# Patient Record
Sex: FEMALE | Race: WHITE | Employment: OTHER | URBAN - METROPOLITAN AREA
[De-identification: names, ages, dates, MRNs, and addresses within clinical notes are randomized per-mention and may not be internally consistent; named-entity substitution may affect disease eponyms.]

---

## 2017-01-03 RX ORDER — GABAPENTIN 100 MG/1
CAPSULE ORAL
Qty: 180 CAPSULE | Refills: 1 | Status: SHIPPED | OUTPATIENT
Start: 2017-01-03 | End: 2017-10-24

## 2017-01-03 NOTE — TELEPHONE ENCOUNTER
LOV: 11/1/16  Future office visit: 2/2/17   Last labs: 10/21/16 Cmp Lipid A1c   Last RX: 12/4/15 #180 #1 Refill  Per protocol: Route to Provider

## 2017-01-09 RX ORDER — GABAPENTIN 100 MG/1
CAPSULE ORAL
Qty: 180 CAPSULE | Refills: 1 | Status: SHIPPED | OUTPATIENT
Start: 2017-01-09 | End: 2017-02-02

## 2017-01-26 ENCOUNTER — LAB ENCOUNTER (OUTPATIENT)
Dept: LAB | Facility: HOSPITAL | Age: 82
End: 2017-01-26
Attending: INTERNAL MEDICINE
Payer: MEDICARE

## 2017-01-26 DIAGNOSIS — E11.40 TYPE 2 DIABETES MELLITUS WITH DIABETIC NEUROPATHY, WITHOUT LONG-TERM CURRENT USE OF INSULIN (HCC): ICD-10-CM

## 2017-01-26 DIAGNOSIS — I10 ESSENTIAL HYPERTENSION WITH GOAL BLOOD PRESSURE LESS THAN 130/85: ICD-10-CM

## 2017-01-26 DIAGNOSIS — E78.5 DYSLIPIDEMIA: ICD-10-CM

## 2017-01-26 LAB
ALBUMIN SERPL-MCNC: 3.5 G/DL (ref 3.5–4.8)
ALP LIVER SERPL-CCNC: 51 U/L (ref 55–142)
ALT SERPL-CCNC: 18 U/L (ref 14–54)
AST SERPL-CCNC: 12 U/L (ref 15–41)
BILIRUB SERPL-MCNC: 0.6 MG/DL (ref 0.1–2)
BUN BLD-MCNC: 8 MG/DL (ref 8–20)
CALCIUM BLD-MCNC: 8.9 MG/DL (ref 8.3–10.3)
CHLORIDE: 106 MMOL/L (ref 101–111)
CHOLEST SMN-MCNC: 148 MG/DL (ref ?–200)
CO2: 29 MMOL/L (ref 22–32)
CREAT BLD-MCNC: 0.82 MG/DL (ref 0.55–1.02)
EST. AVERAGE GLUCOSE BLD GHB EST-MCNC: 163 MG/DL (ref 68–126)
GLUCOSE BLD-MCNC: 144 MG/DL (ref 70–99)
HBA1C MFR BLD HPLC: 7.3 % (ref ?–5.7)
HDLC SERPL-MCNC: 47 MG/DL (ref 45–?)
HDLC SERPL: 3.15 {RATIO} (ref ?–4.44)
LDLC SERPL CALC-MCNC: 49 MG/DL (ref ?–130)
M PROTEIN MFR SERPL ELPH: 7.1 G/DL (ref 6.1–8.3)
NONHDLC SERPL-MCNC: 101 MG/DL (ref ?–130)
POTASSIUM SERPL-SCNC: 3.6 MMOL/L (ref 3.6–5.1)
SODIUM SERPL-SCNC: 142 MMOL/L (ref 136–144)
TRIGLYCERIDES: 260 MG/DL (ref ?–150)
VLDL: 52 MG/DL (ref 5–40)

## 2017-01-26 PROCEDURE — 80061 LIPID PANEL: CPT

## 2017-01-26 PROCEDURE — 83036 HEMOGLOBIN GLYCOSYLATED A1C: CPT

## 2017-01-26 PROCEDURE — 36415 COLL VENOUS BLD VENIPUNCTURE: CPT

## 2017-01-26 PROCEDURE — 80053 COMPREHEN METABOLIC PANEL: CPT

## 2017-01-30 RX ORDER — ATORVASTATIN CALCIUM 10 MG/1
TABLET, FILM COATED ORAL
Qty: 90 TABLET | Refills: 1 | Status: SHIPPED | OUTPATIENT
Start: 2017-01-30 | End: 2017-05-02

## 2017-02-02 ENCOUNTER — OFFICE VISIT (OUTPATIENT)
Dept: INTERNAL MEDICINE CLINIC | Facility: CLINIC | Age: 82
End: 2017-02-02

## 2017-02-02 VITALS
SYSTOLIC BLOOD PRESSURE: 110 MMHG | WEIGHT: 172.19 LBS | DIASTOLIC BLOOD PRESSURE: 66 MMHG | TEMPERATURE: 98 F | HEART RATE: 76 BPM | BODY MASS INDEX: 32 KG/M2 | RESPIRATION RATE: 16 BRPM

## 2017-02-02 DIAGNOSIS — I10 ESSENTIAL HYPERTENSION: ICD-10-CM

## 2017-02-02 DIAGNOSIS — R42 DIZZINESS: ICD-10-CM

## 2017-02-02 DIAGNOSIS — J30.89 ALLERGIC RHINITIS DUE TO OTHER ALLERGIC TRIGGER, UNSPECIFIED RHINITIS SEASONALITY: ICD-10-CM

## 2017-02-02 DIAGNOSIS — E11.42 DIABETIC POLYNEUROPATHY ASSOCIATED WITH TYPE 2 DIABETES MELLITUS (HCC): Primary | ICD-10-CM

## 2017-02-02 DIAGNOSIS — A63.0 GENITAL WARTS: ICD-10-CM

## 2017-02-02 LAB
BASOPHILS # BLD AUTO: 0.06 X10(3) UL (ref 0–0.1)
BASOPHILS NFR BLD AUTO: 0.8 %
EOSINOPHIL # BLD AUTO: 0.2 X10(3) UL (ref 0–0.3)
EOSINOPHIL NFR BLD AUTO: 2.8 %
ERYTHROCYTE [DISTWIDTH] IN BLOOD BY AUTOMATED COUNT: 13 % (ref 11.5–16)
HCT VFR BLD AUTO: 40 % (ref 34–50)
HGB BLD-MCNC: 13 G/DL (ref 12–16)
IMMATURE GRANULOCYTE COUNT: 0.02 X10(3) UL (ref 0–1)
IMMATURE GRANULOCYTE RATIO %: 0.3 %
LYMPHOCYTES # BLD AUTO: 1.62 X10(3) UL (ref 0.9–4)
LYMPHOCYTES NFR BLD AUTO: 22.9 %
MCH RBC QN AUTO: 29.3 PG (ref 27–33.2)
MCHC RBC AUTO-ENTMCNC: 32.5 G/DL (ref 31–37)
MCV RBC AUTO: 90.1 FL (ref 81–100)
MONOCYTES # BLD AUTO: 0.45 X10(3) UL (ref 0.1–0.6)
MONOCYTES NFR BLD AUTO: 6.4 %
NEUTROPHIL ABS PRELIM: 4.71 X10 (3) UL (ref 1.3–6.7)
NEUTROPHILS # BLD AUTO: 4.71 X10(3) UL (ref 1.3–6.7)
NEUTROPHILS NFR BLD AUTO: 66.8 %
PLATELET # BLD AUTO: 243 10(3)UL (ref 150–450)
RBC # BLD AUTO: 4.44 X10(6)UL (ref 3.8–5.1)
RED CELL DISTRIBUTION WIDTH-SD: 42.6 FL (ref 35.1–46.3)
WBC # BLD AUTO: 7.1 X10(3) UL (ref 4–13)

## 2017-02-02 PROCEDURE — 84439 ASSAY OF FREE THYROXINE: CPT | Performed by: INTERNAL MEDICINE

## 2017-02-02 PROCEDURE — 84443 ASSAY THYROID STIM HORMONE: CPT | Performed by: INTERNAL MEDICINE

## 2017-02-02 PROCEDURE — 99214 OFFICE O/P EST MOD 30 MIN: CPT | Performed by: INTERNAL MEDICINE

## 2017-02-02 PROCEDURE — 85025 COMPLETE CBC W/AUTO DIFF WBC: CPT | Performed by: INTERNAL MEDICINE

## 2017-02-02 RX ORDER — MONTELUKAST SODIUM 10 MG/1
10 TABLET ORAL DAILY
Qty: 30 TABLET | Refills: 3 | Status: SHIPPED | OUTPATIENT
Start: 2017-02-02 | End: 2017-04-20

## 2017-02-03 LAB
FREE T4: 1.2 NG/DL (ref 0.9–1.8)
TSI SER-ACNC: 1.22 MIU/ML (ref 0.35–5.5)

## 2017-02-17 ENCOUNTER — OFFICE VISIT (OUTPATIENT)
Dept: INTERNAL MEDICINE CLINIC | Facility: CLINIC | Age: 82
End: 2017-02-17

## 2017-02-17 VITALS
SYSTOLIC BLOOD PRESSURE: 90 MMHG | WEIGHT: 169 LBS | RESPIRATION RATE: 16 BRPM | TEMPERATURE: 98 F | BODY MASS INDEX: 31 KG/M2 | HEART RATE: 68 BPM | DIASTOLIC BLOOD PRESSURE: 68 MMHG

## 2017-02-17 DIAGNOSIS — G25.81 RESTLESS LEG SYNDROME: ICD-10-CM

## 2017-02-17 DIAGNOSIS — R40.0 DAYTIME SLEEPINESS: Primary | ICD-10-CM

## 2017-02-17 DIAGNOSIS — K21.9 GASTROESOPHAGEAL REFLUX DISEASE, ESOPHAGITIS PRESENCE NOT SPECIFIED: ICD-10-CM

## 2017-02-17 DIAGNOSIS — I10 ESSENTIAL HYPERTENSION: ICD-10-CM

## 2017-02-17 DIAGNOSIS — R03.1 LOW BLOOD PRESSURE READING: ICD-10-CM

## 2017-02-17 DIAGNOSIS — R06.83 SNORING: ICD-10-CM

## 2017-02-17 PROCEDURE — 99214 OFFICE O/P EST MOD 30 MIN: CPT | Performed by: INTERNAL MEDICINE

## 2017-02-17 RX ORDER — PRAMIPEXOLE DIHYDROCHLORIDE 0.25 MG/1
TABLET ORAL
Qty: 90 TABLET | Refills: 1 | Status: SHIPPED | OUTPATIENT
Start: 2017-02-17 | End: 2017-08-22

## 2017-02-17 NOTE — PROGRESS NOTES
Estefany Pereira is a 80year old female. Patient presents with: Follow - Up: 2 week follow up  Diabetes  Fatigue      HPI:     Patient here for f/u-  Headache, nausea resolved in 1-2 weeks, labs wnl. She too feels it was a virus.  Dizziness better but st GABAPENTIN 100 MG Oral Cap TAKE 1 CAPSULE (100 MG TOTAL) BY MOUTH 2 (TWO) TIMES DAILY. Disp: 180 capsule Rfl: 1   METFORMIN  MG Oral Tab TAKE 2 TABLETS (1,000 MG TOTAL) BY MOUTH 2 (TWO) TIMES DAILY.  Disp: 180 tablet Rfl: 1   TraMADol HCl 50 MG Ora Arrhythmia    • Neuropathy ([facility] [address]    • Leaky heart valve      slight per pt- had echo 12/2015   • Disorder of thyroid      has Thyroid nodules   • Cancer [facility])      DCIS right breast   • Hearing impairment      bilateral hearing aid   • Problems with swallo rate without respiratory distress, lungs clear to auscultation  CARDIO: RRR nl S1 S2  GI: normal bowel sounds, soft, NT/ND  EXTREMITIES: no cyanosis, clubbing or edema  NEURO: Alert and oriented, no focal deficits    ASSESSMENT AND PLAN:   Daytime sleepine

## 2017-03-03 ENCOUNTER — LAB SERVICES (OUTPATIENT)
Dept: OTHER | Age: 82
End: 2017-03-03

## 2017-03-03 ENCOUNTER — CHARTING TRANS (OUTPATIENT)
Dept: URGENT CARE | Age: 82
End: 2017-03-03

## 2017-03-03 LAB
BILIRUBIN URINE: NEGATIVE
BLOOD URINE: NEGATIVE
CLARITY: ABNORMAL
COLOR: YELLOW
GLUCOSE QUALITATIVE U: NEGATIVE
KETONES, URINE: NEGATIVE
LEUKOCYTE ESTERASE URINE: ABNORMAL
NITRITE URINE: POSITIVE
PH URINE: 5 (ref 5–7)
SPECIFIC GRAVITY URINE: 1.02 (ref 1–1.03)
URINE PROTEIN, QUAL (DIPSTICK): NEGATIVE
UROBILINOGEN URINE: 0.2

## 2017-03-03 ASSESSMENT — PAIN SCALES - GENERAL: PAINLEVEL_OUTOF10: 0

## 2017-03-06 LAB — FINAL REPORT: ABNORMAL

## 2017-04-04 ENCOUNTER — HOSPITAL ENCOUNTER (OUTPATIENT)
Dept: MAMMOGRAPHY | Facility: HOSPITAL | Age: 82
Discharge: HOME | End: 2017-04-04
Attending: SURGERY
Payer: MEDICARE

## 2017-04-04 DIAGNOSIS — D05.11 DUCTAL CARCINOMA IN SITU (DCIS) OF RIGHT BREAST: ICD-10-CM

## 2017-04-04 PROCEDURE — 77062 BREAST TOMOSYNTHESIS BI: CPT

## 2017-04-04 PROCEDURE — 77066 DX MAMMO INCL CAD BI: CPT

## 2017-04-05 ENCOUNTER — TELEPHONE (OUTPATIENT)
Dept: SURGERY | Facility: CLINIC | Age: 82
End: 2017-04-05

## 2017-04-05 NOTE — TELEPHONE ENCOUNTER
Name & date of birth verified. Pt notified that Dr. Higgins Me sees no concerns on her imaging. Dr. Higgins Me feels comfortable monitoring with annual clinical exam and to plan for further imaging only if her exam changes.  Her next exam would be due with me in [facility]

## 2017-04-10 RX ORDER — LOSARTAN POTASSIUM 100 MG/1
TABLET ORAL
Qty: 90 TABLET | Refills: 3 | Status: SHIPPED | OUTPATIENT
Start: 2017-04-10 | End: 2017-05-02

## 2017-04-20 ENCOUNTER — OFFICE VISIT (OUTPATIENT)
Dept: OBGYN CLINIC | Facility: CLINIC | Age: 82
End: 2017-04-20

## 2017-04-20 VITALS
SYSTOLIC BLOOD PRESSURE: 122 MMHG | HEIGHT: 61.5 IN | BODY MASS INDEX: 31.31 KG/M2 | HEART RATE: 81 BPM | WEIGHT: 168 LBS | DIASTOLIC BLOOD PRESSURE: 68 MMHG

## 2017-04-20 DIAGNOSIS — N90.7 SEBACEOUS CYST OF LABIA: Primary | ICD-10-CM

## 2017-04-20 PROCEDURE — 99203 OFFICE O/P NEW LOW 30 MIN: CPT | Performed by: OBSTETRICS & GYNECOLOGY

## 2017-04-20 RX ORDER — CIPROFLOXACIN 250 MG/1
TABLET, FILM COATED ORAL
Refills: 0 | COMMUNITY
Start: 2017-03-03 | End: 2017-04-20 | Stop reason: ALTCHOICE

## 2017-04-20 NOTE — PATIENT INSTRUCTIONS
Understanding Epidermoid Cyst    An epidermoid cyst is a small abnormal growth in the top layers of the skin. It is filled with keratin, the same proteins that make up your hair and nails. These cysts grow slowly. They can grow anywhere on the body.  But © 8558-6414 [address], [address]. All rights reserved. This information is not intended as a substitute for professional medical care. Always follow your healthcare professional's instructions.

## 2017-04-20 NOTE — PROGRESS NOTES
Zenobia Suero is a 80year old female. HPI:   Patient presents with:  Vaginal Problem: genital warts      States \"for years\" she has had warts of vulva and vagina, sometimes prurituc. Also notes skin tags in inguinal folds.  She also notes that for Right 7/1/16    Comment wire localized partial mastectomy    NIYAH NEEDLE LOCALIZATION W/ SPECIMEN 1 SITE RIGHT      Comment 1980    NIYAH NEEDLE LOCALIZATION W/ SPECIMEN 1 SITE RIGHT Right 7/2016    Comment DCIS      Family History   Problem Relation Age of O Take 1 tablet by mouth 2 (two) times daily. Disp:  Rfl:    Loperamide HCl (IMODIUM) 2 MG Oral Cap Take 2 mg by mouth daily as needed for Diarrhea. Disp:  Rfl:    Ferrous Sulfate 324 (65 FE) MG Oral Tab EC Take by mouth 2 (two) times daily.    Disp:  Rfl:

## 2017-05-02 ENCOUNTER — OFFICE VISIT (OUTPATIENT)
Dept: INTERNAL MEDICINE CLINIC | Facility: CLINIC | Age: 82
End: 2017-05-02

## 2017-05-02 VITALS
HEART RATE: 64 BPM | WEIGHT: 174.63 LBS | RESPIRATION RATE: 16 BRPM | TEMPERATURE: 98 F | BODY MASS INDEX: 32.55 KG/M2 | OXYGEN SATURATION: 97 % | DIASTOLIC BLOOD PRESSURE: 62 MMHG | HEIGHT: 61.5 IN | SYSTOLIC BLOOD PRESSURE: 110 MMHG

## 2017-05-02 DIAGNOSIS — G25.81 RESTLESS LEG SYNDROME: ICD-10-CM

## 2017-05-02 DIAGNOSIS — E11.42 DIABETIC POLYNEUROPATHY ASSOCIATED WITH TYPE 2 DIABETES MELLITUS (HCC): ICD-10-CM

## 2017-05-02 DIAGNOSIS — E78.2 MIXED DYSLIPIDEMIA: ICD-10-CM

## 2017-05-02 DIAGNOSIS — D05.11 DUCTAL CARCINOMA IN SITU (DCIS) OF RIGHT BREAST: ICD-10-CM

## 2017-05-02 DIAGNOSIS — Z12.11 COLON CANCER SCREENING: ICD-10-CM

## 2017-05-02 DIAGNOSIS — R42 DIZZINESS: ICD-10-CM

## 2017-05-02 DIAGNOSIS — H35.3290: ICD-10-CM

## 2017-05-02 DIAGNOSIS — Z00.00 WELLNESS EXAMINATION: Primary | ICD-10-CM

## 2017-05-02 DIAGNOSIS — I10 ESSENTIAL HYPERTENSION: ICD-10-CM

## 2017-05-02 DIAGNOSIS — K21.9 GASTROESOPHAGEAL REFLUX DISEASE, ESOPHAGITIS PRESENCE NOT SPECIFIED: ICD-10-CM

## 2017-05-02 PROCEDURE — 93000 ELECTROCARDIOGRAM COMPLETE: CPT | Performed by: INTERNAL MEDICINE

## 2017-05-02 PROCEDURE — G0439 PPPS, SUBSEQ VISIT: HCPCS | Performed by: INTERNAL MEDICINE

## 2017-05-02 RX ORDER — LOSARTAN POTASSIUM 100 MG/1
50 TABLET ORAL DAILY
Qty: 10 TABLET | Refills: 0 | COMMUNITY
Start: 2017-05-02 | End: 2018-03-06

## 2017-05-02 NOTE — PROGRESS NOTES
Zenobia Suero is a 80year old female who presents for a Medicare Annual Wellness visit.     RLS - controlled with medication  DM2 - latest HGBA1C 7.3, compliant with metformin, +neuropathy in legs with pain and occasional numbness,  neurontin helping he GABAPENTIN 100 MG Oral Cap TAKE 1 CAPSULE (100 MG TOTAL) BY MOUTH 2 (TWO) TIMES DAILY. (Patient taking differently: TAKE 1 CAPSULE (100 MG TOTAL) BY MOUTH DAILY. ) Disp: 180 capsule Rfl: 1   METFORMIN  MG Oral Tab TAKE 2 TABLETS (1,000 MG TOTAL) BY 17 07/17/2016       Lab Results  Component Value Date   ALT 18 01/26/2017   ALT 16 10/21/2016   ALT 22 07/17/2016       Lab Results  Component Value Date   TSH 1.220 02/02/2017   TSH 1.740 12/17/2015       Lab Results  Component Value Date   BUN 8 01/26/20 tripping hazards?: 0-No    Are you on multiple medications?: 1-Yes    Does pain affect your day to day activities?: 1-Yes (arthritis)     Have you had any memory issues?: 0-No    Fall/Risk Scorin    Scoring Interpretation: 4+ At Risk     Depression Scr Annually y    Bone Density Screening      Dexascan Every two years Last Dexa Scan:   XR DEXA BONE DENSITOMETRY (CPT=77080) 04/27/2016    No flowsheet data found.     Pap and Pelvic      Pap: Every 3 yrs age 21-65 or Pap+HPV every 5 yrs age 33-67, age 72 and found.     Dilated Eye exam  Annually Data entered on: 3/24/2016   Last Dilated Eye Exam 3/24/2016     No flowsheet data found. COPD      Spirometry Testing Annually No results found for this or any previous visit. No flowsheet data found.         ALLERG 3 (three) times daily as needed.  Disp: 30 tablet Rfl: 0      MEDICAL INFORMATION:   Past Medical History   Diagnosis Date   • [facility]    • Esophageal reflux    • Hyperlipidemia      not taking lipitor due to side effects   • Restless leg syndrome Grandfather    • Diabetes Paternal Grandmother    • Breast Cancer Paternal Aunt    • Breast Cancer Self 80     DCIS      SOCIAL HISTORY:     Smoking Status: Former Smoker                   Packs/Day: 1.00  Years: 28        Quit date: 08/27/1988    [place] Cynthia Carey is a 80year old female who presents for a Medicare Assessment. PLAN SUMMARY:   Diagnoses and all orders for this visit:    Wellness examination- pt believes she had pneumovax at [facility] last year so declined today.  She will check her r

## 2017-06-29 ENCOUNTER — APPOINTMENT (OUTPATIENT)
Dept: LAB | Facility: HOSPITAL | Age: 82
End: 2017-06-29
Attending: INTERNAL MEDICINE
Payer: MEDICARE

## 2017-06-29 DIAGNOSIS — I10 ESSENTIAL HYPERTENSION: ICD-10-CM

## 2017-06-29 DIAGNOSIS — E78.2 MIXED DYSLIPIDEMIA: ICD-10-CM

## 2017-06-29 DIAGNOSIS — E11.42 DIABETIC POLYNEUROPATHY ASSOCIATED WITH TYPE 2 DIABETES MELLITUS (HCC): ICD-10-CM

## 2017-06-29 LAB
ALBUMIN SERPL-MCNC: 3.5 G/DL (ref 3.5–4.8)
ALP LIVER SERPL-CCNC: 53 U/L (ref 55–142)
ALT SERPL-CCNC: 37 U/L (ref 14–54)
AST SERPL-CCNC: 26 U/L (ref 15–41)
BILIRUB SERPL-MCNC: 0.6 MG/DL (ref 0.1–2)
BUN BLD-MCNC: 9 MG/DL (ref 8–20)
CALCIUM BLD-MCNC: 9.2 MG/DL (ref 8.3–10.3)
CHLORIDE: 107 MMOL/L (ref 101–111)
CO2: 24 MMOL/L (ref 22–32)
CREAT BLD-MCNC: 0.89 MG/DL (ref 0.55–1.02)
CREAT UR-SCNC: 152 MG/DL
EST. AVERAGE GLUCOSE BLD GHB EST-MCNC: 177 MG/DL (ref 68–126)
GLUCOSE BLD-MCNC: 145 MG/DL (ref 70–99)
HBA1C MFR BLD HPLC: 7.8 % (ref ?–5.7)
M PROTEIN MFR SERPL ELPH: 7.2 G/DL (ref 6.1–8.3)
MICROALBUMIN UR-MCNC: 0.81 MG/DL
MICROALBUMIN/CREAT 24H UR-RTO: 5.3 UG/MG (ref ?–30)
POTASSIUM SERPL-SCNC: 3.8 MMOL/L (ref 3.6–5.1)
SODIUM SERPL-SCNC: 140 MMOL/L (ref 136–144)

## 2017-06-29 PROCEDURE — 36415 COLL VENOUS BLD VENIPUNCTURE: CPT

## 2017-06-29 PROCEDURE — 82570 ASSAY OF URINE CREATININE: CPT

## 2017-06-29 PROCEDURE — 83036 HEMOGLOBIN GLYCOSYLATED A1C: CPT

## 2017-06-29 PROCEDURE — 80053 COMPREHEN METABOLIC PANEL: CPT

## 2017-06-29 PROCEDURE — 82043 UR ALBUMIN QUANTITATIVE: CPT

## 2017-07-29 DIAGNOSIS — N39.0 URINARY TRACT INFECTION WITHOUT HEMATURIA, SITE UNSPECIFIED: ICD-10-CM

## 2017-07-29 DIAGNOSIS — B37.2 CANDIDIASIS, CUTANEOUS: ICD-10-CM

## 2017-07-29 DIAGNOSIS — R30.0 DYSURIA: ICD-10-CM

## 2017-07-31 RX ORDER — OMEPRAZOLE 40 MG/1
40 CAPSULE, DELAYED RELEASE ORAL 2 TIMES DAILY
Qty: 180 CAPSULE | Refills: 1 | Status: SHIPPED | OUTPATIENT
Start: 2017-07-31 | End: 2018-01-23

## 2017-08-14 DIAGNOSIS — K64.9 HEMORRHOIDS, UNSPECIFIED HEMORRHOID TYPE: ICD-10-CM

## 2017-08-22 DIAGNOSIS — G25.81 RESTLESS LEG SYNDROME: ICD-10-CM

## 2017-08-22 RX ORDER — PRAMIPEXOLE DIHYDROCHLORIDE 0.25 MG/1
TABLET ORAL
Qty: 90 TABLET | Refills: 0 | Status: SHIPPED | OUTPATIENT
Start: 2017-08-22 | End: 2017-11-13

## 2017-08-22 NOTE — TELEPHONE ENCOUNTER
Medication(s) to Refill:   Pending Prescriptions Disp Refills    PRAMIPEXOLE DIHYDROCHLORIDE 0.25 MG Oral Tab [Pharmacy Med Name: Pramipexole Dihydrochloride Oral Tablet 0.25 MG] 90 tablet 0     Sig: TAKE 1 TABLET (0.25 MG TOTAL) BY MOUTH NIGHTLY.

## 2017-10-24 ENCOUNTER — OFFICE VISIT (OUTPATIENT)
Dept: SURGERY | Facility: CLINIC | Age: 82
End: 2017-10-24

## 2017-10-24 VITALS
TEMPERATURE: 98 F | BODY MASS INDEX: 32 KG/M2 | SYSTOLIC BLOOD PRESSURE: 134 MMHG | WEIGHT: 172.38 LBS | RESPIRATION RATE: 20 BRPM | DIASTOLIC BLOOD PRESSURE: 72 MMHG | HEART RATE: 73 BPM

## 2017-10-24 DIAGNOSIS — D05.11 DUCTAL CARCINOMA IN SITU (DCIS) OF RIGHT BREAST: Primary | ICD-10-CM

## 2017-10-24 PROCEDURE — 99214 OFFICE O/P EST MOD 30 MIN: CPT | Performed by: SURGERY

## 2017-10-25 NOTE — PROGRESS NOTES
Breast Surgery Follow-Up Visit    Diagnosis: DCIS, right breast; ER positive, MO negative status post Lumpectomy on July 1, 2016. Stage: TisNxMx (Stage 0).     Disease Status:  Surgical treatment complete, Endocrine therapy deferred and Radiation deferre Macular degeneration    • Macular degeneration    • Neuropathy    • Osteoarthritis     all joints   • Pneumonia, organism unspecified(486)     04/2015   • PONV (postoperative nausea and vomiting)    • Problems with swallowing     due hiatal hernia; gaggs a 10 tablet Rfl: 0   atorvastatin 10 MG Oral Tab TAKE 1 TABLET (10 MG TOTAL) BY MOUTH NIGHTLY. Disp: 90 tablet Rfl: 1   CALCIUM-VITAMIN D OR Take 1,200 mg by mouth daily.    Disp:  Rfl:    Multiple Vitamins-Minerals (PRESERVISION AREDS) Oral Tab Take 1 tablet glaucoma, yellowing of the eyes, change in vision or color blindness. The patient denies hearing loss, ringing in the ears, ear drainage, earaches, nasal congestion, nose bleeds, snoring, pain in mouth/throat, hoarseness, change in voice, facial trauma. hands/feet. There is no history of abusive relationship, bipolar disorder, sleep disturbance, anxiety, depression or feeling of despair. Endocrine:     There is no history of poor/slow wound healing, weight loss/gain, fertility or hormone problems, cold physical therapy may be warranted in the future if she identifies any limitations or restrictions. She was encouraged to contact the office with any questions or concerns prior to her next scheduled appointment.      This encounter lasted a total of 25 mahad

## 2017-11-13 DIAGNOSIS — G25.81 RESTLESS LEG SYNDROME: ICD-10-CM

## 2017-11-13 RX ORDER — PRAMIPEXOLE DIHYDROCHLORIDE 0.25 MG/1
TABLET ORAL
Qty: 90 TABLET | Refills: 0 | Status: SHIPPED | OUTPATIENT
Start: 2017-11-13 | End: 2018-02-08

## 2017-11-20 DIAGNOSIS — E78.5 DYSLIPIDEMIA: ICD-10-CM

## 2017-11-20 RX ORDER — OMEGA-3-ACID ETHYL ESTERS 1 G/1
1 CAPSULE, LIQUID FILLED ORAL 2 TIMES DAILY
Qty: 60 CAPSULE | Refills: 3 | Status: SHIPPED | OUTPATIENT
Start: 2017-11-20 | End: 2017-12-20

## 2017-11-21 ENCOUNTER — OFFICE VISIT (OUTPATIENT)
Dept: FAMILY MEDICINE CLINIC | Facility: CLINIC | Age: 82
End: 2017-11-21

## 2017-11-21 VITALS
BODY MASS INDEX: 32.06 KG/M2 | HEIGHT: 61.5 IN | WEIGHT: 172 LBS | OXYGEN SATURATION: 99 % | RESPIRATION RATE: 16 BRPM | HEART RATE: 82 BPM | SYSTOLIC BLOOD PRESSURE: 136 MMHG | TEMPERATURE: 98 F | DIASTOLIC BLOOD PRESSURE: 84 MMHG

## 2017-11-21 DIAGNOSIS — N30.01 ACUTE CYSTITIS WITH HEMATURIA: Primary | ICD-10-CM

## 2017-11-21 PROCEDURE — 99213 OFFICE O/P EST LOW 20 MIN: CPT | Performed by: NURSE PRACTITIONER

## 2017-11-21 PROCEDURE — 87186 SC STD MICRODIL/AGAR DIL: CPT | Performed by: NURSE PRACTITIONER

## 2017-11-21 PROCEDURE — 87086 URINE CULTURE/COLONY COUNT: CPT | Performed by: NURSE PRACTITIONER

## 2017-11-21 PROCEDURE — 87088 URINE BACTERIA CULTURE: CPT | Performed by: NURSE PRACTITIONER

## 2017-11-21 PROCEDURE — 81003 URINALYSIS AUTO W/O SCOPE: CPT | Performed by: NURSE PRACTITIONER

## 2017-11-21 RX ORDER — NITROFURANTOIN 25; 75 MG/1; MG/1
CAPSULE ORAL
Qty: 14 CAPSULE | Refills: 0 | Status: SHIPPED | OUTPATIENT
Start: 2017-11-21 | End: 2017-12-01 | Stop reason: ALTCHOICE

## 2017-11-21 NOTE — PROGRESS NOTES
CHIEF COMPLAINT:   Patient presents with:  Urinary Symptoms (urologic): burning during urination, frequency to use restroom, difficulty urinating x3 days       HPI:   Cynthia Carey is a 80year old female who presents with symptoms of UTI.  Complaining atorvastatin 10 MG Oral Tab TAKE 1 TABLET (10 MG TOTAL) BY MOUTH NIGHTLY. Disp: 90 tablet Rfl: 1   Meclizine HCl 25 MG Oral Tab Take 1 tablet (25 mg total) by mouth 3 (three) times daily as needed.  Disp: 30 tablet Rfl: 0      Past Medical History:   Thersia Pro LUNGS: clear to ausculation bilaterally, no wheezing or rhonchi  :  No CVAT     SEE URINE DIPSTICK RESULTS    ASSESSMENT AND PLAN:   Justin Enciso is a 80year old female presents with UTI symptoms.     ASSESSMENT:  Uti symptoms  (primary encounter dennis · The ureters carry urine from the kidneys to the bladder. · The bladder holds urine until you are ready to let it out. · The urethra carries urine from the bladder out of the body.  It is shorter in women, so bacteria can move through it more easily. The

## 2017-11-24 ENCOUNTER — TELEPHONE (OUTPATIENT)
Dept: FAMILY MEDICINE CLINIC | Facility: CLINIC | Age: 82
End: 2017-11-24

## 2017-11-24 RX ORDER — CEPHALEXIN 500 MG/1
500 CAPSULE ORAL 2 TIMES DAILY
Qty: 6 CAPSULE | Refills: 0 | Status: SHIPPED | OUTPATIENT
Start: 2017-11-24 | End: 2017-12-01 | Stop reason: ALTCHOICE

## 2017-11-24 NOTE — TELEPHONE ENCOUNTER
Spoke with patient. Advised that the Urine Culture confirmed the UTI and that it should be responding to the Notrofurantoin which was prescribed at the time of the visit.    Patient states that she is feeling much better, but that she developed itching yes

## 2017-12-01 ENCOUNTER — OFFICE VISIT (OUTPATIENT)
Dept: INTERNAL MEDICINE CLINIC | Facility: CLINIC | Age: 82
End: 2017-12-01

## 2017-12-01 ENCOUNTER — LAB ENCOUNTER (OUTPATIENT)
Dept: LAB | Age: 82
End: 2017-12-01
Attending: INTERNAL MEDICINE
Payer: MEDICARE

## 2017-12-01 VITALS
HEART RATE: 65 BPM | OXYGEN SATURATION: 99 % | DIASTOLIC BLOOD PRESSURE: 60 MMHG | RESPIRATION RATE: 16 BRPM | BODY MASS INDEX: 32.69 KG/M2 | WEIGHT: 173.13 LBS | HEIGHT: 61 IN | TEMPERATURE: 98 F | SYSTOLIC BLOOD PRESSURE: 130 MMHG

## 2017-12-01 DIAGNOSIS — K52.1 DRUG-INDUCED DIARRHEA: ICD-10-CM

## 2017-12-01 DIAGNOSIS — E11.42 DIABETIC POLYNEUROPATHY ASSOCIATED WITH TYPE 2 DIABETES MELLITUS (HCC): ICD-10-CM

## 2017-12-01 DIAGNOSIS — E11.42 DIABETIC POLYNEUROPATHY ASSOCIATED WITH TYPE 2 DIABETES MELLITUS (HCC): Primary | ICD-10-CM

## 2017-12-01 DIAGNOSIS — Z91.09 ENVIRONMENTAL ALLERGIES: ICD-10-CM

## 2017-12-01 DIAGNOSIS — N39.0 RECURRENT UTI: ICD-10-CM

## 2017-12-01 DIAGNOSIS — M19.042 PRIMARY OSTEOARTHRITIS OF BOTH HANDS: ICD-10-CM

## 2017-12-01 DIAGNOSIS — M19.041 PRIMARY OSTEOARTHRITIS OF BOTH HANDS: ICD-10-CM

## 2017-12-01 PROCEDURE — 81003 URINALYSIS AUTO W/O SCOPE: CPT | Performed by: INTERNAL MEDICINE

## 2017-12-01 PROCEDURE — 83036 HEMOGLOBIN GLYCOSYLATED A1C: CPT

## 2017-12-01 PROCEDURE — 87086 URINE CULTURE/COLONY COUNT: CPT | Performed by: INTERNAL MEDICINE

## 2017-12-01 PROCEDURE — 99214 OFFICE O/P EST MOD 30 MIN: CPT | Performed by: INTERNAL MEDICINE

## 2017-12-01 PROCEDURE — 80053 COMPREHEN METABOLIC PANEL: CPT

## 2017-12-01 NOTE — PROGRESS NOTES
Gorge Bonilla    is a 80year old female. Patient presents with:  Diabetic Flu  Finger Pain  Allergies      HPI:     Patient here for f/u  DM2 - compliant with metformin but getting too much diarrhea with it, up to 4 times a day.  On 1000 bid currently, Oral Capsule Delayed Release TAKE 1 CAPSULE (40 MG TOTAL) BY MOUTH 2 (TWO) TIMES DAILY. Disp: 180 capsule Rfl: 1   losartan 100 MG Oral Tab Take 0.5 tablets (50 mg total) by mouth daily.  Disp: 10 tablet Rfl: 0   atorvastatin 10 MG Oral Tab TAKE 1 TABLET (1 Vertigo       Social History:  Smoking status: Former Smoker                                                              Packs/day: 1.00      Years: 35.00        Quit date: 8/27/1988  Smokeless tobacco: Never Used                      Alcohol use:  No metformin  Diabetic polyneuropathy associated with type 2 diabetes mellitus (hcc)- check hgba1c today, goal for her is less than 8.  Reduce metformin as above and add januvia 50mg daily  Environmental allergies- start otc claritin or zyrtec  Primary osteoar

## 2018-01-16 PROBLEM — M19.042 ARTHRITIS OF BOTH HANDS: Status: ACTIVE | Noted: 2018-01-16

## 2018-01-16 PROBLEM — G56.03 BILATERAL CARPAL TUNNEL SYNDROME: Status: ACTIVE | Noted: 2018-01-16

## 2018-01-16 PROBLEM — M19.041 ARTHRITIS OF BOTH HANDS: Status: ACTIVE | Noted: 2018-01-16

## 2018-01-23 DIAGNOSIS — N39.0 URINARY TRACT INFECTION WITHOUT HEMATURIA, SITE UNSPECIFIED: ICD-10-CM

## 2018-01-23 DIAGNOSIS — R30.0 DYSURIA: ICD-10-CM

## 2018-01-23 DIAGNOSIS — B37.2 CANDIDIASIS, CUTANEOUS: ICD-10-CM

## 2018-01-23 RX ORDER — OMEPRAZOLE 40 MG/1
CAPSULE, DELAYED RELEASE ORAL
Qty: 180 CAPSULE | Refills: 0 | Status: SHIPPED | OUTPATIENT
Start: 2018-01-23 | End: 2018-07-19

## 2018-01-23 NOTE — TELEPHONE ENCOUNTER
Medication(s) to Refill:   Pending Prescriptions Disp Refills    OMEPRAZOLE 40 MG Oral Capsule Delayed Release [Pharmacy Med Name: Omeprazole Oral Capsule Delayed Release 40 MG] 180 capsule 0     Sig: TAKE ONE CAPSULE BY MOUTH TWICE DAILY            Last T

## 2018-02-08 DIAGNOSIS — G25.81 RESTLESS LEG SYNDROME: ICD-10-CM

## 2018-02-08 RX ORDER — PRAMIPEXOLE DIHYDROCHLORIDE 0.25 MG/1
TABLET ORAL
Qty: 90 TABLET | Refills: 0 | Status: SHIPPED | OUTPATIENT
Start: 2018-02-08 | End: 2018-05-04

## 2018-02-08 NOTE — TELEPHONE ENCOUNTER
LOV: 12/1/17  Future office visit: No upcoming visit   Last labs: 12/1/17 A1C Cmp   Last RX: 11/13/17 #90 No Refills  Per protocol: Route to provider

## 2018-02-20 ENCOUNTER — LAB ENCOUNTER (OUTPATIENT)
Dept: LAB | Facility: HOSPITAL | Age: 83
End: 2018-02-20
Attending: INTERNAL MEDICINE
Payer: MEDICARE

## 2018-02-20 DIAGNOSIS — Z12.11 SCREENING FOR COLON CANCER: Primary | ICD-10-CM

## 2018-02-20 PROCEDURE — 82272 OCCULT BLD FECES 1-3 TESTS: CPT

## 2018-02-20 PROCEDURE — 82270 OCCULT BLOOD FECES: CPT

## 2018-03-02 ENCOUNTER — TELEPHONE (OUTPATIENT)
Dept: INTERNAL MEDICINE CLINIC | Facility: CLINIC | Age: 83
End: 2018-03-02

## 2018-03-02 NOTE — TELEPHONE ENCOUNTER
Pt scheduling [id number] at time of call. Is asking if she is due for her A1C and thyroid check? and does she a routine X-Ray of her chest? Pls let Pt know. Pt did send in 3 day stool test, have we received the results?

## 2018-03-02 NOTE — TELEPHONE ENCOUNTER
FOV 5/4/18    Last A1c 12/1/17  TSH+T4 2/2/17  CT chest 5/31/16  No FIT results    Ok to await to [facility] or would you like labs/imaging prior to OV? Please advise. Thank you.

## 2018-03-02 NOTE — TELEPHONE ENCOUNTER
Called pt to inform, per TB, will see pt first before imaging/tests are ordered. Pt verbalized understanding and agreed with POC.

## 2018-03-06 DIAGNOSIS — I10 ESSENTIAL HYPERTENSION: ICD-10-CM

## 2018-03-06 DIAGNOSIS — R42 DIZZINESS: ICD-10-CM

## 2018-03-06 RX ORDER — LOSARTAN POTASSIUM 100 MG/1
TABLET ORAL
Qty: 90 TABLET | Refills: 2 | Status: SHIPPED | OUTPATIENT
Start: 2018-03-06 | End: 2018-08-24

## 2018-04-18 ENCOUNTER — PATIENT OUTREACH (OUTPATIENT)
Dept: CASE MANAGEMENT | Age: 83
End: 2018-04-18

## 2018-04-23 ENCOUNTER — PATIENT OUTREACH (OUTPATIENT)
Dept: CASE MANAGEMENT | Age: 83
End: 2018-04-23

## 2018-04-23 NOTE — PROGRESS NOTES
Contacted patient to introduce my self as her new chronic care manager.  Patient asked to get a call back on Wednesday at 1pm    Time Spent This Encounter Total: 5 min medical record review, telephone communication, care plan updates where needed, and educa

## 2018-04-24 ENCOUNTER — OFFICE VISIT (OUTPATIENT)
Dept: SURGERY | Facility: CLINIC | Age: 83
End: 2018-04-24

## 2018-04-24 VITALS
BODY MASS INDEX: 33.04 KG/M2 | HEART RATE: 93 BPM | HEIGHT: 61 IN | OXYGEN SATURATION: 97 % | RESPIRATION RATE: 20 BRPM | DIASTOLIC BLOOD PRESSURE: 77 MMHG | SYSTOLIC BLOOD PRESSURE: 138 MMHG | WEIGHT: 175 LBS

## 2018-04-24 DIAGNOSIS — D05.11 DUCTAL CARCINOMA IN SITU (DCIS) OF RIGHT BREAST: Primary | ICD-10-CM

## 2018-04-24 PROCEDURE — 99214 OFFICE O/P EST MOD 30 MIN: CPT | Performed by: SURGERY

## 2018-04-24 NOTE — PROGRESS NOTES
Attempted to contact patient multiple times today without success    Time Spent This Encounter Total: 2 min medical record review, telephone communication, care plan updates where needed, and education.  Provided acknowledgment and validation to patient's c

## 2018-04-27 NOTE — PROGRESS NOTES
No response letter mailed. Time Spent This Encounter Total: 2 min medical record review, telephone communication, care plan updates where needed, and education. Provided acknowledgment and validation to patient's concerns.      Monthly Minute Total inclu

## 2018-05-01 ENCOUNTER — PATIENT OUTREACH (OUTPATIENT)
Dept: CASE MANAGEMENT | Age: 83
End: 2018-05-01

## 2018-05-01 DIAGNOSIS — E78.2 MIXED DYSLIPIDEMIA: ICD-10-CM

## 2018-05-01 DIAGNOSIS — I10 ESSENTIAL HYPERTENSION: ICD-10-CM

## 2018-05-01 DIAGNOSIS — Z91.81 AT RISK FOR FALLING: ICD-10-CM

## 2018-05-01 DIAGNOSIS — E11.42 DIABETIC POLYNEUROPATHY ASSOCIATED WITH TYPE 2 DIABETES MELLITUS (HCC): ICD-10-CM

## 2018-05-01 DIAGNOSIS — M19.042 ARTHRITIS OF BOTH HANDS: ICD-10-CM

## 2018-05-01 DIAGNOSIS — G56.03 BILATERAL CARPAL TUNNEL SYNDROME: ICD-10-CM

## 2018-05-01 DIAGNOSIS — M19.041 ARTHRITIS OF BOTH HANDS: ICD-10-CM

## 2018-05-01 DIAGNOSIS — G25.81 RESTLESS LEG SYNDROME: ICD-10-CM

## 2018-05-01 PROCEDURE — 99490 CHRNC CARE MGMT STAFF 1ST 20: CPT

## 2018-05-01 NOTE — PROGRESS NOTES
Spoke to [place] at [facility] about CCM, HIPAA verified, current care plan and performed CCM Initial . Reviewed pt Patient Active Problem List:     Diabetic polyneuropathy associated with type 2 diabetes mellitus ([facility] [address]     Gastroesophageal reflux disease Sleep apnea- uses breathing strips to help her sleep. Has order for sleep study but is concerned about not being able to fat a sleeps. She may only sleep on her side. Restless leg syndrome- Keeps her up at night it takes her two hours to fall a sleep. What would you say is your biggest concerns about your health? Hydration and schedule appt for hand therapy. She will start by drinking 2 cups of water a day. She currently only drinks Coffee, 1 can of soda and 1 cup of lemonade.  She will write a not [employer]

## 2018-05-04 DIAGNOSIS — G25.81 RESTLESS LEG SYNDROME: ICD-10-CM

## 2018-05-04 RX ORDER — PRAMIPEXOLE DIHYDROCHLORIDE 0.25 MG/1
TABLET ORAL
Qty: 90 TABLET | Refills: 0 | Status: SHIPPED | OUTPATIENT
Start: 2018-05-04 | End: 2018-07-31

## 2018-05-04 NOTE — TELEPHONE ENCOUNTER
LOV: 12/1/17 TB  Future office visit: 5/23/18   Last labs: 12/1/17 Cmp A1C   Last RX: Pramipexole 0.25mg 2/8/18   Per protocol: Route to provider

## 2018-05-23 ENCOUNTER — OFFICE VISIT (OUTPATIENT)
Dept: INTERNAL MEDICINE CLINIC | Facility: CLINIC | Age: 83
End: 2018-05-23

## 2018-05-23 VITALS
WEIGHT: 172 LBS | RESPIRATION RATE: 16 BRPM | HEART RATE: 78 BPM | SYSTOLIC BLOOD PRESSURE: 110 MMHG | TEMPERATURE: 98 F | BODY MASS INDEX: 32.47 KG/M2 | OXYGEN SATURATION: 97 % | DIASTOLIC BLOOD PRESSURE: 60 MMHG | HEIGHT: 61 IN

## 2018-05-23 DIAGNOSIS — R53.83 OTHER FATIGUE: ICD-10-CM

## 2018-05-23 DIAGNOSIS — Z85.3 HISTORY OF BREAST CANCER: ICD-10-CM

## 2018-05-23 DIAGNOSIS — E78.00 PURE HYPERCHOLESTEROLEMIA: ICD-10-CM

## 2018-05-23 DIAGNOSIS — Z00.00 WELLNESS EXAMINATION: Primary | ICD-10-CM

## 2018-05-23 DIAGNOSIS — I10 ESSENTIAL HYPERTENSION: ICD-10-CM

## 2018-05-23 DIAGNOSIS — L29.9 ITCHING: ICD-10-CM

## 2018-05-23 DIAGNOSIS — M75.80 ROTATOR CUFF TENDINITIS, UNSPECIFIED LATERALITY: ICD-10-CM

## 2018-05-23 DIAGNOSIS — Z91.09 ENVIRONMENTAL ALLERGIES: ICD-10-CM

## 2018-05-23 DIAGNOSIS — E11.42 DIABETIC POLYNEUROPATHY ASSOCIATED WITH TYPE 2 DIABETES MELLITUS (HCC): ICD-10-CM

## 2018-05-23 DIAGNOSIS — R06.83 SNORING: ICD-10-CM

## 2018-05-23 PROCEDURE — G0439 PPPS, SUBSEQ VISIT: HCPCS | Performed by: INTERNAL MEDICINE

## 2018-05-23 PROCEDURE — 99213 OFFICE O/P EST LOW 20 MIN: CPT | Performed by: INTERNAL MEDICINE

## 2018-05-23 NOTE — PROGRESS NOTES
REASON FOR VISIT:    Akanksha Beckman is a 80year old female who presents for a Medicare Annual Wellness visit.     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     Atypical lobular hyperplasia of right breast     Vertigo     At risk for falling     Peripheral vertigo     Arthritis of both hands     Bilateral carpal tunnel syndrome      Current Outpatient Prescriptions:  PRAMIP mg/dL 0.83 0.89 0.82 0.96 0.81 0.71 0.78   Some recent data might be hidden     AST and ALT Latest Ref Rng & Units 12/1/2017 6/29/2017 1/26/2017 10/21/2016 7/17/2016 6/20/2016 3/21/2016   AST 15 - 41 U/L 13(L) 26 12(L) 10(L) 17 15 14(L)   ALT 14 - 54 U/L 2 all  PHQ-2 SCORE: 0        Advance Directives     Do you have a healthcare power of ?: Yes  Do you have a living will?: No     Cognitive Assessment     What day of the week is this?: Correct  What month is it?: Correct  What year is it?: Correct  R Date Value   01/26/2017 49       Dilated Eye exam  Annually Data entered on: 3/24/2016   Last Dilated Eye Exam 3/24/2016              ALLERGIES:     Animal Dander [Dand*        Comment:fur  English Plantain          Juniper                   Other DCIS- 06/07/16  1980: OTHER SURGICAL HISTORY      Comment: gallstones  1980: OTHER SURGICAL HISTORY      Comment: L and R breast cyst removal   Family History   Problem Relation Age of Onset   • Breast Cancer Paternal Aunt    • Heart Disorder Mother    • H throat are clear                Hearing Assessed via: Finger Rub decreased b/l- needs hearing aids  EYES: PERRLA, EOMI, conjunctiva are clear    NECK: supple, no adenopathy, no bruits  CHEST: no chest tenderness  BREAST:declined, seeing Dr. Loc Cisneros: appears to not be taking metformin regularly. utd on eye exam Dr. Luca Garcia; Future  -     HEMOGLOBIN A1C; Future  -     COMP METABOLIC PANEL (14); Future  -     MICROALB/CREAT RATIO, RANDOM URINE;  Future  -     TSH W REFLEX TO FREE T4; Fut

## 2018-05-25 ENCOUNTER — HOSPITAL ENCOUNTER (OUTPATIENT)
Dept: OCCUPATIONAL MEDICINE | Facility: HOSPITAL | Age: 83
Setting detail: THERAPIES SERIES
Discharge: HOME | End: 2018-05-25
Attending: ORTHOPAEDIC SURGERY
Payer: MEDICARE

## 2018-05-25 PROCEDURE — 97165 OT EVAL LOW COMPLEX 30 MIN: CPT

## 2018-05-25 NOTE — PROGRESS NOTES
OCCUPATIONAL THERAPY UPPER EXTREMITY EVALUATION   Referring Physician: Katina Burks  Diagnosis: Arthritis bilateral hands (J34.706) (J66.650)     Date of Service: 5/25/2018     PATIENT SUMMARY   Salomon Chilel is a 80year old y/o female who presents ORTHOTICS:   MD issued Pt  What she describes as a wrist orthosis ,  One for each hand/wrist . Not worn to this evaluation. Therapist wants to see them upon next tx session.     SCAR:   none    SENSORY:   Pt reports  Finger and hand  \"tingling\" mainl Code:  [id number]    Patient/Family/Caregiver was advised of these findings, precautions, and treatment options and has agreed to actively participate in planning and for this course of care.     Thank you for your referral. Please co-sign or sign and retur

## 2018-05-31 ENCOUNTER — TELEPHONE (OUTPATIENT)
Dept: OCCUPATIONAL MEDICINE | Facility: HOSPITAL | Age: 83
End: 2018-05-31

## 2018-06-04 ENCOUNTER — HOSPITAL ENCOUNTER (OUTPATIENT)
Dept: OCCUPATIONAL MEDICINE | Facility: HOSPITAL | Age: 83
Setting detail: THERAPIES SERIES
Discharge: HOME | End: 2018-06-04
Attending: ORTHOPAEDIC SURGERY
Payer: MEDICARE

## 2018-06-04 ENCOUNTER — PATIENT OUTREACH (OUTPATIENT)
Dept: CASE MANAGEMENT | Age: 83
End: 2018-06-04

## 2018-06-04 DIAGNOSIS — G25.81 RESTLESS LEG SYNDROME: ICD-10-CM

## 2018-06-04 DIAGNOSIS — I10 ESSENTIAL HYPERTENSION: ICD-10-CM

## 2018-06-04 DIAGNOSIS — E78.00 PURE HYPERCHOLESTEROLEMIA: ICD-10-CM

## 2018-06-04 DIAGNOSIS — E78.2 MIXED DYSLIPIDEMIA: ICD-10-CM

## 2018-06-04 DIAGNOSIS — M19.042 ARTHRITIS OF BOTH HANDS: ICD-10-CM

## 2018-06-04 DIAGNOSIS — M19.041 ARTHRITIS OF BOTH HANDS: ICD-10-CM

## 2018-06-04 DIAGNOSIS — Z91.81 AT RISK FOR FALLING: ICD-10-CM

## 2018-06-04 PROCEDURE — 97140 MANUAL THERAPY 1/> REGIONS: CPT

## 2018-06-04 PROCEDURE — 97110 THERAPEUTIC EXERCISES: CPT

## 2018-06-04 NOTE — PROGRESS NOTES
Spoke to Joselyn Elizabeth for CCM call.     Medical History  Patient Active Problem List:     Diabetic polyneuropathy associated with type 2 diabetes mellitus ([facility] [address]     Gastroesophageal reflux disease     Essential hypertension     Restless leg syndr hand therapy. She will start by drinking 2 cups of water a day. She currently only drinks Coffee, 1 can of soda and 1 cup of lemonade. She will write a not saying drink water on her table next to her phone and also keep the cup full on her coffee table.  Paris Pina

## 2018-06-04 NOTE — PROGRESS NOTES
Dx: Arthritis bilateral hands (J74.995) (M19.042)         Authorized # of Visits:  2/12        Next MD visit: none scheduled  Fall Risk: standard         Precautions: n/a           Subjective:   Reports:  She spoke to a hospital  Rep this morning on the ph Beige twist stik         yel putty  and squeeze to tolerance jhon         Reviewed correct application of MD Issued wrist orthoses.                            Skilled Services: manual therapy    Charges: 1x MT, 2x TE      Total Timed Treatment: 45 mi

## 2018-06-06 ENCOUNTER — HOSPITAL ENCOUNTER (OUTPATIENT)
Dept: OCCUPATIONAL MEDICINE | Facility: HOSPITAL | Age: 83
Setting detail: THERAPIES SERIES
Discharge: HOME | End: 2018-06-06
Attending: ORTHOPAEDIC SURGERY
Payer: MEDICARE

## 2018-06-06 PROCEDURE — 97140 MANUAL THERAPY 1/> REGIONS: CPT

## 2018-06-06 PROCEDURE — 97110 THERAPEUTIC EXERCISES: CPT

## 2018-06-06 NOTE — PROGRESS NOTES
Dx: Arthritis bilateral hands (G53.435) (M19.042)         Authorized # of Visits:  3/12        Next MD visit: none scheduled  Fall Risk: standard         Precautions: n/a           Subjective:   Reports:  * Her L thumb is aching more than the rest of her p questions        Beige twist stik Composite flx/ext in dry rice bilat hands        yel putty  and squeeze to tolerance bilat AROM to all fingers and bilat wrists        Reviewed correct application of MD Issued wrist orthoses.

## 2018-06-11 ENCOUNTER — TELEPHONE (OUTPATIENT)
Dept: INTERNAL MEDICINE CLINIC | Facility: CLINIC | Age: 83
End: 2018-06-11

## 2018-06-11 ENCOUNTER — HOSPITAL ENCOUNTER (OUTPATIENT)
Dept: OCCUPATIONAL MEDICINE | Facility: HOSPITAL | Age: 83
Setting detail: THERAPIES SERIES
Discharge: HOME | End: 2018-06-11
Attending: ORTHOPAEDIC SURGERY
Payer: MEDICARE

## 2018-06-11 PROCEDURE — 97140 MANUAL THERAPY 1/> REGIONS: CPT

## 2018-06-11 PROCEDURE — 97110 THERAPEUTIC EXERCISES: CPT

## 2018-06-11 NOTE — PROGRESS NOTES
Dx: Arthritis bilateral hands (U99.065) (M19.042)         Authorized # of Visits:  3/12        Next MD visit: none scheduled  Fall Risk: standard         Precautions: n/a           Subjective:   Reports:  * Her L thumb continues to ache more than the rest attention to L RF and L thumb       Gentle jnt mobs to carpals and finger  jnts Gentle jnt mobs to carpals and finger  jnts Gentle jnt mobs to carpals and finger  jnts       reveiwed HEP and issued Pt instructions for hands and wrists.  Reviewed HEP  Pt had

## 2018-06-11 NOTE — TELEPHONE ENCOUNTER
Spoke with patient, informed her TB wrote early lease termination letter, per patient's request faxed to Attn: [facility] in [place] and copy mailed out to patient's address on file. Faxed 3 times, failed fax.    Called patient to

## 2018-06-11 NOTE — TELEPHONE ENCOUNTER
Re-faxed letter to number provided [phone], confirmation received. Patient informed and verbalized understanding.

## 2018-06-11 NOTE — TELEPHONE ENCOUNTER
Pt called back to say that the fax number to [facility] [place] at [phone] [facility] to confirm fax number Marilu Vallejo

## 2018-06-11 NOTE — TELEPHONE ENCOUNTER
Patient states she would like to speak with a nurse or Dr SAUNDERS as soon as possible. She states she has a cane and her son feels it is best she doesn't live alone any longer. Her son wants her to move in with him.   In order to do so, she needs a letter from

## 2018-06-13 ENCOUNTER — HOSPITAL ENCOUNTER (OUTPATIENT)
Dept: OCCUPATIONAL MEDICINE | Facility: HOSPITAL | Age: 83
Setting detail: THERAPIES SERIES
Discharge: HOME | End: 2018-06-13
Attending: ORTHOPAEDIC SURGERY
Payer: MEDICARE

## 2018-06-13 PROCEDURE — 97140 MANUAL THERAPY 1/> REGIONS: CPT

## 2018-06-13 PROCEDURE — 97110 THERAPEUTIC EXERCISES: CPT

## 2018-06-13 NOTE — PROGRESS NOTES
Dx: Arthritis bilateral hands (I87.311) (M19.042)         Authorized # of Visits:  5/12        Next MD visit: none scheduled  Fall Risk: standard         Precautions: n/a           Subjective:   Reports:  * her  Bilateral RTC hurt and had to get out of bed mobs to carpals and finger  jnts Gentle jnt mobs to carpals and finger  jnts Hand gripping in rice bilat for finger flx/ext      reveiwed HEP and issued Pt instructions for hands and wrists.  Reviewed HEP  Pt had questions Clear twist stik for bilat wrist A

## 2018-06-17 ENCOUNTER — TELEPHONE (OUTPATIENT)
Dept: INTERNAL MEDICINE CLINIC | Facility: CLINIC | Age: 83
End: 2018-06-17

## 2018-06-18 ENCOUNTER — HOSPITAL ENCOUNTER (OUTPATIENT)
Dept: OCCUPATIONAL MEDICINE | Facility: HOSPITAL | Age: 83
Setting detail: THERAPIES SERIES
Discharge: HOME | End: 2018-06-18
Attending: ORTHOPAEDIC SURGERY
Payer: MEDICARE

## 2018-06-18 PROCEDURE — 97110 THERAPEUTIC EXERCISES: CPT

## 2018-06-18 PROCEDURE — 97140 MANUAL THERAPY 1/> REGIONS: CPT

## 2018-06-18 NOTE — PROGRESS NOTES
Dx: Arthritis bilateral hands (L33.669) (M19.042)         Authorized # of Visits:  6/12        Next MD visit: none scheduled  Fall Risk: standard         Precautions: n/a           Subjective:   Reports:  She has had vertigo for the past 2 days but ok.   * and fingers. Gentle jnt mobs to carpals and finger  jnts Gentle jnt mobs to carpals and finger  jnts Gentle jnt mobs to carpals and finger  jnts Hand gripping in rice bilat for finger flx/ext Fine manip bilat hands in rice finding marbles.      reveiwed

## 2018-06-18 NOTE — TELEPHONE ENCOUNTER
UEU:2/32/4855   Last refill:2/22/2018 , 180 tabs, 0 refills   Last labs:12/01/2017 hgb a1c      per protocol route to provider

## 2018-06-20 ENCOUNTER — HOSPITAL ENCOUNTER (OUTPATIENT)
Dept: OCCUPATIONAL MEDICINE | Facility: HOSPITAL | Age: 83
Setting detail: THERAPIES SERIES
Discharge: HOME | End: 2018-06-20
Attending: ORTHOPAEDIC SURGERY
Payer: MEDICARE

## 2018-06-20 PROCEDURE — 97110 THERAPEUTIC EXERCISES: CPT

## 2018-06-20 PROCEDURE — 97140 MANUAL THERAPY 1/> REGIONS: CPT

## 2018-06-20 NOTE — PROGRESS NOTES
Dx: Arthritis bilateral hands (W73.700) (M19.042)         Authorized # of Visits:  7/12        Next MD visit: none scheduled  Fall Risk: standard         Precautions: n/a       Discharge Summary    Pt has attended 7, cancelled 0, and no shown 0 visits in O flx/ext  3-/5      Strength (lbs) Right Average Left Average   : 38#  (+10) 25#  (+10)   2 pt Pinch: 6#  (+1) 7#   (+3)   3 pt Pinch: 7#  (+1) 8#  (+3)   Lateral Pinch: 12#  (+2) 10#       Goals:    1- Decrease resting and with use pain in bilateral ha PROM all L finger jnts special attention to L RF and L thumb Beige twist stik bilat 3 directions Gentle  jnt mobs to bilat carpals and fingers. Gentle  jnt mobs to bilat carpals and fingers.     Gentle jnt mobs to carpals and finger  jnts Gentle jnt mobs to

## 2018-06-30 PROCEDURE — 99490 CHRNC CARE MGMT STAFF 1ST 20: CPT

## 2018-07-03 ENCOUNTER — PATIENT OUTREACH (OUTPATIENT)
Dept: CASE MANAGEMENT | Age: 83
End: 2018-07-03

## 2018-07-03 DIAGNOSIS — M19.042 ARTHRITIS OF BOTH HANDS: ICD-10-CM

## 2018-07-03 DIAGNOSIS — I10 ESSENTIAL HYPERTENSION: ICD-10-CM

## 2018-07-03 DIAGNOSIS — G56.03 BILATERAL CARPAL TUNNEL SYNDROME: ICD-10-CM

## 2018-07-03 DIAGNOSIS — M19.041 ARTHRITIS OF BOTH HANDS: ICD-10-CM

## 2018-07-03 DIAGNOSIS — E11.42 DIABETIC POLYNEUROPATHY ASSOCIATED WITH TYPE 2 DIABETES MELLITUS (HCC): ICD-10-CM

## 2018-07-03 DIAGNOSIS — E78.2 MIXED DYSLIPIDEMIA: ICD-10-CM

## 2018-07-03 DIAGNOSIS — Z91.81 AT RISK FOR FALLING: ICD-10-CM

## 2018-07-03 NOTE — PROGRESS NOTES
Spoke to Joselyn Elizabeth for CCM call.     Medical History  Patient Active Problem List:     Diabetic polyneuropathy associated with type 2 diabetes mellitus ([facility]     Gastroesophageal reflux disease     Essential hypertension     Restless leg syndr outreach: Hydration (2 cups of water a day)                        • Patient reported progress toward goal: has been drinking two cups of water a day and started PT for her hand    • Patient Reported New Barriers And Concerns: Moving                   - Pl

## 2018-07-09 DIAGNOSIS — E11.42 DIABETIC POLYNEUROPATHY ASSOCIATED WITH TYPE 2 DIABETES MELLITUS (HCC): ICD-10-CM

## 2018-07-09 NOTE — TELEPHONE ENCOUNTER
Re:Diabetic testing supplies (one touch Ultra tests strips) do not meet medicare compliance  Needs dx code and to state if pt is insulin/not insulin user. Also they are missing a script for the Lancets.  Same thing will need dx code and if pt is insulin

## 2018-07-10 NOTE — PROGRESS NOTES
Breast Surgery Follow-Up Visit    Diagnosis: DCIS, right breast; ER positive, SC negative status post Lumpectomy on July 1, 2016. Stage: TisNxMx (Stage 0).     Disease Status:  Surgical treatment complete, Endocrine therapy deferred and Radiation deferre Macular degeneration    • Macular degeneration    • Neuropathy    • Osteoarthritis     all joints   • Pneumonia, organism unspecified(486)     04/2015   • PONV (postoperative nausea and vomiting)    • Problems with swallowing     due hiatal hernia; gaggs a CALCIUM-VITAMIN D OR Take 1,200 mg by mouth daily. Disp:  Rfl:    Multiple Vitamins-Minerals (PRESERVISION AREDS) Oral Tab Take 1 tablet by mouth 2 (two) times daily.  Disp:  Rfl:    Loperamide HCl (IMODIUM) 2 MG Oral Cap Take 2 mg by mouth daily as nee pleurisy/chest pain, pneumonia, asthma, wheezing, +difficulty in breathing with exertion, emphysema, chronic bronchitis, shortness of breath or abnormal sound when breathing. Cardiovascular:   There is no history of chest pain, chest pressure/discomfort history of hives, hay fever, angioedema or anaphylaxis.     /77 (BP Location: Left arm, Patient Position: Sitting, Cuff Size: large)   Pulse 93   Resp 20   Ht 1.549 m (5' 1\")   Wt 79.4 kg (175 lb)   SpO2 97%   BMI 33.07 kg/m²     Physical Examination more than 50% of which was dedicated to the discussion of management options.

## 2018-07-12 RX ORDER — LANCETS 33 GAUGE
1 EACH MISCELLANEOUS DAILY
Qty: 100 EACH | Refills: 3 | Status: SHIPPED | OUTPATIENT
Start: 2018-07-12 | End: 2019-07-12

## 2018-07-17 ENCOUNTER — LAB ENCOUNTER (OUTPATIENT)
Dept: LAB | Facility: HOSPITAL | Age: 83
End: 2018-07-17
Attending: INTERNAL MEDICINE
Payer: MEDICARE

## 2018-07-17 DIAGNOSIS — E11.42 DIABETIC POLYNEUROPATHY ASSOCIATED WITH TYPE 2 DIABETES MELLITUS (HCC): ICD-10-CM

## 2018-07-17 DIAGNOSIS — R53.83 OTHER FATIGUE: ICD-10-CM

## 2018-07-17 LAB
ALBUMIN SERPL-MCNC: 3.4 G/DL (ref 3.5–4.8)
ALP LIVER SERPL-CCNC: 53 U/L (ref 55–142)
ALT SERPL-CCNC: 26 U/L (ref 14–54)
AST SERPL-CCNC: 19 U/L (ref 15–41)
BASOPHILS # BLD AUTO: 0.06 X10(3) UL (ref 0–0.1)
BASOPHILS NFR BLD AUTO: 1.2 %
BILIRUB SERPL-MCNC: 0.8 MG/DL (ref 0.1–2)
BUN BLD-MCNC: 10 MG/DL (ref 8–20)
CALCIUM BLD-MCNC: 8.9 MG/DL (ref 8.3–10.3)
CHLORIDE: 108 MMOL/L (ref 101–111)
CHOLEST SMN-MCNC: 168 MG/DL (ref ?–200)
CO2: 24 MMOL/L (ref 22–32)
CREAT BLD-MCNC: 0.95 MG/DL (ref 0.55–1.02)
CREAT UR-SCNC: 264 MG/DL
EOSINOPHIL # BLD AUTO: 0.2 X10(3) UL (ref 0–0.3)
EOSINOPHIL NFR BLD AUTO: 4 %
ERYTHROCYTE [DISTWIDTH] IN BLOOD BY AUTOMATED COUNT: 12.9 % (ref 11.5–16)
EST. AVERAGE GLUCOSE BLD GHB EST-MCNC: 163 MG/DL (ref 68–126)
GLUCOSE BLD-MCNC: 157 MG/DL (ref 70–99)
HBA1C MFR BLD HPLC: 7.3 % (ref ?–5.7)
HCT VFR BLD AUTO: 37 % (ref 34–50)
HDLC SERPL-MCNC: 36 MG/DL (ref 45–?)
HDLC SERPL: 4.67 {RATIO} (ref ?–4.44)
HGB BLD-MCNC: 12.2 G/DL (ref 12–16)
IMMATURE GRANULOCYTE COUNT: 0.01 X10(3) UL (ref 0–1)
IMMATURE GRANULOCYTE RATIO %: 0.2 %
LDLC SERPL CALC-MCNC: 94 MG/DL (ref ?–130)
LYMPHOCYTES # BLD AUTO: 1.3 X10(3) UL (ref 0.9–4)
LYMPHOCYTES NFR BLD AUTO: 26.1 %
M PROTEIN MFR SERPL ELPH: 7.2 G/DL (ref 6.1–8.3)
MCH RBC QN AUTO: 29 PG (ref 27–33.2)
MCHC RBC AUTO-ENTMCNC: 33 G/DL (ref 31–37)
MCV RBC AUTO: 88.1 FL (ref 81–100)
MICROALBUMIN UR-MCNC: 2 MG/DL
MICROALBUMIN/CREAT 24H UR-RTO: 7.6 UG/MG (ref ?–30)
MONOCYTES # BLD AUTO: 0.41 X10(3) UL (ref 0.1–1)
MONOCYTES NFR BLD AUTO: 8.2 %
NEUTROPHIL ABS PRELIM: 3.01 X10 (3) UL (ref 1.3–6.7)
NEUTROPHILS # BLD AUTO: 3.01 X10(3) UL (ref 1.3–6.7)
NEUTROPHILS NFR BLD AUTO: 60.3 %
NONHDLC SERPL-MCNC: 132 MG/DL (ref ?–130)
PLATELET # BLD AUTO: 222 10(3)UL (ref 150–450)
POTASSIUM SERPL-SCNC: 3.6 MMOL/L (ref 3.6–5.1)
RBC # BLD AUTO: 4.2 X10(6)UL (ref 3.8–5.1)
RED CELL DISTRIBUTION WIDTH-SD: 41.3 FL (ref 35.1–46.3)
SODIUM SERPL-SCNC: 141 MMOL/L (ref 136–144)
TRIGL SERPL-MCNC: 192 MG/DL (ref ?–150)
TSI SER-ACNC: 0.73 MIU/ML (ref 0.35–5.5)
VLDLC SERPL CALC-MCNC: 38 MG/DL (ref 5–40)
WBC # BLD AUTO: 5 X10(3) UL (ref 4–13)

## 2018-07-17 PROCEDURE — 80061 LIPID PANEL: CPT

## 2018-07-17 PROCEDURE — 85025 COMPLETE CBC W/AUTO DIFF WBC: CPT

## 2018-07-17 PROCEDURE — 80053 COMPREHEN METABOLIC PANEL: CPT

## 2018-07-17 PROCEDURE — 84443 ASSAY THYROID STIM HORMONE: CPT

## 2018-07-17 PROCEDURE — 83036 HEMOGLOBIN GLYCOSYLATED A1C: CPT

## 2018-07-17 PROCEDURE — 82570 ASSAY OF URINE CREATININE: CPT

## 2018-07-17 PROCEDURE — 36415 COLL VENOUS BLD VENIPUNCTURE: CPT

## 2018-07-17 PROCEDURE — 82043 UR ALBUMIN QUANTITATIVE: CPT

## 2018-07-19 DIAGNOSIS — N39.0 URINARY TRACT INFECTION WITHOUT HEMATURIA, SITE UNSPECIFIED: ICD-10-CM

## 2018-07-19 DIAGNOSIS — B37.2 CANDIDIASIS, CUTANEOUS: ICD-10-CM

## 2018-07-19 DIAGNOSIS — R30.0 DYSURIA: ICD-10-CM

## 2018-07-19 RX ORDER — OMEPRAZOLE 40 MG/1
CAPSULE, DELAYED RELEASE ORAL
Qty: 180 CAPSULE | Refills: 0 | Status: SHIPPED | OUTPATIENT
Start: 2018-07-19 | End: 2018-08-24

## 2018-07-20 DIAGNOSIS — K64.9 HEMORRHOIDS, UNSPECIFIED HEMORRHOID TYPE: ICD-10-CM

## 2018-07-20 NOTE — TELEPHONE ENCOUNTER
SJQ:[birth year]78 TB  FOV:8/24/18  LAST RX: 8/14/17 proctozone-hc 2.5 % rectal cream use twice a day as needed for itching.  30 g 0 refills   LAST LABS: 7/17/18 microalb,tsh,cbc,cmp,a1c,lipid  PER PROTOCOL: to provider

## 2018-07-31 DIAGNOSIS — G25.81 RESTLESS LEG SYNDROME: ICD-10-CM

## 2018-07-31 PROCEDURE — 99490 CHRNC CARE MGMT STAFF 1ST 20: CPT

## 2018-07-31 RX ORDER — PRAMIPEXOLE DIHYDROCHLORIDE 0.25 MG/1
TABLET ORAL
Qty: 90 TABLET | Refills: 0 | Status: SHIPPED | OUTPATIENT
Start: 2018-07-31 | End: 2018-08-24

## 2018-07-31 NOTE — TELEPHONE ENCOUNTER
LOV: 5/23/18 w/ TB for MWV  FOV: 8/24/18  Last labs: 7/17/18 TSH,CBC,CMP,A1C,LIPID,Microalbumin  Last Refill: 5/4/18 qt:90     Per protocol sent to provider

## 2018-08-06 ENCOUNTER — PATIENT OUTREACH (OUTPATIENT)
Dept: CASE MANAGEMENT | Age: 83
End: 2018-08-06

## 2018-08-06 DIAGNOSIS — I10 ESSENTIAL HYPERTENSION: ICD-10-CM

## 2018-08-06 DIAGNOSIS — E11.42 DIABETIC POLYNEUROPATHY ASSOCIATED WITH TYPE 2 DIABETES MELLITUS (HCC): ICD-10-CM

## 2018-08-06 DIAGNOSIS — Z91.81 AT RISK FOR FALLING: ICD-10-CM

## 2018-08-06 DIAGNOSIS — E78.2 MIXED DYSLIPIDEMIA: ICD-10-CM

## 2018-08-06 DIAGNOSIS — E78.00 PURE HYPERCHOLESTEROLEMIA: ICD-10-CM

## 2018-08-06 NOTE — PROGRESS NOTES
Attempted to contact patient (No answer). Time Spent This Encounter Total: 2 min medical record review, telephone communication, care plan updates where needed, and education. Provided acknowledgment and validation to patient's concerns.      Monthly Mi

## 2018-08-13 NOTE — PROGRESS NOTES
Attempted to contact patient no answer. Time Spent This Encounter Total:2 min medical record review, telephone communication, care plan updates where needed, and education. Provided acknowledgment and validation to patient's concerns.      Monthly Minute

## 2018-08-15 NOTE — PROGRESS NOTES
Spoke to Joselyn Werner for CCM call.     Medical History  Patient Active Problem List:     Diabetic polyneuropathy associated with type 2 diabetes mellitus ([facility] [address]     Gastroesophageal reflux disease     Essential hypertension     Restless leg syndr pain. States is been more achy since she moved. Patient has been unpacking and using arm more than usual.    Plans on following up with a Allergy doctor as patient feels she has chronic runny nose and post nasal drip.  Patient has tried several OTC medicati

## 2018-08-24 ENCOUNTER — OFFICE VISIT (OUTPATIENT)
Dept: INTERNAL MEDICINE CLINIC | Facility: CLINIC | Age: 83
End: 2018-08-24
Payer: MEDICARE

## 2018-08-24 VITALS
DIASTOLIC BLOOD PRESSURE: 82 MMHG | SYSTOLIC BLOOD PRESSURE: 136 MMHG | OXYGEN SATURATION: 96 % | HEART RATE: 68 BPM | HEIGHT: 62 IN | WEIGHT: 166.81 LBS | BODY MASS INDEX: 30.69 KG/M2 | RESPIRATION RATE: 18 BRPM | TEMPERATURE: 98 F

## 2018-08-24 DIAGNOSIS — E11.42 DIABETIC POLYNEUROPATHY ASSOCIATED WITH TYPE 2 DIABETES MELLITUS (HCC): ICD-10-CM

## 2018-08-24 DIAGNOSIS — I10 ESSENTIAL HYPERTENSION: ICD-10-CM

## 2018-08-24 DIAGNOSIS — N90.7 SEBACEOUS CYST OF LABIA: ICD-10-CM

## 2018-08-24 DIAGNOSIS — G89.29 CHRONIC RIGHT SHOULDER PAIN: Primary | ICD-10-CM

## 2018-08-24 DIAGNOSIS — K21.9 GASTROESOPHAGEAL REFLUX DISEASE, ESOPHAGITIS PRESENCE NOT SPECIFIED: ICD-10-CM

## 2018-08-24 DIAGNOSIS — M25.511 CHRONIC RIGHT SHOULDER PAIN: Primary | ICD-10-CM

## 2018-08-24 PROCEDURE — 99214 OFFICE O/P EST MOD 30 MIN: CPT | Performed by: INTERNAL MEDICINE

## 2018-08-24 RX ORDER — LOSARTAN POTASSIUM 100 MG/1
TABLET ORAL
Qty: 90 TABLET | Refills: 2 | Status: SHIPPED | OUTPATIENT
Start: 2018-08-24 | End: 2019-07-01

## 2018-08-24 RX ORDER — PRAMIPEXOLE DIHYDROCHLORIDE 0.25 MG/1
TABLET ORAL
Qty: 90 TABLET | Refills: 1 | Status: SHIPPED | OUTPATIENT
Start: 2018-08-24 | End: 2018-10-19 | Stop reason: DRUGHIGH

## 2018-08-24 RX ORDER — METFORMIN HYDROCHLORIDE 500 MG/1
1000 TABLET, EXTENDED RELEASE ORAL
Qty: 180 TABLET | Refills: 1 | Status: SHIPPED | OUTPATIENT
Start: 2018-08-24 | End: 2018-10-19 | Stop reason: ALTCHOICE

## 2018-08-24 RX ORDER — OMEPRAZOLE 40 MG/1
CAPSULE, DELAYED RELEASE ORAL
Qty: 180 CAPSULE | Refills: 1 | Status: SHIPPED | OUTPATIENT
Start: 2018-08-24 | End: 2018-10-19

## 2018-08-24 NOTE — PROGRESS NOTES
Alejandro Jimenez is a 80year old female. Patient presents with: Follow - Up: cn room 4: follow up on R shoulder   Diabetes: diarrhea with metformin  HTN      HPI:     Patient here for f/u-  Right shoulder pain getting worse, h/o partial RC tear.  Did not d Rectal Cream USE TWO TIMES A DAY AS NEEDED FOR ITCHING Disp: 28.35 g Rfl: 0   Glucose Blood (ONETOUCH ULTRA BLUE) In Vitro Strip TEST BLOOD SUGAR ONE TIME DAILY Disp: 100 strip Rfl: 3   ONETOUCH DELICA LANCETS 47D Does not apply Misc 1 lancet by Finger sti Years: 35.00        Quit date: 8/27/1988  Smokeless tobacco: Never Used                      Alcohol use:  No              Family History   Problem Relation Age of Onset   • Breast Cancer Paternal Aunt    • Heart Disorder Mother    • Hypertension Mother Dedra Boy back for re evaluation, referral given  Diabetic polyneuropathy associated with type 2 diabetes mellitus (hcc)- HGBA1c at goal for her age, eye exam requested from [facility].  Change to metformin ER 1000 mg daily due to diarrhea on regular

## 2018-08-27 ENCOUNTER — TELEPHONE (OUTPATIENT)
Dept: INTERNAL MEDICINE CLINIC | Facility: CLINIC | Age: 83
End: 2018-08-27

## 2018-08-27 NOTE — TELEPHONE ENCOUNTER
Fax received from [facility] for Diabetic eye exam done on 3/30/17 by Georgie Arcos MD patient was also seen in December but march was the eye exam no diabetic Retinopathy  found.  Reviewed by TB and abstracted

## 2018-08-31 PROCEDURE — 99490 CHRNC CARE MGMT STAFF 1ST 20: CPT

## 2018-09-20 ENCOUNTER — PATIENT OUTREACH (OUTPATIENT)
Dept: CASE MANAGEMENT | Age: 83
End: 2018-09-20

## 2018-09-20 NOTE — PROGRESS NOTES
Spoke to patient states she just woke up. Requested a call later in the day. Time Spent This Encounter Total: 3 min medical record review, telephone communication, care plan updates where needed, and education.  Provided acknowledgment and validation to

## 2018-10-01 ENCOUNTER — PATIENT OUTREACH (OUTPATIENT)
Dept: CASE MANAGEMENT | Age: 83
End: 2018-10-01

## 2018-10-01 DIAGNOSIS — E78.00 PURE HYPERCHOLESTEROLEMIA: ICD-10-CM

## 2018-10-01 DIAGNOSIS — E11.42 DIABETIC POLYNEUROPATHY ASSOCIATED WITH TYPE 2 DIABETES MELLITUS (HCC): ICD-10-CM

## 2018-10-01 DIAGNOSIS — Z91.81 AT RISK FOR FALLING: ICD-10-CM

## 2018-10-01 DIAGNOSIS — I10 ESSENTIAL HYPERTENSION: ICD-10-CM

## 2018-10-01 DIAGNOSIS — E78.2 MIXED DYSLIPIDEMIA: ICD-10-CM

## 2018-10-01 NOTE — PROGRESS NOTES
Contacting patient to follow up on CCM for the month. LMCB    Time Spent This Encounter Total:  2 min medical record review, telephone communication, care plan updates where needed, and education.  Provided acknowledgment and validation to patient's concern

## 2018-10-02 NOTE — PROGRESS NOTES
Spoke to Joselyn Elizabeth for CCM call.     Medical History  Patient Active Problem List:     Diabetic polyneuropathy associated with type 2 diabetes mellitus ([facility] [address]     Gastroesophageal reflux disease     Essential hypertension     Restless leg syndr for several environmental allergies and she tested positive for dog,dust mites and three different types of trees she doesn't recall types of trees.     Patient followed up with opthalmology and has eye injection in both eyes due to fluid in the back of the

## 2018-10-10 ENCOUNTER — OFFICE VISIT (OUTPATIENT)
Dept: FAMILY MEDICINE CLINIC | Facility: CLINIC | Age: 83
End: 2018-10-10
Payer: MEDICARE

## 2018-10-10 ENCOUNTER — APPOINTMENT (OUTPATIENT)
Dept: GENERAL RADIOLOGY | Facility: HOSPITAL | Age: 83
End: 2018-10-10
Attending: EMERGENCY MEDICINE
Payer: MEDICARE

## 2018-10-10 ENCOUNTER — APPOINTMENT (OUTPATIENT)
Dept: CT IMAGING | Facility: HOSPITAL | Age: 83
End: 2018-10-10
Attending: EMERGENCY MEDICINE
Payer: MEDICARE

## 2018-10-10 ENCOUNTER — HOSPITAL ENCOUNTER (OUTPATIENT)
Facility: HOSPITAL | Age: 83
Setting detail: OBSERVATION
Discharge: HOME | End: 2018-10-12
Attending: EMERGENCY MEDICINE | Admitting: HOSPITALIST
Payer: MEDICARE

## 2018-10-10 VITALS
DIASTOLIC BLOOD PRESSURE: 54 MMHG | RESPIRATION RATE: 16 BRPM | HEART RATE: 89 BPM | WEIGHT: 164.19 LBS | BODY MASS INDEX: 30 KG/M2 | TEMPERATURE: 100 F | SYSTOLIC BLOOD PRESSURE: 124 MMHG | OXYGEN SATURATION: 94 %

## 2018-10-10 DIAGNOSIS — R53.83 OTHER FATIGUE: ICD-10-CM

## 2018-10-10 DIAGNOSIS — E11.42 DIABETIC POLYNEUROPATHY ASSOCIATED WITH TYPE 2 DIABETES MELLITUS (HCC): Primary | ICD-10-CM

## 2018-10-10 DIAGNOSIS — R53.1 WEAKNESS GENERALIZED: Primary | ICD-10-CM

## 2018-10-10 DIAGNOSIS — R50.9 FEVER, UNSPECIFIED FEVER CAUSE: ICD-10-CM

## 2018-10-10 PROCEDURE — 70450 CT HEAD/BRAIN W/O DYE: CPT | Performed by: EMERGENCY MEDICINE

## 2018-10-10 PROCEDURE — 82962 GLUCOSE BLOOD TEST: CPT | Performed by: NURSE PRACTITIONER

## 2018-10-10 PROCEDURE — 71045 X-RAY EXAM CHEST 1 VIEW: CPT | Performed by: EMERGENCY MEDICINE

## 2018-10-10 PROCEDURE — 99220 INITIAL OBSERVATION CARE,LEVL III: CPT | Performed by: HOSPITALIST

## 2018-10-10 RX ORDER — ASPIRIN 81 MG/1
81 TABLET ORAL DAILY
Status: DISCONTINUED | OUTPATIENT
Start: 2018-10-11 | End: 2018-10-12

## 2018-10-10 RX ORDER — SODIUM CHLORIDE 9 MG/ML
INJECTION, SOLUTION INTRAVENOUS ONCE
Status: COMPLETED | OUTPATIENT
Start: 2018-10-10 | End: 2018-10-12

## 2018-10-10 RX ORDER — DEXTROSE MONOHYDRATE 25 G/50ML
50 INJECTION, SOLUTION INTRAVENOUS
Status: DISCONTINUED | OUTPATIENT
Start: 2018-10-10 | End: 2018-10-12

## 2018-10-10 RX ORDER — ACETAMINOPHEN 500 MG
1000 TABLET ORAL ONCE
Status: COMPLETED | OUTPATIENT
Start: 2018-10-10 | End: 2018-10-10

## 2018-10-10 RX ORDER — PREDNISONE 20 MG/1
20 TABLET ORAL
Status: DISCONTINUED | OUTPATIENT
Start: 2018-10-10 | End: 2018-10-12

## 2018-10-10 RX ORDER — ENOXAPARIN SODIUM 100 MG/ML
30 INJECTION SUBCUTANEOUS DAILY
Status: DISCONTINUED | OUTPATIENT
Start: 2018-10-11 | End: 2018-10-12

## 2018-10-10 RX ORDER — ACETAMINOPHEN 325 MG/1
650 TABLET ORAL EVERY 6 HOURS PRN
Status: DISCONTINUED | OUTPATIENT
Start: 2018-10-10 | End: 2018-10-12

## 2018-10-10 RX ORDER — AMOXICILLIN AND CLAVULANATE POTASSIUM 875; 125 MG/1; MG/1
1 TABLET, FILM COATED ORAL EVERY 12 HOURS SCHEDULED
Status: DISCONTINUED | OUTPATIENT
Start: 2018-10-10 | End: 2018-10-12

## 2018-10-10 RX ORDER — LOSARTAN POTASSIUM 100 MG/1
100 TABLET ORAL DAILY
Status: DISCONTINUED | OUTPATIENT
Start: 2018-10-11 | End: 2018-10-12

## 2018-10-10 RX ORDER — AZELASTINE 1 MG/ML
2 SPRAY, METERED NASAL 2 TIMES DAILY
Status: DISCONTINUED | OUTPATIENT
Start: 2018-10-10 | End: 2018-10-12

## 2018-10-10 RX ORDER — PRAMIPEXOLE DIHYDROCHLORIDE 0.25 MG/1
0.25 TABLET ORAL NIGHTLY
Status: DISCONTINUED | OUTPATIENT
Start: 2018-10-10 | End: 2018-10-12

## 2018-10-10 RX ORDER — PANTOPRAZOLE SODIUM 40 MG/1
40 TABLET, DELAYED RELEASE ORAL
Status: DISCONTINUED | OUTPATIENT
Start: 2018-10-10 | End: 2018-10-11

## 2018-10-10 RX ORDER — SODIUM CHLORIDE 9 MG/ML
INJECTION, SOLUTION INTRAVENOUS CONTINUOUS
Status: DISCONTINUED | OUTPATIENT
Start: 2018-10-10 | End: 2018-10-12

## 2018-10-10 NOTE — PROGRESS NOTES
Pt presents with DIL for Nausea, dizziness, fever of up to 103 for 2 days. Pt states she just hasn't felt well the past few days and last night she spiked a fever. Pt asked if we could check her BS and it was 162.  O2 was 94%, but pt has hx of COPD and is u

## 2018-10-11 RX ORDER — KETOROLAC TROMETHAMINE 30 MG/ML
15 INJECTION, SOLUTION INTRAMUSCULAR; INTRAVENOUS EVERY 6 HOURS PRN
Status: DISCONTINUED | OUTPATIENT
Start: 2018-10-11 | End: 2018-10-12

## 2018-10-11 RX ORDER — KETOROLAC TROMETHAMINE 30 MG/ML
30 INJECTION, SOLUTION INTRAMUSCULAR; INTRAVENOUS EVERY 6 HOURS PRN
Status: DISCONTINUED | OUTPATIENT
Start: 2018-10-11 | End: 2018-10-11 | Stop reason: DRUGHIGH

## 2018-10-11 RX ORDER — PANTOPRAZOLE SODIUM 40 MG/1
40 TABLET, DELAYED RELEASE ORAL NIGHTLY
Status: DISCONTINUED | OUTPATIENT
Start: 2018-10-11 | End: 2018-10-12

## 2018-10-11 NOTE — CM/SW NOTE
10/11/18 1300   CM/SW Screening   Referral Source    Information Source Chart review;Nursing rounds   Patient's Mental Status Alert;Oriented   Patient's [address]   Patient lives with Alone   Patient Status Prior to Admission

## 2018-10-11 NOTE — H&P
ANIBAL HOSPITALIST  History and Physical     Rina Man Patient Status:  Emergency    1930 MRN [id number]   Location [facility] Attending Denisha Varner MD   Hosp Day # 0 PCP Chyna Maier MD     Chief Complaint: • ANGIOGRAM      no intervention   • APPENDECTOMY  1935   • BREAST BIOPSY NEEDLE LOCALIZATION Right 7/1/2016    Performed by Ivan Hilario MD at [facility] MAIN OR   • NIYAH NEEDLE LOCALIZATION W/ SPECIMEN 1 SITE LEFT      1957   • NIYAH NEEDLE LOCALIZATION W/ ITCHING Disp: 28.35 g Rfl: 0   Glucose Blood (ONETOUCH ULTRA BLUE) In Vitro Strip TEST BLOOD SUGAR ONE TIME DAILY Disp: 100 strip Rfl: 3   ONETOUCH DELICA LANCETS 66J Does not apply Misc 1 lancet by Finger stick route daily.  Disp: 100 each Rfl: 3   CALCIUM 1.05 mg/dL (H)). No results for input(s): PTP, INR in the last 168 hours. No results for input(s): TROP, CK in the last 168 hours. Imaging: Imaging data reviewed in Epic. ASSESSMENT / PLAN:     1.  Fever suspect d/t acute sinusitis and possibl

## 2018-10-11 NOTE — PLAN OF CARE
Diabetes/Glucose Control    • Glucose maintained within prescribed range Progressing        Impaired Functional Mobility    • Achieve highest/safest level of mobility/gait Progressing        Impaired Functional Mobility    • Achieve highest/safest level of

## 2018-10-11 NOTE — PHYSICAL THERAPY NOTE
PHYSICAL THERAPY QUICK EVALUATION - INPATIENT    Room Number: 705/144-B  Evaluation Date: 10/11/2018  Presenting Problem: generalized weakness  Physician Order: PT Eval and Treat    Pt was admitted from home on 10/10/2018 with generalized weakness.  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 ..   -   Moving to and from a bed to a chair (including a wheelchair)?: None   -   Need to walk in hospital room?: None   -   Climbing 3-5 steps with a railing?: A Little       AM-PAC Score:  Raw Score: 23   PT Approx Degree of Impairment Sc achieve the following goals . ..     Patient was able to transfer Safely and independently   Patient able to ambulate on level surfaces Safely and independently

## 2018-10-11 NOTE — PROGRESS NOTES
[facility] Pharmacy Note:  Renal Dose Adjustment for Enoxaparin (LOVENOX)    Serafin Agustin has been prescribed Enoxaparin (LOVENOX) 40 mg subcutaneously every 24 hours. Estimated Creatinine Clearance: 26.6 mL/min (A) (based on SCr of 1.05 mg/dL (H)).     Her c

## 2018-10-11 NOTE — PROGRESS NOTES
[facility] Pharmacy Note:  Age Based Dose Adjustment    Paulina Man has been prescribed ketorolac (TORADOL) 15-30 mg IV every 6 hours panel. Patient is >71 years old therefore the dose has been adjusted to 15 mg IV every 6 hours.       Thank you,  Arpit Gomez

## 2018-10-11 NOTE — PROGRESS NOTES
ANIBAL HOSPITALIST  Progress Note     Alvaro Man Patient Status:  Observation    1930 MRN [id number]   [facility] Attending Susie Arias MD   Hosp Day # 0 PCP America Enciso MD     Chief Complaint: fever    S: Patient feel Daily   • Azelastine HCl  2 spray Nasal BID   • Amoxicillin-Pot Clavulanate  1 tablet Oral 2 times per day   • predniSONE  20 mg Oral Daily with breakfast   • enoxaparin  30 mg Subcutaneous Daily   • Insulin Aspart Pen  2-10 Units Subcutaneous TID CC and H

## 2018-10-11 NOTE — ED PROVIDER NOTES
Patient Seen in: [facility] 5nw-a    History   Patient presents with:  Fatigue (constitutional, neurologic)    Stated Complaint: fatigue and fever today    HPI    Patient is an 80-year-old female comes emergency room for evaluation of fatigue.   Juanita intervention   • APPENDECTOMY  1935   • BREAST BIOPSY NEEDLE LOCALIZATION Right 7/1/2016    Performed by Marquita Ontiveros MD at [facility] MAIN OR   • NIYAH NEEDLE LOCALIZATION W/ SPECIMEN 1 SITE LEFT      1957   • NIYAH NEEDLE LOCALIZATION W/ SPECIMEN 1 SITE RIGHT wheezing, no rales, no rhonchi. CARDIOVASCULAR: Regular rate and rhythm. Normal S1S2. No S3S4 or murmur. ABDOMEN: Bowel sounds are present. Soft. nondistended, no pulsatile masses. nontender    MUSCULOSKELETAL: No calf tenderness.   Dorsalis and Posteri HEMOGLOBIN A1C   URINE CULTURE, ROUTINE   BLOOD CULTURE   BLOOD CULTURE   RESPIRATORY PANEL FLU EXPANDED   Sodium 135. Glucose 154. AST 10. EKG    Rate, intervals and axes as noted on EKG Report.   Rate: 81  Rhythm: Sinus Rhythm  Reading: Left anterior hiatal hernia. Small bilateral pleural effusions. Mild atelectasis/scarring in the lower lungs. Normal heart size and pulmonary vascularity. No pneumothorax. No lobar consolidation. CONCLUSION:  Medium-sized hiatal hernia.   Small bilateral pleural patient. Patient has no other questions, complaints or concerns. Patient will be admitted to the hospital for further workup.   Admission disposition: 10/10/2018  9:38 PM                 Disposition and Plan     Clinical Impression:  Weakness generalized  (

## 2018-10-12 VITALS
WEIGHT: 166 LBS | DIASTOLIC BLOOD PRESSURE: 62 MMHG | OXYGEN SATURATION: 95 % | SYSTOLIC BLOOD PRESSURE: 131 MMHG | TEMPERATURE: 99 F | HEART RATE: 66 BPM | HEIGHT: 60 IN | BODY MASS INDEX: 32.59 KG/M2 | RESPIRATION RATE: 18 BRPM

## 2018-10-12 PROCEDURE — 99217 OBSERVATION CARE DISCHARGE: CPT | Performed by: HOSPITALIST

## 2018-10-12 RX ORDER — AMOXICILLIN AND CLAVULANATE POTASSIUM 875; 125 MG/1; MG/1
1 TABLET, FILM COATED ORAL EVERY 12 HOURS SCHEDULED
Qty: 8 TABLET | Refills: 0 | Status: SHIPPED | OUTPATIENT
Start: 2018-10-12 | End: 2018-10-16

## 2018-10-12 RX ORDER — MELATONIN
Qty: 30 TABLET | Refills: 0 | Status: SHIPPED | OUTPATIENT
Start: 2018-10-12 | End: 2020-12-23

## 2018-10-12 NOTE — PROGRESS NOTES
NURSING DISCHARGE NOTE    Discharged Home via Wheelchair. Accompanied by Family member  Belongings Taken by patient/family     Patient was provided discharge information prior to discharge.   She demonstrated an understanding of the discharge informati

## 2018-10-12 NOTE — PROGRESS NOTES
ANIBAL HOSPITALIST  Progress Note     Rina Man Patient Status:  Observation    1930 MRN [id number]   [facility] 5NW-A Attending Abran Candelario MD   Hosp Day # 0 PCP Chyna Maier MD     Chief Complaint:  Fever    S: Patient fee tablet Oral 2 times per day   • predniSONE  20 mg Oral Daily with breakfast   • enoxaparin  30 mg Subcutaneous Daily   • Insulin Aspart Pen  2-10 Units Subcutaneous TID CC and HS       ASSESSMENT / PLAN:     1. Fever due to Acute sinusitis  1.  Continue Aug

## 2018-10-12 NOTE — OCCUPATIONAL THERAPY NOTE
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

## 2018-10-13 NOTE — DISCHARGE SUMMARY
ANIBAL HOSPITALIST  DISCHARGE SUMMARY     Braydon Man Patient Status:  Observation    1930 MRN [id number]   [facility] 5NW-A Attending No att. providers found   Hosp Day # 0 PCP Leah Denney MD     Date of Admission: 10/10/2018  D week.  Patient discharged in good condition    Procedures during hospitalization:   • None     Incidental or significant findings and recommendations (brief descriptions):  • Per Brief Synopsis of Hospital Course    Lab/Test results pending at Discharge: Take 2 tablets (1,000 mg total) by mouth daily with breakfast.   Quantity:  180 tablet  Refills:  1     Omeprazole 40 MG Cpdr      TAKE 1 CAPSULE BY MOUTH TWICE DAILY   Quantity:  180 capsule  Refills:  1     ONETOUCH DELICA LANCETS 64P [facility]      1 lancet

## 2018-10-15 ENCOUNTER — PATIENT OUTREACH (OUTPATIENT)
Dept: CASE MANAGEMENT | Age: 83
End: 2018-10-15

## 2018-10-15 ENCOUNTER — TELEPHONE (OUTPATIENT)
Dept: INTERNAL MEDICINE CLINIC | Facility: CLINIC | Age: 83
End: 2018-10-15

## 2018-10-15 DIAGNOSIS — Z02.9 ENCOUNTERS FOR ADMINISTRATIVE PURPOSE: ICD-10-CM

## 2018-10-15 PROCEDURE — 1111F DSCHRG MED/CURRENT MED MERGE: CPT

## 2018-10-15 NOTE — PROGRESS NOTES
Initial Post Discharge Follow Up   Discharge Date: 10/12/18  Contact Date: 10/15/2018    Consent Verification:  Assessment Completed With: Patient  HIPAA Verified?   Yes    Discharge Dx:   Sinusitis, weakness    General:   • How have you been since your Oral Tab TAKE 1 TABLET (100 MG TOTAL) BY MOUTH DAILY.  (Patient taking differently: Take 100 mg by mouth daily.  ) Disp: 90 tablet Rfl: 2   Omeprazole 40 MG Oral Capsule Delayed Release TAKE 1 CAPSULE BY MOUTH TWICE DAILY Disp: 180 capsule Rfl: 1   MetFORMI addressed before your next visit with your PCP?  (DME, meds, disease concerns, Etc): No     Follow up appointments:       Your appointments     Date & Time Appointment Department [facility])    Oct 19, 2018  2:15 PM T Hospital Follow Up with Bay Rodriguez,

## 2018-10-15 NOTE — TELEPHONE ENCOUNTER
Spoke with pt today for TCM. Pt d/c from hospital on 10/12/18 dx sinusitis, weakness. Pt states since her discharged she has developed a non-productive cough that sounds \"croup like\" with congestion.   Pt states she does not have any other symptoms such

## 2018-10-15 NOTE — TELEPHONE ENCOUNTER
Called pt, answered and got disconnected. Attempted to call back twice- no answer. LMTCB. Regarding sx, offer sooner appt, as there are openings available.

## 2018-10-19 ENCOUNTER — OFFICE VISIT (OUTPATIENT)
Dept: INTERNAL MEDICINE CLINIC | Facility: CLINIC | Age: 83
End: 2018-10-19
Payer: MEDICARE

## 2018-10-19 VITALS
TEMPERATURE: 98 F | RESPIRATION RATE: 16 BRPM | OXYGEN SATURATION: 98 % | DIASTOLIC BLOOD PRESSURE: 62 MMHG | WEIGHT: 167 LBS | HEART RATE: 68 BPM | BODY MASS INDEX: 31.13 KG/M2 | SYSTOLIC BLOOD PRESSURE: 128 MMHG | HEIGHT: 61.6 IN

## 2018-10-19 DIAGNOSIS — J01.90 ACUTE BACTERIAL SINUSITIS: Primary | ICD-10-CM

## 2018-10-19 DIAGNOSIS — B96.89 ACUTE BACTERIAL SINUSITIS: Primary | ICD-10-CM

## 2018-10-19 DIAGNOSIS — K21.9 GASTROESOPHAGEAL REFLUX DISEASE, ESOPHAGITIS PRESENCE NOT SPECIFIED: ICD-10-CM

## 2018-10-19 DIAGNOSIS — E11.42 DIABETIC POLYNEUROPATHY ASSOCIATED WITH TYPE 2 DIABETES MELLITUS (HCC): ICD-10-CM

## 2018-10-19 DIAGNOSIS — G25.81 RESTLESS LEG SYNDROME: ICD-10-CM

## 2018-10-19 PROCEDURE — 99214 OFFICE O/P EST MOD 30 MIN: CPT | Performed by: INTERNAL MEDICINE

## 2018-10-19 PROCEDURE — 1111F DSCHRG MED/CURRENT MED MERGE: CPT | Performed by: INTERNAL MEDICINE

## 2018-10-19 RX ORDER — OMEPRAZOLE 40 MG/1
CAPSULE, DELAYED RELEASE ORAL
Qty: 180 CAPSULE | Refills: 1 | Status: SHIPPED | OUTPATIENT
Start: 2018-10-19 | End: 2019-07-25

## 2018-10-19 RX ORDER — PRAMIPEXOLE DIHYDROCHLORIDE 0.5 MG/1
0.5 TABLET ORAL NIGHTLY
Qty: 90 TABLET | Refills: 3 | Status: SHIPPED | OUTPATIENT
Start: 2018-10-19 | End: 2019-09-29

## 2018-10-19 RX ORDER — BENZONATATE 200 MG/1
200 CAPSULE ORAL 3 TIMES DAILY PRN
Qty: 30 CAPSULE | Refills: 0 | Status: SHIPPED | OUTPATIENT
Start: 2018-10-19 | End: 2018-11-09

## 2018-10-19 NOTE — PROGRESS NOTES
Willian Martinez is a 80year old female. Patient presents with:  Hospital F/U: cn  room 4: hospital follow up on 10/10/18   Diabetes  Sleep Problem: RLS      HPI:     Patient here for [facility]  Admitted 10/10/-10/12/18 for weakness, high fevers and fatigue.  CXR total) by mouth 3 (three) times daily as needed for cough.  Disp: 30 capsule Rfl: 0   MetFORMIN HCl 500 MG Oral Tab TAKE TWO TABLETS BY MOUTH TWICE DAILY Disp: 360 tablet Rfl: 1   melatonin 3 MG Oral Tab 1 tab nightly ORN for sleep Disp: 30 tablet Rfl: 0 degeneration    • Macular degeneration    • Neuropathy    • Osteoarthritis     all joints   • Pneumonia, organism unspecified(486)     04/2015   • PONV (postoperative nausea and vomiting)    • Problems with swallowing     due hiatal hernia; gaggs at times S2  GI: normal bowel sounds, soft, NT/ND  EXTREMITIES: no cyanosis, clubbing or edema, normal strength and tone  NEURO: Alert and oriented    ASSESSMENT AND PLAN:      Acute bacterial sinusitis - pt s/p augmentin. Continue fluids, rest, nasal saline.  Can

## 2018-10-22 ENCOUNTER — OFFICE VISIT (OUTPATIENT)
Dept: FAMILY MEDICINE CLINIC | Facility: CLINIC | Age: 83
End: 2018-10-22
Payer: MEDICARE

## 2018-10-22 VITALS
RESPIRATION RATE: 20 BRPM | HEART RATE: 80 BPM | OXYGEN SATURATION: 99 % | DIASTOLIC BLOOD PRESSURE: 60 MMHG | SYSTOLIC BLOOD PRESSURE: 160 MMHG | TEMPERATURE: 97 F

## 2018-10-22 DIAGNOSIS — R39.9 UTI SYMPTOMS: Primary | ICD-10-CM

## 2018-10-22 PROCEDURE — 87088 URINE BACTERIA CULTURE: CPT | Performed by: PHYSICIAN ASSISTANT

## 2018-10-22 PROCEDURE — 87186 SC STD MICRODIL/AGAR DIL: CPT | Performed by: PHYSICIAN ASSISTANT

## 2018-10-22 PROCEDURE — 99213 OFFICE O/P EST LOW 20 MIN: CPT | Performed by: PHYSICIAN ASSISTANT

## 2018-10-22 PROCEDURE — 87086 URINE CULTURE/COLONY COUNT: CPT | Performed by: PHYSICIAN ASSISTANT

## 2018-10-22 PROCEDURE — 81003 URINALYSIS AUTO W/O SCOPE: CPT | Performed by: PHYSICIAN ASSISTANT

## 2018-10-22 RX ORDER — CEPHALEXIN 500 MG/1
500 CAPSULE ORAL 2 TIMES DAILY
Qty: 14 CAPSULE | Refills: 0 | Status: SHIPPED | OUTPATIENT
Start: 2018-10-22 | End: 2018-11-02 | Stop reason: ALTCHOICE

## 2018-10-22 NOTE — PROGRESS NOTES
CHIEF COMPLAINT:   Patient presents with:  UTI      HPI:   Jenae Sierra is a 80year old female who presents with symptoms of UTI. She comes to the walk in clinic with her son. Complaining of urinary frequency, urgency, dysuria for last 2 days.  Sympto Azelastine HCl 0.1 % Nasal Solution 2 sprays by Nasal route 2 (two) times daily. Disp: 1 Bottle Rfl: 11   losartan 100 MG Oral Tab TAKE 1 TABLET (100 MG TOTAL) BY MOUTH DAILY.  (Patient taking differently: Take 100 mg by mouth daily.  ) Disp: 90 tablet Rfl: due hiatal hernia; gaggs at times   • Restless leg syndrome    • Restless leg syndrome    • Shortness of breath     ON EXERTION   • Torn rotator cuff     both shoulders per pt   • Vertigo       Social History:  Social History    Tobacco Use      Smoking s ASSESSMENT AND PLAN:   Aniya Mehta is a 80year old female presents with UTI symptoms. ASSESSMENT:  Uti symptoms  (primary encounter diagnosis) UA consistent with UTI. Cough.  S/p recent hospital discharge Completed course of augmentin for s · Not al UTIs and cases of cystitis are bladder infections. · Bladder infections are the most common type of cystitis. Symptoms  The infection causes inflammation in the urethra and bladder, which causes many of the symptoms.  The most common symptoms of · You can use acetaminophen or ibuprofen for pain, fever, or discomfort, unless another medicine was prescribed. You can also alternate them, or use both together.  They work differently and are a diferent class of medicines, so taking them together is not If X-rays were done, you will be told if the results will affect your treatment.   Call 911  Call emergency services if any of the following occur:  · Trouble breathing  · Difficulty arousing, confusion  · Fainting or loss of consciousness  · Rapid heart ra

## 2018-10-31 PROCEDURE — 99490 CHRNC CARE MGMT STAFF 1ST 20: CPT

## 2018-11-02 ENCOUNTER — OFFICE VISIT (OUTPATIENT)
Dept: INTERNAL MEDICINE CLINIC | Facility: CLINIC | Age: 83
End: 2018-11-02
Payer: MEDICARE

## 2018-11-02 VITALS
TEMPERATURE: 98 F | HEART RATE: 63 BPM | SYSTOLIC BLOOD PRESSURE: 130 MMHG | DIASTOLIC BLOOD PRESSURE: 64 MMHG | WEIGHT: 162 LBS | HEIGHT: 61.6 IN | OXYGEN SATURATION: 98 % | BODY MASS INDEX: 30.19 KG/M2

## 2018-11-02 DIAGNOSIS — G25.81 RESTLESS LEG SYNDROME: ICD-10-CM

## 2018-11-02 DIAGNOSIS — J06.9 URI, ACUTE: Primary | ICD-10-CM

## 2018-11-02 PROCEDURE — 99213 OFFICE O/P EST LOW 20 MIN: CPT | Performed by: INTERNAL MEDICINE

## 2018-11-02 RX ORDER — ONDANSETRON 4 MG/1
4 TABLET, FILM COATED ORAL EVERY 8 HOURS PRN
Qty: 30 TABLET | Refills: 0 | Status: SHIPPED | OUTPATIENT
Start: 2018-11-02 | End: 2019-06-24

## 2018-11-02 RX ORDER — AZITHROMYCIN 250 MG/1
TABLET, FILM COATED ORAL
Qty: 6 TABLET | Refills: 0 | Status: SHIPPED | OUTPATIENT
Start: 2018-11-02 | End: 2018-11-09 | Stop reason: ALTCHOICE

## 2018-11-02 NOTE — PROGRESS NOTES
Carmen Cho is a 80year old female. Patient presents with: Follow - Up: LG. Room 4. Cough for 3 weeks       HPI:     Patient here for f/u-  C/o cough and congestion for 3 weeks. No fevers or chills.  Gets nausea from so much sputum, would like medicat times daily as needed for cough.  Disp: 30 capsule Rfl: 0   MetFORMIN HCl 500 MG Oral Tab TAKE TWO TABLETS BY MOUTH TWICE DAILY Disp: 360 tablet Rfl: 1   melatonin 3 MG Oral Tab 1 tab nightly ORN for sleep Disp: 30 tablet Rfl: 0   Azelastine HCl 0.1 % Nasal • PONV (postoperative nausea and vomiting)    • Problems with swallowing     due hiatal hernia; gaggs at times   • Restless leg syndrome    • Restless leg syndrome    • Shortness of breath     ON EXERTION   • Torn rotator cuff     both shoulders per pt Z-MIKE) 250 MG Oral Tab 6 tablet 0     Si tabs po the first day and then one daily until finished   • Ondansetron HCl (ZOFRAN) 4 mg tablet 30 tablet 0     Sig: Take 1 tablet (4 mg total) by mouth every 8 (eight) hours as needed for Nausea.        Imaging

## 2018-11-05 ENCOUNTER — OFFICE VISIT (OUTPATIENT)
Dept: FAMILY MEDICINE CLINIC | Facility: CLINIC | Age: 83
End: 2018-11-05
Payer: MEDICARE

## 2018-11-05 VITALS
SYSTOLIC BLOOD PRESSURE: 124 MMHG | HEART RATE: 79 BPM | OXYGEN SATURATION: 97 % | DIASTOLIC BLOOD PRESSURE: 64 MMHG | TEMPERATURE: 98 F

## 2018-11-05 DIAGNOSIS — R39.9 UTI SYMPTOMS: Primary | ICD-10-CM

## 2018-11-05 PROCEDURE — 87086 URINE CULTURE/COLONY COUNT: CPT | Performed by: NURSE PRACTITIONER

## 2018-11-05 PROCEDURE — 81003 URINALYSIS AUTO W/O SCOPE: CPT | Performed by: NURSE PRACTITIONER

## 2018-11-05 PROCEDURE — 99213 OFFICE O/P EST LOW 20 MIN: CPT | Performed by: NURSE PRACTITIONER

## 2018-11-05 RX ORDER — SULFAMETHOXAZOLE AND TRIMETHOPRIM 800; 160 MG/1; MG/1
1 TABLET ORAL 2 TIMES DAILY
Qty: 14 TABLET | Refills: 0 | Status: SHIPPED | OUTPATIENT
Start: 2018-11-05 | End: 2018-11-09

## 2018-11-05 NOTE — PROGRESS NOTES
CHIEF COMPLAINT:   Patient presents with:  UTI: x1 day, burning, frequency      HPI:   Eva Carballo is a 80year old female who presents with symptoms of UTI. Complaining of urinary frequency, urgency, dysuria for last 1 days.    Reports had UTI 3 weeks MetFORMIN HCl 500 MG Oral Tab TAKE TWO TABLETS BY MOUTH TWICE DAILY Disp: 360 tablet Rfl: 1   melatonin 3 MG Oral Tab 1 tab nightly ORN for sleep Disp: 30 tablet Rfl: 0   Azelastine HCl 0.1 % Nasal Solution 2 sprays by Nasal route 2 (two) times daily.  Disp • Problems with swallowing     due hiatal hernia; gaggs at times   • Restless leg syndrome    • Restless leg syndrome    • Shortness of breath     ON EXERTION   • Torn rotator cuff     both shoulders per pt   • Vertigo       Social History:  Social History Jorgito Mckinney is a 80year old female presents with UTI symptoms. ASSESSMENT:  Uti symptoms  (primary encounter diagnosis)    PLAN: Meds as listed below. Comfort measures as described in Patient Instructions.  Advised patient if cough and/or UTI sympto · The bladder holds urine until you are ready to let it out. · The urethra carries urine from the bladder out of the body.  It is shorter in women, so bacteria can move through it more easily. The urethra is longer in men, so a UTI is less likely to reach

## 2018-11-09 ENCOUNTER — OFFICE VISIT (OUTPATIENT)
Dept: INTERNAL MEDICINE CLINIC | Facility: CLINIC | Age: 83
End: 2018-11-09
Payer: MEDICARE

## 2018-11-09 VITALS
HEART RATE: 72 BPM | RESPIRATION RATE: 16 BRPM | WEIGHT: 156 LBS | OXYGEN SATURATION: 95 % | SYSTOLIC BLOOD PRESSURE: 126 MMHG | TEMPERATURE: 98 F | HEIGHT: 61 IN | DIASTOLIC BLOOD PRESSURE: 70 MMHG | BODY MASS INDEX: 29.45 KG/M2

## 2018-11-09 DIAGNOSIS — R63.4 WEIGHT LOSS: ICD-10-CM

## 2018-11-09 DIAGNOSIS — R11.0 NAUSEA: Primary | ICD-10-CM

## 2018-11-09 DIAGNOSIS — R10.10 UPPER ABDOMINAL PAIN: ICD-10-CM

## 2018-11-09 PROCEDURE — 99213 OFFICE O/P EST LOW 20 MIN: CPT | Performed by: INTERNAL MEDICINE

## 2018-11-09 NOTE — PROGRESS NOTES
Jarrod Shaver is a 80year old female. Patient presents with:  Nausea: Pt is having nausea and having difficulty eating. Pt lost about 10 lbs in the past 5 weeks. Pt went to UC for UTI symptoms on 11/5/18 and culture was negative.  LB-rm 9      HPI:     P (ONETOUCH ULTRA BLUE) In Vitro Strip TEST BLOOD SUGAR ONE TIME DAILY Disp: 100 strip Rfl: 3   MetFORMIN HCl 500 MG Oral Tab TAKE TWO TABLETS BY MOUTH TWICE DAILY Disp: 360 tablet Rfl: 1   melatonin 3 MG Oral Tab 1 tab nightly ORN for sleep Disp: 30 tablet organism unspecified(486)     04/2015   • PONV (postoperative nausea and vomiting)    • Problems with swallowing     due hiatal hernia; gaggs at times   • Restless leg syndrome    • Restless leg syndrome    • Shortness of breath     ON EXERTION   • Torn ro discussed differential with pt including gastritis, PUD,and  malignancy. Check labs and abdomen ultrasound.  F/u GI (she has seen [facility])  H/o Barretts esophagus- continue daily omeprazole, discussed she likely needs EGD with above symptoms    O

## 2018-11-12 ENCOUNTER — TELEPHONE (OUTPATIENT)
Dept: INTERNAL MEDICINE CLINIC | Facility: CLINIC | Age: 83
End: 2018-11-12

## 2018-11-12 NOTE — TELEPHONE ENCOUNTER
Pt states that TB referred her to Alona Platt MD for nausea and weight loss.  Pt states that she is unable to get into that doctor until February and would like to know if TB would want to refer her to someone else,     Please advise

## 2018-11-12 NOTE — TELEPHONE ENCOUNTER
Patient will call Dr Abner Garcia office and check if she is able to schedule sooner with any of the other providers. If unable to get in sooner than February with any of the other doctors will call our office.    CHELO

## 2018-11-13 ENCOUNTER — TELEPHONE (OUTPATIENT)
Dept: INTERNAL MEDICINE CLINIC | Facility: CLINIC | Age: 83
End: 2018-11-13

## 2018-11-13 ENCOUNTER — PATIENT OUTREACH (OUTPATIENT)
Dept: CASE MANAGEMENT | Age: 83
End: 2018-11-13

## 2018-11-13 DIAGNOSIS — E78.2 MIXED DYSLIPIDEMIA: ICD-10-CM

## 2018-11-13 DIAGNOSIS — E86.0 DEHYDRATION: ICD-10-CM

## 2018-11-13 DIAGNOSIS — R53.1 WEAKNESS GENERALIZED: ICD-10-CM

## 2018-11-13 DIAGNOSIS — R53.83 OTHER FATIGUE: ICD-10-CM

## 2018-11-13 DIAGNOSIS — I10 ESSENTIAL HYPERTENSION: ICD-10-CM

## 2018-11-13 DIAGNOSIS — Z91.81 AT RISK FOR FALLING: ICD-10-CM

## 2018-11-13 DIAGNOSIS — J06.9 URI, ACUTE: ICD-10-CM

## 2018-11-13 NOTE — TELEPHONE ENCOUNTER
She can try otc antihistamine like claritin or zyrtec for possible allergies. If not improving by end of week, she should come in for OV.

## 2018-11-13 NOTE — TELEPHONE ENCOUNTER
Patient states she has a very wet, hacky cough and is not sure what to do. The cough is keeping her up a night and at times she gets very short of breath. Patient completed abx therapy states shes had cough not for 5 weeks. Please contact patient .  Thanks

## 2018-11-13 NOTE — TELEPHONE ENCOUNTER
Spoke with patient stating has had productive cough--mucus is clear, runny nose for 5-6 weeks now, TB prescribed abx Azithromycin which she completed about a week ago, did have some relief, not taking any thing OTC, No fevers, No chills, No nausea, No vomi

## 2018-11-13 NOTE — PROGRESS NOTES
Spoke to Joselyn Elizabeth for CCM call.     Medical History  Patient Active Problem List:     Diabetic polyneuropathy associated with type 2 diabetes mellitus ([facility] [address]     Gastroesophageal reflux disease     Essential hypertension     Restless leg syndr her up at night. Patient does not follow up with PCP until 11/30/2018 (message routed to PCP office regarding cough)    Future appts;   Audiology 11/16/2018  CAROL HUDSON 11/28/2018  [facility] 11/29/2018  CAROL HORN 02/2019    Previous goal met:Progressing (Co

## 2018-11-13 NOTE — TELEPHONE ENCOUNTER
Spoke with patient advised she can try otc antihistamine like claritin or zyrtec for possible allergies, if not improving by end of week, she should come in for OV. Patient verbalized understanding and agreeable to POC.

## 2018-11-15 ENCOUNTER — LAB ENCOUNTER (OUTPATIENT)
Dept: LAB | Facility: HOSPITAL | Age: 83
End: 2018-11-15
Attending: INTERNAL MEDICINE
Payer: MEDICARE

## 2018-11-15 ENCOUNTER — HOSPITAL ENCOUNTER (OUTPATIENT)
Dept: ULTRASOUND IMAGING | Facility: HOSPITAL | Age: 83
Discharge: HOME | End: 2018-11-15
Attending: INTERNAL MEDICINE
Payer: MEDICARE

## 2018-11-15 DIAGNOSIS — R11.0 NAUSEA: ICD-10-CM

## 2018-11-15 DIAGNOSIS — R10.10 UPPER ABDOMINAL PAIN: ICD-10-CM

## 2018-11-15 DIAGNOSIS — R63.4 WEIGHT LOSS: ICD-10-CM

## 2018-11-15 DIAGNOSIS — E11.42 DIABETIC POLYNEUROPATHY ASSOCIATED WITH TYPE 2 DIABETES MELLITUS (HCC): ICD-10-CM

## 2018-11-15 PROCEDURE — 84443 ASSAY THYROID STIM HORMONE: CPT

## 2018-11-15 PROCEDURE — 85025 COMPLETE CBC W/AUTO DIFF WBC: CPT

## 2018-11-15 PROCEDURE — 76700 US EXAM ABDOM COMPLETE: CPT | Performed by: INTERNAL MEDICINE

## 2018-11-15 PROCEDURE — 83036 HEMOGLOBIN GLYCOSYLATED A1C: CPT

## 2018-11-15 PROCEDURE — 84439 ASSAY OF FREE THYROXINE: CPT

## 2018-11-15 PROCEDURE — 36415 COLL VENOUS BLD VENIPUNCTURE: CPT

## 2018-11-15 PROCEDURE — 80053 COMPREHEN METABOLIC PANEL: CPT

## 2018-11-28 VITALS
TEMPERATURE: 97.7 F | HEART RATE: 68 BPM | SYSTOLIC BLOOD PRESSURE: 127 MMHG | DIASTOLIC BLOOD PRESSURE: 65 MMHG | RESPIRATION RATE: 18 BRPM | OXYGEN SATURATION: 96 %

## 2018-11-28 PROBLEM — R63.4 WEIGHT LOSS: Status: ACTIVE | Noted: 2018-11-28

## 2018-11-28 PROBLEM — R11.0 NAUSEA: Status: ACTIVE | Noted: 2018-11-28

## 2018-11-28 PROBLEM — R05.9 COUGH: Status: ACTIVE | Noted: 2018-11-28

## 2018-11-28 PROBLEM — K22.70 BARRETT'S ESOPHAGUS WITHOUT DYSPLASIA: Status: ACTIVE | Noted: 2018-11-28

## 2018-11-28 PROBLEM — R10.33 PERIUMBILICAL ABDOMINAL PAIN: Status: ACTIVE | Noted: 2018-11-28

## 2018-11-30 ENCOUNTER — HOSPITAL ENCOUNTER (OUTPATIENT)
Dept: CT IMAGING | Facility: HOSPITAL | Age: 83
Discharge: HOME | End: 2018-11-30
Attending: NURSE PRACTITIONER
Payer: MEDICARE

## 2018-11-30 DIAGNOSIS — R05.9 COUGH: ICD-10-CM

## 2018-11-30 DIAGNOSIS — R11.0 NAUSEA: ICD-10-CM

## 2018-11-30 DIAGNOSIS — R10.33 PERIUMBILICAL ABDOMINAL PAIN: ICD-10-CM

## 2018-11-30 DIAGNOSIS — R63.4 WEIGHT LOSS: ICD-10-CM

## 2018-11-30 PROCEDURE — 71260 CT THORAX DX C+: CPT | Performed by: NURSE PRACTITIONER

## 2018-11-30 PROCEDURE — 82565 ASSAY OF CREATININE: CPT

## 2018-11-30 PROCEDURE — 99490 CHRNC CARE MGMT STAFF 1ST 20: CPT

## 2018-11-30 PROCEDURE — 74177 CT ABD & PELVIS W/CONTRAST: CPT | Performed by: NURSE PRACTITIONER

## 2018-12-05 RX ORDER — LORATADINE 10 MG/1
1 CAPSULE, LIQUID FILLED ORAL DAILY
Status: ON HOLD | COMMUNITY
End: 2019-08-17 | Stop reason: ALTCHOICE

## 2018-12-07 ENCOUNTER — PATIENT OUTREACH (OUTPATIENT)
Dept: CASE MANAGEMENT | Age: 83
End: 2018-12-07

## 2018-12-07 DIAGNOSIS — R53.1 WEAKNESS GENERALIZED: ICD-10-CM

## 2018-12-07 DIAGNOSIS — R11.0 NAUSEA: ICD-10-CM

## 2018-12-07 DIAGNOSIS — I10 ESSENTIAL HYPERTENSION: ICD-10-CM

## 2018-12-07 DIAGNOSIS — K22.70 BARRETT'S ESOPHAGUS WITHOUT DYSPLASIA: ICD-10-CM

## 2018-12-07 DIAGNOSIS — Z91.81 AT RISK FOR FALLING: ICD-10-CM

## 2018-12-07 DIAGNOSIS — R05.9 COUGH: ICD-10-CM

## 2018-12-07 DIAGNOSIS — E78.00 PURE HYPERCHOLESTEROLEMIA: ICD-10-CM

## 2018-12-07 DIAGNOSIS — E78.2 MIXED DYSLIPIDEMIA: ICD-10-CM

## 2018-12-07 DIAGNOSIS — R63.4 WEIGHT LOSS: ICD-10-CM

## 2018-12-07 RX ORDER — OMEGA-3-ACID ETHYL ESTERS 1 G/1
1 CAPSULE, LIQUID FILLED ORAL DAILY
COMMUNITY
Start: 2018-11-14 | End: 2019-06-24

## 2018-12-07 NOTE — PROGRESS NOTES
Spoke to Joselyn Elizabeth for CCM call.     Medical History  Patient Active Problem List:     Diabetic polyneuropathy associated with type 2 diabetes mellitus ([facility] [address]     Gastroesophageal reflux disease     Essential hypertension     Restless leg syndr returned. Cold symptoms from November have resolved. Patient is staying hydrated by drinking 3-4 cups of water daily. Patients son is remodeling patients home to accommodate her chronic illness like macular degeneration, weakness and unsteady gait.

## 2018-12-11 ENCOUNTER — ANESTHESIA EVENT (OUTPATIENT)
Dept: ENDOSCOPY | Facility: HOSPITAL | Age: 83
End: 2018-12-11

## 2018-12-12 ENCOUNTER — HOSPITAL ENCOUNTER (OUTPATIENT)
Facility: HOSPITAL | Age: 83
Setting detail: HOSPITAL OUTPATIENT SURGERY
Discharge: HOME | End: 2018-12-12
Attending: INTERNAL MEDICINE | Admitting: INTERNAL MEDICINE
Payer: MEDICARE

## 2018-12-12 ENCOUNTER — ANESTHESIA (OUTPATIENT)
Dept: ENDOSCOPY | Facility: HOSPITAL | Age: 83
End: 2018-12-12

## 2018-12-12 VITALS
DIASTOLIC BLOOD PRESSURE: 62 MMHG | WEIGHT: 155 LBS | OXYGEN SATURATION: 97 % | HEIGHT: 61.5 IN | TEMPERATURE: 98 F | BODY MASS INDEX: 28.89 KG/M2 | RESPIRATION RATE: 18 BRPM | SYSTOLIC BLOOD PRESSURE: 142 MMHG | HEART RATE: 65 BPM

## 2018-12-12 DIAGNOSIS — R93.89 ABNORMAL CT SCAN: ICD-10-CM

## 2018-12-12 DIAGNOSIS — R63.4 WEIGHT LOSS: ICD-10-CM

## 2018-12-12 DIAGNOSIS — K31.89 GASTRIC WALL THICKENING: ICD-10-CM

## 2018-12-12 PROCEDURE — 88305 TISSUE EXAM BY PATHOLOGIST: CPT | Performed by: INTERNAL MEDICINE

## 2018-12-12 PROCEDURE — 0DB58ZZ EXCISION OF ESOPHAGUS, VIA NATURAL OR ARTIFICIAL OPENING ENDOSCOPIC: ICD-10-PCS | Performed by: INTERNAL MEDICINE

## 2018-12-12 PROCEDURE — 0DB68ZZ EXCISION OF STOMACH, VIA NATURAL OR ARTIFICIAL OPENING ENDOSCOPIC: ICD-10-PCS | Performed by: INTERNAL MEDICINE

## 2018-12-12 PROCEDURE — 82962 GLUCOSE BLOOD TEST: CPT

## 2018-12-12 RX ORDER — SODIUM CHLORIDE, SODIUM LACTATE, POTASSIUM CHLORIDE, CALCIUM CHLORIDE 600; 310; 30; 20 MG/100ML; MG/100ML; MG/100ML; MG/100ML
INJECTION, SOLUTION INTRAVENOUS CONTINUOUS
Status: DISCONTINUED | OUTPATIENT
Start: 2018-12-12 | End: 2018-12-12

## 2018-12-12 RX ORDER — DEXTROSE MONOHYDRATE 25 G/50ML
50 INJECTION, SOLUTION INTRAVENOUS
Status: DISCONTINUED | OUTPATIENT
Start: 2018-12-12 | End: 2018-12-12

## 2018-12-12 NOTE — ANESTHESIA PREPROCEDURE EVALUATION
PRE-OP EVALUATION    Patient Name: Meredith Man    Pre-op Diagnosis: Gastric wall thickening [K31.89]  Weight loss [R63.4]  Abnormal CT scan [R93.89]    Procedure(s):  ESOPHAGOGASTRODUODENOSCOPY AND ENDOSCOPIC ULTRASOUND      Surgeon(s) and Role:     * D TABLET (100 MG TOTAL) BY MOUTH DAILY.  (Patient taking differently: Take 100 mg by mouth daily.  ) Disp: 90 tablet Rfl: 2   PROCTOSOL HC 2.5 % Rectal Cream USE TWO TIMES A DAY AS NEEDED FOR ITCHING Disp: 28.35 g Rfl: 0   CALCIUM-VITAMIN D OR Take 1,200 mg b Tobacco Use      Smoking status: Former Smoker        Packs/day: 1.00        Years: 35.00        Pack years: 28        Quit date: 1988        Years since quittin.3      Smokeless tobacco: Never Used    Alcohol use: No      Drug use: No     Availa

## 2018-12-12 NOTE — H&P
History & Physical Examination    Patient Name: Fabricio Villalta  MRN: [id number]  CSN: [id number]  YOB: 1930    Diagnosis: history of rebolledo's esophagus, weight loss, nausea    Present Illness: 81 y/o F history as above presents for EGD/EUS. 2 (two) times daily. Disp:  Rfl:  Taking   Loperamide HCl (IMODIUM) 2 MG Oral Cap Take 2 mg by mouth daily as needed for Diarrhea. Disp:  Rfl:  Past Month at Unknown time   Ferrous Sulfate 324 (65 FE) MG Oral Tab EC Take by mouth 2 (two) times daily.    Dis murmur    • Hyperlipidemia     not taking lipitor due to side effects   • Incontinence     uses pads   • Indigestion    • Itch of skin    • Leaking of urine    • Leaky heart valve     slight per pt- had echo 12/2015   • Loss of appetite    • Macular degene Quit date: 1988        Years since quittin.3      Smokeless tobacco: Never Used    Alcohol use: No      SYSTEM Check if Review is Normal Check if Physical Exam is Normal If not normal, please explain:   HEENT [ x] [x ]    NECK & BACK [ x] [

## 2018-12-12 NOTE — OPERATIVE REPORT
Jimmy Man Patient Status:  Hospital Outpatient Surgery    1930 MRN [id number]   [facility] ([place], [place]) ENDOSCOPY Attending Evans Lau DO   Hosp Day # 0 PCP Marce Zamora MD       PREOPERATIVE DIAGNOSIS/INDICATION: Nausea, We Normal stomach s/p random gastric biopsies. 4. As symptoms resolving, no EUS indicated. RECOMMENDATIONS:    1. Follow up pathology results. 2. Advise small frequent meals. 3. Continue PPI daily.        China Coronado  Gastroenterology/Advanced End[facility]

## 2018-12-12 NOTE — ANESTHESIA POSTPROCEDURE EVALUATION
JOHN L. FARRAR [facility], STVS BRETT Man Patient Status:  Hospital Outpatient Surgery   Age/Gender 80year old female MRN [id number]   Location [address] [facility]. Attending Zbigniew Croft, [address] [facility] Day # 0 PCP Omid Farrell MD       Anesthesia Post-op N

## 2018-12-18 ENCOUNTER — TELEPHONE (OUTPATIENT)
Dept: INTERNAL MEDICINE CLINIC | Facility: CLINIC | Age: 83
End: 2018-12-18

## 2018-12-18 NOTE — TELEPHONE ENCOUNTER
Received letter from patient stated she needs a letter from TB to have a mailbox at her front door. Letter needs to state pt's health problems and that she needs a mailbox at her front door. Letter placed on TB's bin. Please advise.

## 2018-12-19 ENCOUNTER — TELEPHONE (OUTPATIENT)
Dept: INTERNAL MEDICINE CLINIC | Facility: CLINIC | Age: 83
End: 2018-12-19

## 2018-12-20 NOTE — TELEPHONE ENCOUNTER
S/w pt to notify her letter has been written by TB. Pt asked for me to mail her letter along w/ TB's letter to the post office and a copy to her address. Letters have been mailed.

## 2018-12-21 NOTE — TELEPHONE ENCOUNTER
LOV: 5/23/18 TB  FOV: 8/24/18  LAST RX: 1/23/18 omeprazole 40mg 1 cap twice a day 180 caps 0 refills   LAST LABS:7/17/17 microalb,tsh,cbc,cmp,a1c,lipid  PER PROTOCOL: to provider
28-Dec-2018 12:00

## 2018-12-31 PROCEDURE — 99490 CHRNC CARE MGMT STAFF 1ST 20: CPT

## 2019-01-08 ENCOUNTER — PATIENT OUTREACH (OUTPATIENT)
Dept: CASE MANAGEMENT | Age: 84
End: 2019-01-08

## 2019-01-08 DIAGNOSIS — E78.2 MIXED DYSLIPIDEMIA: ICD-10-CM

## 2019-01-08 DIAGNOSIS — Z91.81 AT RISK FOR FALLING: ICD-10-CM

## 2019-01-08 DIAGNOSIS — H35.3230 BILATERAL EXUDATIVE AGE-RELATED MACULAR DEGENERATION, UNSPECIFIED STAGE (HCC): ICD-10-CM

## 2019-01-08 DIAGNOSIS — I10 ESSENTIAL HYPERTENSION: ICD-10-CM

## 2019-01-09 ENCOUNTER — OFFICE VISIT (OUTPATIENT)
Dept: INTERNAL MEDICINE CLINIC | Facility: CLINIC | Age: 84
End: 2019-01-09
Payer: MEDICARE

## 2019-01-09 VITALS
WEIGHT: 156 LBS | HEART RATE: 60 BPM | HEIGHT: 61.5 IN | BODY MASS INDEX: 29.08 KG/M2 | DIASTOLIC BLOOD PRESSURE: 66 MMHG | SYSTOLIC BLOOD PRESSURE: 128 MMHG | TEMPERATURE: 99 F | RESPIRATION RATE: 16 BRPM

## 2019-01-09 DIAGNOSIS — E04.1 THYROID NODULE: ICD-10-CM

## 2019-01-09 DIAGNOSIS — R63.4 WEIGHT LOSS: ICD-10-CM

## 2019-01-09 DIAGNOSIS — I10 ESSENTIAL HYPERTENSION: ICD-10-CM

## 2019-01-09 DIAGNOSIS — R11.0 NAUSEA: ICD-10-CM

## 2019-01-09 DIAGNOSIS — E11.42 DIABETIC POLYNEUROPATHY ASSOCIATED WITH TYPE 2 DIABETES MELLITUS (HCC): Primary | ICD-10-CM

## 2019-01-09 PROCEDURE — 99214 OFFICE O/P EST MOD 30 MIN: CPT | Performed by: INTERNAL MEDICINE

## 2019-01-09 NOTE — PROGRESS NOTES
Heber Skinner is a 80year old female. Patient presents with:  Diabetes: follow up. LB-rm 4      HPI:     Patient here for f/u-  DM2- FBG 130s, +tingling in hands and feet, no sores.  Compliant with metformin  RLS-  pramipexole 0.5mg dose working well, no Disp: 180 capsule Rfl: 1   Glucose Blood (ONETOUCH ULTRA BLUE) In Vitro Strip TEST BLOOD SUGAR ONE TIME DAILY Disp: 100 strip Rfl: 3   MetFORMIN HCl 500 MG Oral Tab TAKE TWO TABLETS BY MOUTH TWICE DAILY Disp: 360 tablet Rfl: 1   melatonin 3 MG Oral Tab 1 t Essential hypertension    • Eye disease    • Fatigue    • Frequent UTI    • Full dentures    • Hearing impairment     bilateral hearing aids   • Hearing loss    • Heart palpitations    • Hemorrhoids    • High blood pressure    • History of cardiac murmur headaches  HEME: No adenopathy      EXAM:   /66 (BP Location: Right arm, Patient Position: Sitting, Cuff Size: adult)   Pulse 60   Temp 98.5 °F (36.9 °C) (Oral)   Resp 16   Ht 61.5\"   Wt 156 lb   Breastfeeding?  No   BMI 29.00 kg/m²   GENERAL: well d

## 2019-01-23 NOTE — PROGRESS NOTES
Spoke to Joselyn Elizabeth for CCM call.     Medical History  Patient Active Problem List:     Diabetic polyneuropathy associated with type 2 diabetes mellitus ([facility] [address]     Gastroesophageal reflux disease     Essential hypertension     Restless leg syndr and was told things take time. Patient states she's had laser treatment on both eye in the past and this has never happended.      Previous goal met:Progressing     Self Management Goals/Action Plan:    • Goal from previous outreach: Staying hydrated, indep

## 2019-01-31 PROCEDURE — 99490 CHRNC CARE MGMT STAFF 1ST 20: CPT

## 2019-02-04 ENCOUNTER — PATIENT OUTREACH (OUTPATIENT)
Dept: CASE MANAGEMENT | Age: 84
End: 2019-02-04

## 2019-02-04 DIAGNOSIS — R42 VERTIGO: ICD-10-CM

## 2019-02-04 DIAGNOSIS — I10 ESSENTIAL HYPERTENSION: ICD-10-CM

## 2019-02-04 DIAGNOSIS — H35.3230 BILATERAL EXUDATIVE AGE-RELATED MACULAR DEGENERATION, UNSPECIFIED STAGE (HCC): ICD-10-CM

## 2019-02-04 DIAGNOSIS — Z91.81 AT RISK FOR FALLING: ICD-10-CM

## 2019-02-04 DIAGNOSIS — E78.2 MIXED DYSLIPIDEMIA: ICD-10-CM

## 2019-02-04 DIAGNOSIS — H81.393 PERIPHERAL VERTIGO OF BOTH EARS: ICD-10-CM

## 2019-02-06 NOTE — PROGRESS NOTES
Spoke to Joselyn Elizabeth for CCM call.     Medical History  Patient Active Problem List:     Diabetic polyneuropathy associated with type 2 diabetes mellitus ([facility] [address]     Gastroesophageal reflux disease     Essential hypertension     Restless leg syndr trying to keep it off.        Previous goal met:Progressing    Self Management Goals/Action Plan:    · Goal from previous outreach: Staying hydrated, independant and out of the hopsital                        · Patient reported progress toward goal: continu

## 2019-02-08 ENCOUNTER — TELEPHONE (OUTPATIENT)
Dept: ENDOCRINOLOGY CLINIC | Facility: CLINIC | Age: 84
End: 2019-02-08

## 2019-02-12 ENCOUNTER — HOSPITAL ENCOUNTER (OUTPATIENT)
Dept: ULTRASOUND IMAGING | Facility: HOSPITAL | Age: 84
Discharge: HOME | End: 2019-02-12
Attending: INTERNAL MEDICINE
Payer: MEDICARE

## 2019-02-12 DIAGNOSIS — E04.1 THYROID NODULE: ICD-10-CM

## 2019-02-12 PROCEDURE — 76536 US EXAM OF HEAD AND NECK: CPT | Performed by: INTERNAL MEDICINE

## 2019-02-13 ENCOUNTER — DIABETIC EDUCATION (OUTPATIENT)
Dept: ENDOCRINOLOGY CLINIC | Facility: CLINIC | Age: 84
End: 2019-02-13
Payer: MEDICARE

## 2019-02-13 VITALS — WEIGHT: 156 LBS | BODY MASS INDEX: 29.08 KG/M2 | HEIGHT: 61.5 IN

## 2019-02-13 DIAGNOSIS — K64.9 HEMORRHOIDS, UNSPECIFIED HEMORRHOID TYPE: ICD-10-CM

## 2019-02-13 DIAGNOSIS — E11.42 DIABETIC POLYNEUROPATHY ASSOCIATED WITH TYPE 2 DIABETES MELLITUS (HCC): Primary | ICD-10-CM

## 2019-02-13 PROCEDURE — 97802 MEDICAL NUTRITION INDIV IN: CPT | Performed by: DIETITIAN, REGISTERED

## 2019-02-13 NOTE — TELEPHONE ENCOUNTER
Patient was in to see Maki in diabetic clinic, requesting refill on PROCTOSOL HC 2.5 % Rectal Cream, pt. States has h/o chronic hemorrhoids, off and on loose stools, itching, burning, no pain, no bleeding, no constipation.  No other symptoms or issues at t

## 2019-02-13 NOTE — PROGRESS NOTES
Jonathan Man  [id number] was seen for Diabetic Medical Nutrition Therapy:    Date: 2/13/2019  Referring Provider: Christa Hendrickson  Start time: 10:00 End time: 11:00    Assessment: Ht 61.5\"   Wt 156 lb   BMI 29.00 kg/m²   HgbA1C (%)   Date Value   11/15/2018

## 2019-02-28 PROCEDURE — 99490 CHRNC CARE MGMT STAFF 1ST 20: CPT

## 2019-03-05 ENCOUNTER — PATIENT OUTREACH (OUTPATIENT)
Dept: CASE MANAGEMENT | Age: 84
End: 2019-03-05

## 2019-03-05 DIAGNOSIS — E78.2 MIXED DYSLIPIDEMIA: ICD-10-CM

## 2019-03-05 DIAGNOSIS — I10 ESSENTIAL HYPERTENSION: ICD-10-CM

## 2019-03-05 DIAGNOSIS — Z91.81 AT RISK FOR FALLING: ICD-10-CM

## 2019-03-05 DIAGNOSIS — E78.00 PURE HYPERCHOLESTEROLEMIA: ICD-10-CM

## 2019-03-05 DIAGNOSIS — H35.3230 BILATERAL EXUDATIVE AGE-RELATED MACULAR DEGENERATION, UNSPECIFIED STAGE (HCC): ICD-10-CM

## 2019-03-06 DIAGNOSIS — K64.9 HEMORRHOIDS, UNSPECIFIED HEMORRHOID TYPE: ICD-10-CM

## 2019-03-06 NOTE — PROGRESS NOTES
Spoke to Joselyn Elizabeth for CCM call.     Medical History  Patient Active Problem List:     Diabetic polyneuropathy associated with type 2 diabetes mellitus ([facility] [place] [address]     Gastroesophageal reflux disease     Essential hypertension     Restless leg syndr avoids processed foods, sweets and too much sodium.     Previous goal met:Progressing    Self Management Goals/Action Plan:    · Goal from previous outreach: Staying hydrated, independant and out of the hopsital  •                          • Patient reporte

## 2019-03-13 ENCOUNTER — TELEPHONE (OUTPATIENT)
Dept: INTERNAL MEDICINE CLINIC | Facility: CLINIC | Age: 84
End: 2019-03-13

## 2019-03-13 NOTE — TELEPHONE ENCOUNTER
Called pt to inform, per TB, recommenced OTC Orajel may help. If not, to F/U with Dentist for further recommendations. Pt verbalized understanding and agreed with POC.

## 2019-03-13 NOTE — TELEPHONE ENCOUNTER
Pt called and stated that she has a sore under her dental plate and is asking if we can send in a prescription to help.  She has tried OTC anbesol and it numbs the area but doesn't heal.     Please advise

## 2019-03-13 NOTE — TELEPHONE ENCOUNTER
Called pt stating x2 weeks has been experiencing bottom gum irritation, soreness, and pain due to dentures. Not a cold sore. Dentures are worn down and have been rubbing against bottom gums- in process of getting new dentures soon.  Previous dentures did no

## 2019-03-26 ENCOUNTER — APPOINTMENT (OUTPATIENT)
Dept: LAB | Facility: HOSPITAL | Age: 84
End: 2019-03-26
Attending: INTERNAL MEDICINE
Payer: MEDICARE

## 2019-03-26 DIAGNOSIS — E11.42 DIABETIC POLYNEUROPATHY ASSOCIATED WITH TYPE 2 DIABETES MELLITUS (HCC): ICD-10-CM

## 2019-03-26 LAB
ALBUMIN SERPL-MCNC: 3.4 G/DL (ref 3.4–5)
ALBUMIN/GLOB SERPL: 0.9 {RATIO} (ref 1–2)
ALP LIVER SERPL-CCNC: 60 U/L (ref 55–142)
ALT SERPL-CCNC: 14 U/L (ref 13–56)
ANION GAP SERPL CALC-SCNC: 11 MMOL/L (ref 0–18)
AST SERPL-CCNC: 15 U/L (ref 15–37)
BILIRUB SERPL-MCNC: 0.7 MG/DL (ref 0.1–2)
BUN BLD-MCNC: 11 MG/DL (ref 7–18)
BUN/CREAT SERPL: 13.3 (ref 10–20)
CALCIUM BLD-MCNC: 8.4 MG/DL (ref 8.5–10.1)
CHLORIDE SERPL-SCNC: 109 MMOL/L (ref 98–107)
CHOLEST SMN-MCNC: 190 MG/DL (ref ?–200)
CO2 SERPL-SCNC: 24 MMOL/L (ref 21–32)
CREAT BLD-MCNC: 0.83 MG/DL (ref 0.55–1.02)
CREAT UR-SCNC: 194 MG/DL
EST. AVERAGE GLUCOSE BLD GHB EST-MCNC: 157 MG/DL (ref 68–126)
GLOBULIN PLAS-MCNC: 3.7 G/DL (ref 2.8–4.4)
GLUCOSE BLD-MCNC: 135 MG/DL (ref 70–99)
HBA1C MFR BLD HPLC: 7.1 % (ref ?–5.7)
HDLC SERPL-MCNC: 45 MG/DL (ref 40–59)
LDLC SERPL CALC-MCNC: 117 MG/DL (ref ?–100)
M PROTEIN MFR SERPL ELPH: 7.1 G/DL (ref 6.4–8.2)
MICROALBUMIN UR-MCNC: 1.39 MG/DL
MICROALBUMIN/CREAT 24H UR-RTO: 7.2 UG/MG (ref ?–30)
NONHDLC SERPL-MCNC: 145 MG/DL (ref ?–130)
OSMOLALITY SERPL CALC.SUM OF ELEC: 299 MOSM/KG (ref 275–295)
POTASSIUM SERPL-SCNC: 3.9 MMOL/L (ref 3.5–5.1)
SODIUM SERPL-SCNC: 144 MMOL/L (ref 136–145)
TRIGL SERPL-MCNC: 141 MG/DL (ref 30–149)
VLDLC SERPL CALC-MCNC: 28 MG/DL (ref 0–30)

## 2019-03-26 PROCEDURE — 83036 HEMOGLOBIN GLYCOSYLATED A1C: CPT

## 2019-03-26 PROCEDURE — 80061 LIPID PANEL: CPT

## 2019-03-26 PROCEDURE — 82043 UR ALBUMIN QUANTITATIVE: CPT

## 2019-03-26 PROCEDURE — 36415 COLL VENOUS BLD VENIPUNCTURE: CPT

## 2019-03-26 PROCEDURE — 80053 COMPREHEN METABOLIC PANEL: CPT

## 2019-03-26 PROCEDURE — 82570 ASSAY OF URINE CREATININE: CPT

## 2019-03-29 ENCOUNTER — OFFICE VISIT (OUTPATIENT)
Dept: INTERNAL MEDICINE CLINIC | Facility: CLINIC | Age: 84
End: 2019-03-29
Payer: MEDICARE

## 2019-03-29 VITALS
TEMPERATURE: 98 F | RESPIRATION RATE: 20 BRPM | WEIGHT: 186 LBS | HEIGHT: 61.5 IN | SYSTOLIC BLOOD PRESSURE: 118 MMHG | DIASTOLIC BLOOD PRESSURE: 60 MMHG | HEART RATE: 68 BPM | BODY MASS INDEX: 34.67 KG/M2

## 2019-03-29 DIAGNOSIS — I10 ESSENTIAL HYPERTENSION: ICD-10-CM

## 2019-03-29 DIAGNOSIS — I38 VALVULAR INSUFFICIENCY: ICD-10-CM

## 2019-03-29 DIAGNOSIS — I51.89 DIASTOLIC DYSFUNCTION: ICD-10-CM

## 2019-03-29 DIAGNOSIS — Z91.09 ENVIRONMENTAL ALLERGIES: ICD-10-CM

## 2019-03-29 DIAGNOSIS — E78.00 HIGH CHOLESTEROL: ICD-10-CM

## 2019-03-29 DIAGNOSIS — E11.42 DIABETIC POLYNEUROPATHY ASSOCIATED WITH TYPE 2 DIABETES MELLITUS (HCC): Primary | ICD-10-CM

## 2019-03-29 PROCEDURE — 99214 OFFICE O/P EST MOD 30 MIN: CPT | Performed by: INTERNAL MEDICINE

## 2019-03-29 RX ORDER — AZELASTINE 1 MG/ML
2 SPRAY, METERED NASAL 2 TIMES DAILY
Qty: 1 BOTTLE | Refills: 11 | Status: SHIPPED | OUTPATIENT
Start: 2019-03-29 | End: 2019-08-13

## 2019-03-29 RX ORDER — PRAVASTATIN SODIUM 20 MG
20 TABLET ORAL NIGHTLY
Qty: 90 TABLET | Refills: 3 | Status: SHIPPED | OUTPATIENT
Start: 2019-03-29 | End: 2019-08-13

## 2019-03-29 NOTE — PROGRESS NOTES
Lenore Ramirez is a 80year old female. Patient presents with:   Follow - Up: room-4 cf. diabetic, high cholesterol, allergies      HPI:     Patient here for f/u-  High cholesterol -  and discussed goal less than 100 with DM2, pt does not want lipit A DAY AS NEEDED FOR ITCHING Disp: 28.35 g Rfl: 0   Loratadine (CLARITIN) 10 MG Oral Cap Take 1 tablet by mouth daily. Disp:  Rfl:    Pramipexole Dihydrochloride 0.5 MG Oral Tab Take 1 tablet (0.5 mg total) by mouth nightly.  Disp: 90 tablet Rfl: 3   Omepraz NO OXYGEN- no pulmonologist   • Deviated septum     left side   • Diabetes ([facility] [address]    • Diabetes mellitus ([facility] [address]    • Diarrhea, unspecified    • Disorder of thyroid     has Thyroid nodules   • Diverticulosis of large intestine    • Dizziness    • Esophagea Allergies  No Known Allergies      REVIEW OF SYSTEMS:   GENERAL HEALTH:  no fevers or chills  SKIN: denies any unusual skin lesions or rashes  RESPIRATORY: no cough  CARDIOVASCULAR: denies chest pain  GI: denies abdominal pain  : no dysuria  NEURO: d Sig: Take 1 tablet (20 mg total) by mouth nightly. • Azelastine HCl 0.1 % Nasal Solution 1 Bottle 11     Si sprays by Nasal route 2 (two) times daily. Imaging & Consults:  CARDIO - INTERNAL    Return in about 3 months (around 2019).   There

## 2019-03-31 PROCEDURE — 99490 CHRNC CARE MGMT STAFF 1ST 20: CPT

## 2019-04-08 ENCOUNTER — PATIENT OUTREACH (OUTPATIENT)
Dept: CASE MANAGEMENT | Age: 84
End: 2019-04-08

## 2019-04-08 DIAGNOSIS — Z91.81 AT RISK FOR FALLING: ICD-10-CM

## 2019-04-08 DIAGNOSIS — E78.2 MIXED DYSLIPIDEMIA: ICD-10-CM

## 2019-04-08 DIAGNOSIS — I10 ESSENTIAL HYPERTENSION: ICD-10-CM

## 2019-04-08 DIAGNOSIS — E78.00 PURE HYPERCHOLESTEROLEMIA: ICD-10-CM

## 2019-04-08 DIAGNOSIS — G25.81 RESTLESS LEG SYNDROME: ICD-10-CM

## 2019-04-08 DIAGNOSIS — H35.3230 BILATERAL EXUDATIVE AGE-RELATED MACULAR DEGENERATION, UNSPECIFIED STAGE (HCC): ICD-10-CM

## 2019-04-09 NOTE — PROGRESS NOTES
Spoke to Josleyn Elizabeth for CCM call.     Medical History  Patient Active Problem List:     Diabetic polyneuropathy associated with type 2 diabetes mellitus ([facility] [address]     Gastroesophageal reflux disease     Essential hypertension     Restless leg syndr price. Encouraged patient to contact medicare to find out about coverage. States she has not been sleeping well at night, finds herself thinking all night and ends up sleeping during the day until noon.  Declined follow up appointment with PCP at this ti

## 2019-04-16 ENCOUNTER — OFFICE VISIT (OUTPATIENT)
Dept: SURGERY | Facility: CLINIC | Age: 84
End: 2019-04-16
Payer: MEDICARE

## 2019-04-16 VITALS
OXYGEN SATURATION: 97 % | BODY MASS INDEX: 34.48 KG/M2 | DIASTOLIC BLOOD PRESSURE: 67 MMHG | HEART RATE: 66 BPM | SYSTOLIC BLOOD PRESSURE: 157 MMHG | HEIGHT: 61.5 IN | WEIGHT: 185 LBS

## 2019-04-16 DIAGNOSIS — D05.11 DUCTAL CARCINOMA IN SITU (DCIS) OF RIGHT BREAST: Primary | ICD-10-CM

## 2019-04-16 PROCEDURE — 99214 OFFICE O/P EST MOD 30 MIN: CPT | Performed by: SURGERY

## 2019-04-16 NOTE — PROGRESS NOTES
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     Disease Status:  Surgical treatment complete, Endocrine therapy deferred and Radiation deferre Frequent UTI    • Full dentures    • Hearing impairment     bilateral hearing aids   • Hearing loss    • Heart palpitations    • Hemorrhoids    • High blood pressure    • History of cardiac murmur    • Hyperlipidemia     not taking lipitor due to side effe menarche at age 15  Age of Menopause: 46  Type: Natural   She has history of hormone replacement therapy for 2 years, last in the 1980's. She denies any history of oral contraceptive use. She denies infertility treatment to achieve pregnancy.     Jon Granda Disp:  Rfl:    Loperamide HCl (IMODIUM) 2 MG Oral Cap Take 2 mg by mouth daily as needed for Diarrhea. Disp:  Rfl:    Ferrous Sulfate 324 (65 FE) MG Oral Tab EC Take by mouth 2 (two) times daily.    Disp:  Rfl:    Aspirin (ASPIR-81 OR) Take 81 mg by mouth d or chest pain while walking.     Breasts:  See history of present illness    Gastrointestinal:     There is no history of difficulty or pain with swallowing, +reflux symptoms, vomiting, dark or bloody stools, constipation, yellowing of the skin, indigestion midline trachea and no thyromegaly. Range of motion is good at both shoulders. Breasts are symmetrical. The tumorectomy site is in theright  breast and shows no evidence of local recurrence. Both nipples are everted with no discharge.  There is not dominant

## 2019-04-30 PROCEDURE — 99490 CHRNC CARE MGMT STAFF 1ST 20: CPT

## 2019-05-02 ENCOUNTER — TELEPHONE (OUTPATIENT)
Dept: INTERNAL MEDICINE CLINIC | Facility: CLINIC | Age: 84
End: 2019-05-02

## 2019-05-02 ENCOUNTER — PATIENT OUTREACH (OUTPATIENT)
Dept: CASE MANAGEMENT | Age: 84
End: 2019-05-02

## 2019-05-02 DIAGNOSIS — Z91.81 AT RISK FOR FALLING: ICD-10-CM

## 2019-05-02 DIAGNOSIS — I10 ESSENTIAL HYPERTENSION: ICD-10-CM

## 2019-05-02 DIAGNOSIS — E11.42 DIABETIC POLYNEUROPATHY ASSOCIATED WITH TYPE 2 DIABETES MELLITUS (HCC): ICD-10-CM

## 2019-05-02 DIAGNOSIS — R53.83 OTHER FATIGUE: ICD-10-CM

## 2019-05-02 DIAGNOSIS — E78.2 MIXED DYSLIPIDEMIA: ICD-10-CM

## 2019-05-02 DIAGNOSIS — R53.1 WEAKNESS GENERALIZED: ICD-10-CM

## 2019-05-02 DIAGNOSIS — E78.00 PURE HYPERCHOLESTEROLEMIA: ICD-10-CM

## 2019-05-02 DIAGNOSIS — G25.81 RESTLESS LEG SYNDROME: ICD-10-CM

## 2019-05-02 NOTE — PROGRESS NOTES
Spoke to Joselyn Elizabeth for CCM call.     Medical History  Patient Active Problem List:     Diabetic polyneuropathy associated with type 2 diabetes mellitus ([facility] [address]     Gastroesophageal reflux disease     Essential hypertension     Restless leg syndr ranging in the 140's. States she tries to avoid too many carb's. Patient expressed concern with transportation. States he sons seems to be very busy and she does not want to disturb him .   Previous goal met:Progressing     Self Management Goals/Action

## 2019-05-03 NOTE — TELEPHONE ENCOUNTER
Called pt to inform, per TB, not sure of audiologist. Recommended to see ENT- Dr. Rizwana Hernandez [address] , [address]  [phone]  Pt verbalized understanding and agreed with POC.

## 2019-05-30 NOTE — PROGRESS NOTES
Patient left message on voicemail last night requesting information for an orthotics or diabetic shoes.  Returned patients call [facility] I was going to suggest [facility] for orthotics [phone]

## 2019-05-31 PROCEDURE — 99490 CHRNC CARE MGMT STAFF 1ST 20: CPT

## 2019-06-03 ENCOUNTER — OFFICE VISIT (OUTPATIENT)
Dept: INTERNAL MEDICINE CLINIC | Facility: CLINIC | Age: 84
End: 2019-06-03
Payer: MEDICARE

## 2019-06-03 ENCOUNTER — PATIENT OUTREACH (OUTPATIENT)
Dept: CASE MANAGEMENT | Age: 84
End: 2019-06-03

## 2019-06-03 VITALS
DIASTOLIC BLOOD PRESSURE: 62 MMHG | WEIGHT: 166 LBS | OXYGEN SATURATION: 97 % | SYSTOLIC BLOOD PRESSURE: 138 MMHG | BODY MASS INDEX: 30.94 KG/M2 | TEMPERATURE: 98 F | HEART RATE: 71 BPM | HEIGHT: 61.5 IN

## 2019-06-03 DIAGNOSIS — R05.9 COUGH: ICD-10-CM

## 2019-06-03 DIAGNOSIS — J06.9 URI, ACUTE: Primary | ICD-10-CM

## 2019-06-03 DIAGNOSIS — I10 ESSENTIAL HYPERTENSION: ICD-10-CM

## 2019-06-03 DIAGNOSIS — R53.1 WEAKNESS GENERALIZED: ICD-10-CM

## 2019-06-03 DIAGNOSIS — E78.2 MIXED DYSLIPIDEMIA: ICD-10-CM

## 2019-06-03 PROCEDURE — 99213 OFFICE O/P EST LOW 20 MIN: CPT | Performed by: INTERNAL MEDICINE

## 2019-06-03 RX ORDER — AZITHROMYCIN 250 MG/1
TABLET, FILM COATED ORAL
Qty: 6 TABLET | Refills: 0 | Status: SHIPPED | OUTPATIENT
Start: 2019-06-03 | End: 2019-06-12 | Stop reason: ALTCHOICE

## 2019-06-03 NOTE — PROGRESS NOTES
Eva Carballo is a 80year old female. Patient presents with:  Cold: LG. Room 3. Nasal and chest congestion for 4 days       HPI:     Patient c/o cough, congestion, low grade fever for 5 days. No sick contacts. No SOB/CP.  She talked to her pharmacist and Disp: 30 tablet Rfl: 0   Pramipexole Dihydrochloride 0.5 MG Oral Tab Take 1 tablet (0.5 mg total) by mouth nightly.  Disp: 90 tablet Rfl: 3   Omeprazole 40 MG Oral Capsule Delayed Release TAKE 1 CAPSULE BY MOUTH TWICE DAILY Disp: 180 capsule Rfl: 1   Glucos Fatigue    • Frequent UTI    • Full dentures    • Hearing impairment     bilateral hearing aids   • Hearing loss    • Heart palpitations    • Hemorrhoids    • High blood pressure    • History of cardiac murmur    • Hyperlipidemia     not taking lipitor due GENERAL: well developed, well nourished,in no apparent distress  SKIN: no rashes,no suspicious lesions  HEENT: atraumatic, normocephalic, nares yellow discharge, OP +erythema, no exudates, TMs +erythema and cerumen (partial)  NECK: supple,no adenopathy

## 2019-06-03 NOTE — PROGRESS NOTES
Melissa Mahan to Joselyn Elizabeth verified for CCM call.     Medical History  Patient Active Problem List:     Diabetic polyneuropathy associated with type 2 diabetes mellitus ([facility] [address]     Gastroesophageal reflux disease     Essential hypertension     Restless leg synd to purchase new hearing aids. Patient inquired about diabetic shoes. States she would like to purchase some or orthotics. Advised patient I left her a message last week with information. Patient states she has not checked her message as she forgets.  Pr

## 2019-06-04 ENCOUNTER — TELEPHONE (OUTPATIENT)
Dept: INTERNAL MEDICINE CLINIC | Facility: CLINIC | Age: 84
End: 2019-06-04

## 2019-06-04 NOTE — TELEPHONE ENCOUNTER
Paperwork received from [facility] in regards to pt's diabetic foot care. Placed in TB's bin for completion.

## 2019-06-05 NOTE — TELEPHONE ENCOUNTER
Diagnosis listed in form do not fit patient, has to have certain deformities of foot etc to qualify it seems.   I will discuss with her further in next OV

## 2019-06-05 NOTE — TELEPHONE ENCOUNTER
S/w pt to notify her of information stated below. Pt verbalized understanding. Forms placed in upcoming appt folder.

## 2019-06-11 ENCOUNTER — TELEPHONE (OUTPATIENT)
Dept: INTERNAL MEDICINE CLINIC | Facility: CLINIC | Age: 84
End: 2019-06-11

## 2019-06-11 NOTE — TELEPHONE ENCOUNTER
Pt not feeling well, has a cough, feeling weak, Just finished antibiotics for cough. 6-3-19.  Would like to see TB

## 2019-06-11 NOTE — TELEPHONE ENCOUNTER
Called pt back to triage. Pt currently c/o \"full nose\", productive cough, headache, sinus pressure, weakness. Currently denies fever/chills. Pt seen 6/3 and prescribed Z-madalyn (which she completed and feels little relief from symptoms).   Pt worried abou

## 2019-06-12 ENCOUNTER — OFFICE VISIT (OUTPATIENT)
Dept: INTERNAL MEDICINE CLINIC | Facility: CLINIC | Age: 84
End: 2019-06-12
Payer: MEDICARE

## 2019-06-12 ENCOUNTER — HOSPITAL ENCOUNTER (OUTPATIENT)
Dept: GENERAL RADIOLOGY | Facility: HOSPITAL | Age: 84
Discharge: HOME | End: 2019-06-12
Attending: INTERNAL MEDICINE
Payer: MEDICARE

## 2019-06-12 VITALS
WEIGHT: 166 LBS | HEART RATE: 76 BPM | RESPIRATION RATE: 16 BRPM | BODY MASS INDEX: 30.94 KG/M2 | TEMPERATURE: 98 F | DIASTOLIC BLOOD PRESSURE: 62 MMHG | HEIGHT: 61.5 IN | SYSTOLIC BLOOD PRESSURE: 130 MMHG

## 2019-06-12 DIAGNOSIS — J34.89 SINUS PRESSURE: ICD-10-CM

## 2019-06-12 DIAGNOSIS — R05.3 PERSISTENT COUGH: ICD-10-CM

## 2019-06-12 DIAGNOSIS — R05.3 PERSISTENT COUGH: Primary | ICD-10-CM

## 2019-06-12 PROCEDURE — 99213 OFFICE O/P EST LOW 20 MIN: CPT | Performed by: INTERNAL MEDICINE

## 2019-06-12 PROCEDURE — 71046 X-RAY EXAM CHEST 2 VIEWS: CPT | Performed by: INTERNAL MEDICINE

## 2019-06-12 RX ORDER — BENZONATATE 200 MG/1
200 CAPSULE ORAL 3 TIMES DAILY PRN
Qty: 30 CAPSULE | Refills: 0 | Status: SHIPPED | OUTPATIENT
Start: 2019-06-12 | End: 2019-08-13

## 2019-06-12 RX ORDER — AMOXICILLIN AND CLAVULANATE POTASSIUM 875; 125 MG/1; MG/1
1 TABLET, FILM COATED ORAL 2 TIMES DAILY
Qty: 20 TABLET | Refills: 0 | Status: SHIPPED | OUTPATIENT
Start: 2019-06-12 | End: 2019-06-22

## 2019-06-12 NOTE — PROGRESS NOTES
Alena Arcos is a 80year old female.   Patient presents with:  Nasal Congestion: SN Rm 4; nasal congestion, cough, completed Rx but continues to feel bad      HPI:     Patient c/o nasal congestion, productive cough, sinus pressure, malaise and lethargy s Loratadine (CLARITIN) 10 MG Oral Cap Take 1 tablet by mouth daily. Disp:  Rfl:    Ondansetron HCl (ZOFRAN) 4 mg tablet Take 1 tablet (4 mg total) by mouth every 8 (eight) hours as needed for Nausea. Disp: 30 tablet Rfl: 0   Pramipexole Dihydrochloride 0. Diarrhea, unspecified    • Disorder of thyroid     has Thyroid nodules   • Diverticulosis of large intestine    • Dizziness    • Esophageal reflux    • Essential hypertension    • Eye disease    • Fatigue    • Frequent UTI    • Full dentures    • Hearing i unusual skin lesions or rashes  RESPIRATORY: + cough  CARDIOVASCULAR: denies chest pain  GI: denies abdominal pain  : no dysuria  NEURO: denies headaches  HEME: No adenopathy      EXAM:   /62 (BP Location: Right arm, Patient Position: Sitting, Cuff

## 2019-06-19 ENCOUNTER — APPOINTMENT (OUTPATIENT)
Dept: GENERAL RADIOLOGY | Facility: HOSPITAL | Age: 84
End: 2019-06-19
Attending: PHYSICIAN ASSISTANT
Payer: MEDICARE

## 2019-06-19 ENCOUNTER — HOSPITAL ENCOUNTER (EMERGENCY)
Facility: HOSPITAL | Age: 84
Discharge: HOME | End: 2019-06-19
Attending: EMERGENCY MEDICINE
Payer: MEDICARE

## 2019-06-19 VITALS
BODY MASS INDEX: 30.75 KG/M2 | WEIGHT: 165 LBS | SYSTOLIC BLOOD PRESSURE: 129 MMHG | HEART RATE: 74 BPM | HEIGHT: 61.5 IN | RESPIRATION RATE: 18 BRPM | OXYGEN SATURATION: 96 % | DIASTOLIC BLOOD PRESSURE: 75 MMHG | TEMPERATURE: 98 F

## 2019-06-19 DIAGNOSIS — J44.1 COPD WITH ACUTE EXACERBATION (HCC): Primary | ICD-10-CM

## 2019-06-19 PROCEDURE — 36415 COLL VENOUS BLD VENIPUNCTURE: CPT

## 2019-06-19 PROCEDURE — 85025 COMPLETE CBC W/AUTO DIFF WBC: CPT | Performed by: PHYSICIAN ASSISTANT

## 2019-06-19 PROCEDURE — 94640 AIRWAY INHALATION TREATMENT: CPT

## 2019-06-19 PROCEDURE — 81001 URINALYSIS AUTO W/SCOPE: CPT | Performed by: PHYSICIAN ASSISTANT

## 2019-06-19 PROCEDURE — 99284 EMERGENCY DEPT VISIT MOD MDM: CPT

## 2019-06-19 PROCEDURE — 71046 X-RAY EXAM CHEST 2 VIEWS: CPT | Performed by: PHYSICIAN ASSISTANT

## 2019-06-19 PROCEDURE — 80053 COMPREHEN METABOLIC PANEL: CPT | Performed by: PHYSICIAN ASSISTANT

## 2019-06-19 RX ORDER — ALBUTEROL SULFATE 90 UG/1
2 AEROSOL, METERED RESPIRATORY (INHALATION) EVERY 4 HOURS PRN
Qty: 1 INHALER | Refills: 0 | Status: SHIPPED | OUTPATIENT
Start: 2019-06-19 | End: 2019-07-19

## 2019-06-19 RX ORDER — IPRATROPIUM BROMIDE AND ALBUTEROL SULFATE 2.5; .5 MG/3ML; MG/3ML
3 SOLUTION RESPIRATORY (INHALATION) ONCE
Status: COMPLETED | OUTPATIENT
Start: 2019-06-19 | End: 2019-06-19

## 2019-06-19 RX ORDER — PREDNISONE 20 MG/1
40 TABLET ORAL DAILY
Qty: 10 TABLET | Refills: 0 | Status: SHIPPED | OUTPATIENT
Start: 2019-06-19 | End: 2019-06-24

## 2019-06-20 NOTE — ED PROVIDER NOTES
Patient Seen in: [facility] Emergency Department    History   Patient presents with:  Dyspnea MELO SOB (respiratory)    Stated Complaint: SOB, URI-ambulatory    HPI    Joon Perla is a pleasant 22-year-old female with a past medical history of COPD, diab • Macular degeneration    • Macular degeneration    • Nausea    • Neuropathy    • Osteoarthritis     all joints   • Osteoporosis    • Pain in joints    • Pneumonia, organism unspecified(486)     04/2015   • PONV (postoperative nausea and vomiting)    • Temp 98 °F (36.7 °C)   Temp src Temporal   SpO2 96 %   O2 Device None (Room air)       Current:/74   Pulse 86   Temp 98 °F (36.7 °C) (Temporal)   Resp 20   Ht 156.2 cm (5' 1.5\")   Wt 74.8 kg   SpO2 96%   BMI 30.67 kg/m²         Physical Exam    Ge Final result                 Please view results for these tests on the individual orders.    RAINBOW DRAW BLUE   RAINBOW DRAW LAVENDER   RAINBOW DRAW LIGHT GREEN   RAINBOW DRAW GOLD   CBC W/ DIFFERENTIAL          Xr Chest Pa + Lat Chest (cpt=71046) is completely resolved and she is able to have a full conversation without any evidence of dyspnea. She states she feels well. She will follow-up closely with her PCP.   She verbalizes that if she has any worsening symptoms should be reevaluated in the ER

## 2019-06-24 ENCOUNTER — OFFICE VISIT (OUTPATIENT)
Dept: INTERNAL MEDICINE CLINIC | Facility: CLINIC | Age: 84
End: 2019-06-24
Payer: MEDICARE

## 2019-06-24 VITALS
HEART RATE: 72 BPM | HEIGHT: 61.5 IN | RESPIRATION RATE: 17 BRPM | BODY MASS INDEX: 30.97 KG/M2 | OXYGEN SATURATION: 96 % | TEMPERATURE: 98 F | DIASTOLIC BLOOD PRESSURE: 72 MMHG | WEIGHT: 166.19 LBS | SYSTOLIC BLOOD PRESSURE: 132 MMHG

## 2019-06-24 DIAGNOSIS — Z78.0 POST-MENOPAUSAL: ICD-10-CM

## 2019-06-24 DIAGNOSIS — I10 ESSENTIAL HYPERTENSION: ICD-10-CM

## 2019-06-24 DIAGNOSIS — Z00.00 ENCOUNTER FOR ANNUAL HEALTH EXAMINATION: Primary | ICD-10-CM

## 2019-06-24 DIAGNOSIS — E11.42 DIABETIC POLYNEUROPATHY ASSOCIATED WITH TYPE 2 DIABETES MELLITUS (HCC): ICD-10-CM

## 2019-06-24 PROCEDURE — G0439 PPPS, SUBSEQ VISIT: HCPCS | Performed by: INTERNAL MEDICINE

## 2019-06-24 NOTE — PATIENT INSTRUCTIONS
Paulina Man's SCREENING SCHEDULE   Tests on this list are recommended by your physician but may not be covered, or covered at this frequency, by your insurer. Please check with your insurance carrier before scheduling to verify coverage.    PREVENTATIV Covered every 10 years- more often if abnormal There are no preventive care reminders to display for this patient.  Update Health Maintenance if applicable    Flex Sigmoidoscopy Screen  Covered every 5 years No results found for this or any previous visit Influenza  Covered Annually No orders found for this or any previous visit. Please get every year    Pneumococcal 13 (Prevnar)  Covered Once after 65 No orders found for this or any previous visit.  Please get once after your 65th birthday    Pneumococcal 2 Directives.

## 2019-06-24 NOTE — PROGRESS NOTES
HPI:   Aniya Mehta is a 80year old female who presents for a Medicare Subsequent Annual Wellness visit (Pt already had Initial Annual Wellness). Patient doing better with her cough;  no fevers, chills and energy better.  She is s/p zpak and on the Vision problems based on screening of functional status. Vision Problems? : Yes   She has Walking problems based on screening of functional status.    Difficulty walking?: Yes             Depression Screening (PHQ-2/PHQ-9): Over the LAST [address] Fever, unspecified fever cause     Acute frontal sinusitis     Dehydration     Hyponatremia     URI, acute     Weight loss     Cough     Periumbilical abdominal pain     Nausea     Mckeon's esophagus without dysplasia    Wt Readings from Last 3 Encounters differently: Take 3 mg by mouth nightly as needed.  )   losartan 100 MG Oral Tab TAKE 1 TABLET (100 MG TOTAL) BY MOUTH DAILY.  (Patient taking differently: Take 100 mg by mouth daily.  )   ONETOUCH DELICA LANCETS 35S Does not apply Misc 1 lancet by Finger s site left; angiogram; needle biopsy right; mastectomy partial (Right, 7/1/16); lyric needle localization w/ specimen 1 site right; lyric needle localization w/ specimen 1 site right (Right, 7/2016); and cholecystectomy.     Her family history includes Breast Ca edema   Pulses: 2+ and symmetric   Skin: Skin color, texture, turgor normal, no rashes or lesions   Lymph nodes: Cervical, supraclavicular, and axillary nodes normal   Neurologic: Alert and oriented       Vaccination History     Immunization History   Admi patient maintain a good energy level?: Daily Walks  How would you describe your daily physical activity?: Moderate  How would you describe your current health state?: Fair  How do you maintain positive mental well-being?: Puzzles      This section provided high risk No results found for: CHLAMYDIA No flowsheet data found. Screening Mammogram      Mammogram Annually to 76, then as discussed There are no preventive care reminders to display for this patient.  Update [employer] if applicable     Immun 03/26/2019 7.2        LDL  Annually LDL Cholesterol (mg/dL)   Date Value   03/26/2019 117 (H)    No flowsheet data found. Dilated Eye exam  Annually Data entered on: 3/30/2017   Last Dilated Eye Exam 3/30/2017     No flowsheet data found.

## 2019-06-28 ENCOUNTER — TELEPHONE (OUTPATIENT)
Dept: INTERNAL MEDICINE CLINIC | Facility: CLINIC | Age: 84
End: 2019-06-28

## 2019-06-28 NOTE — TELEPHONE ENCOUNTER
DM eye exam in [facility] abstracted D. O.S 1/22/19 abstracted and already reviewed by TB

## 2019-06-30 PROCEDURE — 99490 CHRNC CARE MGMT STAFF 1ST 20: CPT

## 2019-07-01 ENCOUNTER — PATIENT OUTREACH (OUTPATIENT)
Dept: CASE MANAGEMENT | Age: 84
End: 2019-07-01

## 2019-07-01 DIAGNOSIS — I10 ESSENTIAL HYPERTENSION: ICD-10-CM

## 2019-07-01 RX ORDER — LOSARTAN POTASSIUM 100 MG/1
TABLET ORAL
Qty: 90 TABLET | Refills: 1 | Status: SHIPPED | OUTPATIENT
Start: 2019-07-01 | End: 2020-03-16

## 2019-07-01 NOTE — TELEPHONE ENCOUNTER
Last Ov: 6/24/19, TB, physical  Upcoming appt: 8/6/19  Last labs: A1c, lipid, microalb/creat, CMP 3/26/19  Last Rx: losartan 100mg, #90, 2R 8/24/18    Per Protocol, passed. Rx refill sent to pharmacy.

## 2019-07-01 NOTE — PROGRESS NOTES
Spoke to patient states she is still in bed, just feels exhausted. Patient requested a call back tomorrow after noon. Time Spent This Encounter Total: 5 min medical record review, telephone communication, care plan updates where needed, and education.  P

## 2019-07-02 NOTE — PROGRESS NOTES
Spoke to Joselyn Elizabeth for CCM call.     Medical History  Patient Active Problem List:     Diabetic polyneuropathy associated with type 2 diabetes mellitus ([facility] [address]     Gastroesophageal reflux disease     Essential hypertension     Restless leg syndr of all the medications side effects hoping to get off some medications but states she now realizes she needs to take them all.     Cotinues trying to cut back on her sugar intake,drink plenty of water and states she walks daily three blocks to the grocery s today: 35  Physical assessment, complete health history, and need for CCM established by America Enciso MD.

## 2019-07-21 ENCOUNTER — OFFICE VISIT (OUTPATIENT)
Dept: FAMILY MEDICINE CLINIC | Facility: CLINIC | Age: 84
End: 2019-07-21
Payer: MEDICARE

## 2019-07-21 VITALS
DIASTOLIC BLOOD PRESSURE: 58 MMHG | SYSTOLIC BLOOD PRESSURE: 110 MMHG | RESPIRATION RATE: 20 BRPM | OXYGEN SATURATION: 99 % | TEMPERATURE: 98 F | HEART RATE: 79 BPM

## 2019-07-21 DIAGNOSIS — R39.9 UTI SYMPTOMS: Primary | ICD-10-CM

## 2019-07-21 LAB
MULTISTIX LOT#: ABNORMAL NUMERIC
PH, URINE: 5.5 (ref 4.5–8)
SPECIFIC GRAVITY: 1.02 (ref 1–1.03)
UROBILINOGEN,SEMI-QN: 0.2 MG/DL (ref 0–1.9)

## 2019-07-21 PROCEDURE — 87186 SC STD MICRODIL/AGAR DIL: CPT | Performed by: NURSE PRACTITIONER

## 2019-07-21 PROCEDURE — 87088 URINE BACTERIA CULTURE: CPT | Performed by: NURSE PRACTITIONER

## 2019-07-21 PROCEDURE — 87086 URINE CULTURE/COLONY COUNT: CPT | Performed by: NURSE PRACTITIONER

## 2019-07-21 PROCEDURE — 81003 URINALYSIS AUTO W/O SCOPE: CPT | Performed by: NURSE PRACTITIONER

## 2019-07-21 PROCEDURE — 99213 OFFICE O/P EST LOW 20 MIN: CPT | Performed by: NURSE PRACTITIONER

## 2019-07-21 RX ORDER — CEPHALEXIN 500 MG/1
500 CAPSULE ORAL 2 TIMES DAILY
Qty: 14 CAPSULE | Refills: 0 | Status: SHIPPED | OUTPATIENT
Start: 2019-07-21 | End: 2019-07-28

## 2019-07-21 NOTE — PROGRESS NOTES
CHIEF COMPLAINT:   Patient presents with:  UTI: dysuria, frequency x2 days      HPI:   Felisa Lindsey is a 80year old female who presents with symptoms of UTI. Complaining of urinary frequency, urgency, dysuria for last 2 days.  Symptoms have been  consi differently: Take 3 mg by mouth nightly as needed.  ) Disp: 30 tablet Rfl: 0   CALCIUM-VITAMIN D OR Take 1,200 mg by mouth daily. Disp:  Rfl:    Multiple Vitamins-Minerals (PRESERVISION AREDS) Oral Tab Take 1 tablet by mouth 2 (two) times daily.  Disp:  R • Restless leg syndrome    • Shortness of breath     ON EXERTION   • Torn rotator cuff     both shoulders per pt   • Uncomfortable fullness after meals    • Vertigo    • Visual impairment     glasses   • Wears glasses    • Weight loss       Social Histor presents with UTI symptoms. ASSESSMENT:  Uti symptoms  (primary encounter diagnosis)    PLAN: Meds as listed below. Comfort measures as described in Patient Instructions.  Patient/Parent has given us consent to send out a culture and understand that the

## 2019-07-25 DIAGNOSIS — K21.9 GASTROESOPHAGEAL REFLUX DISEASE, ESOPHAGITIS PRESENCE NOT SPECIFIED: ICD-10-CM

## 2019-07-25 RX ORDER — OMEPRAZOLE 40 MG/1
CAPSULE, DELAYED RELEASE ORAL
Qty: 180 CAPSULE | Refills: 1 | Status: SHIPPED | OUTPATIENT
Start: 2019-07-25 | End: 2020-04-27

## 2019-07-25 NOTE — TELEPHONE ENCOUNTER
LOV: 6/24/19 w/ TB for MWV  FOV: 8/6/19  Last labs: 3/26/19 CMP,LIPID,A1C,Microalbumin  Last Refill: 10/19/18 qt:180 1 refill    Per protocol routed to provider

## 2019-08-02 ENCOUNTER — PATIENT OUTREACH (OUTPATIENT)
Dept: CASE MANAGEMENT | Age: 84
End: 2019-08-02

## 2019-08-02 DIAGNOSIS — M19.042 ARTHRITIS OF BOTH HANDS: ICD-10-CM

## 2019-08-02 DIAGNOSIS — E11.42 DIABETIC POLYNEUROPATHY ASSOCIATED WITH TYPE 2 DIABETES MELLITUS (HCC): ICD-10-CM

## 2019-08-02 DIAGNOSIS — I10 ESSENTIAL HYPERTENSION: ICD-10-CM

## 2019-08-02 DIAGNOSIS — E87.1 HYPONATREMIA: ICD-10-CM

## 2019-08-02 DIAGNOSIS — G25.81 RESTLESS LEG SYNDROME: ICD-10-CM

## 2019-08-02 DIAGNOSIS — E78.2 MIXED DYSLIPIDEMIA: ICD-10-CM

## 2019-08-02 DIAGNOSIS — M19.041 ARTHRITIS OF BOTH HANDS: ICD-10-CM

## 2019-08-02 NOTE — PROGRESS NOTES
8/2/2019  Spoke to Blanca Florentino for CCM.       Updates to patient care team/ comments: None  Patient reported changes in medications: Not using Azelastine  Med Adherence  Comment: Taking as directed     Health Maintenance:  Up to date  Influenza Vaccine(1) due o • Update to previous barriers: Pt is going to [employer] 8/3 to check into hearing aids    • Patient Reported New Barriers And Concerns: Has several doctor appointments she needs to make                    - Plan for overcoming all barriers: Call the transpo

## 2019-08-08 ENCOUNTER — TELEPHONE (OUTPATIENT)
Dept: INTERNAL MEDICINE CLINIC | Facility: CLINIC | Age: 84
End: 2019-08-08

## 2019-08-08 NOTE — TELEPHONE ENCOUNTER
Patient states she has had low back pain for the last couple of days, hx of arthritis, scoliosis, states the low back pain is across the low back w/o sciatica, constant pain whether sitting or walking, better when lying down, no hip pain, rates about 7, ta

## 2019-08-08 NOTE — TELEPHONE ENCOUNTER
Pt is calling, says she has back pain. would like to see someone for it.  Would like to see a \"back doctor\"    Pls call to discuss

## 2019-08-08 NOTE — TELEPHONE ENCOUNTER
Patient referred to Dr Daryl Bustillo, info given. Pt verbalizes understanding and will call back if she cannot get in to see one of the MD's quickly.

## 2019-08-08 NOTE — TELEPHONE ENCOUNTER
OK, but the concern is they would not be able to get her in soon enough. If she would like to try to see [facility] (Dr. Camron juan) she can, or we can see her here for evaluation.

## 2019-08-09 ENCOUNTER — TELEPHONE (OUTPATIENT)
Dept: OBGYN CLINIC | Facility: CLINIC | Age: 84
End: 2019-08-09

## 2019-08-09 NOTE — TELEPHONE ENCOUNTER
PT has not been seen since 2017 and PT thinks she possibly has a  vaginal or bladder infection. PT has taken her bladder infection medicine so she does not think it is a bladder infection. She has lumps in her vaginal area as well.     Should she be

## 2019-08-09 NOTE — TELEPHONE ENCOUNTER
80year old patient complaining of recurring UTI symptoms and vaginal irritation.  States that she has had vaginal bumps since she saw Dr. Manpreet Mascorro last; nothing has gotten worse, but she wants to have them looked at since it has been awhile and she has va

## 2019-08-13 ENCOUNTER — APPOINTMENT (OUTPATIENT)
Dept: LAB | Facility: HOSPITAL | Age: 84
End: 2019-08-13
Attending: INTERNAL MEDICINE
Payer: MEDICARE

## 2019-08-13 ENCOUNTER — OFFICE VISIT (OUTPATIENT)
Dept: OBGYN CLINIC | Facility: CLINIC | Age: 84
End: 2019-08-13
Payer: MEDICARE

## 2019-08-13 VITALS — BODY MASS INDEX: 31 KG/M2 | SYSTOLIC BLOOD PRESSURE: 130 MMHG | WEIGHT: 166.63 LBS | DIASTOLIC BLOOD PRESSURE: 62 MMHG

## 2019-08-13 DIAGNOSIS — E78.00 HIGH CHOLESTEROL: ICD-10-CM

## 2019-08-13 DIAGNOSIS — L81.9 ABNORMAL PIGMENTATION: ICD-10-CM

## 2019-08-13 DIAGNOSIS — L29.2 VULVAR ITCHING: Primary | ICD-10-CM

## 2019-08-13 DIAGNOSIS — N90.89 VULVAR SKIN TAG: ICD-10-CM

## 2019-08-13 DIAGNOSIS — N30.00 ACUTE CYSTITIS WITHOUT HEMATURIA: ICD-10-CM

## 2019-08-13 LAB
ALBUMIN SERPL-MCNC: 3.4 G/DL (ref 3.4–5)
ALBUMIN/GLOB SERPL: 0.9 {RATIO} (ref 1–2)
ALP LIVER SERPL-CCNC: 60 U/L (ref 55–142)
ALT SERPL-CCNC: 17 U/L (ref 13–56)
ANION GAP SERPL CALC-SCNC: 6 MMOL/L (ref 0–18)
AST SERPL-CCNC: 12 U/L (ref 15–37)
BILIRUB SERPL-MCNC: 0.5 MG/DL (ref 0.1–2)
BUN BLD-MCNC: 13 MG/DL (ref 7–18)
BUN/CREAT SERPL: 14.3 (ref 10–20)
CALCIUM BLD-MCNC: 9 MG/DL (ref 8.5–10.1)
CHLORIDE SERPL-SCNC: 109 MMOL/L (ref 98–112)
CHOLEST SMN-MCNC: 179 MG/DL (ref ?–200)
CO2 SERPL-SCNC: 27 MMOL/L (ref 21–32)
CREAT BLD-MCNC: 0.91 MG/DL (ref 0.55–1.02)
GLOBULIN PLAS-MCNC: 3.7 G/DL (ref 2.8–4.4)
GLUCOSE BLD-MCNC: 129 MG/DL (ref 70–99)
HDLC SERPL-MCNC: 40 MG/DL (ref 40–59)
LDLC SERPL CALC-MCNC: 97 MG/DL (ref ?–100)
M PROTEIN MFR SERPL ELPH: 7.1 G/DL (ref 6.4–8.2)
NONHDLC SERPL-MCNC: 139 MG/DL (ref ?–130)
OSMOLALITY SERPL CALC.SUM OF ELEC: 296 MOSM/KG (ref 275–295)
POTASSIUM SERPL-SCNC: 3.7 MMOL/L (ref 3.5–5.1)
SODIUM SERPL-SCNC: 142 MMOL/L (ref 136–145)
TRIGL SERPL-MCNC: 208 MG/DL (ref 30–149)
VLDLC SERPL CALC-MCNC: 42 MG/DL (ref 0–30)

## 2019-08-13 PROCEDURE — 80053 COMPREHEN METABOLIC PANEL: CPT

## 2019-08-13 PROCEDURE — 80061 LIPID PANEL: CPT

## 2019-08-13 PROCEDURE — 99213 OFFICE O/P EST LOW 20 MIN: CPT | Performed by: NURSE PRACTITIONER

## 2019-08-13 PROCEDURE — 36415 COLL VENOUS BLD VENIPUNCTURE: CPT

## 2019-08-13 PROCEDURE — 87086 URINE CULTURE/COLONY COUNT: CPT | Performed by: NURSE PRACTITIONER

## 2019-08-13 NOTE — PROGRESS NOTES
Darrel Rivera is a 80year old female. HPI:   Patient presents with: Follow - Up: Pt Labia: right and left with small pin point sebaceous cysts pt states they are very itchy.      Pt saw Dr. Luis Gonzales in 2017 for vulvar sebacceous cysts and some pigme AREDS) Oral Tab Take 1 tablet by mouth 2 (two) times daily. Disp:  Rfl:    Loperamide HCl (IMODIUM) 2 MG Oral Cap Take 2 mg by mouth daily as needed for Diarrhea. Disp:  Rfl:    Ferrous Sulfate 324 (65 FE) MG Oral Tab EC Take by mouth 2 (two) times daily. • Vertigo    • Visual impairment     glasses   • Wears glasses    • Weight loss      Past Surgical History:   Procedure Laterality Date   • ANGIOGRAM      no intervention   • APPENDECTOMY  1935   • BREAST BIOPSY NEEDLE LOCALIZATION Right 7/1/2016    Perf 8/13/2019  Kan Jaeger APRN

## 2019-08-16 ENCOUNTER — OFFICE VISIT (OUTPATIENT)
Dept: FAMILY MEDICINE CLINIC | Facility: CLINIC | Age: 84
End: 2019-08-16
Payer: MEDICARE

## 2019-08-16 VITALS
HEART RATE: 78 BPM | OXYGEN SATURATION: 98 % | TEMPERATURE: 98 F | DIASTOLIC BLOOD PRESSURE: 74 MMHG | SYSTOLIC BLOOD PRESSURE: 142 MMHG

## 2019-08-16 DIAGNOSIS — R39.9 UTI SYMPTOMS: Primary | ICD-10-CM

## 2019-08-16 LAB
MULTISTIX LOT#: ABNORMAL NUMERIC
PH, URINE: 6 (ref 4.5–8)
PROTEIN (URINE DIPSTICK): 30 MG/DL
SPECIFIC GRAVITY: 1.02 (ref 1–1.03)
URINE-COLOR: YELLOW
UROBILINOGEN,SEMI-QN: 0.2 MG/DL (ref 0–1.9)

## 2019-08-16 PROCEDURE — 87088 URINE BACTERIA CULTURE: CPT | Performed by: PHYSICIAN ASSISTANT

## 2019-08-16 PROCEDURE — 87186 SC STD MICRODIL/AGAR DIL: CPT | Performed by: PHYSICIAN ASSISTANT

## 2019-08-16 PROCEDURE — 99213 OFFICE O/P EST LOW 20 MIN: CPT | Performed by: PHYSICIAN ASSISTANT

## 2019-08-16 PROCEDURE — 87086 URINE CULTURE/COLONY COUNT: CPT | Performed by: PHYSICIAN ASSISTANT

## 2019-08-16 PROCEDURE — 81003 URINALYSIS AUTO W/O SCOPE: CPT | Performed by: PHYSICIAN ASSISTANT

## 2019-08-16 RX ORDER — NITROFURANTOIN 25; 75 MG/1; MG/1
100 CAPSULE ORAL 2 TIMES DAILY
Qty: 14 CAPSULE | Refills: 0 | Status: ON HOLD | OUTPATIENT
Start: 2019-08-16 | End: 2019-08-17 | Stop reason: ALTCHOICE

## 2019-08-16 RX ORDER — PHENAZOPYRIDINE HYDROCHLORIDE 200 MG/1
200 TABLET, FILM COATED ORAL 3 TIMES DAILY PRN
Qty: 6 TABLET | Refills: 0 | Status: ON HOLD | OUTPATIENT
Start: 2019-08-16 | End: 2019-08-21

## 2019-08-16 NOTE — PROGRESS NOTES
CHIEF COMPLAINT:   Patient presents with:  UTI      HPI:   Wade Scanlon is a 80year old female who presents with symptoms of UTI. Complaining of urinary frequency, urgency, dysuria since July. She was seen here in the [facility] and had an e coli uti.   Sh melatonin 3 MG Oral Tab 1 tab nightly ORN for sleep (Patient taking differently: Take 3 mg by mouth nightly as needed.  ) Disp: 30 tablet Rfl: 0   CALCIUM-VITAMIN D OR Take 1,200 mg by mouth daily.    Disp:  Rfl:    Multiple Vitamins-Minerals (PRESERVISION • Pain in joints    • Pneumonia, organism unspecified(486)     04/2015   • PONV (postoperative nausea and vomiting)    • Problems with swallowing     due hiatal hernia; gaggs at times   • Restless leg syndrome    • Restless leg syndrome    • Shortness of b Nitrite Urine neg Negative    Leukocyte Esterase Urine large Negative    APPEARANCE turbid Clear    Color Urine yellow Yellow    Multistix Lot# [id number] Numeric    Multistix Expiration Date 10/31/19 Date         ASSESSMENT AND PLAN:   Asael nunez a The phrases \"bladder infection\", \"UTI,\" and \"cystitis,\" are often used to describe the same thing, but they are not always the same. Cystitis is an inflammation of the bladder. The  most common cause of cystitis is an infection.   In summary:  · Infec Bladder infections are diagnosed by a urine test. They are treated with antibiotics and usually clear up quickly without complications. Treatment helps prevent a more serious kidney infection.   Medicines  Medicines can help in the treatment of a bladder in · If you use birth control pills and have frequent bladder infections, discuss it with your doctor. Follow-up care  Return to this facility or see your doctor if all symptoms are not gone after 3 days of treatment.  This is especially important if you have

## 2019-08-17 ENCOUNTER — APPOINTMENT (OUTPATIENT)
Dept: GENERAL RADIOLOGY | Facility: HOSPITAL | Age: 84
End: 2019-08-17
Attending: EMERGENCY MEDICINE
Payer: MEDICARE

## 2019-08-17 ENCOUNTER — HOSPITAL ENCOUNTER (OUTPATIENT)
Facility: HOSPITAL | Age: 84
Setting detail: OBSERVATION
Discharge: HOME | End: 2019-08-21
Attending: EMERGENCY MEDICINE | Admitting: HOSPITALIST
Payer: MEDICARE

## 2019-08-17 DIAGNOSIS — N30.00 ACUTE CYSTITIS WITHOUT HEMATURIA: ICD-10-CM

## 2019-08-17 DIAGNOSIS — R53.1 WEAKNESS GENERALIZED: Primary | ICD-10-CM

## 2019-08-17 DIAGNOSIS — R50.9 FEVER, UNSPECIFIED FEVER CAUSE: ICD-10-CM

## 2019-08-17 PROBLEM — R73.9 HYPERGLYCEMIA: Status: ACTIVE | Noted: 2019-08-17

## 2019-08-17 LAB
ALBUMIN SERPL-MCNC: 3.6 G/DL (ref 3.4–5)
ALBUMIN/GLOB SERPL: 0.9 {RATIO} (ref 1–2)
ALP LIVER SERPL-CCNC: 65 U/L (ref 55–142)
ALT SERPL-CCNC: 18 U/L (ref 13–56)
ANION GAP SERPL CALC-SCNC: 5 MMOL/L (ref 0–18)
AST SERPL-CCNC: 12 U/L (ref 15–37)
BASOPHILS # BLD AUTO: 0.05 X10(3) UL (ref 0–0.2)
BASOPHILS NFR BLD AUTO: 0.5 %
BILIRUB SERPL-MCNC: 0.6 MG/DL (ref 0.1–2)
BUN BLD-MCNC: 13 MG/DL (ref 7–18)
BUN/CREAT SERPL: 14.3 (ref 10–20)
CALCIUM BLD-MCNC: 9 MG/DL (ref 8.5–10.1)
CHLORIDE SERPL-SCNC: 107 MMOL/L (ref 98–112)
CLARITY UR REFRACT.AUTO: CLEAR
CO2 SERPL-SCNC: 28 MMOL/L (ref 21–32)
CREAT BLD-MCNC: 0.91 MG/DL (ref 0.55–1.02)
DEPRECATED RDW RBC AUTO: 42.7 FL (ref 35.1–46.3)
EOSINOPHIL # BLD AUTO: 0.29 X10(3) UL (ref 0–0.7)
EOSINOPHIL NFR BLD AUTO: 2.6 %
ERYTHROCYTE [DISTWIDTH] IN BLOOD BY AUTOMATED COUNT: 13.2 % (ref 11–15)
GLOBULIN PLAS-MCNC: 3.8 G/DL (ref 2.8–4.4)
GLUCOSE BLD-MCNC: 160 MG/DL (ref 70–99)
GLUCOSE BLD-MCNC: 200 MG/DL (ref 70–99)
HCT VFR BLD AUTO: 36.6 % (ref 35–48)
HGB BLD-MCNC: 12.4 G/DL (ref 12–16)
IMM GRANULOCYTES # BLD AUTO: 0.05 X10(3) UL (ref 0–1)
IMM GRANULOCYTES NFR BLD: 0.5 %
LACTATE SERPL-SCNC: 1.2 MMOL/L (ref 0.4–2)
LYMPHOCYTES # BLD AUTO: 0.51 X10(3) UL (ref 1–4)
LYMPHOCYTES NFR BLD AUTO: 4.6 %
M PROTEIN MFR SERPL ELPH: 7.4 G/DL (ref 6.4–8.2)
MCH RBC QN AUTO: 29.7 PG (ref 26–34)
MCHC RBC AUTO-ENTMCNC: 33.9 G/DL (ref 31–37)
MCV RBC AUTO: 87.6 FL (ref 80–100)
MONOCYTES # BLD AUTO: 0.51 X10(3) UL (ref 0.1–1)
MONOCYTES NFR BLD AUTO: 4.6 %
NEUTROPHILS # BLD AUTO: 9.62 X10 (3) UL (ref 1.5–7.7)
NEUTROPHILS # BLD AUTO: 9.62 X10(3) UL (ref 1.5–7.7)
NEUTROPHILS NFR BLD AUTO: 87.2 %
OSMOLALITY SERPL CALC.SUM OF ELEC: 294 MOSM/KG (ref 275–295)
PLATELET # BLD AUTO: 198 10(3)UL (ref 150–450)
POTASSIUM SERPL-SCNC: 3.7 MMOL/L (ref 3.5–5.1)
RBC # BLD AUTO: 4.18 X10(6)UL (ref 3.8–5.3)
SODIUM SERPL-SCNC: 140 MMOL/L (ref 136–145)
SP GR UR STRIP.AUTO: <=1.005 (ref 1–1.03)
WBC # BLD AUTO: 11 X10(3) UL (ref 4–11)

## 2019-08-17 PROCEDURE — 99220 INITIAL OBSERVATION CARE,LEVL III: CPT | Performed by: HOSPITALIST

## 2019-08-17 PROCEDURE — 71046 X-RAY EXAM CHEST 2 VIEWS: CPT | Performed by: EMERGENCY MEDICINE

## 2019-08-17 RX ORDER — SODIUM CHLORIDE 9 MG/ML
INJECTION, SOLUTION INTRAVENOUS CONTINUOUS
Status: DISCONTINUED | OUTPATIENT
Start: 2019-08-17 | End: 2019-08-18

## 2019-08-17 RX ORDER — NITROFURANTOIN 25; 75 MG/1; MG/1
100 CAPSULE ORAL 2 TIMES DAILY
Status: ON HOLD | COMMUNITY
End: 2019-08-21

## 2019-08-17 RX ORDER — ENOXAPARIN SODIUM 100 MG/ML
40 INJECTION SUBCUTANEOUS DAILY
Status: DISCONTINUED | OUTPATIENT
Start: 2019-08-17 | End: 2019-08-18

## 2019-08-17 RX ORDER — SODIUM CHLORIDE 9 MG/ML
INJECTION, SOLUTION INTRAVENOUS ONCE
Status: COMPLETED | OUTPATIENT
Start: 2019-08-17 | End: 2019-08-18

## 2019-08-17 RX ORDER — ACETAMINOPHEN 325 MG/1
650 TABLET ORAL EVERY 6 HOURS PRN
Status: DISCONTINUED | OUTPATIENT
Start: 2019-08-17 | End: 2019-08-21

## 2019-08-17 RX ORDER — ASPIRIN 81 MG/1
81 TABLET ORAL DAILY
Status: DISCONTINUED | OUTPATIENT
Start: 2019-08-17 | End: 2019-08-18

## 2019-08-17 RX ORDER — ACETAMINOPHEN 500 MG
1000 TABLET ORAL ONCE
Status: COMPLETED | OUTPATIENT
Start: 2019-08-17 | End: 2019-08-17

## 2019-08-17 RX ORDER — SODIUM CHLORIDE 9 MG/ML
125 INJECTION, SOLUTION INTRAVENOUS CONTINUOUS
Status: DISCONTINUED | OUTPATIENT
Start: 2019-08-17 | End: 2019-08-18

## 2019-08-17 RX ORDER — MAGNESIUM OXIDE 400 MG (241.3 MG MAGNESIUM) TABLET
3 TABLET NIGHTLY PRN
Status: DISCONTINUED | OUTPATIENT
Start: 2019-08-17 | End: 2019-08-21

## 2019-08-17 RX ORDER — LOSARTAN POTASSIUM 100 MG/1
100 TABLET ORAL DAILY
Status: DISCONTINUED | OUTPATIENT
Start: 2019-08-17 | End: 2019-08-18

## 2019-08-17 RX ORDER — LOPERAMIDE HYDROCHLORIDE 2 MG/1
2 CAPSULE ORAL AS NEEDED
COMMUNITY

## 2019-08-17 RX ORDER — DEXTROSE MONOHYDRATE 25 G/50ML
50 INJECTION, SOLUTION INTRAVENOUS
Status: DISCONTINUED | OUTPATIENT
Start: 2019-08-17 | End: 2019-08-21

## 2019-08-17 RX ORDER — PANTOPRAZOLE SODIUM 40 MG/1
40 TABLET, DELAYED RELEASE ORAL
Status: DISCONTINUED | OUTPATIENT
Start: 2019-08-18 | End: 2019-08-21

## 2019-08-17 RX ORDER — PRAMIPEXOLE DIHYDROCHLORIDE 0.25 MG/1
0.5 TABLET ORAL NIGHTLY
Status: DISCONTINUED | OUTPATIENT
Start: 2019-08-17 | End: 2019-08-21

## 2019-08-17 RX ORDER — ONDANSETRON 2 MG/ML
4 INJECTION INTRAMUSCULAR; INTRAVENOUS EVERY 6 HOURS PRN
Status: DISCONTINUED | OUTPATIENT
Start: 2019-08-17 | End: 2019-08-21

## 2019-08-17 RX ORDER — POTASSIUM CHLORIDE 20 MEQ/1
40 TABLET, EXTENDED RELEASE ORAL ONCE
Status: COMPLETED | OUTPATIENT
Start: 2019-08-17 | End: 2019-08-17

## 2019-08-17 RX ORDER — PHENAZOPYRIDINE HYDROCHLORIDE 200 MG/1
200 TABLET, FILM COATED ORAL 3 TIMES DAILY PRN
Status: DISCONTINUED | OUTPATIENT
Start: 2019-08-17 | End: 2019-08-21

## 2019-08-17 NOTE — ED INITIAL ASSESSMENT (HPI)
Has been having on-going issues with dysuria, to urgent care yesterday and diagnosed with UTI. Today with fever and chills.

## 2019-08-18 ENCOUNTER — APPOINTMENT (OUTPATIENT)
Dept: CT IMAGING | Facility: HOSPITAL | Age: 84
End: 2019-08-18
Attending: HOSPITALIST
Payer: MEDICARE

## 2019-08-18 LAB
ADENOVIRUS PCR:: NEGATIVE
ALBUMIN SERPL-MCNC: 2.8 G/DL (ref 3.4–5)
ALBUMIN/GLOB SERPL: 0.8 {RATIO} (ref 1–2)
ALP LIVER SERPL-CCNC: 54 U/L (ref 55–142)
ALT SERPL-CCNC: 14 U/L (ref 13–56)
ANION GAP SERPL CALC-SCNC: 6 MMOL/L (ref 0–18)
AST SERPL-CCNC: 11 U/L (ref 15–37)
B PERT DNA SPEC QL NAA+PROBE: NEGATIVE
BASOPHILS # BLD AUTO: 0.03 X10(3) UL (ref 0–0.2)
BASOPHILS NFR BLD AUTO: 0.4 %
BILIRUB SERPL-MCNC: 0.6 MG/DL (ref 0.1–2)
BUN BLD-MCNC: 13 MG/DL (ref 7–18)
BUN/CREAT SERPL: 15.3 (ref 10–20)
C DIFF TOX B STL QL: NEGATIVE
C PNEUM DNA SPEC QL NAA+PROBE: NEGATIVE
CALCIUM BLD-MCNC: 8.1 MG/DL (ref 8.5–10.1)
CHLORIDE SERPL-SCNC: 109 MMOL/L (ref 98–112)
CO2 SERPL-SCNC: 27 MMOL/L (ref 21–32)
CORONAVIRUS 229E PCR:: NEGATIVE
CORONAVIRUS HKU1 PCR:: NEGATIVE
CORONAVIRUS NL63 PCR:: NEGATIVE
CORONAVIRUS OC43 PCR:: NEGATIVE
CREAT BLD-MCNC: 0.85 MG/DL (ref 0.55–1.02)
DEPRECATED HBV CORE AB SER IA-ACNC: 59.8 NG/ML (ref 18–340)
DEPRECATED RDW RBC AUTO: 44.2 FL (ref 35.1–46.3)
EOSINOPHIL # BLD AUTO: 0.41 X10(3) UL (ref 0–0.7)
EOSINOPHIL NFR BLD AUTO: 5.8 %
ERYTHROCYTE [DISTWIDTH] IN BLOOD BY AUTOMATED COUNT: 13.5 % (ref 11–15)
EST. AVERAGE GLUCOSE BLD GHB EST-MCNC: 163 MG/DL (ref 68–126)
FLUAV RNA SPEC QL NAA+PROBE: NEGATIVE
FLUBV RNA SPEC QL NAA+PROBE: NEGATIVE
GLOBULIN PLAS-MCNC: 3.3 G/DL (ref 2.8–4.4)
GLUCOSE BLD-MCNC: 118 MG/DL (ref 70–99)
GLUCOSE BLD-MCNC: 123 MG/DL (ref 70–99)
GLUCOSE BLD-MCNC: 144 MG/DL (ref 70–99)
GLUCOSE BLD-MCNC: 145 MG/DL (ref 70–99)
GLUCOSE BLD-MCNC: 203 MG/DL (ref 70–99)
GLUCOSE BLD-MCNC: 95 MG/DL (ref 70–99)
HBA1C MFR BLD HPLC: 7.3 % (ref ?–5.7)
HCT VFR BLD AUTO: 31.3 % (ref 35–48)
HGB BLD-MCNC: 10.4 G/DL (ref 12–16)
HGB RETIC QN AUTO: 33.2 PG (ref 28.2–36.6)
IMM GRANULOCYTES # BLD AUTO: 0.02 X10(3) UL (ref 0–1)
IMM GRANULOCYTES NFR BLD: 0.3 %
IMM RETICS NFR: 0.09 RATIO (ref 0.1–0.3)
IRON SATURATION: 16 % (ref 15–50)
IRON SERPL-MCNC: 46 UG/DL (ref 50–170)
LYMPHOCYTES # BLD AUTO: 1.19 X10(3) UL (ref 1–4)
LYMPHOCYTES NFR BLD AUTO: 16.8 %
M PROTEIN MFR SERPL ELPH: 6.1 G/DL (ref 6.4–8.2)
MCH RBC QN AUTO: 29.6 PG (ref 26–34)
MCHC RBC AUTO-ENTMCNC: 33.2 G/DL (ref 31–37)
MCV RBC AUTO: 89.2 FL (ref 80–100)
METAPNEUMOVIRUS PCR:: NEGATIVE
MONOCYTES # BLD AUTO: 0.39 X10(3) UL (ref 0.1–1)
MONOCYTES NFR BLD AUTO: 5.5 %
MYCOPLASMA PNEUMONIA PCR:: NEGATIVE
NEUTROPHILS # BLD AUTO: 5.04 X10 (3) UL (ref 1.5–7.7)
NEUTROPHILS # BLD AUTO: 5.04 X10(3) UL (ref 1.5–7.7)
NEUTROPHILS NFR BLD AUTO: 71.2 %
OSMOLALITY SERPL CALC.SUM OF ELEC: 295 MOSM/KG (ref 275–295)
PARAINFLUENZA 1 PCR:: NEGATIVE
PARAINFLUENZA 2 PCR:: NEGATIVE
PARAINFLUENZA 3 PCR:: NEGATIVE
PARAINFLUENZA 4 PCR:: NEGATIVE
PLATELET # BLD AUTO: 176 10(3)UL (ref 150–450)
POTASSIUM SERPL-SCNC: 3.9 MMOL/L (ref 3.5–5.1)
POTASSIUM SERPL-SCNC: 3.9 MMOL/L (ref 3.5–5.1)
PROCALCITONIN SERPL-MCNC: 0.21 NG/ML
RBC # BLD AUTO: 3.51 X10(6)UL (ref 3.8–5.3)
RETICS # AUTO: 37 X10(3) UL (ref 22.5–147.5)
RETICS/RBC NFR AUTO: 1.1 % (ref 0.5–2.5)
RHINOVIRUS/ENTERO PCR:: NEGATIVE
RSV RNA SPEC QL NAA+PROBE: NEGATIVE
SODIUM SERPL-SCNC: 142 MMOL/L (ref 136–145)
TOTAL IRON BINDING CAPACITY: 283 UG/DL (ref 240–450)
TRANSFERRIN SERPL-MCNC: 190 MG/DL (ref 200–360)
VIT B12 SERPL-MCNC: 240 PG/ML (ref 193–986)
WBC # BLD AUTO: 7.1 X10(3) UL (ref 4–11)

## 2019-08-18 PROCEDURE — 99226 SUBSEQUENT OBSERVATION CARE: CPT | Performed by: HOSPITALIST

## 2019-08-18 PROCEDURE — 74177 CT ABD & PELVIS W/CONTRAST: CPT | Performed by: HOSPITALIST

## 2019-08-18 RX ORDER — SODIUM CHLORIDE 9 MG/ML
INJECTION, SOLUTION INTRAVENOUS CONTINUOUS
Status: DISCONTINUED | OUTPATIENT
Start: 2019-08-18 | End: 2019-08-19

## 2019-08-18 RX ORDER — MELATONIN
325
Status: DISCONTINUED | OUTPATIENT
Start: 2019-08-18 | End: 2019-08-21

## 2019-08-18 RX ORDER — MAGNESIUM HYDROXIDE/ALUMINUM HYDROXICE/SIMETHICONE 120; 1200; 1200 MG/30ML; MG/30ML; MG/30ML
30 SUSPENSION ORAL 4 TIMES DAILY PRN
Status: DISCONTINUED | OUTPATIENT
Start: 2019-08-18 | End: 2019-08-21

## 2019-08-18 RX ORDER — CALCIUM CARBONATE 200(500)MG
500 TABLET,CHEWABLE ORAL
Status: DISCONTINUED | OUTPATIENT
Start: 2019-08-18 | End: 2019-08-21

## 2019-08-18 NOTE — PROGRESS NOTES
Patient does not have diarrhea, she has formed stool. Not Cdiff. Await FOBT. DC contact isolation. Iron panel noted, oral supplement initiated. B12 okay. Await CT. Continue abx, follow up cultures.     PTailorMD

## 2019-08-18 NOTE — PROGRESS NOTES
[facility]  Progress Note     Milka Man Patient Status:  Observation    1930 MRN [id number]   [place] 4NW-A Attending Irina Bedoya MD   Hosp Day # 0 PCP Marce Zamora MD     Chief Complaint: Fever    S: Patient pr BILT 0.5 0.6 0.6   TP 7.1 7.4 6.1*       Estimated Creatinine Clearance: 34.7 mL/min (based on SCr of 0.85 mg/dL). No results for input(s): PTP, INR in the last 168 hours. No results for input(s): TROP, CK in the last 168 hours.          Imaging: Im discharge to: Home    Plan of care discussed with patient and RN.     Ned Ennis MD

## 2019-08-18 NOTE — H&P
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  Disp: 6 tablet Rfl: 0   Nitrofurantoin Monohyd Macro 100 MG Oral Cap Take 1 capsule (100 mg total) by mouth 2 (two) times daily Pulse 82   Temp (!) 101 °F (38.3 °C) (Temporal)   Resp 18   Ht 157.5 cm (5' 2\")   Wt 165 lb (74.8 kg)   SpO2 94%   BMI 30.18 kg/m²   General: No acute distress. Alert and oriented x 3. HEENT: Normocephalic atraumatic. Dry mucous membranes.    Neck: No c

## 2019-08-18 NOTE — PLAN OF CARE
Problem: SAFETY ADULT - FALL  Goal: Free from fall injury  Description  INTERVENTIONS:  - Assess pt frequently for physical needs  - Identify cognitive and physical deficits and behaviors that affect risk of falls.   - [place] fall precautions as indica appropriate  Outcome: Progressing  Goal: Hemodynamic stability and optimal renal function maintained  Description  INTERVENTIONS:  - Monitor labs and assess for signs and symptoms of volume excess or deficit  - Monitor intake, output and patient weight  -

## 2019-08-18 NOTE — PROGRESS NOTES
Alert, orientated x4. Hard of hearing. Afebrile. Vital signs stable. No new urinary complaints. Iv infusing without difficulty. Denies pain.

## 2019-08-18 NOTE — PLAN OF CARE
Received patient from ER due to fever and diagnosed with cystitis. She is alert and appropriate. She denies chest pain and SOB. No signs of distress. Orders per Dr. Alfredo Noble. IVF started  and was given antibiotics in ER. K 3.7- will replace per protocol. limits  Description  INTERVENTIONS:  - Monitor labs and rhythm and assess patient for signs and symptoms of electrolyte imbalances  - Administer electrolyte replacement as ordered  - Monitor response to electrolyte replacements, including rhythm and repeat

## 2019-08-18 NOTE — PROGRESS NOTES
Chart reviewed, hemodynamics noted  Plan:  Continue IVF and IV abx  Check labs and blood cultures  Hold antihypertensives  Telemetry  Angle HORN

## 2019-08-18 NOTE — ED PROVIDER NOTES
Patient Seen in: [facility] Emergency Department    History   Patient presents with:  Fever (infectious)    Stated Complaint: fever, recent UTI    HPI    Patient is a an 80-year-old female who states for the past several days she has had dysuria, ur (postoperative nausea and vomiting)    • Problems with swallowing     due hiatal hernia; gaggs at times   • Restless leg syndrome    • Restless leg syndrome    • Shortness of breath     ON EXERTION   • Torn rotator cuff     both shoulders per pt   • Uncomf Wt 74.8 kg   SpO2 94%   BMI 30.18 kg/m²         Physical Exam  GENERAL: Patient resting comfortably on the cart in no acute distress. HEENT: Extraocular muscles intact, pupils equal round reactive to light and accommodation.   Mouth normal, neck supple, n RAINBOW DRAW LAVENDER   RAINBOW DRAW LIGHT GREEN   RAINBOW DRAW GOLD              MDM   Patient was given IV fluids as well as IV Rocephin. Patient with weakness, fever despite antibiotics.   Patient did not feel comfortable going home as she lives alone

## 2019-08-19 ENCOUNTER — APPOINTMENT (OUTPATIENT)
Dept: GENERAL RADIOLOGY | Facility: HOSPITAL | Age: 84
End: 2019-08-19
Attending: HOSPITALIST
Payer: MEDICARE

## 2019-08-19 LAB
ALBUMIN SERPL-MCNC: 2.9 G/DL (ref 3.4–5)
ALBUMIN/GLOB SERPL: 0.9 {RATIO} (ref 1–2)
ALP LIVER SERPL-CCNC: 53 U/L (ref 55–142)
ALT SERPL-CCNC: 16 U/L (ref 13–56)
ANION GAP SERPL CALC-SCNC: 7 MMOL/L (ref 0–18)
AST SERPL-CCNC: 16 U/L (ref 15–37)
BASOPHILS # BLD AUTO: 0.04 X10(3) UL (ref 0–0.2)
BASOPHILS NFR BLD AUTO: 0.8 %
BILIRUB SERPL-MCNC: 0.5 MG/DL (ref 0.1–2)
BUN BLD-MCNC: 11 MG/DL (ref 7–18)
BUN/CREAT SERPL: 14.1 (ref 10–20)
CALCIUM BLD-MCNC: 8.2 MG/DL (ref 8.5–10.1)
CHLORIDE SERPL-SCNC: 111 MMOL/L (ref 98–112)
CO2 SERPL-SCNC: 23 MMOL/L (ref 21–32)
CREAT BLD-MCNC: 0.78 MG/DL (ref 0.55–1.02)
DEPRECATED RDW RBC AUTO: 43.8 FL (ref 35.1–46.3)
EOSINOPHIL # BLD AUTO: 0.47 X10(3) UL (ref 0–0.7)
EOSINOPHIL NFR BLD AUTO: 9 %
ERYTHROCYTE [DISTWIDTH] IN BLOOD BY AUTOMATED COUNT: 13.3 % (ref 11–15)
GLOBULIN PLAS-MCNC: 3.4 G/DL (ref 2.8–4.4)
GLUCOSE BLD-MCNC: 111 MG/DL (ref 70–99)
GLUCOSE BLD-MCNC: 115 MG/DL (ref 70–99)
GLUCOSE BLD-MCNC: 130 MG/DL (ref 70–99)
GLUCOSE BLD-MCNC: 131 MG/DL (ref 70–99)
GLUCOSE BLD-MCNC: 143 MG/DL (ref 70–99)
HCT VFR BLD AUTO: 33 % (ref 35–48)
HGB BLD-MCNC: 10.7 G/DL (ref 12–16)
IMM GRANULOCYTES # BLD AUTO: 0.02 X10(3) UL (ref 0–1)
IMM GRANULOCYTES NFR BLD: 0.4 %
LYMPHOCYTES # BLD AUTO: 1.55 X10(3) UL (ref 1–4)
LYMPHOCYTES NFR BLD AUTO: 29.8 %
M PROTEIN MFR SERPL ELPH: 6.3 G/DL (ref 6.4–8.2)
MCH RBC QN AUTO: 29.1 PG (ref 26–34)
MCHC RBC AUTO-ENTMCNC: 32.4 G/DL (ref 31–37)
MCV RBC AUTO: 89.7 FL (ref 80–100)
MONOCYTES # BLD AUTO: 0.4 X10(3) UL (ref 0.1–1)
MONOCYTES NFR BLD AUTO: 7.7 %
NEUTROPHILS # BLD AUTO: 2.72 X10 (3) UL (ref 1.5–7.7)
NEUTROPHILS # BLD AUTO: 2.72 X10(3) UL (ref 1.5–7.7)
NEUTROPHILS NFR BLD AUTO: 52.3 %
OSMOLALITY SERPL CALC.SUM OF ELEC: 292 MOSM/KG (ref 275–295)
PLATELET # BLD AUTO: 159 10(3)UL (ref 150–450)
POTASSIUM SERPL-SCNC: 3.8 MMOL/L (ref 3.5–5.1)
RBC # BLD AUTO: 3.68 X10(6)UL (ref 3.8–5.3)
SODIUM SERPL-SCNC: 141 MMOL/L (ref 136–145)
WBC # BLD AUTO: 5.2 X10(3) UL (ref 4–11)

## 2019-08-19 PROCEDURE — 71045 X-RAY EXAM CHEST 1 VIEW: CPT | Performed by: HOSPITALIST

## 2019-08-19 PROCEDURE — 99226 SUBSEQUENT OBSERVATION CARE: CPT | Performed by: HOSPITALIST

## 2019-08-19 RX ORDER — POTASSIUM CHLORIDE 20 MEQ/1
40 TABLET, EXTENDED RELEASE ORAL ONCE
Status: COMPLETED | OUTPATIENT
Start: 2019-08-19 | End: 2019-08-19

## 2019-08-19 RX ORDER — LOPERAMIDE HYDROCHLORIDE 2 MG/1
2 CAPSULE ORAL AS NEEDED
Status: DISCONTINUED | OUTPATIENT
Start: 2019-08-19 | End: 2019-08-19

## 2019-08-19 RX ORDER — LOPERAMIDE HYDROCHLORIDE 2 MG/1
2 CAPSULE ORAL 4 TIMES DAILY PRN
Status: DISCONTINUED | OUTPATIENT
Start: 2019-08-19 | End: 2019-08-21

## 2019-08-19 RX ORDER — FLUCONAZOLE 100 MG/1
150 TABLET ORAL ONCE
Status: COMPLETED | OUTPATIENT
Start: 2019-08-19 | End: 2019-08-19

## 2019-08-19 RX ORDER — ASPIRIN 81 MG/1
81 TABLET ORAL DAILY
Status: DISCONTINUED | OUTPATIENT
Start: 2019-08-19 | End: 2019-08-21

## 2019-08-19 NOTE — PROGRESS NOTES
[facility] HOSPITAL[facility]  Progress Note     Londell Gowers Aper Patient Status:  Observation    1930 MRN [id number]   [facility] 4NW-A Attending Letha Ba MD   Hosp Day # 0 PCP Chapin Shahid MD     Chief Complaint: Fever    S: Patient fe Epic.    Medications:   • aspirin  81 mg Oral Daily   • triamcinolone acetonide  1 Application Topical BID   • ferrous sulfate  325 mg Oral Daily with breakfast   • Pantoprazole Sodium  40 mg Oral QAM AC   • Pramipexole Dihydrochloride  0.5 mg Oral Nightly

## 2019-08-19 NOTE — PLAN OF CARE
Patient alert and oriented X4, calm and cooperative with care. Maintained on IV Rocephin for UTI, tolerated well. Vital signs stable. Afebrile. No complaints of pain. No nausea and vomiting.  Up to the bathroom with assist. Ambulates on the hallway with wa within prescribed range  Description  INTERVENTIONS:  - Monitor Blood Glucose as ordered  - Assess for signs and symptoms of hyperglycemia and hypoglycemia  - Administer ordered medications to maintain glucose within target range  - Assess barriers to adeq

## 2019-08-19 NOTE — PLAN OF CARE
Problem: SAFETY ADULT - FALL  Goal: Free from fall injury  Description  INTERVENTIONS:  - Assess pt frequently for physical needs  - Identify cognitive and physical deficits and behaviors that affect risk of falls.   - [place] fall precautions as indica appropriate  Outcome: Progressing  Goal: Hemodynamic stability and optimal renal function maintained  Description  INTERVENTIONS:  - Monitor labs and assess for signs and symptoms of volume excess or deficit  - Monitor intake, output and patient weight  -

## 2019-08-20 LAB
GLUCOSE BLD-MCNC: 123 MG/DL (ref 70–99)
GLUCOSE BLD-MCNC: 124 MG/DL (ref 70–99)
GLUCOSE BLD-MCNC: 126 MG/DL (ref 70–99)
GLUCOSE BLD-MCNC: 152 MG/DL (ref 70–99)
POTASSIUM SERPL-SCNC: 3.8 MMOL/L (ref 3.5–5.1)

## 2019-08-20 PROCEDURE — 99226 SUBSEQUENT OBSERVATION CARE: CPT | Performed by: HOSPITALIST

## 2019-08-20 RX ORDER — POTASSIUM CHLORIDE 20 MEQ/1
40 TABLET, EXTENDED RELEASE ORAL ONCE
Status: COMPLETED | OUTPATIENT
Start: 2019-08-20 | End: 2019-08-20

## 2019-08-20 RX ORDER — KETOROLAC TROMETHAMINE 30 MG/ML
30 INJECTION, SOLUTION INTRAMUSCULAR; INTRAVENOUS EVERY 6 HOURS PRN
Status: DISCONTINUED | OUTPATIENT
Start: 2019-08-20 | End: 2019-08-20 | Stop reason: DRUGHIGH

## 2019-08-20 RX ORDER — IBUPROFEN 400 MG/1
400 TABLET ORAL EVERY 6 HOURS PRN
Status: DISCONTINUED | OUTPATIENT
Start: 2019-08-20 | End: 2019-08-20

## 2019-08-20 RX ORDER — LOSARTAN POTASSIUM 100 MG/1
100 TABLET ORAL DAILY
Status: DISCONTINUED | OUTPATIENT
Start: 2019-08-20 | End: 2019-08-21

## 2019-08-20 RX ORDER — IBUPROFEN 600 MG/1
600 TABLET ORAL EVERY 6 HOURS PRN
Status: DISCONTINUED | OUTPATIENT
Start: 2019-08-20 | End: 2019-08-20

## 2019-08-20 RX ORDER — KETOROLAC TROMETHAMINE 30 MG/ML
15 INJECTION, SOLUTION INTRAMUSCULAR; INTRAVENOUS EVERY 6 HOURS PRN
Status: DISCONTINUED | OUTPATIENT
Start: 2019-08-20 | End: 2019-08-21

## 2019-08-20 NOTE — PROGRESS NOTES
[facility] HOSPITALIST  Progress Note     Shayla Man Patient Status:  Observation    1930 MRN [id number]   [facility] 4NW-A Attending Job Dial, MD   Hosp Day # 0 PCP Surya Jhaveri MD     Chief Complaint: Fever    S: Patient co hours. No results for input(s): TROP, CK in the last 168 hours. Imaging: Imaging data reviewed in Epic.     Medications:   • aspirin  81 mg Oral Daily   • triamcinolone acetonide  1 Application Topical BID   • ferrous sulfate  325 mg Oral Daily w

## 2019-08-20 NOTE — CM/SW NOTE
08/20/19 1300   CM/SW Referral Data   Referral Source Social Work (self-referral)   Reason for Referral Discharge planning   Informant Patient   Patient Info   Patient's Mental Status Alert;Oriented   Patient's [address]   Patient Status Pr

## 2019-08-20 NOTE — PLAN OF CARE
Assumed care @ 0821. Patient denies burning with urination, denies vaginal itching. Patient c/o severe headache, Dr. Yin Navarrete notified, Motrin given as needed- with moderate  relief. Patient afebrile.

## 2019-08-20 NOTE — CONSULTS
[facility] Pharmacy Note:  Age Based Dose Adjustment    Petar Marr has been prescribed ketorolac (TORADOL) 30 mg IV every 6 hours. Patient is >71 years old therefore the dose has been adjusted to 15 mg IV every 6 hours.       Thank you,  Gino Soto, [facility]

## 2019-08-20 NOTE — PLAN OF CARE
Remains stable on room air. C/o mild burning when urinating, declines prn pyridium. Afebrile. Ambulates in room stable gait with a walker. Iv abx given. Fall precaution maintained. Slept well.    Problem: GENITOURINARY - ADULT  Goal: Absence of urinary rete

## 2019-08-21 ENCOUNTER — APPOINTMENT (OUTPATIENT)
Dept: CT IMAGING | Facility: HOSPITAL | Age: 84
End: 2019-08-21
Attending: HOSPITALIST
Payer: MEDICARE

## 2019-08-21 VITALS
WEIGHT: 170 LBS | HEART RATE: 55 BPM | SYSTOLIC BLOOD PRESSURE: 138 MMHG | RESPIRATION RATE: 17 BRPM | TEMPERATURE: 98 F | OXYGEN SATURATION: 96 % | HEIGHT: 61.5 IN | BODY MASS INDEX: 31.69 KG/M2 | DIASTOLIC BLOOD PRESSURE: 53 MMHG

## 2019-08-21 LAB
GLUCOSE BLD-MCNC: 118 MG/DL (ref 70–99)
GLUCOSE BLD-MCNC: 122 MG/DL (ref 70–99)

## 2019-08-21 PROCEDURE — 70450 CT HEAD/BRAIN W/O DYE: CPT | Performed by: HOSPITALIST

## 2019-08-21 PROCEDURE — 99217 OBSERVATION CARE DISCHARGE: CPT | Performed by: HOSPITALIST

## 2019-08-21 RX ORDER — CIPROFLOXACIN 250 MG/1
250 TABLET, FILM COATED ORAL 2 TIMES DAILY
Qty: 10 TABLET | Refills: 0 | Status: SHIPPED | OUTPATIENT
Start: 2019-08-21 | End: 2019-08-26

## 2019-08-21 NOTE — PLAN OF CARE
NURSING DISCHARGE NOTE    Discharged Home via Wheelchair. Accompanied by Support staff  Belongings Taken by patient/family. RX called into pt's pharmacy as requested. DC instructions and rx given and explained.  As discussed with Dr Ko Paez, reinforced

## 2019-08-21 NOTE — PLAN OF CARE
A/O. [place]. Wears glasses. NP cough. 97% on RA. NSR/SB on tele. No BP right arm. Hx breast ca. Chronic diarrhea. Voinding. C/o HA. Tylenol given with little relief. Toradol given with a little relief. Pt denies migraine hx.  States arthritis in neck and pain target range  - Assess barriers to adequate nutritional intake and initiate nutrition consult as needed  - Instruct patient on self management of diabetes  Outcome: Progressing     Problem: Diabetes/Glucose Control  Goal: Glucose maintained within prescrib

## 2019-08-21 NOTE — PLAN OF CARE
Pt alert and oriented x4. VSS, afebrile. C/o mild headache, tylenol given with relief. Denies SOB or nausea. Ambulatory to the bathroom with walker and assist. IV antibiotics given per [facility]. SpO2 within normal limits on room air.  QID accu check completed, n limits  Description  INTERVENTIONS:  - Monitor labs and rhythm and assess patient for signs and symptoms of electrolyte imbalances  - Administer electrolyte replacement as ordered  - Monitor response to electrolyte replacements, including rhythm and repeat

## 2019-08-21 NOTE — PROGRESS NOTES
[facility] HOSPITALIST  Progress Note     Shantal Man Patient Status:  Observation    1930 MRN [id number]   [facility] 4NW-A Attending Luis Daniel Blackmon MD   Hosp Day # 0 PCP Sara Chatman MD     Chief Complaint: UTI/HA    S: Patient s triamcinolone acetonide  1 Application Topical BID   • ferrous sulfate  325 mg Oral Daily with breakfast   • Pantoprazole Sodium  40 mg Oral QAM AC   • Pramipexole Dihydrochloride  0.5 mg Oral Nightly   • cefTRIAXone  1 g Intravenous Q24H   • Insulin Aspar

## 2019-08-21 NOTE — PLAN OF CARE
Pt states son out of town. Pt calling a neighbor to find a ride home. cipro rx called into [facility] [phone] per pt request.    8293: pt states her neighbor will pick her up about 5pm.  Pt states she wants to eat dinner prior to going home.

## 2019-08-22 ENCOUNTER — TELEPHONE (OUTPATIENT)
Dept: INTERNAL MEDICINE CLINIC | Facility: CLINIC | Age: 84
End: 2019-08-22

## 2019-08-22 ENCOUNTER — PATIENT OUTREACH (OUTPATIENT)
Dept: CASE MANAGEMENT | Age: 84
End: 2019-08-22

## 2019-08-22 DIAGNOSIS — R50.9 FEVER, UNSPECIFIED FEVER CAUSE: ICD-10-CM

## 2019-08-22 DIAGNOSIS — N30.00 ACUTE CYSTITIS WITHOUT HEMATURIA: ICD-10-CM

## 2019-08-22 DIAGNOSIS — Z02.9 ENCOUNTERS FOR UNSPECIFIED ADMINISTRATIVE PURPOSE: ICD-10-CM

## 2019-08-22 DIAGNOSIS — R53.1 WEAKNESS GENERALIZED: ICD-10-CM

## 2019-08-22 PROCEDURE — 1111F DSCHRG MED/CURRENT MED MERGE: CPT

## 2019-08-22 NOTE — TELEPHONE ENCOUNTER
Patient was discharged home from [facility] on 8/21/19 and is at High risk for readmission and recommended to have a TCM HFU by 8/28/19 preferred or no later than 9/4/19 or sooner.  [facility] attempted to schedule a TCM HFU, patient declines at this time sta

## 2019-08-22 NOTE — PROGRESS NOTES
Initial Post Discharge Follow Up   Discharge Date: 8/21/19  Contact Date: 8/22/2019    Consent Verification:  Assessment Completed With: Patient  HIPAA Verified?   Yes    Discharge Dx:    Weakness, generalized, Fever, Acute cystitis without hematuria  HX for Diarrhea. Disp:  Rfl:    triamcinolone acetonide 0.1 % External Ointment Apply 1 Application topically 2 (two) times daily.  Disp: 30 g Rfl: 0   Omeprazole 40 MG Oral Capsule Delayed Release TAKE 1 CAPSULE BY MOUTH TWICE DAILY Disp: 180 capsule Rfl: 1 declined [facility]     DME ordered at D/C? No    Services ordered at D/C?   No      Needs post D/C:   Now that you are home, are there any needs or concerns you need addressed before your next visit with your PCP?  (DME, meds, disease concerns, Etc): No     Follow Reviewed upcoming Specialist Appt with patient     Yes, Patient states she has not called and scheduled a HFU with Dr. Naresh Jimenez as recommended. NCM explained that it is important to make sure her UTI is clearing up before she goes on her trip.  Patient states sh

## 2019-08-23 NOTE — DISCHARGE SUMMARY
[facility]  DISCHARGE SUMMARY     Milka Man Patient Status:  Observation    1930 MRN [id number]   [facility] 4NW-A Attending No att. providers found   Hosp Day # 0 PCP Marce Zamora MD     Date of Admission: 2019 for sleep   Quantity:  30 tablet  Refills:  0        CONTINUE taking these medications      Instructions Prescription details   ASPIR-81 OR      Take 81 mg by mouth daily. Refills:  0     CALCIUM-VITAMIN D OR      Take 1,200 mg by mouth daily.    Refills: Yes    Follow-up appointment:   Jozef Riggs MD  [address] [place] [address]  [phone]    Call in 1 week  Recurrent UTI    Andrea An, [address] [facility] [address] [phone]    Schedule an appointment as soon

## 2019-08-31 PROCEDURE — 99490 CHRNC CARE MGMT STAFF 1ST 20: CPT

## 2019-09-13 ENCOUNTER — PATIENT OUTREACH (OUTPATIENT)
Dept: CASE MANAGEMENT | Age: 84
End: 2019-09-13

## 2019-09-13 DIAGNOSIS — R53.1 WEAKNESS GENERALIZED: ICD-10-CM

## 2019-09-13 DIAGNOSIS — Z91.81 AT RISK FOR FALLING: ICD-10-CM

## 2019-09-13 DIAGNOSIS — E11.42 DIABETIC POLYNEUROPATHY ASSOCIATED WITH TYPE 2 DIABETES MELLITUS (HCC): ICD-10-CM

## 2019-09-13 DIAGNOSIS — I10 BENIGN ESSENTIAL HTN: ICD-10-CM

## 2019-09-13 DIAGNOSIS — E78.00 PURE HYPERCHOLESTEROLEMIA: ICD-10-CM

## 2019-09-13 DIAGNOSIS — R05.9 COUGH: ICD-10-CM

## 2019-09-13 DIAGNOSIS — G25.81 RLS (RESTLESS LEGS SYNDROME): ICD-10-CM

## 2019-09-13 DIAGNOSIS — E86.0 DEHYDRATION: ICD-10-CM

## 2019-09-13 NOTE — PROGRESS NOTES
Contacting patient to follow up on CCM for the month.  LMCB       Time with pt -  min  Chart review - 2 min  Total time 2  min

## 2019-09-17 ENCOUNTER — OFFICE VISIT (OUTPATIENT)
Dept: INTERNAL MEDICINE CLINIC | Facility: CLINIC | Age: 84
End: 2019-09-17
Payer: MEDICARE

## 2019-09-17 VITALS
SYSTOLIC BLOOD PRESSURE: 138 MMHG | HEART RATE: 72 BPM | BODY MASS INDEX: 30.53 KG/M2 | RESPIRATION RATE: 16 BRPM | DIASTOLIC BLOOD PRESSURE: 64 MMHG | HEIGHT: 61.5 IN | TEMPERATURE: 98 F | WEIGHT: 163.81 LBS | OXYGEN SATURATION: 99 %

## 2019-09-17 DIAGNOSIS — J44.9 CHRONIC OBSTRUCTIVE PULMONARY DISEASE, UNSPECIFIED COPD TYPE (HCC): ICD-10-CM

## 2019-09-17 DIAGNOSIS — R63.5 WEIGHT GAIN: ICD-10-CM

## 2019-09-17 DIAGNOSIS — M51.36 DDD (DEGENERATIVE DISC DISEASE), LUMBAR: ICD-10-CM

## 2019-09-17 DIAGNOSIS — E11.42 DIABETIC POLYNEUROPATHY ASSOCIATED WITH TYPE 2 DIABETES MELLITUS (HCC): Primary | ICD-10-CM

## 2019-09-17 DIAGNOSIS — I10 BENIGN ESSENTIAL HTN: ICD-10-CM

## 2019-09-17 PROCEDURE — 99214 OFFICE O/P EST MOD 30 MIN: CPT | Performed by: INTERNAL MEDICINE

## 2019-09-17 NOTE — PROGRESS NOTES
9/17/2019  Spoke to Clara for [address]. Updates to patient care team/ comments: None  Patient reported changes in medications: StartingTiotropium Bromide Monohydrate 18 MCG Inhalation later today needs to  from pharmacy.      Med Adherence  Comment scheduled for September 27th             Patient agrees to goal action plan. Self-Management Abilities (patient reported)             1= least confident in achieving goal, 5= very confident               - confidence: 5     Care Manager Follow Up:  One mon

## 2019-09-17 NOTE — PROGRESS NOTES
Jayesh Mcfadden is a 80year old female. Patient presents with: Follow - Up: cn room 3: 6 week follow up       HPI:     Patient here for f/u-  C/o chronic cough and MARX, has this issue for years. +COPD and use to take inhaler but stopped at some point. lungs daily. Disp: 30 capsule Rfl: 2   Loperamide HCl 2 MG Oral Cap Take 2 mg by mouth as needed for Diarrhea. Disp:  Rfl:    triamcinolone acetonide 0.1 % External Ointment Apply 1 Application topically 2 (two) times daily.  Disp: 30 g Rfl: 0   Omeprazole reflux    • Essential hypertension    • Eye disease    • Fatigue    • Frequent UTI    • Full dentures    • Hearing impairment     bilateral hearing aids   • Hearing loss    • Heart palpitations    • Hemorrhoids    • High blood pressure    • History of card above  HEME: No adenopathy      EXAM:   /64   Pulse 72   Temp 97.8 °F (36.6 °C) (Oral)   Resp 16   Ht 61.5\"   Wt 163 lb 12.8 oz   SpO2 99%   BMI 30.45 kg/m²   GENERAL: well developed, well nourished,in no apparent distress  SKIN: no rashes,no suspic

## 2019-09-18 ENCOUNTER — TELEPHONE (OUTPATIENT)
Dept: INTERNAL MEDICINE CLINIC | Facility: CLINIC | Age: 84
End: 2019-09-18

## 2019-09-18 ENCOUNTER — APPOINTMENT (OUTPATIENT)
Dept: CT IMAGING | Facility: HOSPITAL | Age: 84
End: 2019-09-18
Attending: EMERGENCY MEDICINE
Payer: MEDICARE

## 2019-09-18 ENCOUNTER — HOSPITAL ENCOUNTER (EMERGENCY)
Facility: HOSPITAL | Age: 84
Discharge: HOME | End: 2019-09-18
Attending: EMERGENCY MEDICINE
Payer: MEDICARE

## 2019-09-18 VITALS
HEART RATE: 72 BPM | TEMPERATURE: 98 F | OXYGEN SATURATION: 98 % | RESPIRATION RATE: 12 BRPM | HEIGHT: 61 IN | SYSTOLIC BLOOD PRESSURE: 106 MMHG | DIASTOLIC BLOOD PRESSURE: 89 MMHG | WEIGHT: 163 LBS | BODY MASS INDEX: 30.78 KG/M2

## 2019-09-18 DIAGNOSIS — M54.50 ACUTE LEFT-SIDED LOW BACK PAIN WITHOUT SCIATICA: Primary | ICD-10-CM

## 2019-09-18 LAB
ALBUMIN SERPL-MCNC: 3.6 G/DL (ref 3.4–5)
ALBUMIN/GLOB SERPL: 0.9 {RATIO} (ref 1–2)
ALP LIVER SERPL-CCNC: 63 U/L (ref 55–142)
ALT SERPL-CCNC: 19 U/L (ref 13–56)
ANION GAP SERPL CALC-SCNC: 7 MMOL/L (ref 0–18)
AST SERPL-CCNC: 18 U/L (ref 15–37)
BASOPHILS # BLD AUTO: 0.05 X10(3) UL (ref 0–0.2)
BASOPHILS NFR BLD AUTO: 1 %
BILIRUB SERPL-MCNC: 0.5 MG/DL (ref 0.1–2)
BILIRUB UR QL STRIP.AUTO: NEGATIVE
BUN BLD-MCNC: 11 MG/DL (ref 7–18)
BUN/CREAT SERPL: 11.7 (ref 10–20)
CALCIUM BLD-MCNC: 9.4 MG/DL (ref 8.5–10.1)
CHLORIDE SERPL-SCNC: 105 MMOL/L (ref 98–112)
CLARITY UR REFRACT.AUTO: CLEAR
CO2 SERPL-SCNC: 26 MMOL/L (ref 21–32)
COLOR UR AUTO: YELLOW
CREAT BLD-MCNC: 0.94 MG/DL (ref 0.55–1.02)
DEPRECATED RDW RBC AUTO: 42.5 FL (ref 35.1–46.3)
EOSINOPHIL # BLD AUTO: 0.09 X10(3) UL (ref 0–0.7)
EOSINOPHIL NFR BLD AUTO: 1.7 %
ERYTHROCYTE [DISTWIDTH] IN BLOOD BY AUTOMATED COUNT: 13.2 % (ref 11–15)
GLOBULIN PLAS-MCNC: 3.9 G/DL (ref 2.8–4.4)
GLUCOSE BLD-MCNC: 156 MG/DL (ref 70–99)
GLUCOSE UR STRIP.AUTO-MCNC: NEGATIVE MG/DL
HCT VFR BLD AUTO: 39.2 % (ref 35–48)
HGB BLD-MCNC: 13.1 G/DL (ref 12–16)
IMM GRANULOCYTES # BLD AUTO: 0.01 X10(3) UL (ref 0–1)
IMM GRANULOCYTES NFR BLD: 0.2 %
KETONES UR STRIP.AUTO-MCNC: NEGATIVE MG/DL
LIPASE SERPL-CCNC: 163 U/L (ref 73–393)
LYMPHOCYTES # BLD AUTO: 0.99 X10(3) UL (ref 1–4)
LYMPHOCYTES NFR BLD AUTO: 19 %
M PROTEIN MFR SERPL ELPH: 7.5 G/DL (ref 6.4–8.2)
MCH RBC QN AUTO: 29.1 PG (ref 26–34)
MCHC RBC AUTO-ENTMCNC: 33.4 G/DL (ref 31–37)
MCV RBC AUTO: 87.1 FL (ref 80–100)
MONOCYTES # BLD AUTO: 0.34 X10(3) UL (ref 0.1–1)
MONOCYTES NFR BLD AUTO: 6.5 %
NEUTROPHILS # BLD AUTO: 3.73 X10 (3) UL (ref 1.5–7.7)
NEUTROPHILS # BLD AUTO: 3.73 X10(3) UL (ref 1.5–7.7)
NEUTROPHILS NFR BLD AUTO: 71.6 %
NITRITE UR QL STRIP.AUTO: NEGATIVE
OSMOLALITY SERPL CALC.SUM OF ELEC: 289 MOSM/KG (ref 275–295)
PH UR STRIP.AUTO: 5 [PH] (ref 4.5–8)
PLATELET # BLD AUTO: 230 10(3)UL (ref 150–450)
POTASSIUM SERPL-SCNC: 4 MMOL/L (ref 3.5–5.1)
PROT UR STRIP.AUTO-MCNC: NEGATIVE MG/DL
RBC # BLD AUTO: 4.5 X10(6)UL (ref 3.8–5.3)
RBC UR QL AUTO: NEGATIVE
SODIUM SERPL-SCNC: 138 MMOL/L (ref 136–145)
SP GR UR STRIP.AUTO: 1.01 (ref 1–1.03)
UROBILINOGEN UR STRIP.AUTO-MCNC: <2 MG/DL
WBC # BLD AUTO: 5.2 X10(3) UL (ref 4–11)

## 2019-09-18 PROCEDURE — 85025 COMPLETE CBC W/AUTO DIFF WBC: CPT | Performed by: EMERGENCY MEDICINE

## 2019-09-18 PROCEDURE — 74177 CT ABD & PELVIS W/CONTRAST: CPT | Performed by: EMERGENCY MEDICINE

## 2019-09-18 PROCEDURE — 96374 THER/PROPH/DIAG INJ IV PUSH: CPT

## 2019-09-18 PROCEDURE — 81001 URINALYSIS AUTO W/SCOPE: CPT | Performed by: EMERGENCY MEDICINE

## 2019-09-18 PROCEDURE — 99284 EMERGENCY DEPT VISIT MOD MDM: CPT

## 2019-09-18 PROCEDURE — 96376 TX/PRO/DX INJ SAME DRUG ADON: CPT

## 2019-09-18 PROCEDURE — 99285 EMERGENCY DEPT VISIT HI MDM: CPT

## 2019-09-18 PROCEDURE — 80053 COMPREHEN METABOLIC PANEL: CPT | Performed by: EMERGENCY MEDICINE

## 2019-09-18 PROCEDURE — 83690 ASSAY OF LIPASE: CPT | Performed by: EMERGENCY MEDICINE

## 2019-09-18 PROCEDURE — 96375 TX/PRO/DX INJ NEW DRUG ADDON: CPT

## 2019-09-18 RX ORDER — KETOROLAC TROMETHAMINE 30 MG/ML
15 INJECTION, SOLUTION INTRAMUSCULAR; INTRAVENOUS ONCE
Status: COMPLETED | OUTPATIENT
Start: 2019-09-18 | End: 2019-09-18

## 2019-09-18 RX ORDER — LIDOCAINE 50 MG/G
1 PATCH TOPICAL EVERY 24 HOURS
Qty: 30 PATCH | Refills: 0 | Status: SHIPPED | OUTPATIENT
Start: 2019-09-18 | End: 2019-10-18

## 2019-09-18 RX ORDER — MORPHINE SULFATE 4 MG/ML
4 INJECTION, SOLUTION INTRAMUSCULAR; INTRAVENOUS ONCE
Status: COMPLETED | OUTPATIENT
Start: 2019-09-18 | End: 2019-09-18

## 2019-09-18 RX ORDER — ONDANSETRON 2 MG/ML
4 INJECTION INTRAMUSCULAR; INTRAVENOUS ONCE
Status: COMPLETED | OUTPATIENT
Start: 2019-09-18 | End: 2019-09-18

## 2019-09-18 RX ORDER — MORPHINE SULFATE 4 MG/ML
2 INJECTION, SOLUTION INTRAMUSCULAR; INTRAVENOUS ONCE
Status: COMPLETED | OUTPATIENT
Start: 2019-09-18 | End: 2019-09-18

## 2019-09-18 RX ORDER — ONDANSETRON 4 MG/1
4 TABLET, ORALLY DISINTEGRATING ORAL EVERY 4 HOURS PRN
Qty: 20 TABLET | Refills: 0 | Status: SHIPPED | OUTPATIENT
Start: 2019-09-18 | End: 2019-09-23

## 2019-09-18 NOTE — ED INITIAL ASSESSMENT (HPI)
Pt to ed with rpts of lower back pain since last night and this morning it got worse on the left side/flank with bouts of nausea.  Pt denies hx of kidney stone

## 2019-09-18 NOTE — ED PROVIDER NOTES
Patient Seen in: [facility] Emergency Department      History   Patient presents with:  Back Pain (musculoskeletal)    Stated Complaint: back pain, denies injury, denies incontinence, nausea    HPI    Patient is an 51-year-old female percents to ED Nausea    • Neuropathy    • Osteoarthritis     all joints   • Osteoporosis    • Pain in joints    • Pneumonia, organism unspecified(486)     04/2015   • PONV (postoperative nausea and vomiting)    • Problems with swallowing     due hiatal hernia; gaggs at Physical Exam    GENERAL: No acute distress, well appearing and non-toxic, Alert and oriented X 3   HEENT: Normocephalic, atraumatic. Moist mucous membranes.   Pupils equal round reactive to light and accommodation, extraocular motion is intact, sc ---------                               -----------         ------                     CBC W/ DIFFERENTIAL[[id number]]          Abnormal            Final result                 Please view results for these tests on the individual orders.    RAINBOW limiting evaluation. PELVIC NODES:  No adenopathy. PELVIC ORGANS:  Stable including coarse calcifications most consistent with degenerating fibroids. BONES:  Scoliosis with diffuse degenerative change including anterolisthesis of the lower lumbar spine. [address]  [address] [id number] [phone]    In 1 week          Medications Prescribed:  Discharge Medication List as of 9/18/2019  1:56 PM    START taking these medications    ondansetron 4 MG Oral Tablet Dispersible  Take 1 tablet (4 mg total)

## 2019-09-20 ENCOUNTER — TELEPHONE (OUTPATIENT)
Dept: INTERNAL MEDICINE CLINIC | Facility: CLINIC | Age: 84
End: 2019-09-20

## 2019-09-20 ENCOUNTER — PATIENT OUTREACH (OUTPATIENT)
Dept: CASE MANAGEMENT | Age: 84
End: 2019-09-20

## 2019-09-20 RX ORDER — CYCLOBENZAPRINE HCL 5 MG
5 TABLET ORAL NIGHTLY PRN
Qty: 30 TABLET | Refills: 0 | Status: SHIPPED | OUTPATIENT
Start: 2019-09-20 | End: 2019-11-06

## 2019-09-20 NOTE — TELEPHONE ENCOUNTER
Called pt to stating seen in ER for LBP on 9/18/19. Slight nausea- given Zofran, pt has not needed to use. Pt thinks may have pulled a muscle while doing some lifting. No radiating pain down legs. No numbness/ tingling of extremities.  CT completed to r/o k

## 2019-09-20 NOTE — TELEPHONE ENCOUNTER
Called pt to inform, per TB, sent rx flexeril 5mg nightly prn to [facility] listed. Advised on sedation SE. Pt verbalized understanding and agreed with POC.

## 2019-09-20 NOTE — PROGRESS NOTES
Pt called stated she bend over and pulled a pot a short distance on her lawn and carried a small tote into her garage on 09/16/2019 Stated she must have pulled something' as her left lower back started aching the following day.  Pain was so severe she went

## 2019-09-23 ENCOUNTER — PATIENT OUTREACH (OUTPATIENT)
Dept: CASE MANAGEMENT | Age: 84
End: 2019-09-23

## 2019-09-23 NOTE — PROGRESS NOTES
Followed up with patient regarding back pain and medication. Pt stated feeling much better, started on Flexeril before bed. and is also applying heat to affected area. Neighbor brought her a 16 hour heating pad, patient stated it helps a lot.  Stated he does

## 2019-09-26 ENCOUNTER — PATIENT OUTREACH (OUTPATIENT)
Dept: CASE MANAGEMENT | Age: 84
End: 2019-09-26

## 2019-09-26 NOTE — PROGRESS NOTES
Contacted patient and LMCB I wanted to confirm [facility] called her today to confirm her  time for appointment tomorrow. Contacting patient to follow up on CCM for the month.  LMCB       Time with pt -1  min  Chart review -1  min  Total t

## 2019-09-26 NOTE — PROGRESS NOTES
Patient returned call stated she did receive a call from [facility] and they will be picking her up at 12:30 pm and driving  her to interview. Stated she is very hopeful and excited about interview.  Stated is she gets approved she will be able to go to [facility] on

## 2019-09-29 DIAGNOSIS — G25.81 RESTLESS LEG SYNDROME: ICD-10-CM

## 2019-09-30 PROCEDURE — 99490 CHRNC CARE MGMT STAFF 1ST 20: CPT

## 2019-09-30 RX ORDER — PRAMIPEXOLE DIHYDROCHLORIDE 0.5 MG/1
TABLET ORAL
Qty: 90 TABLET | Refills: 2 | Status: SHIPPED | OUTPATIENT
Start: 2019-09-30 | End: 2020-07-13

## 2019-09-30 NOTE — TELEPHONE ENCOUNTER
Last Ov: 9/17/19, TB, DM f/u  Upcoming appt: 10/1, TB  Last labs: CMP, Lipid, CBC, UA w Cx 9/18/19  Last Rx: pramipexole 0/5mg, #90, 3R 10/19/18     Per Protocol, not on protocol. Rx pending.

## 2019-10-01 ENCOUNTER — PATIENT OUTREACH (OUTPATIENT)
Dept: CASE MANAGEMENT | Age: 84
End: 2019-10-01

## 2019-10-01 ENCOUNTER — OFFICE VISIT (OUTPATIENT)
Dept: INTERNAL MEDICINE CLINIC | Facility: CLINIC | Age: 84
End: 2019-10-01
Payer: MEDICARE

## 2019-10-01 VITALS
BODY MASS INDEX: 30.89 KG/M2 | RESPIRATION RATE: 16 BRPM | HEIGHT: 61 IN | DIASTOLIC BLOOD PRESSURE: 66 MMHG | HEART RATE: 80 BPM | SYSTOLIC BLOOD PRESSURE: 120 MMHG | WEIGHT: 163.63 LBS

## 2019-10-01 DIAGNOSIS — Z91.81 AT RISK FOR FALLING: ICD-10-CM

## 2019-10-01 DIAGNOSIS — M47.816 LUMBAR FACET ARTHROPATHY: ICD-10-CM

## 2019-10-01 DIAGNOSIS — M54.50 LOW BACK PAIN AT MULTIPLE SITES: Primary | ICD-10-CM

## 2019-10-01 DIAGNOSIS — E11.42 DIABETIC POLYNEUROPATHY ASSOCIATED WITH TYPE 2 DIABETES MELLITUS (HCC): ICD-10-CM

## 2019-10-01 DIAGNOSIS — G25.81 RLS (RESTLESS LEGS SYNDROME): ICD-10-CM

## 2019-10-01 PROCEDURE — 99213 OFFICE O/P EST LOW 20 MIN: CPT | Performed by: INTERNAL MEDICINE

## 2019-10-01 NOTE — PROGRESS NOTES
10/1/2019  Spoke to Clara for CCM.       Updates to patient care team/ comments: None  Patient reported changes in medications: None  Med Adherence  Comment: Taking as directed    Health Maintenance:  Influenza Vaccine(1) due on 09/01/2019 Pt had Flu vacc plan.  Self-Management Abilities (patient reported)             1= least confident in achieving goal, 5= very confident               - confidence: 5    Care Manager Follow Up:  One month    Reason For Follow Up: review progress and or barriers towards henry

## 2019-10-01 NOTE — PROGRESS NOTES
Giuseppe cMclain is a 80year old female. Patient presents with:  Er F/u: cn room 3: er follow up for back pain , patient states medications are the same       HPI:     Patient here for f/u on back pain.  On 9/18, pt dragged her heavy plants while bending PRAMIPEXOLE DIHYDROCHLORIDE 0.5 MG Oral Tab TAKE 1 TABLET BY MOUTH NIGHTLY Disp: 90 tablet Rfl: 2   Cyclobenzaprine HCl 5 MG Oral Tab Take 1 tablet (5 mg total) by mouth nightly as needed for Muscle spasms.  Disp: 30 tablet Rfl: 0   lidocaine (LIDODERM) 5 • COPD (chronic obstructive pulmonary disease) (HCC)     NO OXYGEN- no pulmonologist   • Deviated septum     left side   • Diabetes ([address]    • Diabetes mellitus ([address]    • Diarrhea, unspecified    • Disorder of thyroid     has Thyroid nodules   • Diverti Grandmother    • Breast Cancer Self 80        DCIS        Allergies  No Known Allergies      REVIEW OF SYSTEMS:   GENERAL HEALTH:  no fevers or chills  CARDIOVASCULAR: denies chest pain  GI: denies abdominal pain  MS: as above      EXAM:   /66 (BP Lo

## 2019-10-31 PROCEDURE — 99490 CHRNC CARE MGMT STAFF 1ST 20: CPT

## 2019-11-01 ENCOUNTER — OFFICE VISIT (OUTPATIENT)
Dept: SURGERY | Facility: CLINIC | Age: 84
End: 2019-11-01
Payer: MEDICARE

## 2019-11-01 VITALS
RESPIRATION RATE: 18 BRPM | HEART RATE: 100 BPM | SYSTOLIC BLOOD PRESSURE: 146 MMHG | DIASTOLIC BLOOD PRESSURE: 74 MMHG | OXYGEN SATURATION: 95 %

## 2019-11-01 DIAGNOSIS — D05.11 DUCTAL CARCINOMA IN SITU (DCIS) OF RIGHT BREAST: Primary | ICD-10-CM

## 2019-11-01 PROCEDURE — 99214 OFFICE O/P EST MOD 30 MIN: CPT | Performed by: SURGERY

## 2019-11-02 NOTE — PROGRESS NOTES
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     Disease Status:  Surgical treatment complete, Endocrine therapy deferred and Radiation deferre Frequent UTI    • Full dentures    • Hearing impairment     bilateral hearing aids   • Hearing loss    • Heart palpitations    • Hemorrhoids    • High blood pressure    • History of cardiac murmur    • Hyperlipidemia     not taking lipitor due to side effe age 15  Age of Menopause: 46  Type: Natural   She has history of hormone replacement therapy for 2 years, last in the 1980's. She denies any history of oral contraceptive use. She denies infertility treatment to achieve pregnancy.     Medications:    infl by mouth daily. , Disp: , Rfl:     No current facility-administered medications on file prior to visit.        Allergies:    No Known Allergies    Family History:   Family History   Problem Relation Age of Onset   • Heart Disorder Mother    • Heart Disorde skin, indigestion, nausea, change in bowel habits, diarrhea, abdominal pain or vomiting blood.      Genitourinary:  The patient denies frequent urination, needing to get up at night to urinate, urinary hesitancy or retaining urine, painful urination, urinar changes on either side. The abdomen is soft, flat and nontender, with no palpable masses or organomegaly. Imaging:  Bilateral diagnostic mammogram on April 4, 2017 showed no suspicious findings bilaterally.     Impression:   Ms. Nola Rodriguez is a 80

## 2019-11-06 ENCOUNTER — PATIENT OUTREACH (OUTPATIENT)
Dept: CASE MANAGEMENT | Age: 84
End: 2019-11-06

## 2019-11-06 DIAGNOSIS — R73.9 HYPERGLYCEMIA: ICD-10-CM

## 2019-11-06 DIAGNOSIS — E78.2 MIXED DYSLIPIDEMIA: ICD-10-CM

## 2019-11-06 DIAGNOSIS — E11.42 DIABETIC POLYNEUROPATHY ASSOCIATED WITH TYPE 2 DIABETES MELLITUS (HCC): ICD-10-CM

## 2019-11-06 DIAGNOSIS — E66.9 OBESITY (BMI 30-39.9): ICD-10-CM

## 2019-11-06 DIAGNOSIS — M19.042 ARTHRITIS OF BOTH HANDS: ICD-10-CM

## 2019-11-06 DIAGNOSIS — G25.81 RLS (RESTLESS LEGS SYNDROME): ICD-10-CM

## 2019-11-06 DIAGNOSIS — Z91.81 AT RISK FOR FALLING: ICD-10-CM

## 2019-11-06 DIAGNOSIS — M19.041 ARTHRITIS OF BOTH HANDS: ICD-10-CM

## 2019-11-06 DIAGNOSIS — I10 BENIGN ESSENTIAL HTN: ICD-10-CM

## 2019-11-06 DIAGNOSIS — E78.00 PURE HYPERCHOLESTEROLEMIA: ICD-10-CM

## 2019-11-06 DIAGNOSIS — R53.83 OTHER FATIGUE: ICD-10-CM

## 2019-11-06 NOTE — PROGRESS NOTES
11/6/2019  Spoke to Clara for CCM. Updates to patient care team/ comments: None  Patient reported changes in medications: Pt no longer takes Cyclobenzaprine and is taking Tylenol daily.  Updated med list.  Med Adherence  Comment: Taking as prescribed verbalized understanding and was thankful.      Previous goal met: Continue previous goal    Self Management Goals/Action Plan:    • Active goal from previous outreach: Staying hydrated, active, independent and out of the hospital.    • Patient reported pro

## 2019-11-14 ENCOUNTER — PATIENT OUTREACH (OUTPATIENT)
Dept: CASE MANAGEMENT | Age: 84
End: 2019-11-14

## 2019-11-14 NOTE — PROGRESS NOTES
Spoke with Anyi García from [place] regarding transportation. Anyi García states pt is already enrolled and registered for [facility]. I notified Anyi García pt states this is getting expensive for pt.  Anyi García relates another option for pt would be [employer]

## 2019-11-14 NOTE — PROGRESS NOTES
[facility], [address] regarding RTA programs. I was told I would have to speak with Melissa Mcmillan and she will no be in until later today. Left my contact information for her to call me back. Called patient for monthly CCM outreach.  No answer unable to leave

## 2019-11-30 PROCEDURE — 99490 CHRNC CARE MGMT STAFF 1ST 20: CPT

## 2019-12-05 ENCOUNTER — PATIENT OUTREACH (OUTPATIENT)
Dept: CASE MANAGEMENT | Age: 84
End: 2019-12-05

## 2019-12-20 ENCOUNTER — TELEPHONE (OUTPATIENT)
Dept: FAMILY MEDICINE CLINIC | Facility: CLINIC | Age: 84
End: 2019-12-20

## 2019-12-20 ENCOUNTER — OFFICE VISIT (OUTPATIENT)
Dept: FAMILY MEDICINE CLINIC | Facility: CLINIC | Age: 84
End: 2019-12-20
Payer: MEDICARE

## 2019-12-20 VITALS
SYSTOLIC BLOOD PRESSURE: 122 MMHG | OXYGEN SATURATION: 98 % | DIASTOLIC BLOOD PRESSURE: 62 MMHG | TEMPERATURE: 98 F | HEART RATE: 70 BPM | BODY MASS INDEX: 31.75 KG/M2 | WEIGHT: 168.19 LBS | RESPIRATION RATE: 18 BRPM | HEIGHT: 61 IN

## 2019-12-20 DIAGNOSIS — R30.0 DYSURIA: ICD-10-CM

## 2019-12-20 DIAGNOSIS — N30.01 ACUTE CYSTITIS WITH HEMATURIA: Primary | ICD-10-CM

## 2019-12-20 PROCEDURE — 87077 CULTURE AEROBIC IDENTIFY: CPT | Performed by: FAMILY MEDICINE

## 2019-12-20 PROCEDURE — 87086 URINE CULTURE/COLONY COUNT: CPT | Performed by: FAMILY MEDICINE

## 2019-12-20 PROCEDURE — 81003 URINALYSIS AUTO W/O SCOPE: CPT | Performed by: FAMILY MEDICINE

## 2019-12-20 PROCEDURE — 99214 OFFICE O/P EST MOD 30 MIN: CPT | Performed by: FAMILY MEDICINE

## 2019-12-20 PROCEDURE — 87186 SC STD MICRODIL/AGAR DIL: CPT | Performed by: FAMILY MEDICINE

## 2019-12-20 RX ORDER — NITROFURANTOIN MACROCRYSTALS 100 MG/1
100 CAPSULE ORAL 2 TIMES DAILY
Qty: 14 CAPSULE | Refills: 0 | Status: SHIPPED | OUTPATIENT
Start: 2019-12-20 | End: 2019-12-20

## 2019-12-20 RX ORDER — NITROFURANTOIN 25; 75 MG/1; MG/1
100 CAPSULE ORAL 2 TIMES DAILY
Qty: 14 CAPSULE | Refills: 0 | Status: SHIPPED | OUTPATIENT
Start: 2019-12-20 | End: 2019-12-23 | Stop reason: ALTCHOICE

## 2019-12-20 NOTE — PATIENT INSTRUCTIONS
As of October 6th 2014, the Drug Enforcement Agency [facility]) is reclassifying all hydrocodone combination medications from Schedule III to Schedule II. This includes medications such as Norco, Vicodin, Lortab, Zohydro, and Vicoprofen.      What this means for bacteria can get into the urinary tract from the skin around the rectum. Or they can travel in the blood from elsewhere in the body.  Once they are in your urinary tract, they can cause infection in the urethra (urethritis), the bladder (cystitis), or the k age  · Not emptying your bladder. This can allow bacteria a chance to grow in your urine.   · Dehydration  · Constipation  · Sex  · Use of a diaphragm for birth control   Treatment  Bladder infections are diagnosed by a urine test. They are treated with ant bladder. · If you use birth control pills and have frequent bladder infections, discuss it with your doctor. Follow-up care  Call your healthcare provider if all symptoms are not gone after 3 days of treatment.  This is especially important if you have re

## 2019-12-20 NOTE — TELEPHONE ENCOUNTER
Pharmacy calling to confirm nitorfurantoin macrocrystal order.  This typically 4 times daily    Would you like marcrobid for two times daily

## 2019-12-20 NOTE — PROGRESS NOTES
CHIEF COMPLAINT: Patient presents with:  UTI: frequent urination, burning with urination        HPI:     Giuseppe Mcclain is a 80year old female presents for UTI symptoms. Sophia Madden is an 79 yo F with history of UTI p/w dysuria sx x 2 days.  She started y due hiatal hernia; gaggs at times   • Restless leg syndrome    • Restless leg syndrome    • Shortness of breath     ON EXERTION   • Torn rotator cuff     both shoulders per pt   • Uncomfortable fullness after meals    • Vertigo    • Visual impairment DIHYDROCHLORIDE 0.5 MG Oral Tab TAKE 1 TABLET BY MOUTH NIGHTLY 90 tablet 2   • Tiotropium Bromide Monohydrate 18 MCG Inhalation Cap Inhale 1 capsule (18 mcg total) into the lungs daily.  30 capsule 2   • Loperamide HCl 2 MG Oral Cap Take 2 mg by mouth as ne myalgias. Neurological: Negative for dizziness and light-headedness. Pertinent positives and negatives noted in the the HPI.     PHYSICAL EXAM:   /62 (BP Location: Left arm, Patient Position: Sitting, Cuff Size: adult)   Pulse 70   Temp 97.9 Lipase 09/18/2019 163    • Urine Color 09/18/2019 Yellow    • Clarity Urine 09/18/2019 Clear    • Spec Gravity 09/18/2019 1.014    • Glucose Urine 09/18/2019 Negative    • Bilirubin Urine 09/18/2019 Negative    • Ketones Urine 09/18/2019 Negative    • Bloo days, R/B/SEs of new med d/w pt  - advised to drink plenty of water  - if sx worsen or persist, advised to f-u with PCP    - URINE CULTURE, ROUTINE; Future  - Nitrofurantoin Macrocrystal 100 MG Oral Cap;  Take 1 capsule (100 mg total) by mouth 2 (two) times abdominal pain     Nausea     Mckeon's esophagus without dysplasia     Vulvar itching     Hyperglycemia     Acute cystitis without hematuria     Obesity (BMI 30-39. 9)     Anemia      Imaging & Referrals:  None     12/20/2019  Jess Dias MD

## 2019-12-23 ENCOUNTER — TELEPHONE (OUTPATIENT)
Dept: INTERNAL MEDICINE CLINIC | Facility: CLINIC | Age: 84
End: 2019-12-23

## 2019-12-23 ENCOUNTER — OFFICE VISIT (OUTPATIENT)
Dept: FAMILY MEDICINE CLINIC | Facility: CLINIC | Age: 84
End: 2019-12-23
Payer: MEDICARE

## 2019-12-23 VITALS
OXYGEN SATURATION: 96 % | WEIGHT: 170 LBS | RESPIRATION RATE: 18 BRPM | SYSTOLIC BLOOD PRESSURE: 122 MMHG | HEART RATE: 100 BPM | BODY MASS INDEX: 32.1 KG/M2 | HEIGHT: 61 IN | TEMPERATURE: 98 F | DIASTOLIC BLOOD PRESSURE: 62 MMHG

## 2019-12-23 DIAGNOSIS — N30.01 ACUTE CYSTITIS WITH HEMATURIA: Primary | ICD-10-CM

## 2019-12-23 DIAGNOSIS — R11.2 NAUSEA AND VOMITING, INTRACTABILITY OF VOMITING NOT SPECIFIED, UNSPECIFIED VOMITING TYPE: ICD-10-CM

## 2019-12-23 PROCEDURE — 99214 OFFICE O/P EST MOD 30 MIN: CPT | Performed by: FAMILY MEDICINE

## 2019-12-23 RX ORDER — SULFAMETHOXAZOLE AND TRIMETHOPRIM 800; 160 MG/1; MG/1
1 TABLET ORAL 2 TIMES DAILY
Qty: 6 TABLET | Refills: 0 | Status: SHIPPED | OUTPATIENT
Start: 2019-12-23 | End: 2019-12-26

## 2019-12-23 RX ORDER — ONDANSETRON 4 MG/1
4 TABLET, ORALLY DISINTEGRATING ORAL EVERY 8 HOURS PRN
Qty: 10 TABLET | Refills: 0 | Status: SHIPPED | OUTPATIENT
Start: 2019-12-23 | End: 2020-07-17

## 2019-12-23 NOTE — PROGRESS NOTES
CHIEF COMPLAINT: Patient presents with:  Vomiting: x1 day   Medication Problem        HPI:     Latoya Townsend is a 80year old female presents for nausea and vomiting from medication.     Diogo Doris is an 81 yo F who was recently seen in the [facility] on 12/20 for Pneumonia, organism unspecified(486)     04/2015   • PONV (postoperative nausea and vomiting)    • Problems with swallowing     due hiatal hernia; gaggs at times   • Restless leg syndrome    • Restless leg syndrome    • Shortness of breath     ON EXERTION 800-160 MG Oral Tab per tablet Take 1 tablet by mouth 2 (two) times daily for 3 days. 6 tablet 0   • ondansetron 4 MG Oral Tablet Dispersible Take 1 tablet (4 mg total) by mouth every 8 (eight) hours as needed for Nausea.  10 tablet 0   • Ranibizumab 0.5 MG for chills, fatigue and fever. Gastrointestinal: Positive for nausea and vomiting. Negative for abdominal pain and diarrhea. Genitourinary: Negative for dysuria, frequency, hematuria and flank pain. Musculoskeletal: Negative for myalgias.         Pert yellow    • Multistix Lot# 12/20/2019 [id number]    • Multistix Expiration Date 12/20/[id number]    • Urine Culture 12/20/2019 50,000-99,000 CFU/ML Citrobacter braakii*      REVIEWED THIS VISIT  ASSESSMENT/PLAN:   80year old female with    1.  Acute cysti associated with type 2 diabetes mellitus (HCC)     Chronic diarrhea     Gastroesophageal reflux disease     Benign essential HTN     RLS (restless legs syndrome)     Pure hypercholesterolemia     Nodule of left lung     Exudative macular degeneration ([address]

## 2019-12-23 NOTE — TELEPHONE ENCOUNTER
Called pt stating was seen @[facility] on 12/20/19 for UTI, ucx sensitive to Nitrofurantoin Monohyd Macro (MACROBID) 100 MG provided. Pt taking x3 days. Noted nausea after 1 day. 1 episode of vomiting. Chills. Drowsy. Low appetite. Slight HA. No blood in urine.  Dotty Paez

## 2019-12-23 NOTE — PATIENT INSTRUCTIONS
Self-Care for Vomiting and Diarrhea    Vomiting and diarrhea can make you miserable. Your stomach and bowels are reacting to an irritant. This might be food, medicine, or a viral stomach flu.  Vomiting and diarrhea are two ways your body can remove the pr · Certain over-the-counter antihistamines can help control nausea. Other medicines can help soothe stomach upset. Ask your healthcare provider which medicines may help you.   When to call your healthcare provider  Call your healthcare provider if you have:

## 2019-12-23 NOTE — TELEPHONE ENCOUNTER
Pt called and stated that she has N/V, chills, drowsy, loss of appetite. Pt has a bladder infection and she was out on an abx and she is wondering if that's what is making her sick.      Please advise

## 2019-12-23 NOTE — PROGRESS NOTES
Notes reviewed. Pt growing Citrobacter, sensitive to Macrobid that was Rx'd at time of visit.  Pt notified by Lupe Fernandes.

## 2019-12-26 ENCOUNTER — LAB ENCOUNTER (OUTPATIENT)
Dept: LAB | Facility: HOSPITAL | Age: 84
End: 2019-12-26
Attending: INTERNAL MEDICINE
Payer: MEDICARE

## 2019-12-26 DIAGNOSIS — R63.5 WEIGHT GAIN: ICD-10-CM

## 2019-12-26 DIAGNOSIS — E11.42 DIABETIC POLYNEUROPATHY ASSOCIATED WITH TYPE 2 DIABETES MELLITUS (HCC): ICD-10-CM

## 2019-12-26 PROCEDURE — 80053 COMPREHEN METABOLIC PANEL: CPT

## 2019-12-26 PROCEDURE — 84443 ASSAY THYROID STIM HORMONE: CPT

## 2019-12-26 PROCEDURE — 36415 COLL VENOUS BLD VENIPUNCTURE: CPT

## 2019-12-26 PROCEDURE — 84439 ASSAY OF FREE THYROXINE: CPT

## 2019-12-26 PROCEDURE — 83036 HEMOGLOBIN GLYCOSYLATED A1C: CPT

## 2020-01-08 ENCOUNTER — PATIENT OUTREACH (OUTPATIENT)
Dept: CASE MANAGEMENT | Age: 85
End: 2020-01-08

## 2020-01-29 ENCOUNTER — PATIENT OUTREACH (OUTPATIENT)
Dept: CASE MANAGEMENT | Age: 85
End: 2020-01-29

## 2020-01-29 ENCOUNTER — TELEPHONE (OUTPATIENT)
Dept: INTERNAL MEDICINE CLINIC | Facility: CLINIC | Age: 85
End: 2020-01-29

## 2020-01-29 DIAGNOSIS — E78.00 PURE HYPERCHOLESTEROLEMIA: ICD-10-CM

## 2020-01-29 DIAGNOSIS — M19.041 ARTHRITIS OF BOTH HANDS: ICD-10-CM

## 2020-01-29 DIAGNOSIS — G56.03 BILATERAL CARPAL TUNNEL SYNDROME: ICD-10-CM

## 2020-01-29 DIAGNOSIS — Z91.81 AT RISK FOR FALLING: ICD-10-CM

## 2020-01-29 DIAGNOSIS — M19.042 ARTHRITIS OF BOTH HANDS: ICD-10-CM

## 2020-01-29 DIAGNOSIS — E11.42 DIABETIC POLYNEUROPATHY ASSOCIATED WITH TYPE 2 DIABETES MELLITUS (HCC): ICD-10-CM

## 2020-01-29 DIAGNOSIS — I10 BENIGN ESSENTIAL HTN: ICD-10-CM

## 2020-01-29 DIAGNOSIS — R53.1 WEAKNESS GENERALIZED: ICD-10-CM

## 2020-01-29 DIAGNOSIS — E78.2 MIXED DYSLIPIDEMIA: ICD-10-CM

## 2020-01-29 DIAGNOSIS — R50.9 FEVER, UNSPECIFIED FEVER CAUSE: ICD-10-CM

## 2020-01-29 DIAGNOSIS — D05.11 DUCTAL CARCINOMA IN SITU (DCIS) OF RIGHT BREAST: ICD-10-CM

## 2020-01-29 DIAGNOSIS — R73.9 HYPERGLYCEMIA: ICD-10-CM

## 2020-01-29 DIAGNOSIS — E66.9 OBESITY (BMI 30-39.9): ICD-10-CM

## 2020-01-29 DIAGNOSIS — E86.0 DEHYDRATION: ICD-10-CM

## 2020-01-29 NOTE — TELEPHONE ENCOUNTER
Spoke with patient for [employer]. Pt relates she has a cold going on since 1/23/20. Pt states she has congestion, post nasal drip and cough. Pt denies fever. Pt is currently taking Coricidin but is not taking it consistently.  Pt declined sooner appoint

## 2020-01-29 NOTE — PROGRESS NOTES
1/29/2020  Spoke to Clara for [address]. Updates to patient care team/ comments: None  Patient reported changes in medications: None  Med Adherence  Comment: Taking as prescribed.      Health Maintenance: Up to date  Diabetes Care Dilated Eye Exam due on 0 notify me if she needs additional help with this. Other- Pt relates she will be getting her floor done due to water leakage. Listened to patient and provided support.     Previous goal met: Continue previous goal    Self Management Goals/Action Plan:

## 2020-01-29 NOTE — TELEPHONE ENCOUNTER
Called pt stating experiencing cold x1 week. Not consistently, but taking OTC Coricidin (decongestant/ cold sx relief) 2 tablets x1 week with minimal improvements. Congestion. Minimal cough. Sleeping ok. No sore throat. No fever or chills. No body aches.  Ivory Grant

## 2020-01-31 PROCEDURE — 99490 CHRNC CARE MGMT STAFF 1ST 20: CPT

## 2020-02-04 ENCOUNTER — OFFICE VISIT (OUTPATIENT)
Dept: INTERNAL MEDICINE CLINIC | Facility: CLINIC | Age: 85
End: 2020-02-04
Payer: MEDICARE

## 2020-02-04 VITALS
SYSTOLIC BLOOD PRESSURE: 128 MMHG | HEIGHT: 61 IN | BODY MASS INDEX: 31.19 KG/M2 | OXYGEN SATURATION: 98 % | HEART RATE: 76 BPM | WEIGHT: 165.19 LBS | RESPIRATION RATE: 18 BRPM | DIASTOLIC BLOOD PRESSURE: 60 MMHG

## 2020-02-04 DIAGNOSIS — R53.81 MALAISE AND FATIGUE: ICD-10-CM

## 2020-02-04 DIAGNOSIS — R06.83 SNORING: ICD-10-CM

## 2020-02-04 DIAGNOSIS — I10 BENIGN ESSENTIAL HTN: ICD-10-CM

## 2020-02-04 DIAGNOSIS — R53.83 MALAISE AND FATIGUE: ICD-10-CM

## 2020-02-04 DIAGNOSIS — E11.42 DIABETIC POLYNEUROPATHY ASSOCIATED WITH TYPE 2 DIABETES MELLITUS (HCC): Primary | ICD-10-CM

## 2020-02-04 PROBLEM — J44.9 CHRONIC OBSTRUCTIVE PULMONARY DISEASE (HCC): Status: ACTIVE | Noted: 2020-02-04

## 2020-02-04 PROCEDURE — 99214 OFFICE O/P EST MOD 30 MIN: CPT | Performed by: INTERNAL MEDICINE

## 2020-02-04 NOTE — PROGRESS NOTES
Manish Espino is a 80year old female. Patient presents with: Follow - Up: cn room 2: 6 month follow up for DM, HTN. c/o fatigue, wants to do sleep study      HPI:     Patient here for f/u-  HTN- compliant with losartan, BP well controlled.  No HA/LH/D 90 tablet 2   • Loperamide HCl 2 MG Oral Cap Take 2 mg by mouth as needed for Diarrhea. • triamcinolone acetonide 0.1 % External Ointment Apply 1 Application topically 2 (two) times daily.  30 g 0   • Omeprazole 40 MG Oral Capsule Delayed Release TAKE 1 Fatigue    • Frequent UTI    • Full dentures    • Hearing impairment     bilateral hearing aids   • Hearing loss    • Heart palpitations    • Hemorrhoids    • High blood pressure    • History of cardiac murmur    • Hyperlipidemia     not taking lipitor due headaches  PSYCH: No reported depression  HEME: No adenopathy      EXAM:   /60 (BP Location: Right arm, Patient Position: Sitting, Cuff Size: adult)   Pulse 76   Resp 18   Ht 61\"   Wt 165 lb 3.2 oz (74.9 kg)   SpO2 98%   BMI 31.21 kg/m²   GENERAL: w

## 2020-02-25 ENCOUNTER — PATIENT OUTREACH (OUTPATIENT)
Dept: CASE MANAGEMENT | Age: 85
End: 2020-02-25

## 2020-02-25 ENCOUNTER — TELEPHONE (OUTPATIENT)
Dept: INTERNAL MEDICINE CLINIC | Facility: CLINIC | Age: 85
End: 2020-02-25

## 2020-02-25 DIAGNOSIS — J44.9 CHRONIC OBSTRUCTIVE PULMONARY DISEASE, UNSPECIFIED COPD TYPE (HCC): ICD-10-CM

## 2020-02-25 DIAGNOSIS — K22.70 BARRETT'S ESOPHAGUS WITHOUT DYSPLASIA: ICD-10-CM

## 2020-02-25 DIAGNOSIS — E87.1 HYPONATREMIA: ICD-10-CM

## 2020-02-25 DIAGNOSIS — E78.2 MIXED DYSLIPIDEMIA: ICD-10-CM

## 2020-02-25 DIAGNOSIS — K52.9 CHRONIC DIARRHEA: ICD-10-CM

## 2020-02-25 DIAGNOSIS — R53.83 OTHER FATIGUE: ICD-10-CM

## 2020-02-25 DIAGNOSIS — R73.9 HYPERGLYCEMIA: ICD-10-CM

## 2020-02-25 DIAGNOSIS — E66.9 OBESITY (BMI 30-39.9): ICD-10-CM

## 2020-02-25 DIAGNOSIS — M19.041 ARTHRITIS OF BOTH HANDS: ICD-10-CM

## 2020-02-25 DIAGNOSIS — R53.1 WEAKNESS GENERALIZED: ICD-10-CM

## 2020-02-25 DIAGNOSIS — K21.9 GASTROESOPHAGEAL REFLUX DISEASE, ESOPHAGITIS PRESENCE NOT SPECIFIED: ICD-10-CM

## 2020-02-25 DIAGNOSIS — Z91.81 AT RISK FOR FALLING: ICD-10-CM

## 2020-02-25 DIAGNOSIS — E11.42 DIABETIC POLYNEUROPATHY ASSOCIATED WITH TYPE 2 DIABETES MELLITUS (HCC): ICD-10-CM

## 2020-02-25 DIAGNOSIS — I10 BENIGN ESSENTIAL HTN: ICD-10-CM

## 2020-02-25 DIAGNOSIS — E78.00 PURE HYPERCHOLESTEROLEMIA: ICD-10-CM

## 2020-02-25 DIAGNOSIS — M19.042 ARTHRITIS OF BOTH HANDS: ICD-10-CM

## 2020-02-25 NOTE — PROGRESS NOTES
Pt called me stating she is having worsening left sided low back pain and mid abdominal pain which started a couple of weeks ago. Pain 6/10. Pt currently taking Tylenol in the mornings.  Pt states she found a bottle of Cyclobenzaprine 5 mg which was given t

## 2020-02-29 PROCEDURE — 99490 CHRNC CARE MGMT STAFF 1ST 20: CPT

## 2020-03-02 ENCOUNTER — APPOINTMENT (OUTPATIENT)
Dept: CT IMAGING | Facility: HOSPITAL | Age: 85
End: 2020-03-02
Attending: EMERGENCY MEDICINE
Payer: MEDICARE

## 2020-03-02 ENCOUNTER — HOSPITAL ENCOUNTER (EMERGENCY)
Facility: HOSPITAL | Age: 85
Discharge: HOME | End: 2020-03-02
Attending: EMERGENCY MEDICINE
Payer: MEDICARE

## 2020-03-02 VITALS
BODY MASS INDEX: 30.21 KG/M2 | OXYGEN SATURATION: 96 % | RESPIRATION RATE: 20 BRPM | WEIGHT: 160 LBS | DIASTOLIC BLOOD PRESSURE: 66 MMHG | SYSTOLIC BLOOD PRESSURE: 131 MMHG | TEMPERATURE: 98 F | HEIGHT: 61 IN | HEART RATE: 66 BPM

## 2020-03-02 DIAGNOSIS — R10.9 ABDOMINAL PAIN OF UNKNOWN ETIOLOGY: Primary | ICD-10-CM

## 2020-03-02 LAB
ALBUMIN SERPL-MCNC: 3.2 G/DL (ref 3.4–5)
ALBUMIN/GLOB SERPL: 0.7 {RATIO} (ref 1–2)
ALP LIVER SERPL-CCNC: 86 U/L (ref 55–142)
ALT SERPL-CCNC: 13 U/L (ref 13–56)
ANION GAP SERPL CALC-SCNC: 6 MMOL/L (ref 0–18)
AST SERPL-CCNC: 11 U/L (ref 15–37)
BASOPHILS # BLD AUTO: 0.05 X10(3) UL (ref 0–0.2)
BASOPHILS NFR BLD AUTO: 0.8 %
BILIRUB SERPL-MCNC: 0.5 MG/DL (ref 0.1–2)
BILIRUB UR QL STRIP.AUTO: NEGATIVE
BUN BLD-MCNC: 11 MG/DL (ref 7–18)
BUN/CREAT SERPL: 12.5 (ref 10–20)
CALCIUM BLD-MCNC: 9.4 MG/DL (ref 8.5–10.1)
CHLORIDE SERPL-SCNC: 104 MMOL/L (ref 98–112)
CLARITY UR REFRACT.AUTO: CLEAR
CO2 SERPL-SCNC: 27 MMOL/L (ref 21–32)
COLOR UR AUTO: YELLOW
CREAT BLD-MCNC: 0.88 MG/DL (ref 0.55–1.02)
DEPRECATED RDW RBC AUTO: 40 FL (ref 35.1–46.3)
EOSINOPHIL # BLD AUTO: 0.18 X10(3) UL (ref 0–0.7)
EOSINOPHIL NFR BLD AUTO: 2.8 %
ERYTHROCYTE [DISTWIDTH] IN BLOOD BY AUTOMATED COUNT: 12.4 % (ref 11–15)
GLOBULIN PLAS-MCNC: 4.9 G/DL (ref 2.8–4.4)
GLUCOSE BLD-MCNC: 138 MG/DL (ref 70–99)
GLUCOSE UR STRIP.AUTO-MCNC: NEGATIVE MG/DL
HCT VFR BLD AUTO: 39.4 % (ref 35–48)
HGB BLD-MCNC: 12.7 G/DL (ref 12–16)
IMM GRANULOCYTES # BLD AUTO: 0.02 X10(3) UL (ref 0–1)
IMM GRANULOCYTES NFR BLD: 0.3 %
KETONES UR STRIP.AUTO-MCNC: NEGATIVE MG/DL
LEUKOCYTE ESTERASE UR QL STRIP.AUTO: NEGATIVE
LIPASE SERPL-CCNC: 153 U/L (ref 73–393)
LYMPHOCYTES # BLD AUTO: 1.04 X10(3) UL (ref 1–4)
LYMPHOCYTES NFR BLD AUTO: 15.9 %
M PROTEIN MFR SERPL ELPH: 8.1 G/DL (ref 6.4–8.2)
MCH RBC QN AUTO: 28.4 PG (ref 26–34)
MCHC RBC AUTO-ENTMCNC: 32.2 G/DL (ref 31–37)
MCV RBC AUTO: 88.1 FL (ref 80–100)
MONOCYTES # BLD AUTO: 0.51 X10(3) UL (ref 0.1–1)
MONOCYTES NFR BLD AUTO: 7.8 %
NEUTROPHILS # BLD AUTO: 4.74 X10 (3) UL (ref 1.5–7.7)
NEUTROPHILS # BLD AUTO: 4.74 X10(3) UL (ref 1.5–7.7)
NEUTROPHILS NFR BLD AUTO: 72.4 %
NITRITE UR QL STRIP.AUTO: NEGATIVE
OSMOLALITY SERPL CALC.SUM OF ELEC: 286 MOSM/KG (ref 275–295)
PH UR STRIP.AUTO: 5 [PH] (ref 4.5–8)
PLATELET # BLD AUTO: 297 10(3)UL (ref 150–450)
POTASSIUM SERPL-SCNC: 3.8 MMOL/L (ref 3.5–5.1)
PROT UR STRIP.AUTO-MCNC: NEGATIVE MG/DL
RBC # BLD AUTO: 4.47 X10(6)UL (ref 3.8–5.3)
RBC UR QL AUTO: NEGATIVE
SODIUM SERPL-SCNC: 137 MMOL/L (ref 136–145)
SP GR UR STRIP.AUTO: 1.02 (ref 1–1.03)
UROBILINOGEN UR STRIP.AUTO-MCNC: <2 MG/DL
WBC # BLD AUTO: 6.5 X10(3) UL (ref 4–11)

## 2020-03-02 PROCEDURE — 74177 CT ABD & PELVIS W/CONTRAST: CPT | Performed by: EMERGENCY MEDICINE

## 2020-03-02 PROCEDURE — 85025 COMPLETE CBC W/AUTO DIFF WBC: CPT | Performed by: EMERGENCY MEDICINE

## 2020-03-02 PROCEDURE — 80053 COMPREHEN METABOLIC PANEL: CPT | Performed by: EMERGENCY MEDICINE

## 2020-03-02 PROCEDURE — 99284 EMERGENCY DEPT VISIT MOD MDM: CPT

## 2020-03-02 PROCEDURE — 81003 URINALYSIS AUTO W/O SCOPE: CPT | Performed by: EMERGENCY MEDICINE

## 2020-03-02 PROCEDURE — 99285 EMERGENCY DEPT VISIT HI MDM: CPT

## 2020-03-02 PROCEDURE — 96374 THER/PROPH/DIAG INJ IV PUSH: CPT

## 2020-03-02 PROCEDURE — 83690 ASSAY OF LIPASE: CPT | Performed by: EMERGENCY MEDICINE

## 2020-03-02 RX ORDER — ONDANSETRON 2 MG/ML
4 INJECTION INTRAMUSCULAR; INTRAVENOUS ONCE
Status: COMPLETED | OUTPATIENT
Start: 2020-03-02 | End: 2020-03-02

## 2020-03-02 NOTE — ED INITIAL ASSESSMENT (HPI)
Pt to ED from home with c/o lower abd pain, nausea, constipation, odorous urine x1 week. Pt reports her left lower back, where she has chronic pain, has been worse.

## 2020-03-02 NOTE — ED NOTES
PT is aware of being discharged but wants to order food and will leave after she eats. This RN explained that we may need this room for another PT and might have to relocate. PT verbalized understanding.  Food tray ordered

## 2020-03-02 NOTE — ED PROVIDER NOTES
Patient Seen in: [facility] Emergency Department      History   Patient presents with:  Back Pain  Abdomen/Flank Pain    Stated Complaint: lower back pain, abdominal pain, nausea    HPI    Patient is an 43-year-old female who states she has chronic Hearing impairment     bilateral hearing aids   • Hearing loss    • Heart palpitations    • Hemorrhoids    • High blood pressure    • History of cardiac murmur    • Hyperlipidemia     not taking lipitor due to side effects   • Incontinence     uses pads quittin.5      Smokeless tobacco: Never Used    Alcohol use: No    Drug use: No             Review of Systems    Positive for stated complaint: lower back pain, abdominal pain, nausea  Other systems are as noted in HPI.   Constitutional and vital signs DIFFERENTIAL WITH PLATELET    Narrative: The following orders were created for panel order CBC WITH DIFFERENTIAL WITH PLATELET.   Procedure                               Abnormality         Status                     --------- etiology  (primary encounter diagnosis)    Disposition:  Discharge  3/2/2020  4:09 pm    Follow-up:  Jarocho Whitman MD  [address]  Murali Masters [address] [place] [address]          Jessie Shah,  Erin Carrion Masters [id number]

## 2020-03-05 ENCOUNTER — APPOINTMENT (OUTPATIENT)
Dept: LAB | Facility: HOSPITAL | Age: 85
End: 2020-03-05
Attending: NURSE PRACTITIONER
Payer: MEDICARE

## 2020-03-05 DIAGNOSIS — R10.33 PERIUMBILICAL ABDOMINAL PAIN: ICD-10-CM

## 2020-03-05 PROCEDURE — 83013 H PYLORI (C-13) BREATH: CPT

## 2020-03-08 LAB — H. PYLORI BREATH TEST: NEGATIVE

## 2020-03-16 DIAGNOSIS — I10 ESSENTIAL HYPERTENSION: ICD-10-CM

## 2020-03-16 RX ORDER — LOSARTAN POTASSIUM 100 MG/1
TABLET ORAL
Qty: 90 TABLET | Refills: 0 | Status: SHIPPED | OUTPATIENT
Start: 2020-03-16 | End: 2020-04-27

## 2020-03-17 ENCOUNTER — PATIENT OUTREACH (OUTPATIENT)
Dept: CASE MANAGEMENT | Age: 85
End: 2020-03-17

## 2020-03-17 DIAGNOSIS — J44.9 CHRONIC OBSTRUCTIVE PULMONARY DISEASE, UNSPECIFIED COPD TYPE (HCC): ICD-10-CM

## 2020-03-17 DIAGNOSIS — K21.9 GASTROESOPHAGEAL REFLUX DISEASE, ESOPHAGITIS PRESENCE NOT SPECIFIED: ICD-10-CM

## 2020-03-17 DIAGNOSIS — K22.70 BARRETT'S ESOPHAGUS WITHOUT DYSPLASIA: ICD-10-CM

## 2020-03-17 DIAGNOSIS — G56.03 BILATERAL CARPAL TUNNEL SYNDROME: ICD-10-CM

## 2020-03-17 DIAGNOSIS — E78.00 PURE HYPERCHOLESTEROLEMIA: ICD-10-CM

## 2020-03-17 DIAGNOSIS — K52.9 CHRONIC DIARRHEA: ICD-10-CM

## 2020-03-17 DIAGNOSIS — Z91.81 AT RISK FOR FALLING: ICD-10-CM

## 2020-03-17 DIAGNOSIS — I10 BENIGN ESSENTIAL HTN: ICD-10-CM

## 2020-03-17 DIAGNOSIS — R53.83 OTHER FATIGUE: ICD-10-CM

## 2020-03-17 DIAGNOSIS — R53.1 WEAKNESS GENERALIZED: ICD-10-CM

## 2020-03-17 DIAGNOSIS — E87.1 HYPONATREMIA: ICD-10-CM

## 2020-03-17 DIAGNOSIS — G25.81 RLS (RESTLESS LEGS SYNDROME): ICD-10-CM

## 2020-03-17 DIAGNOSIS — R73.9 HYPERGLYCEMIA: ICD-10-CM

## 2020-03-17 DIAGNOSIS — E78.2 MIXED DYSLIPIDEMIA: ICD-10-CM

## 2020-03-17 DIAGNOSIS — E11.42 DIABETIC POLYNEUROPATHY ASSOCIATED WITH TYPE 2 DIABETES MELLITUS (HCC): ICD-10-CM

## 2020-03-17 DIAGNOSIS — M19.041 ARTHRITIS OF BOTH HANDS: ICD-10-CM

## 2020-03-17 DIAGNOSIS — E66.9 OBESITY (BMI 30-39.9): ICD-10-CM

## 2020-03-17 DIAGNOSIS — M19.042 ARTHRITIS OF BOTH HANDS: ICD-10-CM

## 2020-03-17 DIAGNOSIS — R11.0 NAUSEA: ICD-10-CM

## 2020-03-17 NOTE — PROGRESS NOTES
3/17/2020  Spoke to Clara for [address]. Updates to patient care team/ comments: None  Patient reported changes in medications: None  Med Adherence  Comment: Taking as prescribed.      Health Maintenance:  Diabetes Care Dilated Eye Exam due on 01/22/2020 is trying to increase her water intake. Patient states Wu Pena suggested for her to drink 8-10 glasses of water which she can't do. I suggested for patient to at least try to drink 4-6 cups of water which patient states it is more doable for her.  Patient me 1= least confident in achieving goal, 5= very confident               - confidence: 5    Care Manager Follow Up: 1 month  Reason For Follow Up: review progress and or barriers towards patients goals.      Care managers interventions: Mailed junior

## 2020-03-31 PROCEDURE — 99490 CHRNC CARE MGMT STAFF 1ST 20: CPT

## 2020-04-13 ENCOUNTER — VIRTUAL PHONE E/M (OUTPATIENT)
Dept: INTERNAL MEDICINE CLINIC | Facility: CLINIC | Age: 85
End: 2020-04-13
Payer: MEDICARE

## 2020-04-13 DIAGNOSIS — R11.0 NAUSEA: Primary | ICD-10-CM

## 2020-04-13 DIAGNOSIS — E11.42 DIABETIC POLYNEUROPATHY ASSOCIATED WITH TYPE 2 DIABETES MELLITUS (HCC): ICD-10-CM

## 2020-04-13 DIAGNOSIS — M51.36 DDD (DEGENERATIVE DISC DISEASE), LUMBAR: ICD-10-CM

## 2020-04-13 PROCEDURE — 99442 PHONE E/M BY PHYS 11-20 MIN: CPT | Performed by: INTERNAL MEDICINE

## 2020-04-13 NOTE — PROGRESS NOTES
Due to COVID-19 ACTION PLAN, the patient's office visit was converted to a phone or visit.   Time Spent:15 minutes    Subjective     HPI:   Manish Espino is a 80year old female who presents for follow up-  DM2- reports BG are good, consistently under 15 exam could be performed. Every conscious effort was taken to allow for sufficient and adequate time. This billing visit was spent on reviewing labs, medications, radiology tests and decision making.   Appropriate medical decision-making and tests are orde

## 2020-04-26 DIAGNOSIS — K21.9 GASTROESOPHAGEAL REFLUX DISEASE, ESOPHAGITIS PRESENCE NOT SPECIFIED: ICD-10-CM

## 2020-04-26 DIAGNOSIS — I10 ESSENTIAL HYPERTENSION: ICD-10-CM

## 2020-04-27 ENCOUNTER — PATIENT OUTREACH (OUTPATIENT)
Dept: CASE MANAGEMENT | Age: 85
End: 2020-04-27

## 2020-04-27 ENCOUNTER — TELEPHONE (OUTPATIENT)
Dept: INTERNAL MEDICINE CLINIC | Facility: CLINIC | Age: 85
End: 2020-04-27

## 2020-04-27 DIAGNOSIS — G56.03 BILATERAL CARPAL TUNNEL SYNDROME: ICD-10-CM

## 2020-04-27 DIAGNOSIS — E78.00 PURE HYPERCHOLESTEROLEMIA: ICD-10-CM

## 2020-04-27 DIAGNOSIS — D05.11 DUCTAL CARCINOMA IN SITU (DCIS) OF RIGHT BREAST: ICD-10-CM

## 2020-04-27 DIAGNOSIS — I10 BENIGN ESSENTIAL HTN: ICD-10-CM

## 2020-04-27 DIAGNOSIS — E78.2 MIXED DYSLIPIDEMIA: ICD-10-CM

## 2020-04-27 DIAGNOSIS — R53.1 WEAKNESS GENERALIZED: ICD-10-CM

## 2020-04-27 DIAGNOSIS — E11.42 DIABETIC POLYNEUROPATHY ASSOCIATED WITH TYPE 2 DIABETES MELLITUS (HCC): ICD-10-CM

## 2020-04-27 DIAGNOSIS — N30.00 ACUTE CYSTITIS WITHOUT HEMATURIA: ICD-10-CM

## 2020-04-27 RX ORDER — LOSARTAN POTASSIUM 100 MG/1
TABLET ORAL
Qty: 90 TABLET | Refills: 0 | Status: SHIPPED | OUTPATIENT
Start: 2020-04-27 | End: 2020-07-13

## 2020-04-27 RX ORDER — PRAMIPEXOLE DIHYDROCHLORIDE 0.25 MG/1
TABLET ORAL
Qty: 90 TABLET | Refills: 0 | OUTPATIENT
Start: 2020-04-27

## 2020-04-27 RX ORDER — OMEPRAZOLE 40 MG/1
CAPSULE, DELAYED RELEASE ORAL
Qty: 180 CAPSULE | Refills: 0 | Status: SHIPPED | OUTPATIENT
Start: 2020-04-27 | End: 2020-09-07

## 2020-04-27 NOTE — PROGRESS NOTES
4/27/2020  Spoke to Clara for [address]. Updates to patient care team/ comments: None    Patient reported changes in medications: None    Med Adherence  Comment: Pt relates taking medication as prescribed.    Health Maintenance:     Diabetes Care Dilated E record review, telephone communication, care plan updates where needed, education, goals and action plan recreation/update. Provided acknowledgment and validation to patient's concerns.    Monthly Minute Total including today: 28 minutes     Physical assess

## 2020-04-27 NOTE — TELEPHONE ENCOUNTER
Losartan and Metformin-PASSED per protocol, refill sent. Last VISIT 2.4.20 w/ TB (DM f/up)  Last PE 6.24.19   Last REFILL 9.30.19 Pramipexole Dihydrochloride 0.5mg #90 2R  (Listed as discontinued, dose adjustment)    7.25.19 Omeprazole 40mg #180 1R  Last LABS 12.26.19 TSH+FreeT4, CMP, HgA1c    Future Appointments   Date Time Provider Karel Siddiquii   6/16/2020  2:30 PM Inna Lerma MD [facility] Surg/Onc         Per PROTOCOL?  Not on protocol     Please Advise

## 2020-04-30 PROCEDURE — 99490 CHRNC CARE MGMT STAFF 1ST 20: CPT

## 2020-05-26 ENCOUNTER — TELEPHONE (OUTPATIENT)
Dept: INTERNAL MEDICINE CLINIC | Facility: CLINIC | Age: 85
End: 2020-05-26

## 2020-05-26 NOTE — TELEPHONE ENCOUNTER
Pt stated she has a history of cysts on her thyroid and just discovered a lump on her throat, it's the size of a grape. Pt stated she just noticed it over the weekend. TB wasn't available for in office visit till 6/23. TB please advise.

## 2020-05-26 NOTE — TELEPHONE ENCOUNTER
LOV 4/13/20 Virtual TE. Last Thyroid US 1/9/19 with results below:    CONCLUSION:  6 mm right midpole thyroid nodule. Heterogeneous thyroid gland noted. Previously noted 4 mm isthmic nodule and 13 mm left inferior pole nodule are no longer appreciated. Patient states she not having any unusual swallowing difficulties or pain in the area. She does have a scratchy throat but could be allergies or reflux. Please advise.

## 2020-05-27 NOTE — TELEPHONE ENCOUNTER
Spoke with pt to schedule but she's not sure how to do the video call. Pt stated she will ask her son Ritesh Hughes, on hipaa, to be there to help her connect. Pt wanted me to call her son Ritesh Hughes to schedule.   LMTCB to schedule based off of his availability

## 2020-05-29 NOTE — TELEPHONE ENCOUNTER
Patient called and was told to tell her son to call the office so we can set up a video TE when it is convenient for him so he is available to help with the call.

## 2020-05-29 NOTE — TELEPHONE ENCOUNTER
Pt scheduled for the date and time below.     Future Appointments   Date Time Provider Karel Dockery   6/1/2020 10:20 AM Jeronimo Alfaro MD [facility] [address] [facility]

## 2020-06-01 ENCOUNTER — TELEMEDICINE (OUTPATIENT)
Dept: INTERNAL MEDICINE CLINIC | Facility: CLINIC | Age: 85
End: 2020-06-01
Payer: MEDICARE

## 2020-06-01 DIAGNOSIS — H92.01 ACUTE PAIN OF RIGHT EAR: ICD-10-CM

## 2020-06-01 DIAGNOSIS — R22.1 LUMP IN THROAT: Primary | ICD-10-CM

## 2020-06-01 DIAGNOSIS — E04.1 THYROID NODULE: ICD-10-CM

## 2020-06-01 DIAGNOSIS — E11.42 DIABETIC POLYNEUROPATHY ASSOCIATED WITH TYPE 2 DIABETES MELLITUS (HCC): ICD-10-CM

## 2020-06-01 DIAGNOSIS — I10 BENIGN ESSENTIAL HTN: ICD-10-CM

## 2020-06-01 PROCEDURE — 99214 OFFICE O/P EST MOD 30 MIN: CPT | Performed by: INTERNAL MEDICINE

## 2020-06-01 RX ORDER — AZITHROMYCIN 250 MG/1
TABLET, FILM COATED ORAL
Qty: 6 TABLET | Refills: 0 | Status: SHIPPED | OUTPATIENT
Start: 2020-06-01 | End: 2020-06-06

## 2020-06-01 NOTE — PROGRESS NOTES
Due to COVID-19 ACTION PLAN, the patient's office visit was converted to a video visit. Patient understands and accepts financial responsibility for any deductible, co-insurance and/or co-pays associated with this service.   Time Spent:22 min    Subjective MICROALB/CREAT RATIO, RANDOM URINE; Future    Acute pain of right ear- possible otitis media, treat with zpak. Patient advised to call or message if not better         Return in about 2 months (around 8/1/2020) for wellness.     MD Jairo Celeste

## 2020-06-19 ENCOUNTER — VIRTUAL PHONE E/M (OUTPATIENT)
Dept: SURGERY | Facility: CLINIC | Age: 85
End: 2020-06-19
Payer: MEDICARE

## 2020-06-19 DIAGNOSIS — D05.11 DUCTAL CARCINOMA IN SITU (DCIS) OF RIGHT BREAST: Primary | ICD-10-CM

## 2020-06-22 NOTE — PROGRESS NOTES
Patient was scheduled for virtual telephone encounter secondary to active coronavirus pandemic. She did not answer the phone for this telephone encounter. Was encouraged to contact the office back at her convenience.

## 2020-06-24 ENCOUNTER — PATIENT OUTREACH (OUTPATIENT)
Dept: CASE MANAGEMENT | Age: 85
End: 2020-06-24

## 2020-06-24 DIAGNOSIS — E11.42 DIABETIC POLYNEUROPATHY ASSOCIATED WITH TYPE 2 DIABETES MELLITUS (HCC): ICD-10-CM

## 2020-06-24 DIAGNOSIS — R63.4 WEIGHT LOSS: ICD-10-CM

## 2020-06-24 DIAGNOSIS — Z91.81 AT RISK FOR FALLING: ICD-10-CM

## 2020-06-24 DIAGNOSIS — J44.9 CHRONIC OBSTRUCTIVE PULMONARY DISEASE, UNSPECIFIED COPD TYPE (HCC): ICD-10-CM

## 2020-06-24 DIAGNOSIS — R73.9 HYPERGLYCEMIA: ICD-10-CM

## 2020-06-24 DIAGNOSIS — D64.9 ANEMIA, UNSPECIFIED TYPE: ICD-10-CM

## 2020-06-24 DIAGNOSIS — E66.9 OBESITY (BMI 30-39.9): ICD-10-CM

## 2020-06-24 DIAGNOSIS — E87.1 HYPONATREMIA: ICD-10-CM

## 2020-06-24 DIAGNOSIS — M19.041 ARTHRITIS OF BOTH HANDS: ICD-10-CM

## 2020-06-24 DIAGNOSIS — G56.03 BILATERAL CARPAL TUNNEL SYNDROME: ICD-10-CM

## 2020-06-24 DIAGNOSIS — N60.91 ATYPICAL LOBULAR HYPERPLASIA OF RIGHT BREAST: ICD-10-CM

## 2020-06-24 DIAGNOSIS — R53.83 OTHER FATIGUE: ICD-10-CM

## 2020-06-24 DIAGNOSIS — R53.1 WEAKNESS GENERALIZED: ICD-10-CM

## 2020-06-24 DIAGNOSIS — J06.9 URI, ACUTE: ICD-10-CM

## 2020-06-24 DIAGNOSIS — M19.042 ARTHRITIS OF BOTH HANDS: ICD-10-CM

## 2020-06-24 DIAGNOSIS — E78.00 PURE HYPERCHOLESTEROLEMIA: ICD-10-CM

## 2020-06-24 DIAGNOSIS — N30.00 ACUTE CYSTITIS WITHOUT HEMATURIA: ICD-10-CM

## 2020-06-24 DIAGNOSIS — I10 BENIGN ESSENTIAL HTN: ICD-10-CM

## 2020-06-24 DIAGNOSIS — K22.70 BARRETT'S ESOPHAGUS WITHOUT DYSPLASIA: ICD-10-CM

## 2020-06-24 DIAGNOSIS — E78.2 MIXED DYSLIPIDEMIA: ICD-10-CM

## 2020-06-24 NOTE — PROGRESS NOTES
6/24/2020  Spoke to Clara for [address]. Updates to patient care team/ comments: None  Patient reported changes in medications: None  Med Adherence  Comment: Reviewed medications with patient taking as prescribed.     Health Maintenance:  Diabetes Care Dil zpack and ear ache has resolved. Patient states her blood sugars have been fine and she continues to check them every now and then. Patient states she is going on walks almost every day depending on the weather.  Praised patient and encouraged her to contin these assessments and data.

## 2020-06-30 PROCEDURE — 99490 CHRNC CARE MGMT STAFF 1ST 20: CPT

## 2020-06-30 PROCEDURE — G2058 CCM ADD 20MIN: HCPCS

## 2020-07-08 ENCOUNTER — LAB ENCOUNTER (OUTPATIENT)
Dept: LAB | Facility: HOSPITAL | Age: 85
End: 2020-07-08
Attending: INTERNAL MEDICINE
Payer: MEDICARE

## 2020-07-08 ENCOUNTER — HOSPITAL ENCOUNTER (OUTPATIENT)
Dept: ULTRASOUND IMAGING | Facility: HOSPITAL | Age: 85
Discharge: HOME | End: 2020-07-08
Attending: INTERNAL MEDICINE
Payer: MEDICARE

## 2020-07-08 DIAGNOSIS — E11.42 DIABETIC POLYNEUROPATHY ASSOCIATED WITH TYPE 2 DIABETES MELLITUS (HCC): ICD-10-CM

## 2020-07-08 DIAGNOSIS — E04.1 THYROID NODULE: ICD-10-CM

## 2020-07-08 DIAGNOSIS — R53.83 MALAISE AND FATIGUE: ICD-10-CM

## 2020-07-08 DIAGNOSIS — H92.01 ACUTE PAIN OF RIGHT EAR: ICD-10-CM

## 2020-07-08 DIAGNOSIS — R53.81 MALAISE AND FATIGUE: ICD-10-CM

## 2020-07-08 DIAGNOSIS — I10 BENIGN ESSENTIAL HTN: ICD-10-CM

## 2020-07-08 DIAGNOSIS — R22.1 LUMP IN THROAT: ICD-10-CM

## 2020-07-08 LAB
ALBUMIN SERPL-MCNC: 3.6 G/DL (ref 3.4–5)
ALBUMIN/GLOB SERPL: 1 {RATIO} (ref 1–2)
ALP LIVER SERPL-CCNC: 58 U/L (ref 55–142)
ALT SERPL-CCNC: 14 U/L (ref 13–56)
ANION GAP SERPL CALC-SCNC: 4 MMOL/L (ref 0–18)
AST SERPL-CCNC: 16 U/L (ref 15–37)
BASOPHILS # BLD AUTO: 0.08 X10(3) UL (ref 0–0.2)
BASOPHILS NFR BLD AUTO: 1.3 %
BILIRUB SERPL-MCNC: 0.6 MG/DL (ref 0.1–2)
BUN BLD-MCNC: 11 MG/DL (ref 7–18)
BUN/CREAT SERPL: 12.5 (ref 10–20)
CALCIUM BLD-MCNC: 9.5 MG/DL (ref 8.5–10.1)
CHLORIDE SERPL-SCNC: 109 MMOL/L (ref 98–112)
CHOLEST SMN-MCNC: 181 MG/DL (ref ?–200)
CO2 SERPL-SCNC: 28 MMOL/L (ref 21–32)
CREAT BLD-MCNC: 0.88 MG/DL (ref 0.55–1.02)
CREAT UR-SCNC: 129 MG/DL
DEPRECATED RDW RBC AUTO: 44.3 FL (ref 35.1–46.3)
EOSINOPHIL # BLD AUTO: 0.21 X10(3) UL (ref 0–0.7)
EOSINOPHIL NFR BLD AUTO: 3.4 %
ERYTHROCYTE [DISTWIDTH] IN BLOOD BY AUTOMATED COUNT: 13.8 % (ref 11–15)
EST. AVERAGE GLUCOSE BLD GHB EST-MCNC: 163 MG/DL (ref 68–126)
GLOBULIN PLAS-MCNC: 3.7 G/DL (ref 2.8–4.4)
GLUCOSE BLD-MCNC: 118 MG/DL (ref 70–99)
HBA1C MFR BLD HPLC: 7.3 % (ref ?–5.7)
HCT VFR BLD AUTO: 37.7 % (ref 35–48)
HDLC SERPL-MCNC: 39 MG/DL (ref 40–59)
HGB BLD-MCNC: 12.4 G/DL (ref 12–16)
IMM GRANULOCYTES # BLD AUTO: 0.02 X10(3) UL (ref 0–1)
IMM GRANULOCYTES NFR BLD: 0.3 %
LDLC SERPL CALC-MCNC: 113 MG/DL (ref ?–100)
LYMPHOCYTES # BLD AUTO: 1.63 X10(3) UL (ref 1–4)
LYMPHOCYTES NFR BLD AUTO: 26.4 %
M PROTEIN MFR SERPL ELPH: 7.3 G/DL (ref 6.4–8.2)
MCH RBC QN AUTO: 28.9 PG (ref 26–34)
MCHC RBC AUTO-ENTMCNC: 32.9 G/DL (ref 31–37)
MCV RBC AUTO: 87.9 FL (ref 80–100)
MICROALBUMIN UR-MCNC: 1.21 MG/DL
MICROALBUMIN/CREAT 24H UR-RTO: 9.4 UG/MG (ref ?–30)
MONOCYTES # BLD AUTO: 0.49 X10(3) UL (ref 0.1–1)
MONOCYTES NFR BLD AUTO: 7.9 %
NEUTROPHILS # BLD AUTO: 3.74 X10 (3) UL (ref 1.5–7.7)
NEUTROPHILS # BLD AUTO: 3.74 X10(3) UL (ref 1.5–7.7)
NEUTROPHILS NFR BLD AUTO: 60.7 %
NONHDLC SERPL-MCNC: 142 MG/DL (ref ?–130)
OSMOLALITY SERPL CALC.SUM OF ELEC: 292 MOSM/KG (ref 275–295)
PATIENT FASTING Y/N/NP: YES
PATIENT FASTING Y/N/NP: YES
PLATELET # BLD AUTO: 220 10(3)UL (ref 150–450)
POTASSIUM SERPL-SCNC: 3.5 MMOL/L (ref 3.5–5.1)
RBC # BLD AUTO: 4.29 X10(6)UL (ref 3.8–5.3)
SODIUM SERPL-SCNC: 141 MMOL/L (ref 136–145)
T4 FREE SERPL-MCNC: 1.1 NG/DL (ref 0.8–1.7)
TRIGL SERPL-MCNC: 144 MG/DL (ref 30–149)
TSI SER-ACNC: 1.89 MIU/ML (ref 0.36–3.74)
VLDLC SERPL CALC-MCNC: 29 MG/DL (ref 0–30)
WBC # BLD AUTO: 6.2 X10(3) UL (ref 4–11)

## 2020-07-08 PROCEDURE — 82043 UR ALBUMIN QUANTITATIVE: CPT

## 2020-07-08 PROCEDURE — 82570 ASSAY OF URINE CREATININE: CPT

## 2020-07-08 PROCEDURE — 36415 COLL VENOUS BLD VENIPUNCTURE: CPT

## 2020-07-08 PROCEDURE — 80061 LIPID PANEL: CPT

## 2020-07-08 PROCEDURE — 84443 ASSAY THYROID STIM HORMONE: CPT

## 2020-07-08 PROCEDURE — 84439 ASSAY OF FREE THYROXINE: CPT

## 2020-07-08 PROCEDURE — 83036 HEMOGLOBIN GLYCOSYLATED A1C: CPT

## 2020-07-08 PROCEDURE — 76536 US EXAM OF HEAD AND NECK: CPT | Performed by: INTERNAL MEDICINE

## 2020-07-08 PROCEDURE — 80053 COMPREHEN METABOLIC PANEL: CPT

## 2020-07-08 PROCEDURE — 85025 COMPLETE CBC W/AUTO DIFF WBC: CPT

## 2020-07-11 DIAGNOSIS — I10 ESSENTIAL HYPERTENSION: ICD-10-CM

## 2020-07-11 DIAGNOSIS — G25.81 RESTLESS LEG SYNDROME: ICD-10-CM

## 2020-07-13 RX ORDER — PRAMIPEXOLE DIHYDROCHLORIDE 0.5 MG/1
TABLET ORAL
Qty: 90 TABLET | Refills: 0 | Status: SHIPPED | OUTPATIENT
Start: 2020-07-13 | End: 2020-10-01

## 2020-07-13 RX ORDER — LOSARTAN POTASSIUM 100 MG/1
TABLET ORAL
Qty: 90 TABLET | Refills: 0 | Status: SHIPPED | OUTPATIENT
Start: 2020-07-13 | End: 2021-01-25

## 2020-07-13 NOTE — TELEPHONE ENCOUNTER
Last VISIT 2/4/20 TB DM, 4/13/20 Virtual E/M Nausea, DM    Last REFILL 4/27/20 Losartan 90T 0R, 9/30/19 Pramipexole 90T 2R    Last LABS  7/8/20 Microalb/Creat, TSH. CMP.  CBC, Lipid, HgA1c    Future Appointments   Date Time Provider Karel Dockery   8/13

## 2020-07-16 ENCOUNTER — TELEPHONE (OUTPATIENT)
Dept: INTERNAL MEDICINE CLINIC | Facility: CLINIC | Age: 85
End: 2020-07-16

## 2020-07-16 ENCOUNTER — PATIENT OUTREACH (OUTPATIENT)
Dept: CASE MANAGEMENT | Age: 85
End: 2020-07-16

## 2020-07-16 DIAGNOSIS — D64.9 ANEMIA, UNSPECIFIED TYPE: ICD-10-CM

## 2020-07-16 DIAGNOSIS — J44.9 CHRONIC OBSTRUCTIVE PULMONARY DISEASE, UNSPECIFIED COPD TYPE (HCC): ICD-10-CM

## 2020-07-16 DIAGNOSIS — E87.1 HYPONATREMIA: ICD-10-CM

## 2020-07-16 DIAGNOSIS — M19.041 ARTHRITIS OF BOTH HANDS: ICD-10-CM

## 2020-07-16 DIAGNOSIS — H81.399 PERIPHERAL VERTIGO, UNSPECIFIED LATERALITY: ICD-10-CM

## 2020-07-16 DIAGNOSIS — K21.9 GASTROESOPHAGEAL REFLUX DISEASE, ESOPHAGITIS PRESENCE NOT SPECIFIED: ICD-10-CM

## 2020-07-16 DIAGNOSIS — G56.03 BILATERAL CARPAL TUNNEL SYNDROME: ICD-10-CM

## 2020-07-16 DIAGNOSIS — E78.2 MIXED DYSLIPIDEMIA: ICD-10-CM

## 2020-07-16 DIAGNOSIS — M19.042 ARTHRITIS OF BOTH HANDS: ICD-10-CM

## 2020-07-16 DIAGNOSIS — N60.91 ATYPICAL LOBULAR HYPERPLASIA OF RIGHT BREAST: ICD-10-CM

## 2020-07-16 DIAGNOSIS — R42 VERTIGO: ICD-10-CM

## 2020-07-16 DIAGNOSIS — H35.3230 BILATERAL EXUDATIVE AGE-RELATED MACULAR DEGENERATION, UNSPECIFIED STAGE (HCC): ICD-10-CM

## 2020-07-16 DIAGNOSIS — R53.83 OTHER FATIGUE: ICD-10-CM

## 2020-07-16 DIAGNOSIS — R73.9 HYPERGLYCEMIA: ICD-10-CM

## 2020-07-16 DIAGNOSIS — G25.81 RLS (RESTLESS LEGS SYNDROME): ICD-10-CM

## 2020-07-16 DIAGNOSIS — Z91.81 AT RISK FOR FALLING: ICD-10-CM

## 2020-07-16 DIAGNOSIS — E78.00 PURE HYPERCHOLESTEROLEMIA: ICD-10-CM

## 2020-07-16 DIAGNOSIS — D05.11 DUCTAL CARCINOMA IN SITU (DCIS) OF RIGHT BREAST: ICD-10-CM

## 2020-07-16 DIAGNOSIS — K22.70 BARRETT'S ESOPHAGUS WITHOUT DYSPLASIA: ICD-10-CM

## 2020-07-16 DIAGNOSIS — R53.1 WEAKNESS GENERALIZED: ICD-10-CM

## 2020-07-16 DIAGNOSIS — I10 BENIGN ESSENTIAL HTN: ICD-10-CM

## 2020-07-16 DIAGNOSIS — E66.9 OBESITY (BMI 30-39.9): ICD-10-CM

## 2020-07-16 DIAGNOSIS — E11.42 DIABETIC POLYNEUROPATHY ASSOCIATED WITH TYPE 2 DIABETES MELLITUS (HCC): ICD-10-CM

## 2020-07-16 NOTE — TELEPHONE ENCOUNTER
Not appropriate for 20 min visit with heart racing and swelling of ankles.   Change to 40 min appt earlier in the day tomorrow (use the hold spots)

## 2020-07-16 NOTE — PROGRESS NOTES
S/w pt states she feels her heart racing while laying down when taking her afternoon nap for the past week. Pt states she is also having swelling on her ankles for the past 2 weeks. Pt denies chest pain, SOB or dizziness.  Talked to Delfino [facility] and scheduled p

## 2020-07-16 NOTE — TELEPHONE ENCOUNTER
S/w pt states she feels her heart racing while laying down when taking her afternoon nap for the past week. Pt states she is also having swelling on her ankles for the past 2 weeks. Pt denies chest pain, SOB or dizziness.  Talked to [facility] and scheduled p

## 2020-07-16 NOTE — PROGRESS NOTES
Called patient for monthly CCM outreach. No answer left voicemail to call back.     Time Spent This Encounter Total: 4 minutes medical record review, telephone communication, care plan updates where needed, education, goals and action plan recreation/update

## 2020-07-17 ENCOUNTER — OFFICE VISIT (OUTPATIENT)
Dept: INTERNAL MEDICINE CLINIC | Facility: CLINIC | Age: 85
End: 2020-07-17
Payer: MEDICARE

## 2020-07-17 VITALS
SYSTOLIC BLOOD PRESSURE: 120 MMHG | HEIGHT: 61.5 IN | DIASTOLIC BLOOD PRESSURE: 60 MMHG | WEIGHT: 165.81 LBS | BODY MASS INDEX: 30.9 KG/M2 | HEART RATE: 72 BPM | TEMPERATURE: 98 F | RESPIRATION RATE: 18 BRPM

## 2020-07-17 DIAGNOSIS — M25.472 ANKLE EDEMA, BILATERAL: ICD-10-CM

## 2020-07-17 DIAGNOSIS — R00.2 HEART PALPITATIONS: Primary | ICD-10-CM

## 2020-07-17 DIAGNOSIS — M25.471 ANKLE EDEMA, BILATERAL: ICD-10-CM

## 2020-07-17 DIAGNOSIS — E66.9 DIABETES MELLITUS TYPE 2 IN OBESE (HCC): ICD-10-CM

## 2020-07-17 DIAGNOSIS — E78.5 DYSLIPIDEMIA, GOAL LDL BELOW 100: ICD-10-CM

## 2020-07-17 DIAGNOSIS — E11.69 DIABETES MELLITUS TYPE 2 IN OBESE (HCC): ICD-10-CM

## 2020-07-17 PROCEDURE — 93000 ELECTROCARDIOGRAM COMPLETE: CPT | Performed by: INTERNAL MEDICINE

## 2020-07-17 PROCEDURE — 99214 OFFICE O/P EST MOD 30 MIN: CPT | Performed by: INTERNAL MEDICINE

## 2020-07-17 RX ORDER — PRAVASTATIN SODIUM 20 MG
20 TABLET ORAL NIGHTLY
Qty: 90 TABLET | Refills: 1 | Status: SHIPPED | OUTPATIENT
Start: 2020-07-17 | End: 2021-04-12

## 2020-07-17 NOTE — PROGRESS NOTES
7/17/2020  Spoke to Clara for [address]. Updates to patient care team/ comments: None  Patient reported changes in medications: None  Med Adherence  Comment: Reviewed medications with patient taking as prescribed.     Health Maintenance:   Diabetes Care [place] Mg progress toward goal: Patient reports no ED visits. Patient states she is going on walks almost every day. Patient relates she is working on staying hydrated and is drinking four glasses of water daily.       · Update to previous barriers: Patient relates s

## 2020-07-17 NOTE — TELEPHONE ENCOUNTER
Patient rescheduled with TB for today 7/17 at 1:40am 40 minutes per TB. Pt verbalizes understanding and agreeable to plan. FYI to TB.

## 2020-07-17 NOTE — PROGRESS NOTES
Salomon Chilel is a 80year old female. Patient presents with:   Other: cn room 3: patient has been having heart racing when laying down   Edema  Lab Results: review labs from earlier in the month, diabetic pt      HPI:     Patient with DM2, HTN, RLS, CO Dispense Refill   • Pravastatin Sodium 20 MG Oral Tab Take 1 tablet (20 mg total) by mouth nightly.  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 Imaging & Consults:  ELECTROCARDIOGRAM, COMPLETE  CARD ECHO 2D DOPPLER (CPT=93306)    Return in about 3 months (around 10/17/2020) for wellness, remember to do.   There

## 2020-07-20 ENCOUNTER — PATIENT OUTREACH (OUTPATIENT)
Dept: CASE MANAGEMENT | Age: 85
End: 2020-07-20

## 2020-07-20 DIAGNOSIS — D64.9 ANEMIA, UNSPECIFIED TYPE: ICD-10-CM

## 2020-07-20 DIAGNOSIS — N30.00 ACUTE CYSTITIS WITHOUT HEMATURIA: ICD-10-CM

## 2020-07-20 DIAGNOSIS — E11.42 DIABETIC POLYNEUROPATHY ASSOCIATED WITH TYPE 2 DIABETES MELLITUS (HCC): ICD-10-CM

## 2020-07-20 DIAGNOSIS — M19.041 ARTHRITIS OF BOTH HANDS: ICD-10-CM

## 2020-07-20 DIAGNOSIS — K21.9 GASTROESOPHAGEAL REFLUX DISEASE, ESOPHAGITIS PRESENCE NOT SPECIFIED: ICD-10-CM

## 2020-07-20 DIAGNOSIS — K52.9 CHRONIC DIARRHEA: ICD-10-CM

## 2020-07-20 DIAGNOSIS — K22.70 BARRETT'S ESOPHAGUS WITHOUT DYSPLASIA: ICD-10-CM

## 2020-07-20 DIAGNOSIS — M19.042 ARTHRITIS OF BOTH HANDS: ICD-10-CM

## 2020-07-20 DIAGNOSIS — R73.9 HYPERGLYCEMIA: ICD-10-CM

## 2020-07-20 DIAGNOSIS — G56.03 BILATERAL CARPAL TUNNEL SYNDROME: ICD-10-CM

## 2020-07-20 DIAGNOSIS — E66.9 OBESITY (BMI 30-39.9): ICD-10-CM

## 2020-07-20 DIAGNOSIS — R42 VERTIGO: ICD-10-CM

## 2020-07-20 DIAGNOSIS — E78.2 MIXED DYSLIPIDEMIA: ICD-10-CM

## 2020-07-20 DIAGNOSIS — J44.9 CHRONIC OBSTRUCTIVE PULMONARY DISEASE, UNSPECIFIED COPD TYPE (HCC): ICD-10-CM

## 2020-07-20 DIAGNOSIS — E87.1 HYPONATREMIA: ICD-10-CM

## 2020-07-20 DIAGNOSIS — R53.1 WEAKNESS GENERALIZED: ICD-10-CM

## 2020-07-20 DIAGNOSIS — I10 BENIGN ESSENTIAL HTN: ICD-10-CM

## 2020-07-20 DIAGNOSIS — H81.399 PERIPHERAL VERTIGO, UNSPECIFIED LATERALITY: ICD-10-CM

## 2020-07-20 DIAGNOSIS — E78.00 PURE HYPERCHOLESTEROLEMIA: ICD-10-CM

## 2020-07-20 DIAGNOSIS — N60.91 ATYPICAL LOBULAR HYPERPLASIA OF RIGHT BREAST: ICD-10-CM

## 2020-07-20 DIAGNOSIS — D05.11 DUCTAL CARCINOMA IN SITU (DCIS) OF RIGHT BREAST: ICD-10-CM

## 2020-07-20 DIAGNOSIS — G25.81 RLS (RESTLESS LEGS SYNDROME): ICD-10-CM

## 2020-07-20 DIAGNOSIS — Z91.81 AT RISK FOR FALLING: ICD-10-CM

## 2020-07-20 DIAGNOSIS — R53.83 OTHER FATIGUE: ICD-10-CM

## 2020-07-20 NOTE — PROGRESS NOTES
Returned patients call. Patient states she is unsure why she called me on Saturday 7/18. Patient relates she needs assistance with scheduling an echo doppler that was ordered by PCP.  Called central scheduling spoke with Mati Villanueva and scheduled patient for 7/22

## 2020-07-22 ENCOUNTER — HOSPITAL ENCOUNTER (OUTPATIENT)
Dept: CV DIAGNOSTICS | Facility: HOSPITAL | Age: 85
Discharge: HOME | End: 2020-07-22
Attending: INTERNAL MEDICINE
Payer: MEDICARE

## 2020-07-22 DIAGNOSIS — M25.472 ANKLE EDEMA, BILATERAL: ICD-10-CM

## 2020-07-22 DIAGNOSIS — R00.2 HEART PALPITATIONS: ICD-10-CM

## 2020-07-22 DIAGNOSIS — M25.471 ANKLE EDEMA, BILATERAL: ICD-10-CM

## 2020-07-22 PROCEDURE — 93306 TTE W/DOPPLER COMPLETE: CPT | Performed by: INTERNAL MEDICINE

## 2020-07-31 PROCEDURE — 99490 CHRNC CARE MGMT STAFF 1ST 20: CPT

## 2020-07-31 PROCEDURE — G2058 CCM ADD 20MIN: HCPCS

## 2020-08-04 ENCOUNTER — TELEPHONE (OUTPATIENT)
Dept: INTERNAL MEDICINE CLINIC | Facility: CLINIC | Age: 85
End: 2020-08-04

## 2020-08-04 NOTE — TELEPHONE ENCOUNTER
LOV 7/17/20 with TB. Patient has had a slight forehead h/a for about one week, takes Tylenol sometimes, has a hx of sinus problems and allergies, uses Azelastine drops. Pt states she would like to be seen for sinus issues. Pt states her BP is high.   Af

## 2020-08-04 NOTE — TELEPHONE ENCOUNTER
Pt called and stated that she has had a light HA for the last week. She decided to take her BP this morning. She has 2 machines and she has taken it 5 times today.  Between the 2 machines her BP has ran 150-158/81-92

## 2020-08-04 NOTE — TELEPHONE ENCOUNTER
Patient scheduled for Virtual TE 8/10/20 at 3pm with TB. Pt notified to keep a diary of BP's, h/a's and make sure she takes her medication daily. Pt verbalizes understanding.

## 2020-08-04 NOTE — TELEPHONE ENCOUNTER
HA may be from elevated BP from forgetting to take her medication. Monitor headaches, agree with pill box to help with remembering medications. I am seeing her in a few months.  If having acute sinus issues, can do telephone visit earlier (ok to use SDA if

## 2020-08-10 ENCOUNTER — VIRTUAL PHONE E/M (OUTPATIENT)
Dept: INTERNAL MEDICINE CLINIC | Facility: CLINIC | Age: 85
End: 2020-08-10

## 2020-08-10 DIAGNOSIS — M54.9 BACK PAIN WITH RADIATION: ICD-10-CM

## 2020-08-10 DIAGNOSIS — Z91.09 ENVIRONMENTAL ALLERGIES: ICD-10-CM

## 2020-08-10 DIAGNOSIS — I10 BENIGN ESSENTIAL HTN: Primary | ICD-10-CM

## 2020-08-10 PROCEDURE — 99442 PHONE E/M BY PHYS 11-20 MIN: CPT | Performed by: INTERNAL MEDICINE

## 2020-08-17 ENCOUNTER — HOSPITAL ENCOUNTER (OUTPATIENT)
Dept: GENERAL RADIOLOGY | Facility: HOSPITAL | Age: 85
Discharge: HOME | End: 2020-08-17
Attending: INTERNAL MEDICINE
Payer: MEDICARE

## 2020-08-17 DIAGNOSIS — M54.9 BACK PAIN WITH RADIATION: ICD-10-CM

## 2020-08-17 PROCEDURE — 72114 X-RAY EXAM L-S SPINE BENDING: CPT | Performed by: INTERNAL MEDICINE

## 2020-08-19 ENCOUNTER — PATIENT OUTREACH (OUTPATIENT)
Dept: CASE MANAGEMENT | Age: 85
End: 2020-08-19

## 2020-08-19 DIAGNOSIS — J44.9 CHRONIC OBSTRUCTIVE PULMONARY DISEASE, UNSPECIFIED COPD TYPE (HCC): ICD-10-CM

## 2020-08-19 DIAGNOSIS — E78.2 MIXED DYSLIPIDEMIA: ICD-10-CM

## 2020-08-19 DIAGNOSIS — R53.83 OTHER FATIGUE: ICD-10-CM

## 2020-08-19 DIAGNOSIS — N30.00 ACUTE CYSTITIS WITHOUT HEMATURIA: ICD-10-CM

## 2020-08-19 DIAGNOSIS — R42 VERTIGO: ICD-10-CM

## 2020-08-19 DIAGNOSIS — R73.9 HYPERGLYCEMIA: ICD-10-CM

## 2020-08-19 DIAGNOSIS — M19.042 ARTHRITIS OF BOTH HANDS: ICD-10-CM

## 2020-08-19 DIAGNOSIS — G56.03 BILATERAL CARPAL TUNNEL SYNDROME: ICD-10-CM

## 2020-08-19 DIAGNOSIS — M19.041 ARTHRITIS OF BOTH HANDS: ICD-10-CM

## 2020-08-19 DIAGNOSIS — E11.42 DIABETIC POLYNEUROPATHY ASSOCIATED WITH TYPE 2 DIABETES MELLITUS (HCC): ICD-10-CM

## 2020-08-19 DIAGNOSIS — E78.00 PURE HYPERCHOLESTEROLEMIA: ICD-10-CM

## 2020-08-19 DIAGNOSIS — H81.399 PERIPHERAL VERTIGO, UNSPECIFIED LATERALITY: ICD-10-CM

## 2020-08-19 DIAGNOSIS — R53.1 WEAKNESS GENERALIZED: ICD-10-CM

## 2020-08-19 DIAGNOSIS — I10 BENIGN ESSENTIAL HTN: ICD-10-CM

## 2020-08-19 DIAGNOSIS — Z91.81 AT RISK FOR FALLING: ICD-10-CM

## 2020-08-19 DIAGNOSIS — K52.9 CHRONIC DIARRHEA: ICD-10-CM

## 2020-08-19 DIAGNOSIS — K21.9 GASTROESOPHAGEAL REFLUX DISEASE, ESOPHAGITIS PRESENCE NOT SPECIFIED: ICD-10-CM

## 2020-08-19 DIAGNOSIS — E66.9 OBESITY (BMI 30-39.9): ICD-10-CM

## 2020-08-19 DIAGNOSIS — D05.10 DUCTAL CARCINOMA IN SITU (DCIS) OF BREAST, UNSPECIFIED LATERALITY: ICD-10-CM

## 2020-08-19 DIAGNOSIS — D64.9 ANEMIA, UNSPECIFIED TYPE: ICD-10-CM

## 2020-08-19 DIAGNOSIS — D05.11 DUCTAL CARCINOMA IN SITU (DCIS) OF RIGHT BREAST: ICD-10-CM

## 2020-08-19 DIAGNOSIS — G25.81 RLS (RESTLESS LEGS SYNDROME): ICD-10-CM

## 2020-08-19 DIAGNOSIS — K22.70 BARRETT'S ESOPHAGUS WITHOUT DYSPLASIA: ICD-10-CM

## 2020-08-19 NOTE — PROGRESS NOTES
8/19/2020  Spoke to Clara for [address]. Updates to patient care team/ comments: None  Patient reported changes in medications: None  Med Adherence  Comment: Taking as prescribed per patient.      Health Maintenance:   Diabetes Care Dilated Eye Exam due on her RLS medication. Patient states she had tried this in the past and it helped her sleep but is unsure if it will be ok. I notified patient she can try increasing Melatonin to 4 mg nightly per Dr. Sharonda Casarez recommendations.  See xray result notes from 8/1 agrees to goal action plan.   Self-Management Abilities (patient reported)             1= least confident in achieving goal, 5= very confident               - confidence: 5    Care Manager Follow Up: 1 month  Reason For Follow Up: review progress and or bar

## 2020-08-31 PROCEDURE — 99490 CHRNC CARE MGMT STAFF 1ST 20: CPT

## 2020-09-01 ENCOUNTER — VIRTUAL PHONE E/M (OUTPATIENT)
Dept: SURGERY | Facility: CLINIC | Age: 85
End: 2020-09-01
Payer: MEDICARE

## 2020-09-01 DIAGNOSIS — D05.11 DUCTAL CARCINOMA IN SITU (DCIS) OF RIGHT BREAST: Primary | ICD-10-CM

## 2020-09-01 PROCEDURE — 99441 PHONE E/M BY PHYS 5-10 MIN: CPT | Performed by: SURGERY

## 2020-09-01 NOTE — PROGRESS NOTES
Virtual/Telephone Check-In    Luis Alberto Man verbally consents to a Virtual/Telephone Check-In service on 09/01/20. Patient has been referred to the [facility] website at [email or web address] to review the yearly Consent to Treat document.   Patient Santi Gotti Constipation    • COPD (chronic obstructive pulmonary disease) (HCC)     NO OXYGEN- no pulmonologist   • Deviated septum     left side   • Diabetes ([facility] [address]    • Diabetes mellitus ([facility] [address]    • Diarrhea, unspecified    • Disorder of thyroid     has Thyroid nodul 1957   • NIYAH LOCALIZATION WIRE 1 SITE RIGHT (CPT=19281)      1980   • NIYAH LOCALIZATION WIRE 1 SITE RIGHT (FNG=07286) Right 7/2016    DCIS   • MASTECTOMY PARTIAL Right 7/1/16    wire localized partial mastectomy   • NEEDLE BIOPSY RIGHT      diag DCIS- 0 triamcinolone acetonide 0.1 % External Ointment, Apply 1 Application topically 2 (two) times daily. , Disp: 30 g, Rfl: 0  hydrocortisone (PROCTOSOL HC) 2.5 % Rectal Cream, USE TWO TIMES A DAY AS NEEDED FOR ITCHING, Disp: 28.35 g, Rfl: 0  Glucose Blood (ON ringing in the ears, ear drainage, earaches, nasal congestion, nose bleeds, snoring, pain in mouth/throat, hoarseness, change in voice, facial trauma.     Respiratory:  The patient denies chronic cough, phlegm, hemoptysis, pleurisy/chest pain, pneumonia, as depression or feeling of despair. Endocrine: There is no history of poor/slow wound healing, weight loss/gain, fertility or hormone problems, cold intolerance, thyroid disease.      Allergic/Immunologic:  There is no history of hives, hay fever, angio

## 2020-09-04 DIAGNOSIS — K21.9 GASTROESOPHAGEAL REFLUX DISEASE, ESOPHAGITIS PRESENCE NOT SPECIFIED: ICD-10-CM

## 2020-09-05 NOTE — TELEPHONE ENCOUNTER
Last OV 8.10.20 w/ TB (virtual)   Last PE 6.24.19-PE scheduled for 10.19.20   Last REFILL 4.27.20 Omeprazole 40mg #180 0R  Last LABS 7.8.20 HgA1c, Lipid, CBC, CMP, TSH+FreeT4, Microalb    Future Appointments   Date Time Provider Karel Dockery   10/19/2

## 2020-09-07 RX ORDER — OMEPRAZOLE 40 MG/1
CAPSULE, DELAYED RELEASE ORAL
Qty: 180 CAPSULE | Refills: 0 | Status: SHIPPED | OUTPATIENT
Start: 2020-09-07 | End: 2021-01-12

## 2020-09-23 ENCOUNTER — PATIENT OUTREACH (OUTPATIENT)
Dept: CASE MANAGEMENT | Age: 85
End: 2020-09-23

## 2020-10-01 DIAGNOSIS — G25.81 RESTLESS LEG SYNDROME: ICD-10-CM

## 2020-10-01 RX ORDER — PRAMIPEXOLE DIHYDROCHLORIDE 0.5 MG/1
TABLET ORAL
Qty: 90 TABLET | Refills: 0 | Status: SHIPPED | OUTPATIENT
Start: 2020-10-01 | End: 2021-01-04

## 2020-10-01 NOTE — TELEPHONE ENCOUNTER
Last Ov: 8/10/20, TB, virtual visit (HTN f/u)  Last labs: Microalb/creat, TSH+Free T4, CMP, CBC, Lipid, A1c 7/8/20  Last Rx: pramipexole 0.5mg, #90, 0R 7/13/20    Future Appointments   Date Time Provider Karel Dockery   10/19/2020  1:00 PM Jesi Ordoñez

## 2020-10-09 ENCOUNTER — HOSPITAL ENCOUNTER (OUTPATIENT)
Facility: HOSPITAL | Age: 85
Setting detail: OBSERVATION
Discharge: HOME | End: 2020-10-11
Attending: EMERGENCY MEDICINE | Admitting: INTERNAL MEDICINE
Payer: MEDICARE

## 2020-10-09 DIAGNOSIS — K57.92 ACUTE DIVERTICULITIS: Primary | ICD-10-CM

## 2020-10-10 ENCOUNTER — APPOINTMENT (OUTPATIENT)
Dept: CT IMAGING | Facility: HOSPITAL | Age: 85
End: 2020-10-10
Attending: EMERGENCY MEDICINE
Payer: MEDICARE

## 2020-10-10 PROBLEM — K57.92 ACUTE DIVERTICULITIS: Status: ACTIVE | Noted: 2020-10-10

## 2020-10-10 PROBLEM — N39.0 UTI (URINARY TRACT INFECTION): Status: ACTIVE | Noted: 2020-10-10

## 2020-10-10 PROCEDURE — 74177 CT ABD & PELVIS W/CONTRAST: CPT | Performed by: EMERGENCY MEDICINE

## 2020-10-10 PROCEDURE — 99225 SUBSEQUENT OBSERVATION CARE: CPT | Performed by: INTERNAL MEDICINE

## 2020-10-10 PROCEDURE — 99220 INITIAL OBSERVATION CARE,LEVL III: CPT | Performed by: INTERNAL MEDICINE

## 2020-10-10 RX ORDER — METRONIDAZOLE 500 MG/100ML
500 INJECTION, SOLUTION INTRAVENOUS EVERY 8 HOURS
Status: DISCONTINUED | OUTPATIENT
Start: 2020-10-10 | End: 2020-10-11

## 2020-10-10 RX ORDER — LEVOFLOXACIN 5 MG/ML
750 INJECTION, SOLUTION INTRAVENOUS ONCE
Status: COMPLETED | OUTPATIENT
Start: 2020-10-10 | End: 2020-10-10

## 2020-10-10 RX ORDER — SODIUM CHLORIDE 9 MG/ML
INJECTION, SOLUTION INTRAVENOUS CONTINUOUS
Status: DISCONTINUED | OUTPATIENT
Start: 2020-10-10 | End: 2020-10-10

## 2020-10-10 RX ORDER — POTASSIUM CHLORIDE 20 MEQ/1
40 TABLET, EXTENDED RELEASE ORAL EVERY 4 HOURS
Status: COMPLETED | OUTPATIENT
Start: 2020-10-10 | End: 2020-10-10

## 2020-10-10 RX ORDER — ENOXAPARIN SODIUM 100 MG/ML
40 INJECTION SUBCUTANEOUS DAILY
Status: DISCONTINUED | OUTPATIENT
Start: 2020-10-10 | End: 2020-10-11

## 2020-10-10 RX ORDER — LEVOFLOXACIN 5 MG/ML
750 INJECTION, SOLUTION INTRAVENOUS
Status: DISCONTINUED | OUTPATIENT
Start: 2020-10-12 | End: 2020-10-11

## 2020-10-10 RX ORDER — ONDANSETRON 2 MG/ML
4 INJECTION INTRAMUSCULAR; INTRAVENOUS EVERY 6 HOURS PRN
Status: DISCONTINUED | OUTPATIENT
Start: 2020-10-10 | End: 2020-10-11

## 2020-10-10 RX ORDER — MORPHINE SULFATE 4 MG/ML
4 INJECTION, SOLUTION INTRAMUSCULAR; INTRAVENOUS EVERY 30 MIN PRN
Status: DISCONTINUED | OUTPATIENT
Start: 2020-10-10 | End: 2020-10-10

## 2020-10-10 RX ORDER — MORPHINE SULFATE 4 MG/ML
4 INJECTION, SOLUTION INTRAMUSCULAR; INTRAVENOUS EVERY 30 MIN PRN
Status: DISCONTINUED | OUTPATIENT
Start: 2020-10-10 | End: 2020-10-11

## 2020-10-10 RX ORDER — PRAVASTATIN SODIUM 20 MG
20 TABLET ORAL NIGHTLY
Status: DISCONTINUED | OUTPATIENT
Start: 2020-10-10 | End: 2020-10-11

## 2020-10-10 RX ORDER — METRONIDAZOLE 500 MG/100ML
500 INJECTION, SOLUTION INTRAVENOUS ONCE
Status: COMPLETED | OUTPATIENT
Start: 2020-10-10 | End: 2020-10-10

## 2020-10-10 RX ORDER — ASPIRIN 81 MG/1
81 TABLET ORAL DAILY
Status: DISCONTINUED | OUTPATIENT
Start: 2020-10-11 | End: 2020-10-11

## 2020-10-10 RX ORDER — ONDANSETRON 2 MG/ML
4 INJECTION INTRAMUSCULAR; INTRAVENOUS EVERY 4 HOURS PRN
Status: DISCONTINUED | OUTPATIENT
Start: 2020-10-10 | End: 2020-10-10

## 2020-10-10 RX ORDER — LOSARTAN POTASSIUM 100 MG/1
100 TABLET ORAL DAILY
Status: DISCONTINUED | OUTPATIENT
Start: 2020-10-11 | End: 2020-10-11

## 2020-10-10 RX ORDER — ONDANSETRON 2 MG/ML
4 INJECTION INTRAMUSCULAR; INTRAVENOUS ONCE
Status: COMPLETED | OUTPATIENT
Start: 2020-10-10 | End: 2020-10-10

## 2020-10-10 RX ORDER — PRAMIPEXOLE DIHYDROCHLORIDE 0.25 MG/1
0.5 TABLET ORAL NIGHTLY
Status: DISCONTINUED | OUTPATIENT
Start: 2020-10-10 | End: 2020-10-11

## 2020-10-10 RX ORDER — PANTOPRAZOLE SODIUM 40 MG/1
40 TABLET, DELAYED RELEASE ORAL
Refills: 0 | Status: DISCONTINUED | OUTPATIENT
Start: 2020-10-11 | End: 2020-10-11

## 2020-10-10 RX ORDER — DEXTROSE MONOHYDRATE 25 G/50ML
50 INJECTION, SOLUTION INTRAVENOUS
Status: DISCONTINUED | OUTPATIENT
Start: 2020-10-10 | End: 2020-10-11

## 2020-10-10 RX ORDER — ACETAMINOPHEN 325 MG/1
650 TABLET ORAL EVERY 6 HOURS PRN
Status: DISCONTINUED | OUTPATIENT
Start: 2020-10-10 | End: 2020-10-11

## 2020-10-10 NOTE — ED INITIAL ASSESSMENT (HPI)
Pt reports she is currently taking abx for a bladder infection and yesterday her abdomen started to cause discomfort.  Denies n/v

## 2020-10-10 NOTE — PROGRESS NOTES
NURSING ADMISSION NOTE      Patient admitted via Cart  Oriented to room. Safety precautions initiated. Bed in low position. Call light in reach.   Pt received in bed from ED, aaox4, denies pain, pt stated abdominal pain is off and on, started on ivf,

## 2020-10-10 NOTE — H&P
[address] [place] [address] Aper Patient Status:  Emergency    1930 MRN [id number]   Location [facility] Attending Khushbu Nelson • Heart palpitations    • Hemorrhoids    • High blood pressure    • High cholesterol    • History of cardiac murmur    • Hoarseness, chronic    • Hyperlipidemia     not taking lipitor due to side effects   • Incontinence     uses pads   • Indigestion • Heart Disorder Father    • Hypertension Father    • Lipids Father    • Heart Disorder Paternal Grandfather    • Diabetes Paternal Grandmother    • Breast Cancer Self 80        DCIS      Reviewed    Social history:   reports that she quit smoking about 10/09/2020    AST 8 10/09/2020    ALT 15 10/09/2020    LIP 97 10/09/2020       Imaging:     CT ABDOMEN+PELVIS (CONTRAST ONLY) (CPT=74177)     COMPARISON:  [facility] , CT, CT ABDOMEN+PELVIS(CONTRAST ONLY)(CPT=74177), 3/02/2020, 3:05 PM.     INDICATIONS:  abdom BASES:  There is a moderate hiatal hernia. OTHER:  Negative.                     =====  CONCLUSION:    1. There is acute diverticulitis of the sigmoid colon without evidence of drainable abscess or free intraperitoneal air.   2. There is a moderate hiatal

## 2020-10-10 NOTE — PROGRESS NOTES
Critical access hospital Pharmacy Note:  Renal Adjustment for levofloxacin (Rosalinda Fabian)    Freedom Villa is a 80year old patient who has been prescribed levofloxacin (LEVAQUIN) 500 mg x 1 dose.   CrCl is estimated creatinine clearance is 36.2 mL/min (based on SCr of 0.78 mg/dL)

## 2020-10-10 NOTE — ED PROVIDER NOTES
Patient Seen in: [facility] Emergency Department      History   Patient presents with:  Abdomen/Flank Pain    Stated Complaint: abdominal pain    HPI    Patient presents with abdominal pain.   The patient states that the pain started yesterday in her uses pads   • Indigestion    • Itch of skin    • Leaking of urine    • Leaky heart valve     slight per pt- had echo 12/2015   • Loss of appetite    • Macular degeneration    • Macular degeneration    • Nausea    • Neuropathy    • Osteoarthritis     all HPI.  Constitutional and vital signs reviewed. All other systems reviewed and negative except as noted above.     Physical Exam     ED Triage Vitals [10/09/20 2228]   /76   Pulse 81   Resp 18   Temp 99.2 °F (37.3 °C)   Temp src    SpO2 96 %   O2 view results for these tests on the individual orders.    RAINBOW DRAW BLUE   RAINBOW DRAW LAVENDER   RAINBOW DRAW LIGHT GREEN   RAINBOW DRAW GOLD          CT abdomen/pelvis with contrast: Uncomplicated diverticulitis involving the mid distal sigmoid colon Unknown

## 2020-10-10 NOTE — PROGRESS NOTES
[facility]OSPITAL[facility]  Progress Note     Meredith Man Patient Status:  Observation    1930 MRN [id number]   [facility] 4NW-A Attending Florence Gomez, [address] Day # 0 PCP Deandre Joyce MD     Chief Complaint: Abdominal pain    S: Juanita mg/dL). No results for input(s): PTP, INR in the last 168 hours. No results for input(s): TROP, CK in the last 168 hours. Imaging: Imaging data reviewed in Epic.     Medications:   • enoxaparin  40 mg Subcutaneous Daily   • Insulin Aspart Pen  1-1

## 2020-10-11 VITALS
OXYGEN SATURATION: 95 % | WEIGHT: 161.63 LBS | SYSTOLIC BLOOD PRESSURE: 126 MMHG | TEMPERATURE: 99 F | DIASTOLIC BLOOD PRESSURE: 45 MMHG | HEIGHT: 61 IN | RESPIRATION RATE: 18 BRPM | BODY MASS INDEX: 30.51 KG/M2 | HEART RATE: 66 BPM

## 2020-10-11 PROCEDURE — 99217 OBSERVATION CARE DISCHARGE: CPT | Performed by: INTERNAL MEDICINE

## 2020-10-11 RX ORDER — METRONIDAZOLE 500 MG/1
500 TABLET ORAL 3 TIMES DAILY
Qty: 21 TABLET | Refills: 0 | Status: SHIPPED | OUTPATIENT
Start: 2020-10-11 | End: 2020-10-27 | Stop reason: ALTCHOICE

## 2020-10-11 RX ORDER — DOCUSATE SODIUM 100 MG/1
100 CAPSULE, LIQUID FILLED ORAL 2 TIMES DAILY PRN
Qty: 20 CAPSULE | Refills: 0 | Status: SHIPPED | OUTPATIENT
Start: 2020-10-11

## 2020-10-11 RX ORDER — LEVOFLOXACIN 750 MG/1
750 TABLET ORAL DAILY
Qty: 7 TABLET | Refills: 0 | Status: SHIPPED | OUTPATIENT
Start: 2020-10-11 | End: 2020-10-18

## 2020-10-11 NOTE — PLAN OF CARE
Assumed care @9723, pt alert and oriented x 4,vitals stable, afebrile, c/o pain 5/10, prn tylenol given with good relief. Pt was up walking the hallway, voiding, no bm tonight. IVF discontinued per MD, pt tolerating diet.  Potassium replaced 4.7. , no

## 2020-10-11 NOTE — DISCHARGE SUMMARY
[facility] [place] [facility]OSPITAL[facility]  DISCHARGE SUMMARY     Teo Stewarter Patient Status:  Observation    1930 MRN [id number]   [facility] 4NW-A Attending Sammy Meza, DO   Hosp Day # 0 PCP Ronaldo Grajeda MD     Date of Admission: 10/9/2020  Date of couple weeks. Discussed with patient that she should follow-up with a pulmonologist regarding her COPD. Her COPD was stable in the hospital and she was on room air the entire time.     Lace+ Score: 70  59-90 High Risk  29-58 Medium Risk  0-28   Low Risk Sulfate 324 (65 Fe) MG Tbec      Take by mouth 2 (two) times daily.    Refills: 0     hydrocortisone 2.5 % Crea  Commonly known as: Proctosol HC      USE TWO TIMES A DAY AS NEEDED FOR ITCHING   Quantity: 28.35 g  Refills: 0     Loperamide HCl 2 MG Caps  Com trever Barnhart, [address]   [address] [phone]    In 2 weeks      Appointments for Next 30 Days 10/11/2020 - 11/10/2020      Date Arrival Time Visit Type Length Department Provider     10/19/2020  1:00 PM  [facility]

## 2020-10-11 NOTE — PROGRESS NOTES
NURSING DISCHARGE NOTE    Discharged Home via Wheelchair. Accompanied by Support staff  Belongings Taken by patient/family.   Pt is aaox4, denies pain, ambulates around the room, tolerated food served, dc instructions given to pt, verbalized understand

## 2020-10-12 ENCOUNTER — PATIENT OUTREACH (OUTPATIENT)
Dept: CASE MANAGEMENT | Age: 85
End: 2020-10-12

## 2020-10-12 DIAGNOSIS — Z02.9 ENCOUNTERS FOR UNSPECIFIED ADMINISTRATIVE PURPOSE: ICD-10-CM

## 2020-10-12 PROCEDURE — 1111F DSCHRG MED/CURRENT MED MERGE: CPT

## 2020-10-12 RX ORDER — AZELASTINE 1 MG/ML
1 SPRAY, METERED NASAL NIGHTLY
COMMUNITY
End: 2021-06-09

## 2020-10-12 NOTE — PROGRESS NOTES
The patient is requesting assistance with scheduling for the following GI specialist:     Follow up with Lance Talamantes MD in 2 week(s)  [facility] GI  [address] [facility] [phone]    Please contact the patient on the patient's laurie

## 2020-10-12 NOTE — PROGRESS NOTES
Initial Post Discharge Follow Up   Discharge Date: 10/11/20  Contact Date: 10/12/2020    Consent Verification:  Assessment Completed With: Patient  HIPAA Verified?   Yes    Discharge Dx:     Acute diverticulitis of sigmoid colon  GERD with hiatal hernia inhalers with the PCP during the TCM-HFU appointment. The patient denies any difficulty breathing, confusion, chest pain, productive cough, or wheezing.  The patient has not yet checked BP or glucose levels since discharge, but denies any symptoms of hypo/h TAKE ONE TABLET BY MOUTH NIGHTLY 90 tablet 0   • Omeprazole 40 MG Oral Capsule Delayed Release TAKE ONE CAPSULE BY MOUTH TWICE DAILY 180 capsule 0   • Pravastatin Sodium 20 MG Oral Tab Take 1 tablet (20 mg total) by mouth nightly.  90 tablet 1   • losartan verbalized understanding and agreement. • Did you  your discharge medications when you left the hospital? Yes  • May I go over your medications with you to make sure we are not missing anything? yes  • Are there any reasons that keep you from [place] [address] [facility] 75TH IM/FM 5 [facility], [address] [place] [id number] [phone]          PCP TCM/HFU appointment: scheduled at D/C within 7-14 days  yes;  A TCM-HFU appointment was scheduled w medications and or orders sent to PCP.

## 2020-10-12 NOTE — PROGRESS NOTES
Attempted to reach the patient to complete [facility] FU call. Left a message for the pt to call [facility] back at, [phone].

## 2020-10-13 NOTE — PROGRESS NOTES
1st attempt GI apt request    F/U with Dr.Praveen Munguia in 2 weeks  [address]  [phone]

## 2020-10-14 NOTE — PROGRESS NOTES
2nd attempt GI apt request     F/U with Dr.Praveen Munguia in 2 weeks  [address] [phone]  Office will contact patient to schedule apt.

## 2020-10-16 ENCOUNTER — TELEPHONE (OUTPATIENT)
Dept: INTERNAL MEDICINE CLINIC | Facility: CLINIC | Age: 85
End: 2020-10-16

## 2020-10-16 NOTE — TELEPHONE ENCOUNTER
Patient states she was in the hospital for acute diverticulitis, currently taken Flagyl and Levofloxacin, typically pt doesn't tolerate abx very well, this am had some loose stool 4 x's but none since, sensative stomach this am and vomited x 1, a little nauseated now but no other s/s. Pt rescheduled for 10/19/20 at 1pm with TB. Pt was asked to call back if any s/s. Pt verbalizes understanding.  FYI to CHIP

## 2020-10-16 NOTE — TELEPHONE ENCOUNTER
Pt was scheduled to come in today for a HFU she called to cxl stated she is having diarrhea. She stated she is scheduled for Monday and will keep that appt. I changed the Monday appt from a wellness to the [place]     Please triage due to symptoms to make sure she is able to keep mondays appt.

## 2020-10-19 ENCOUNTER — OFFICE VISIT (OUTPATIENT)
Dept: INTERNAL MEDICINE CLINIC | Facility: CLINIC | Age: 85
End: 2020-10-19
Payer: MEDICARE

## 2020-10-19 VITALS
RESPIRATION RATE: 16 BRPM | BODY MASS INDEX: 31.15 KG/M2 | WEIGHT: 165 LBS | DIASTOLIC BLOOD PRESSURE: 62 MMHG | SYSTOLIC BLOOD PRESSURE: 118 MMHG | TEMPERATURE: 97 F | HEART RATE: 67 BPM | HEIGHT: 61 IN

## 2020-10-19 DIAGNOSIS — K21.9 HIATAL HERNIA WITH GERD: ICD-10-CM

## 2020-10-19 DIAGNOSIS — J44.9 CHRONIC OBSTRUCTIVE PULMONARY DISEASE, UNSPECIFIED COPD TYPE (HCC): ICD-10-CM

## 2020-10-19 DIAGNOSIS — B96.89 UTI DUE TO KLEBSIELLA SPECIES: ICD-10-CM

## 2020-10-19 DIAGNOSIS — E11.42 DIABETIC POLYNEUROPATHY ASSOCIATED WITH TYPE 2 DIABETES MELLITUS (HCC): ICD-10-CM

## 2020-10-19 DIAGNOSIS — K57.92 ACUTE DIVERTICULITIS: Primary | ICD-10-CM

## 2020-10-19 DIAGNOSIS — I72.8 SPLENIC ARTERY ANEURYSM (HCC): ICD-10-CM

## 2020-10-19 DIAGNOSIS — N39.0 UTI DUE TO KLEBSIELLA SPECIES: ICD-10-CM

## 2020-10-19 DIAGNOSIS — K44.9 HIATAL HERNIA WITH GERD: ICD-10-CM

## 2020-10-19 PROCEDURE — 99495 TRANSJ CARE MGMT MOD F2F 14D: CPT | Performed by: INTERNAL MEDICINE

## 2020-10-19 RX ORDER — IPRATROPIUM/ALBUTEROL SULFATE 20-100 MCG
1 MIST INHALER (GRAM) INHALATION EVERY 6 HOURS
Qty: 4 G | Refills: 2 | Status: SHIPPED | OUTPATIENT
Start: 2020-10-19

## 2020-10-19 NOTE — PROGRESS NOTES
HPI:    Naty Raymond is a 80year old female here today for hospital follow up.    She was discharged from Inpatient hospital, [facility] to Home   Admission Date: 10/9/20   Discharge Date: 10/11/20  Hospital Discharge Diagnoses (since 9/19/2020) like to restart combivent, winter weather sometimes flares her symptoms. No acute SOB/cough/f/c.   Her diabetes is well controlled, she says she is utd on DM eye exam- Dr. Viviana Riggs eye    Allergies:  She is allergic to peanut-containing drug Intravitreal Solution Prefilled Syringe, 0.5 mg by Intravitreal route. No current facility-administered medications on file prior to visit.          HISTORY: reconciled and reviewed with patient  She  has a past medical history of Abdominal distention, A Diabetes in her paternal grandmother; Heart Disorder in her father, mother, and paternal grandfather; Hypertension in her father and mother; Lipids in her father. She  reports that she quit smoking about 32 years ago.  She has a 35.00 pack-year smoking hi and all orders for this visit:    Acute diverticulitis- resolved with course of flagyl and levaquin. Diet reviewed with patient.     Hiatal hernia with GERD- controlled on omeprazole    Splenic artery aneurysm ([address]- incidental finding, f/u GI as directed f

## 2020-10-19 NOTE — TELEPHONE ENCOUNTER
Last VISIT 10/19/20    Last REFILL 04/27/20 qty 360 w/0 refills    Last LABS 10/11/20 CBC done    Future Appointments   Date Time Provider Karel Dockery   10/27/2020  3:30 PM DENILSON Padilla [facility] GI         Per PROTOCOL?  Passed    Please

## 2020-10-30 ENCOUNTER — TELEPHONE (OUTPATIENT)
Dept: INTERNAL MEDICINE CLINIC | Facility: CLINIC | Age: 85
End: 2020-10-30

## 2020-10-30 DIAGNOSIS — I72.8 SPLENIC ARTERY ANEURYSM (HCC): Primary | ICD-10-CM

## 2020-11-23 ENCOUNTER — PATIENT OUTREACH (OUTPATIENT)
Dept: CASE MANAGEMENT | Age: 85
End: 2020-11-23

## 2020-11-23 ENCOUNTER — MED REC SCAN ONLY (OUTPATIENT)
Dept: INTERNAL MEDICINE CLINIC | Facility: CLINIC | Age: 85
End: 2020-11-23

## 2020-11-23 DIAGNOSIS — K21.9 GASTROESOPHAGEAL REFLUX DISEASE, UNSPECIFIED WHETHER ESOPHAGITIS PRESENT: ICD-10-CM

## 2020-11-23 DIAGNOSIS — E78.00 PURE HYPERCHOLESTEROLEMIA: ICD-10-CM

## 2020-11-23 DIAGNOSIS — K57.92 ACUTE DIVERTICULITIS: ICD-10-CM

## 2020-11-23 DIAGNOSIS — M19.042 ARTHRITIS OF BOTH HANDS: ICD-10-CM

## 2020-11-23 DIAGNOSIS — K52.9 CHRONIC DIARRHEA: ICD-10-CM

## 2020-11-23 DIAGNOSIS — I10 BENIGN ESSENTIAL HTN: ICD-10-CM

## 2020-11-23 DIAGNOSIS — Z91.81 AT RISK FOR FALLING: ICD-10-CM

## 2020-11-23 DIAGNOSIS — R73.9 HYPERGLYCEMIA: ICD-10-CM

## 2020-11-23 DIAGNOSIS — E11.42 DIABETIC POLYNEUROPATHY ASSOCIATED WITH TYPE 2 DIABETES MELLITUS (HCC): ICD-10-CM

## 2020-11-23 DIAGNOSIS — E86.0 DEHYDRATION: ICD-10-CM

## 2020-11-23 DIAGNOSIS — D64.9 ANEMIA, UNSPECIFIED TYPE: ICD-10-CM

## 2020-11-23 DIAGNOSIS — E66.9 OBESITY (BMI 30-39.9): ICD-10-CM

## 2020-11-23 DIAGNOSIS — M19.041 ARTHRITIS OF BOTH HANDS: ICD-10-CM

## 2020-11-23 DIAGNOSIS — E78.2 MIXED DYSLIPIDEMIA: ICD-10-CM

## 2020-11-23 NOTE — PROGRESS NOTES
11/23/2020  Spoke to Clara for [address].       Updates to patient care team/ comments: UTD  Patient reported changes in medications: UTD  Med Adherence  Comment: Pt reports taking medication as prescribed     Health Maintenance:     Diabetes Care Dilated Eye E barriers:     • Patient Reported New Barriers And Concerns: none                   - Plan for overcoming all barriers: n/a            Patient agrees to goal action plan.   Self-Management Abilities (patient reported)             1= least confident in [place]

## 2020-11-30 PROCEDURE — G2058 CCM ADD 20MIN: HCPCS

## 2020-11-30 PROCEDURE — 99490 CHRNC CARE MGMT STAFF 1ST 20: CPT

## 2020-12-17 ENCOUNTER — PATIENT OUTREACH (OUTPATIENT)
Dept: CASE MANAGEMENT | Age: 85
End: 2020-12-17

## 2020-12-17 NOTE — PROGRESS NOTES
Contacting patient to follow up on CCM for the month.  LMCB       Time with pt - 2 min  Chart review -3  min  Total time - 5 min  Total Monthly time- 5 min

## 2020-12-23 ENCOUNTER — PATIENT OUTREACH (OUTPATIENT)
Dept: CASE MANAGEMENT | Age: 85
End: 2020-12-23

## 2020-12-23 DIAGNOSIS — E78.00 PURE HYPERCHOLESTEROLEMIA: ICD-10-CM

## 2020-12-23 DIAGNOSIS — E11.42 DIABETIC POLYNEUROPATHY ASSOCIATED WITH TYPE 2 DIABETES MELLITUS (HCC): ICD-10-CM

## 2020-12-23 DIAGNOSIS — M19.042 ARTHRITIS OF BOTH HANDS: ICD-10-CM

## 2020-12-23 DIAGNOSIS — E66.9 OBESITY (BMI 30-39.9): ICD-10-CM

## 2020-12-23 DIAGNOSIS — D64.9 ANEMIA, UNSPECIFIED TYPE: ICD-10-CM

## 2020-12-23 DIAGNOSIS — H35.3230 BILATERAL EXUDATIVE AGE-RELATED MACULAR DEGENERATION, UNSPECIFIED STAGE (HCC): ICD-10-CM

## 2020-12-23 DIAGNOSIS — E78.2 MIXED DYSLIPIDEMIA: ICD-10-CM

## 2020-12-23 DIAGNOSIS — R73.9 HYPERGLYCEMIA: ICD-10-CM

## 2020-12-23 DIAGNOSIS — J44.9 CHRONIC OBSTRUCTIVE PULMONARY DISEASE, UNSPECIFIED COPD TYPE (HCC): ICD-10-CM

## 2020-12-23 DIAGNOSIS — M19.041 ARTHRITIS OF BOTH HANDS: ICD-10-CM

## 2020-12-23 NOTE — PROGRESS NOTES
12/23/2020  Spoke to Jonathan Jara for [address].       Updates to patient care team/ comments: UTD  Patient reported changes in medications: Pt stopped taking melatonin  Med Adherence  Comment: Pt taking medications as prescribed     Health Maintenance:     Diabetes C greatly benefit her constipation and high blood pressure      • Update to previous barriers: n/a    • Patient Reported New Barriers And Concerns: none                               Patient agrees to goal action plan.   Self-Management Abilities (patient rep

## 2020-12-31 PROCEDURE — G2058 CCM ADD 20MIN: HCPCS

## 2020-12-31 PROCEDURE — 99490 CHRNC CARE MGMT STAFF 1ST 20: CPT

## 2021-01-02 DIAGNOSIS — G25.81 RESTLESS LEG SYNDROME: ICD-10-CM

## 2021-01-04 ENCOUNTER — TELEPHONE (OUTPATIENT)
Dept: INTERNAL MEDICINE CLINIC | Facility: CLINIC | Age: 86
End: 2021-01-04

## 2021-01-04 RX ORDER — PRAMIPEXOLE DIHYDROCHLORIDE 0.5 MG/1
TABLET ORAL
Qty: 90 TABLET | Refills: 0 | Status: SHIPPED | OUTPATIENT
Start: 2021-01-04 | End: 2021-01-25

## 2021-01-04 NOTE — TELEPHONE ENCOUNTER
Patient is calling and is out of her medication and she won't be able to sleep tonight if it is not refilled  Please advise

## 2021-01-12 DIAGNOSIS — K21.9 GASTROESOPHAGEAL REFLUX DISEASE: ICD-10-CM

## 2021-01-12 RX ORDER — CYCLOBENZAPRINE HCL 5 MG
5 TABLET ORAL NIGHTLY PRN
Qty: 30 TABLET | Refills: 0 | Status: SHIPPED | OUTPATIENT
Start: 2021-01-12 | End: 2021-04-12

## 2021-01-12 RX ORDER — OMEPRAZOLE 40 MG/1
CAPSULE, DELAYED RELEASE ORAL
Qty: 180 CAPSULE | Refills: 0 | Status: SHIPPED | OUTPATIENT
Start: 2021-01-12 | End: 2021-01-25

## 2021-01-12 NOTE — TELEPHONE ENCOUNTER
Last Ov:10/19/20  Upcoming appt:none  Last labs:10/11/20 cbc, glucose  Last Rx:9/20/19 cyclobenzaprine 9/7/20 omeprazole    Per Protocol sent for review

## 2021-01-22 ENCOUNTER — PATIENT OUTREACH (OUTPATIENT)
Dept: CASE MANAGEMENT | Age: 86
End: 2021-01-22

## 2021-01-22 NOTE — PROGRESS NOTES
Contacting patient to follow up on CCM for the month.  LMCB       Time with pt -1  min  Chart review -3  min  Total time -4  min  Total Monthly time- 4 min

## 2021-01-23 DIAGNOSIS — K21.9 GASTROESOPHAGEAL REFLUX DISEASE: ICD-10-CM

## 2021-01-23 DIAGNOSIS — G25.81 RESTLESS LEG SYNDROME: ICD-10-CM

## 2021-01-23 DIAGNOSIS — I10 ESSENTIAL HYPERTENSION: ICD-10-CM

## 2021-01-25 RX ORDER — PRAMIPEXOLE DIHYDROCHLORIDE 0.5 MG/1
TABLET ORAL
Qty: 90 TABLET | Refills: 0 | Status: SHIPPED | OUTPATIENT
Start: 2021-01-25 | End: 2021-06-09

## 2021-01-25 RX ORDER — LOSARTAN POTASSIUM 100 MG/1
TABLET ORAL
Qty: 90 TABLET | Refills: 0 | Status: SHIPPED | OUTPATIENT
Start: 2021-01-25 | End: 2021-05-06

## 2021-01-25 RX ORDER — OMEPRAZOLE 40 MG/1
CAPSULE, DELAYED RELEASE ORAL
Qty: 180 CAPSULE | Refills: 0 | Status: SHIPPED | OUTPATIENT
Start: 2021-01-25 | End: 2021-08-13

## 2021-01-25 NOTE — TELEPHONE ENCOUNTER
Last Ov: 10/19/20, TB, TCM  Last labs: CBC 10/11/20  Last Rx:   Omeprazole 40mg, #180, 0R 1/12/21  Pramipexole 0.5mg, #90, 0R 1/4/21    No future appointments. Per Protocol - losartan passed, refill sent. Others not on protocol, pending.

## 2021-01-26 DIAGNOSIS — Z23 NEED FOR VACCINATION: ICD-10-CM

## 2021-01-28 ENCOUNTER — TELEPHONE (OUTPATIENT)
Dept: INTERNAL MEDICINE CLINIC | Facility: CLINIC | Age: 86
End: 2021-01-28

## 2021-01-28 DIAGNOSIS — E78.5 DYSLIPIDEMIA, GOAL LDL BELOW 100: ICD-10-CM

## 2021-01-28 DIAGNOSIS — I10 ESSENTIAL HYPERTENSION: ICD-10-CM

## 2021-01-28 DIAGNOSIS — E11.69 DIABETES MELLITUS TYPE 2 IN OBESE (HCC): ICD-10-CM

## 2021-01-28 DIAGNOSIS — Z00.00 ROUTINE GENERAL MEDICAL EXAMINATION AT A HEALTH CARE FACILITY: Primary | ICD-10-CM

## 2021-01-28 DIAGNOSIS — E66.9 DIABETES MELLITUS TYPE 2 IN OBESE (HCC): ICD-10-CM

## 2021-01-28 NOTE — TELEPHONE ENCOUNTER
Wellness   Future Appointments   Date Time Provider Karel Nahed   2/25/2021 10:45 AM REFERENCE [id number]   2/25/2021 11:00 AM Che Mercer MD [facility] [facility]       Orders to Nathaly Platts aware must fast no call back required

## 2021-02-01 ENCOUNTER — TELEPHONE (OUTPATIENT)
Dept: INTERNAL MEDICINE CLINIC | Facility: CLINIC | Age: 86
End: 2021-02-01

## 2021-02-01 DIAGNOSIS — B35.1 ONYCHOMYCOSIS: Primary | ICD-10-CM

## 2021-02-01 NOTE — TELEPHONE ENCOUNTER
Patient notified/referred to Dr Yolanda Tenorio for evaluation, info given. Pt verbalizes understanding.

## 2021-02-01 NOTE — TELEPHONE ENCOUNTER
Patient states she noticed a couple days ago all her toenails are white and believes she has a toenail fungus and would like to see Podiatrist.  Pt states she has macular degeneration and cannot really see very well. Please advise.   LOV 10/19/20 with TB.

## 2021-02-01 NOTE — TELEPHONE ENCOUNTER
Pt requesting a referral for podiatrist. Pt stated she would rather not have to come in to the office, as she's just going to be referred anyway. Pt stated she believes she has a fungus on her toe nails, as they are white. Please advise.

## 2021-02-07 ENCOUNTER — IMMUNIZATION (OUTPATIENT)
Dept: LAB | Age: 86
End: 2021-02-07
Attending: HOSPITALIST
Payer: MEDICARE

## 2021-02-07 DIAGNOSIS — Z23 NEED FOR VACCINATION: Primary | ICD-10-CM

## 2021-02-07 PROCEDURE — 0001A SARSCOV2 VAC 30MCG/0.3ML IM: CPT

## 2021-02-12 ENCOUNTER — OFFICE VISIT (OUTPATIENT)
Dept: PODIATRY CLINIC | Facility: CLINIC | Age: 86
End: 2021-02-12
Payer: MEDICARE

## 2021-02-12 DIAGNOSIS — L60.0 ONYCHOCRYPTOSIS: Primary | ICD-10-CM

## 2021-02-12 DIAGNOSIS — M79.674 PAIN IN TOES OF BOTH FEET: ICD-10-CM

## 2021-02-12 DIAGNOSIS — B35.1 ONYCHOMYCOSIS: ICD-10-CM

## 2021-02-12 DIAGNOSIS — M79.675 PAIN IN TOES OF BOTH FEET: ICD-10-CM

## 2021-02-12 PROCEDURE — 11721 DEBRIDE NAIL 6 OR MORE: CPT | Performed by: PODIATRIST

## 2021-02-12 PROCEDURE — 99203 OFFICE O/P NEW LOW 30 MIN: CPT | Performed by: PODIATRIST

## 2021-02-15 NOTE — PROGRESS NOTES
Sindi Valentino is a 80year old female. Patient presents with:  New Patient: 80year old female unable to trim her own nail, which are thick and long.         HPI:   This pleasant 26-year-old female patient presents to the clinic with a chief complaint of l Ointment Apply 1 Application topically 2 (two) times daily.  30 g 0   • hydrocortisone (PROCTOSOL HC) 2.5 % Rectal Cream USE TWO TIMES A DAY AS NEEDED FOR ITCHING 28.35 g 0   • Glucose Blood (ONETOUCH ULTRA BLUE) In Vitro Strip TEST BLOOD SUGAR ONE TIME BLAISE • Osteoarthritis     all joints   • Osteoporosis    • Pain in joints    • Pneumonia, organism unspecified(486)     04/2015   • PONV (postoperative nausea and vomiting)    • Problems with swallowing     due hiatal hernia; gaggs at times   • Restless leg s 1.00        Years: 35.00        Pack years: 28        Quit date: 1988        Years since quittin.4      Smokeless tobacco: Never Used    Substance and Sexual Activity      Alcohol use: No      Drug use: No      Sexual activity: Never    Other Top removed and they would not get ingrown. This alleviated a lot of her symptoms advised follow-up every 2 to 3 months for care if the nails are painful. The patient indicates understanding of these issues and agrees to the plan.     Whit Ordaz DPM

## 2021-02-19 RX ORDER — HYDROCORTISONE 25 MG/G
CREAM TOPICAL
Qty: 28 G | Refills: 1 | Status: SHIPPED | OUTPATIENT
Start: 2021-02-19 | End: 2021-03-23

## 2021-02-19 NOTE — TELEPHONE ENCOUNTER
Last OV:  10/19/20 TCM f/u  Future Appointments   Date Time Provider Karel Dockery   2/25/2021 10:45 AM REFERENCE [id number]   2/25/2021 11:00 AM Carla Barajas MD [facility]   2/28/2021  1:20 PM EE [facility] COVID VACCINE Dannie Apple

## 2021-02-23 ENCOUNTER — LAB ENCOUNTER (OUTPATIENT)
Dept: LAB | Age: 86
End: 2021-02-23
Attending: INTERNAL MEDICINE
Payer: MEDICARE

## 2021-02-23 ENCOUNTER — PATIENT OUTREACH (OUTPATIENT)
Dept: CASE MANAGEMENT | Age: 86
End: 2021-02-23

## 2021-02-23 DIAGNOSIS — E11.69 DIABETES MELLITUS TYPE 2 IN OBESE (HCC): ICD-10-CM

## 2021-02-23 DIAGNOSIS — E78.5 DYSLIPIDEMIA, GOAL LDL BELOW 100: ICD-10-CM

## 2021-02-23 DIAGNOSIS — Z00.00 ROUTINE GENERAL MEDICAL EXAMINATION AT A HEALTH CARE FACILITY: ICD-10-CM

## 2021-02-23 DIAGNOSIS — E66.9 DIABETES MELLITUS TYPE 2 IN OBESE (HCC): ICD-10-CM

## 2021-02-23 DIAGNOSIS — I10 ESSENTIAL HYPERTENSION: ICD-10-CM

## 2021-02-23 LAB
ALBUMIN SERPL-MCNC: 3.3 G/DL (ref 3.4–5)
ALBUMIN/GLOB SERPL: 0.9 {RATIO} (ref 1–2)
ALP LIVER SERPL-CCNC: 70 U/L
ALT SERPL-CCNC: 16 U/L
ANION GAP SERPL CALC-SCNC: 5 MMOL/L (ref 0–18)
AST SERPL-CCNC: 8 U/L (ref 15–37)
BASOPHILS # BLD AUTO: 0.06 X10(3) UL (ref 0–0.2)
BASOPHILS NFR BLD AUTO: 0.9 %
BILIRUB SERPL-MCNC: 0.7 MG/DL (ref 0.1–2)
BUN BLD-MCNC: 9 MG/DL (ref 7–18)
BUN/CREAT SERPL: 9.9 (ref 10–20)
CALCIUM BLD-MCNC: 8.9 MG/DL (ref 8.5–10.1)
CHLORIDE SERPL-SCNC: 110 MMOL/L (ref 98–112)
CHOLEST SMN-MCNC: 180 MG/DL (ref ?–200)
CO2 SERPL-SCNC: 26 MMOL/L (ref 21–32)
CREAT BLD-MCNC: 0.91 MG/DL
CREAT UR-SCNC: 156 MG/DL
DEPRECATED RDW RBC AUTO: 44.8 FL (ref 35.1–46.3)
EOSINOPHIL # BLD AUTO: 0.16 X10(3) UL (ref 0–0.7)
EOSINOPHIL NFR BLD AUTO: 2.5 %
ERYTHROCYTE [DISTWIDTH] IN BLOOD BY AUTOMATED COUNT: 13.6 % (ref 11–15)
GLOBULIN PLAS-MCNC: 3.8 G/DL (ref 2.8–4.4)
GLUCOSE BLD-MCNC: 141 MG/DL (ref 70–99)
HCT VFR BLD AUTO: 40.4 %
HDLC SERPL-MCNC: 42 MG/DL (ref 40–59)
HGB BLD-MCNC: 13.4 G/DL
IMM GRANULOCYTES # BLD AUTO: 0.02 X10(3) UL (ref 0–1)
IMM GRANULOCYTES NFR BLD: 0.3 %
LDLC SERPL CALC-MCNC: 106 MG/DL (ref ?–100)
LYMPHOCYTES # BLD AUTO: 1.35 X10(3) UL (ref 1–4)
LYMPHOCYTES NFR BLD AUTO: 21.1 %
M PROTEIN MFR SERPL ELPH: 7.1 G/DL (ref 6.4–8.2)
MCH RBC QN AUTO: 30.1 PG (ref 26–34)
MCHC RBC AUTO-ENTMCNC: 33.2 G/DL (ref 31–37)
MCV RBC AUTO: 90.8 FL
MICROALBUMIN UR-MCNC: 1.51 MG/DL
MICROALBUMIN/CREAT 24H UR-RTO: 9.7 UG/MG (ref ?–30)
MONOCYTES # BLD AUTO: 0.49 X10(3) UL (ref 0.1–1)
MONOCYTES NFR BLD AUTO: 7.6 %
NEUTROPHILS # BLD AUTO: 4.33 X10 (3) UL (ref 1.5–7.7)
NEUTROPHILS # BLD AUTO: 4.33 X10(3) UL (ref 1.5–7.7)
NEUTROPHILS NFR BLD AUTO: 67.6 %
NONHDLC SERPL-MCNC: 138 MG/DL (ref ?–130)
OSMOLALITY SERPL CALC.SUM OF ELEC: 293 MOSM/KG (ref 275–295)
PATIENT FASTING Y/N/NP: YES
PATIENT FASTING Y/N/NP: YES
PLATELET # BLD AUTO: 216 10(3)UL (ref 150–450)
POTASSIUM SERPL-SCNC: 3.7 MMOL/L (ref 3.5–5.1)
RBC # BLD AUTO: 4.45 X10(6)UL
SODIUM SERPL-SCNC: 141 MMOL/L (ref 136–145)
TRIGL SERPL-MCNC: 161 MG/DL (ref 30–149)
TSI SER-ACNC: 1.14 MIU/ML (ref 0.36–3.74)
VLDLC SERPL CALC-MCNC: 32 MG/DL (ref 0–30)
WBC # BLD AUTO: 6.4 X10(3) UL (ref 4–11)

## 2021-02-23 PROCEDURE — 80061 LIPID PANEL: CPT

## 2021-02-23 PROCEDURE — 36415 COLL VENOUS BLD VENIPUNCTURE: CPT

## 2021-02-23 PROCEDURE — 82043 UR ALBUMIN QUANTITATIVE: CPT

## 2021-02-23 PROCEDURE — 85025 COMPLETE CBC W/AUTO DIFF WBC: CPT

## 2021-02-23 PROCEDURE — 84443 ASSAY THYROID STIM HORMONE: CPT

## 2021-02-23 PROCEDURE — 80053 COMPREHEN METABOLIC PANEL: CPT

## 2021-02-23 PROCEDURE — 82570 ASSAY OF URINE CREATININE: CPT

## 2021-02-23 NOTE — PROGRESS NOTES
Contacting patient to follow up on CCM for the month.  LMCB       Time with pt -  min  Chart review -  min  Total time - 3 min  Total Monthly time-3  min

## 2021-02-28 ENCOUNTER — IMMUNIZATION (OUTPATIENT)
Dept: LAB | Age: 86
End: 2021-02-28
Attending: HOSPITALIST
Payer: MEDICARE

## 2021-02-28 DIAGNOSIS — Z23 NEED FOR VACCINATION: Primary | ICD-10-CM

## 2021-02-28 PROCEDURE — 0002A SARSCOV2 VAC 30MCG/0.3ML IM: CPT

## 2021-03-02 ENCOUNTER — TELEPHONE (OUTPATIENT)
Dept: INTERNAL MEDICINE CLINIC | Facility: CLINIC | Age: 86
End: 2021-03-02

## 2021-03-02 NOTE — TELEPHONE ENCOUNTER
returning a call re: results stated she had an ultrasound and wants to see if we have those results.  Please advise

## 2021-03-14 ENCOUNTER — APPOINTMENT (OUTPATIENT)
Dept: CT IMAGING | Facility: HOSPITAL | Age: 86
End: 2021-03-14
Attending: EMERGENCY MEDICINE
Payer: MEDICARE

## 2021-03-14 ENCOUNTER — HOSPITAL ENCOUNTER (EMERGENCY)
Facility: HOSPITAL | Age: 86
Discharge: HOME | End: 2021-03-14
Attending: EMERGENCY MEDICINE
Payer: MEDICARE

## 2021-03-14 VITALS
WEIGHT: 164.88 LBS | HEIGHT: 61.5 IN | DIASTOLIC BLOOD PRESSURE: 72 MMHG | TEMPERATURE: 99 F | SYSTOLIC BLOOD PRESSURE: 172 MMHG | BODY MASS INDEX: 30.73 KG/M2 | RESPIRATION RATE: 18 BRPM | HEART RATE: 81 BPM | OXYGEN SATURATION: 100 %

## 2021-03-14 DIAGNOSIS — K57.92 ACUTE DIVERTICULITIS: Primary | ICD-10-CM

## 2021-03-14 LAB
ALBUMIN SERPL-MCNC: 3.3 G/DL (ref 3.4–5)
ALBUMIN/GLOB SERPL: 0.8 {RATIO} (ref 1–2)
ALP LIVER SERPL-CCNC: 69 U/L
ALT SERPL-CCNC: 15 U/L
ANION GAP SERPL CALC-SCNC: 4 MMOL/L (ref 0–18)
AST SERPL-CCNC: 9 U/L (ref 15–37)
BASOPHILS # BLD AUTO: 0.06 X10(3) UL (ref 0–0.2)
BASOPHILS NFR BLD AUTO: 0.7 %
BILIRUB SERPL-MCNC: 0.8 MG/DL (ref 0.1–2)
BUN BLD-MCNC: 9 MG/DL (ref 7–18)
BUN/CREAT SERPL: 10.5 (ref 10–20)
CALCIUM BLD-MCNC: 9.1 MG/DL (ref 8.5–10.1)
CHLORIDE SERPL-SCNC: 106 MMOL/L (ref 98–112)
CO2 SERPL-SCNC: 28 MMOL/L (ref 21–32)
CREAT BLD-MCNC: 0.86 MG/DL
DEPRECATED RDW RBC AUTO: 44.2 FL (ref 35.1–46.3)
EOSINOPHIL # BLD AUTO: 0.14 X10(3) UL (ref 0–0.7)
EOSINOPHIL NFR BLD AUTO: 1.5 %
ERYTHROCYTE [DISTWIDTH] IN BLOOD BY AUTOMATED COUNT: 13.4 % (ref 11–15)
GLOBULIN PLAS-MCNC: 4.1 G/DL (ref 2.8–4.4)
GLUCOSE BLD-MCNC: 146 MG/DL (ref 70–99)
HCT VFR BLD AUTO: 37.8 %
HGB BLD-MCNC: 12.8 G/DL
IMM GRANULOCYTES # BLD AUTO: 0.02 X10(3) UL (ref 0–1)
IMM GRANULOCYTES NFR BLD: 0.2 %
LIPASE SERPL-CCNC: 133 U/L (ref 73–393)
LYMPHOCYTES # BLD AUTO: 1.24 X10(3) UL (ref 1–4)
LYMPHOCYTES NFR BLD AUTO: 13.6 %
M PROTEIN MFR SERPL ELPH: 7.4 G/DL (ref 6.4–8.2)
MCH RBC QN AUTO: 30.2 PG (ref 26–34)
MCHC RBC AUTO-ENTMCNC: 33.9 G/DL (ref 31–37)
MCV RBC AUTO: 89.2 FL
MONOCYTES # BLD AUTO: 0.61 X10(3) UL (ref 0.1–1)
MONOCYTES NFR BLD AUTO: 6.7 %
NEUTROPHILS # BLD AUTO: 7.02 X10 (3) UL (ref 1.5–7.7)
NEUTROPHILS # BLD AUTO: 7.02 X10(3) UL (ref 1.5–7.7)
NEUTROPHILS NFR BLD AUTO: 77.3 %
OSMOLALITY SERPL CALC.SUM OF ELEC: 287 MOSM/KG (ref 275–295)
PLATELET # BLD AUTO: 226 10(3)UL (ref 150–450)
POTASSIUM SERPL-SCNC: 3.9 MMOL/L (ref 3.5–5.1)
RBC # BLD AUTO: 4.24 X10(6)UL
SODIUM SERPL-SCNC: 138 MMOL/L (ref 136–145)
WBC # BLD AUTO: 9.1 X10(3) UL (ref 4–11)

## 2021-03-14 PROCEDURE — 99284 EMERGENCY DEPT VISIT MOD MDM: CPT

## 2021-03-14 PROCEDURE — 74176 CT ABD & PELVIS W/O CONTRAST: CPT | Performed by: EMERGENCY MEDICINE

## 2021-03-14 PROCEDURE — 96374 THER/PROPH/DIAG INJ IV PUSH: CPT

## 2021-03-14 PROCEDURE — 96375 TX/PRO/DX INJ NEW DRUG ADDON: CPT

## 2021-03-14 PROCEDURE — 83690 ASSAY OF LIPASE: CPT | Performed by: EMERGENCY MEDICINE

## 2021-03-14 PROCEDURE — 85025 COMPLETE CBC W/AUTO DIFF WBC: CPT | Performed by: EMERGENCY MEDICINE

## 2021-03-14 PROCEDURE — 80053 COMPREHEN METABOLIC PANEL: CPT | Performed by: EMERGENCY MEDICINE

## 2021-03-14 RX ORDER — ONDANSETRON 2 MG/ML
4 INJECTION INTRAMUSCULAR; INTRAVENOUS ONCE
Status: COMPLETED | OUTPATIENT
Start: 2021-03-14 | End: 2021-03-14

## 2021-03-14 RX ORDER — METRONIDAZOLE 500 MG/1
500 TABLET ORAL 3 TIMES DAILY
Qty: 30 TABLET | Refills: 0 | Status: SHIPPED | OUTPATIENT
Start: 2021-03-14 | End: 2021-03-23 | Stop reason: ALTCHOICE

## 2021-03-14 RX ORDER — KETOROLAC TROMETHAMINE 30 MG/ML
INJECTION, SOLUTION INTRAMUSCULAR; INTRAVENOUS
Status: COMPLETED
Start: 2021-03-14 | End: 2021-03-14

## 2021-03-14 RX ORDER — CIPROFLOXACIN 500 MG/1
500 TABLET, FILM COATED ORAL 2 TIMES DAILY
Qty: 20 TABLET | Refills: 0 | Status: SHIPPED | OUTPATIENT
Start: 2021-03-14 | End: 2021-03-23 | Stop reason: ALTCHOICE

## 2021-03-14 RX ORDER — ONDANSETRON 4 MG/1
4 TABLET, ORALLY DISINTEGRATING ORAL EVERY 4 HOURS PRN
Qty: 10 TABLET | Refills: 0 | Status: SHIPPED | OUTPATIENT
Start: 2021-03-14 | End: 2021-03-21

## 2021-03-14 RX ORDER — KETOROLAC TROMETHAMINE 30 MG/ML
15 INJECTION, SOLUTION INTRAMUSCULAR; INTRAVENOUS ONCE
Status: COMPLETED | OUTPATIENT
Start: 2021-03-14 | End: 2021-03-14

## 2021-03-14 RX ORDER — HYDROMORPHONE HYDROCHLORIDE 1 MG/ML
INJECTION, SOLUTION INTRAMUSCULAR; INTRAVENOUS; SUBCUTANEOUS
Status: DISCONTINUED
Start: 2021-03-14 | End: 2021-03-14 | Stop reason: WASHOUT

## 2021-03-14 RX ORDER — ONDANSETRON 2 MG/ML
INJECTION INTRAMUSCULAR; INTRAVENOUS
Status: COMPLETED
Start: 2021-03-14 | End: 2021-03-14

## 2021-03-14 NOTE — ED PROVIDER NOTES
Patient Seen in: [facility] Emergency Department      History   Patient presents with:  Abdomen/Flank Pain    Stated Complaint:     HPI/Subjective:   HPI    3 days of lower abdominal pain from the left side.   Has a history constipated and still has organism unspecified(486)     04/2015   • PONV (postoperative nausea and vomiting)    • Problems with swallowing     due hiatal hernia; gaggs at times   • Restless leg syndrome    • Restless leg syndrome    • Shortness of breath     ON EXERTION   • Sleep a Current:BP (!) 172/72   Pulse 81   Temp 98.5 °F (36.9 °C) (Temporal)   Resp 18   Ht 156.2 cm (5' 1.5\")   Wt 74.8 kg   SpO2 100%   BMI 30.66 kg/m²         Physical Exam      Constitutional: Awake, alert, age appearing, non-toxic  Head: Normocephalic the Radiology studies and agree with interpretation from radiology.       CT ABDOMEN+PELVIS(CPT=74176)    Result Date: 3/14/2021  PROCEDURE:  CT ABDOMEN+PELVIS (CPT=74176)  COMPARISON:  [facility] , CT, CT CHEST+ABDOMEN+PELVIS(ALL CNTRST ONLY)(CPT=71260/35907), cm.  ABDOMINAL WALL:  Small fat containing umbilical hernia about 15 x 10 mm. No bowel containing hernia. No abdominal wall mass or hematoma. URINARY BLADDER:  Unremarkable. PELVIC NODES:  Unremarkable. PELVIC ORGANS:  No acute process.   BONES:  No ac Tablet Dispersible  Take 1 tablet (4 mg total) by mouth every 4 (four) hours as needed for Nausea., Normal, Disp-10 tablet, R-0

## 2021-03-15 ENCOUNTER — TELEPHONE (OUTPATIENT)
Dept: INTERNAL MEDICINE CLINIC | Facility: CLINIC | Age: 86
End: 2021-03-15

## 2021-03-15 NOTE — TELEPHONE ENCOUNTER
CHELO   TC to pt to schedule ER fu for Diverticulitis. Pt declined to schedule an appt, she will continue to take her antibiotic and if she starts feeling unwell she will call the office. Pt has AWV with Dr. Jim Wilkinson on 3/23/21.

## 2021-03-19 ENCOUNTER — TELEPHONE (OUTPATIENT)
Dept: INTERNAL MEDICINE CLINIC | Facility: CLINIC | Age: 86
End: 2021-03-19

## 2021-03-19 RX ORDER — AMOXICILLIN AND CLAVULANATE POTASSIUM 875; 125 MG/1; MG/1
1 TABLET, FILM COATED ORAL 2 TIMES DAILY
Qty: 20 TABLET | Refills: 0 | Status: SHIPPED | OUTPATIENT
Start: 2021-03-19 | End: 2021-03-29

## 2021-03-19 NOTE — TELEPHONE ENCOUNTER
Pt was prescribed Ciprofloxacin HCl 500 MG Oral Tab while in the ER and she states it is making her sick, She can't take this medicine and would like put in her chart somewhere. Would dong like to know if TB can prescribe her something else, please advise?

## 2021-03-19 NOTE — TELEPHONE ENCOUNTER
LMTCB on both home and cell with instruction on antibiotic. Patients son answered and was able to take down abx and read back instructions for abx. Advised to stop cipro and flagyl and start augmentin and to take with food.  Advised to call with any q

## 2021-03-23 ENCOUNTER — PATIENT OUTREACH (OUTPATIENT)
Dept: CASE MANAGEMENT | Age: 86
End: 2021-03-23

## 2021-03-23 ENCOUNTER — OFFICE VISIT (OUTPATIENT)
Dept: INTERNAL MEDICINE CLINIC | Facility: CLINIC | Age: 86
End: 2021-03-23
Payer: MEDICARE

## 2021-03-23 VITALS
HEIGHT: 60.63 IN | SYSTOLIC BLOOD PRESSURE: 94 MMHG | DIASTOLIC BLOOD PRESSURE: 54 MMHG | HEART RATE: 72 BPM | WEIGHT: 165 LBS | RESPIRATION RATE: 16 BRPM | BODY MASS INDEX: 31.56 KG/M2 | TEMPERATURE: 97 F

## 2021-03-23 DIAGNOSIS — E78.00 PURE HYPERCHOLESTEROLEMIA: ICD-10-CM

## 2021-03-23 DIAGNOSIS — M54.9 BACK PAIN WITH RADIATION: ICD-10-CM

## 2021-03-23 DIAGNOSIS — K57.92 ACUTE DIVERTICULITIS: ICD-10-CM

## 2021-03-23 DIAGNOSIS — J43.8 OTHER EMPHYSEMA (HCC): ICD-10-CM

## 2021-03-23 DIAGNOSIS — H35.3231 EXUDATIVE AGE-RELATED MACULAR DEGENERATION OF BOTH EYES WITH ACTIVE CHOROIDAL NEOVASCULARIZATION (HCC): ICD-10-CM

## 2021-03-23 DIAGNOSIS — Z78.0 POST-MENOPAUSAL: ICD-10-CM

## 2021-03-23 DIAGNOSIS — Z00.00 ENCOUNTER FOR ANNUAL HEALTH EXAMINATION: Primary | ICD-10-CM

## 2021-03-23 DIAGNOSIS — I10 BENIGN ESSENTIAL HTN: ICD-10-CM

## 2021-03-23 DIAGNOSIS — E78.2 MIXED DYSLIPIDEMIA: ICD-10-CM

## 2021-03-23 DIAGNOSIS — G25.81 RLS (RESTLESS LEGS SYNDROME): ICD-10-CM

## 2021-03-23 DIAGNOSIS — E11.42 DIABETIC POLYNEUROPATHY ASSOCIATED WITH TYPE 2 DIABETES MELLITUS (HCC): ICD-10-CM

## 2021-03-23 DIAGNOSIS — I72.8 SPLENIC ARTERY ANEURYSM (HCC): ICD-10-CM

## 2021-03-23 DIAGNOSIS — M51.36 DDD (DEGENERATIVE DISC DISEASE), LUMBAR: ICD-10-CM

## 2021-03-23 PROBLEM — M51.369 DDD (DEGENERATIVE DISC DISEASE), LUMBAR: Status: ACTIVE | Noted: 2021-03-23

## 2021-03-23 PROCEDURE — G0439 PPPS, SUBSEQ VISIT: HCPCS | Performed by: INTERNAL MEDICINE

## 2021-03-23 PROCEDURE — 99213 OFFICE O/P EST LOW 20 MIN: CPT | Performed by: INTERNAL MEDICINE

## 2021-03-23 RX ORDER — ALBUTEROL SULFATE 90 UG/1
2 AEROSOL, METERED RESPIRATORY (INHALATION) EVERY 4 HOURS PRN
Qty: 1 INHALER | Refills: 6 | Status: SHIPPED | OUTPATIENT
Start: 2021-03-23

## 2021-03-23 RX ORDER — HYDROCORTISONE 25 MG/G
CREAM TOPICAL
Qty: 28 G | Refills: 1 | Status: SHIPPED | OUTPATIENT
Start: 2021-03-23

## 2021-03-23 RX ORDER — ONDANSETRON 4 MG/1
4 TABLET, FILM COATED ORAL EVERY 8 HOURS PRN
Qty: 30 TABLET | Refills: 1 | Status: SHIPPED | OUTPATIENT
Start: 2021-03-23 | End: 2021-04-22

## 2021-03-23 NOTE — PROGRESS NOTES
HPI:   Jude Riggs is a 80year old female who presents for a Medicare Subsequent Annual Wellness visit (Pt already had Initial Annual Wellness).     Patient with DM2 with neuropathy, emphysema,HTN, HL, gerd, RLS, splenic artery aneurysm, recent divert of  for [employer] on file in [place]. Not Discussed       She smoked tobacco in the past but quit greater than 12 months ago.   Social History    Tobacco Use      Smoking status: Former Smoker        Packs/day: 1.00        Years: 35.00        Pack pain with radiation     DDD (degenerative disc disease), lumbar    Wt Readings from Last 3 Encounters:  03/23/21 : 165 lb (74.8 kg)  03/14/21 : 164 lb 14.5 oz (74.8 kg)  02/13/21 : 165 lb (74.8 kg)     Last Cholesterol Labs:   Lab Results   Component Value HCl 0.1 % Nasal Solution, 1 spray by Nasal route nightly. docusate sodium 100 MG Oral Cap, Take 1 capsule (100 mg total) by mouth 2 (two) times daily as needed for constipation.   Pravastatin Sodium 20 MG Oral Tab, Take 1 tablet (20 mg total) by mouth hailey degeneration, Nausea, Neuropathy, Osteoarthritis, Osteoporosis, Pain in joints, Pneumonia, organism unspecified(486), PONV (postoperative nausea and vomiting), Problems with swallowing, Restless leg syndrome, Restless leg syndrome, Shortness of breath, Sle Assessment  (Required for AWV/SWV)    {Hearing "[employer] and left her hearing aids at home     Visual Acuity with glasses wnl                           General Appearance:  Alert, cooperative, no distress, appears stated age   Head:  Normocephalic, without obvio is a 80year old female who presents for a Medicare Assessment.      PLAN SUMMARY:   Diagnoses and all orders for this visit:    Encounter for annual health examination- patient received both covid 19 vaccines, she no longer needs screening colonoscopy at a chart, separate sheet to patient  [address] Internal Lab or Procedure External Lab or Procedure   Diabetes Screening      HbgA1C   Annually Lab Results   Component Value Date    A1C 7.4 (H) 02/23/2021    No flowsheet d Please get every year    Pneumococcal 13 (Prevnar)  Covered Once after 65 09/16/2015 Please get once after your 65th birthday    Pneumococcal 23 (Pneumovax)  Covered Once after 65 05/23/2017 Please get once after your 65th birthday    Hepatitis B for [place] [address] [[id number]]

## 2021-03-23 NOTE — PATIENT INSTRUCTIONS
Paulina Man's SCREENING SCHEDULE   Tests on this list are recommended by your physician but may not be covered, or covered at this frequency, by your insurer. Please check with your insurance carrier before scheduling to verify coverage.    PREVENTATIV Covered every 10 years- more often if abnormal There are no preventive care reminders to display for this patient.  Update Health Maintenance if applicable    Flex Sigmoidoscopy Screen  Covered every 5 years No results found for this or any previous visit year    Pneumococcal 13 (Prevnar)  Covered Once after 65 No orders found for this or any previous visit. Please get once after your 65th birthday    Pneumococcal 23 (Pneumovax)  Covered Once after 65 No orders found for this or any previous visit.  Please g

## 2021-04-05 ENCOUNTER — HOSPITAL ENCOUNTER (EMERGENCY)
Facility: HOSPITAL | Age: 86
Discharge: HOME | End: 2021-04-05
Attending: EMERGENCY MEDICINE
Payer: MEDICARE

## 2021-04-05 ENCOUNTER — APPOINTMENT (OUTPATIENT)
Dept: CT IMAGING | Facility: HOSPITAL | Age: 86
End: 2021-04-05
Attending: EMERGENCY MEDICINE
Payer: MEDICARE

## 2021-04-05 ENCOUNTER — TELEPHONE (OUTPATIENT)
Dept: INTERNAL MEDICINE CLINIC | Facility: CLINIC | Age: 86
End: 2021-04-05

## 2021-04-05 VITALS
WEIGHT: 165 LBS | HEART RATE: 61 BPM | RESPIRATION RATE: 18 BRPM | OXYGEN SATURATION: 97 % | DIASTOLIC BLOOD PRESSURE: 70 MMHG | TEMPERATURE: 97 F | HEIGHT: 61 IN | BODY MASS INDEX: 31.15 KG/M2 | SYSTOLIC BLOOD PRESSURE: 119 MMHG

## 2021-04-05 DIAGNOSIS — R10.9 ABDOMINAL PAIN OF UNKNOWN ETIOLOGY: Primary | ICD-10-CM

## 2021-04-05 DIAGNOSIS — N39.0 URINARY TRACT INFECTION WITHOUT HEMATURIA, SITE UNSPECIFIED: ICD-10-CM

## 2021-04-05 PROCEDURE — 96365 THER/PROPH/DIAG IV INF INIT: CPT

## 2021-04-05 PROCEDURE — 87184 SC STD DISK METHOD PER PLATE: CPT | Performed by: EMERGENCY MEDICINE

## 2021-04-05 PROCEDURE — 99284 EMERGENCY DEPT VISIT MOD MDM: CPT

## 2021-04-05 PROCEDURE — 93005 ELECTROCARDIOGRAM TRACING: CPT

## 2021-04-05 PROCEDURE — 80053 COMPREHEN METABOLIC PANEL: CPT | Performed by: EMERGENCY MEDICINE

## 2021-04-05 PROCEDURE — 93010 ELECTROCARDIOGRAM REPORT: CPT

## 2021-04-05 PROCEDURE — 83690 ASSAY OF LIPASE: CPT | Performed by: EMERGENCY MEDICINE

## 2021-04-05 PROCEDURE — 85025 COMPLETE CBC W/AUTO DIFF WBC: CPT | Performed by: EMERGENCY MEDICINE

## 2021-04-05 PROCEDURE — 74176 CT ABD & PELVIS W/O CONTRAST: CPT | Performed by: EMERGENCY MEDICINE

## 2021-04-05 PROCEDURE — 87186 SC STD MICRODIL/AGAR DIL: CPT | Performed by: EMERGENCY MEDICINE

## 2021-04-05 PROCEDURE — 87077 CULTURE AEROBIC IDENTIFY: CPT | Performed by: EMERGENCY MEDICINE

## 2021-04-05 PROCEDURE — 87086 URINE CULTURE/COLONY COUNT: CPT | Performed by: EMERGENCY MEDICINE

## 2021-04-05 PROCEDURE — 96366 THER/PROPH/DIAG IV INF ADDON: CPT

## 2021-04-05 PROCEDURE — 81001 URINALYSIS AUTO W/SCOPE: CPT | Performed by: EMERGENCY MEDICINE

## 2021-04-05 RX ORDER — ONDANSETRON 4 MG/1
4 TABLET, ORALLY DISINTEGRATING ORAL EVERY 4 HOURS PRN
Qty: 10 TABLET | Refills: 0 | Status: SHIPPED | OUTPATIENT
Start: 2021-04-05 | End: 2021-04-12

## 2021-04-05 RX ORDER — CEFADROXIL 500 MG/1
500 CAPSULE ORAL 2 TIMES DAILY
Qty: 10 CAPSULE | Refills: 0 | Status: SHIPPED | OUTPATIENT
Start: 2021-04-05 | End: 2021-04-10

## 2021-04-05 RX ORDER — PANTOPRAZOLE SODIUM 20 MG/1
20 TABLET, DELAYED RELEASE ORAL
Qty: 10 TABLET | Refills: 0 | Status: SHIPPED | OUTPATIENT
Start: 2021-04-05 | End: 2021-04-12

## 2021-04-05 RX ORDER — SODIUM CHLORIDE 9 MG/ML
125 INJECTION, SOLUTION INTRAVENOUS CONTINUOUS
Status: DISCONTINUED | OUTPATIENT
Start: 2021-04-05 | End: 2021-04-05

## 2021-04-05 NOTE — ED NOTES
Result Date: 4/5/2021  PROCEDURE:  CT ABDOMEN+PELVIS KIDNEYSTONE 2D RNDR(NO IV,NO ORAL)(CPT=74176)  COMPARISON:  [facility] , CT, CT ABDOMEN+PELVIS(CPT=74176), 3/14/2021, 12:51 PM.  INDICATIONS:  UTI  TECHNIQUE:  Unenhanced multislice CT scanning from abov The abdomen is soft nontender there is no rebound no guarding I discussed with her about the nausea she has had this since she had the diverticulitis that area nonspecific I discussed urinary close follow-up with her primary care physician.     She feels c

## 2021-04-05 NOTE — ED PROVIDER NOTES
Patient Seen in: [facility] Emergency Department      History   Patient presents with:  Urinary Symptoms    Stated Complaint: UTI    HPI/Subjective:   HPI    This is a 60-year-old female who presents with symptoms of dysuria that began today.   The p in joints    • Pneumonia, organism unspecified(486)     04/2015   • PONV (postoperative nausea and vomiting)    • Problems with swallowing     due hiatal hernia; gaggs at times   • Restless leg syndrome    • Restless leg syndrome    • Shortness of breath Device None (Room air)       Current:/70   Pulse 61   Temp 97.1 °F (36.2 °C) (Temporal)   Resp 18   Ht 154.9 cm (5' 1\")   Wt 74.8 kg   LMP  (LMP Unknown)   SpO2 97%   BMI 31.18 kg/m²         Physical Exam  General: .   Female who is in no respiratory CBC W/ DIFFERENTIAL[[id number]]                              Final result                 Please view results for these tests on the individual orders.    URINE CULTURE, ROUTINE   CBC W/ DIFFERENTIAL               The patient was placed on [facility] mg total) by mouth 2 (two) times daily for 5 days. Qty: 10 capsule Refills: 0    Pantoprazole Sodium 20 MG Oral Tab EC  Take 1 tablet (20 mg total) by mouth every morning before breakfast for 10 days.   Qty: 10 tablet Refills: 0

## 2021-04-05 NOTE — TELEPHONE ENCOUNTER
Patient states she has finished her abx for diverticulitis, so nauseated right now, close to vomiting, had loose stools last week now she thinks she may have a UTI. Pt states she is going to the ER and agreed with plan, states her son will take her.   Pt v

## 2021-04-05 NOTE — TELEPHONE ENCOUNTER
Ongoing seen at ER for diverticulitis but nausea and fatigue every day. Pt did finish her medication but still not feeling well. Transferred to nurse.

## 2021-04-05 NOTE — ED INITIAL ASSESSMENT (HPI)
Patient with c/o dysuria that began today. Patient recently finished up a course of antibiotics for diverticulitis. Continues to experience severe nausea as well.

## 2021-04-06 ENCOUNTER — TELEPHONE (OUTPATIENT)
Dept: INTERNAL MEDICINE CLINIC | Facility: CLINIC | Age: 86
End: 2021-04-06

## 2021-04-06 NOTE — TELEPHONE ENCOUNTER
Called patient to schedule ER fu with Dr. Alden Schwab for diverticulitis. Pt declined to schedule and will fu with Dr. Sony Rivers at [facility].

## 2021-04-09 RX ORDER — SULFAMETHOXAZOLE AND TRIMETHOPRIM 800; 160 MG/1; MG/1
1 TABLET ORAL 2 TIMES DAILY
Qty: 14 TABLET | Refills: 0 | Status: SHIPPED | OUTPATIENT
Start: 2021-04-09 | End: 2021-04-12 | Stop reason: ALTCHOICE

## 2021-04-16 ENCOUNTER — TELEPHONE (OUTPATIENT)
Dept: INTERNAL MEDICINE CLINIC | Facility: CLINIC | Age: 86
End: 2021-04-16

## 2021-04-16 DIAGNOSIS — B95.2 ENTEROCOCCUS UTI: ICD-10-CM

## 2021-04-16 DIAGNOSIS — N39.0 ENTEROCOCCUS UTI: ICD-10-CM

## 2021-04-16 DIAGNOSIS — Z87.440 HISTORY OF UTI: Primary | ICD-10-CM

## 2021-04-16 DIAGNOSIS — R30.0 DYSURIA: ICD-10-CM

## 2021-04-16 RX ORDER — ONDANSETRON 4 MG/1
4 TABLET, FILM COATED ORAL EVERY 8 HOURS PRN
Qty: 30 TABLET | Refills: 0 | Status: SHIPPED | OUTPATIENT
Start: 2021-04-16 | End: 2022-02-01

## 2021-04-16 NOTE — TELEPHONE ENCOUNTER
Pt would like a call back to discuss a medication that she stated Dr. Wilkes Adjutant from the [facility] put her on for her UTI that she doesn't feel she has anymore     Please advise

## 2021-04-16 NOTE — TELEPHONE ENCOUNTER
Patient states Dr Navas Needs at [address] [facility] ER put her on Cipro, appears ER was trying to reach her to change abx to Bactrim which was sent to her pharmacy on 4/9/21 however pt never got that message.   Pt states she feels she does not have any UTI s/s anymore, should

## 2021-04-22 ENCOUNTER — PATIENT OUTREACH (OUTPATIENT)
Dept: CASE MANAGEMENT | Age: 86
End: 2021-04-22

## 2021-04-22 DIAGNOSIS — I10 BENIGN ESSENTIAL HTN: ICD-10-CM

## 2021-04-22 DIAGNOSIS — E78.00 PURE HYPERCHOLESTEROLEMIA: ICD-10-CM

## 2021-04-22 DIAGNOSIS — M19.042 ARTHRITIS OF BOTH HANDS: ICD-10-CM

## 2021-04-22 DIAGNOSIS — E11.42 DIABETIC POLYNEUROPATHY ASSOCIATED WITH TYPE 2 DIABETES MELLITUS (HCC): ICD-10-CM

## 2021-04-22 DIAGNOSIS — R73.9 HYPERGLYCEMIA: ICD-10-CM

## 2021-04-22 DIAGNOSIS — D64.9 ANEMIA, UNSPECIFIED TYPE: ICD-10-CM

## 2021-04-22 DIAGNOSIS — J44.9 CHRONIC OBSTRUCTIVE PULMONARY DISEASE, UNSPECIFIED COPD TYPE (HCC): ICD-10-CM

## 2021-04-22 DIAGNOSIS — E66.9 OBESITY (BMI 30-39.9): ICD-10-CM

## 2021-04-22 DIAGNOSIS — E78.2 MIXED DYSLIPIDEMIA: ICD-10-CM

## 2021-04-22 DIAGNOSIS — M51.36 DDD (DEGENERATIVE DISC DISEASE), LUMBAR: ICD-10-CM

## 2021-04-22 DIAGNOSIS — K21.9 GASTROESOPHAGEAL REFLUX DISEASE, UNSPECIFIED WHETHER ESOPHAGITIS PRESENT: ICD-10-CM

## 2021-04-22 DIAGNOSIS — M19.041 ARTHRITIS OF BOTH HANDS: ICD-10-CM

## 2021-04-22 NOTE — PROGRESS NOTES
4/22/2021  Spoke to Clara for [address].       Updates to patient care team/ comments: UTD  Patient reported changes in medications: UTD  Med Adherence  Comment: pt completed abx and is taking medications as prescribed    Health Maintenance:     Diabetes Care D there will be more people in the population vaccinated and speculates that she will feel much safer in public and comfortable enough to start scheduling visits that she has neglected during pandemic.    Pt has already begun to prioritize what she wants to a updates where needed, education, goals and action plan recreation/update. Provided acknowledgment and validation to patient's concerns.    Monthly Minute Total including today: 54    Physical assessment, complete health history, and care plan established by

## 2021-04-29 ENCOUNTER — PATIENT OUTREACH (OUTPATIENT)
Dept: CASE MANAGEMENT | Age: 86
End: 2021-04-29

## 2021-04-29 DIAGNOSIS — I10 BENIGN ESSENTIAL HTN: ICD-10-CM

## 2021-04-29 DIAGNOSIS — D64.9 ANEMIA, UNSPECIFIED TYPE: ICD-10-CM

## 2021-04-29 DIAGNOSIS — J44.9 CHRONIC OBSTRUCTIVE PULMONARY DISEASE, UNSPECIFIED COPD TYPE (HCC): ICD-10-CM

## 2021-04-29 DIAGNOSIS — E11.42 DIABETIC POLYNEUROPATHY ASSOCIATED WITH TYPE 2 DIABETES MELLITUS (HCC): ICD-10-CM

## 2021-04-29 DIAGNOSIS — E78.00 PURE HYPERCHOLESTEROLEMIA: ICD-10-CM

## 2021-04-29 DIAGNOSIS — M19.042 ARTHRITIS OF BOTH HANDS: ICD-10-CM

## 2021-04-29 DIAGNOSIS — M51.36 DDD (DEGENERATIVE DISC DISEASE), LUMBAR: ICD-10-CM

## 2021-04-29 DIAGNOSIS — E78.2 MIXED DYSLIPIDEMIA: ICD-10-CM

## 2021-04-29 DIAGNOSIS — R73.9 HYPERGLYCEMIA: ICD-10-CM

## 2021-04-29 DIAGNOSIS — K21.9 GASTROESOPHAGEAL REFLUX DISEASE, UNSPECIFIED WHETHER ESOPHAGITIS PRESENT: ICD-10-CM

## 2021-04-29 DIAGNOSIS — M19.041 ARTHRITIS OF BOTH HANDS: ICD-10-CM

## 2021-04-29 DIAGNOSIS — E66.9 OBESITY (BMI 30-39.9): ICD-10-CM

## 2021-04-29 NOTE — PROGRESS NOTES
Called pt for monthly follow up    Pt had requested assistance in finding rides to doctors apointments to help relieve her son of some of the duties.     Spoke with [facility] and was informed pt is not registered with them and would n

## 2021-04-30 PROCEDURE — 99439 CHRNC CARE MGMT STAF EA ADDL: CPT

## 2021-04-30 PROCEDURE — 99490 CHRNC CARE MGMT STAFF 1ST 20: CPT

## 2021-05-03 ENCOUNTER — TELEPHONE (OUTPATIENT)
Dept: INTERNAL MEDICINE CLINIC | Facility: CLINIC | Age: 86
End: 2021-05-03

## 2021-05-03 NOTE — TELEPHONE ENCOUNTER
Pt hasn't slept since yesterday due to restless leg syndrome would like to speak to the nurse about what she can possibly do.  I offered an appt for tomorrow with TB she declined please advise

## 2021-05-03 NOTE — TELEPHONE ENCOUNTER
Patient contacted to discuss symptoms. Patient states took mirapex for restless legs. Patient states her restless legs stopped roughly an hour ago. Patient denies any symptoms at this time. Patient will call with any questions or concerns or if symptoms return. Patient denies any needs at this time.

## 2021-05-06 DIAGNOSIS — I10 ESSENTIAL HYPERTENSION: ICD-10-CM

## 2021-05-06 RX ORDER — LOSARTAN POTASSIUM 100 MG/1
TABLET ORAL
Qty: 90 TABLET | Refills: 1 | Status: SHIPPED | OUTPATIENT
Start: 2021-05-06 | End: 2021-11-02

## 2021-05-27 ENCOUNTER — PATIENT OUTREACH (OUTPATIENT)
Dept: CASE MANAGEMENT | Age: 86
End: 2021-05-27

## 2021-05-27 NOTE — PROGRESS NOTES
Contacting patient to follow up on CCM for the month. [facility]    Mailed pt resources on flavored water recipes that do not only utilize citrus. Pt is very sensitive to citrus and does not like the taste of water.   Recipes include alternatives like club soda a

## 2021-06-07 DIAGNOSIS — G25.81 RESTLESS LEG SYNDROME: ICD-10-CM

## 2021-06-08 NOTE — TELEPHONE ENCOUNTER
Last Ov: 3/23/21, TB, CPE  Last labs: CMP, CBC, Lipase 4/5/21  Last Rx: pramipexole 0.5mg, #90, 0R 1/24/21    No future appointments.     Per Protocol - not on protocol

## 2021-06-09 ENCOUNTER — TELEPHONE (OUTPATIENT)
Dept: CASE MANAGEMENT | Age: 86
End: 2021-06-09

## 2021-06-09 ENCOUNTER — PATIENT OUTREACH (OUTPATIENT)
Dept: CASE MANAGEMENT | Age: 86
End: 2021-06-09

## 2021-06-09 DIAGNOSIS — E78.2 MIXED DYSLIPIDEMIA: ICD-10-CM

## 2021-06-09 DIAGNOSIS — E66.9 OBESITY (BMI 30-39.9): ICD-10-CM

## 2021-06-09 DIAGNOSIS — I10 BENIGN ESSENTIAL HTN: ICD-10-CM

## 2021-06-09 DIAGNOSIS — M54.9 BACK PAIN WITH RADIATION: ICD-10-CM

## 2021-06-09 DIAGNOSIS — J44.9 CHRONIC OBSTRUCTIVE PULMONARY DISEASE, UNSPECIFIED COPD TYPE (HCC): ICD-10-CM

## 2021-06-09 DIAGNOSIS — R73.9 HYPERGLYCEMIA: ICD-10-CM

## 2021-06-09 DIAGNOSIS — M51.36 DDD (DEGENERATIVE DISC DISEASE), LUMBAR: ICD-10-CM

## 2021-06-09 DIAGNOSIS — M19.041 ARTHRITIS OF BOTH HANDS: ICD-10-CM

## 2021-06-09 DIAGNOSIS — D64.9 ANEMIA, UNSPECIFIED TYPE: ICD-10-CM

## 2021-06-09 DIAGNOSIS — H35.3230 BILATERAL EXUDATIVE AGE-RELATED MACULAR DEGENERATION, UNSPECIFIED STAGE (HCC): ICD-10-CM

## 2021-06-09 DIAGNOSIS — G25.81 RLS (RESTLESS LEGS SYNDROME): ICD-10-CM

## 2021-06-09 DIAGNOSIS — M19.042 ARTHRITIS OF BOTH HANDS: ICD-10-CM

## 2021-06-09 DIAGNOSIS — G56.03 BILATERAL CARPAL TUNNEL SYNDROME: ICD-10-CM

## 2021-06-09 DIAGNOSIS — K52.9 CHRONIC DIARRHEA: ICD-10-CM

## 2021-06-09 DIAGNOSIS — E78.00 PURE HYPERCHOLESTEROLEMIA: ICD-10-CM

## 2021-06-09 DIAGNOSIS — E11.42 DIABETIC POLYNEUROPATHY ASSOCIATED WITH TYPE 2 DIABETES MELLITUS (HCC): ICD-10-CM

## 2021-06-09 DIAGNOSIS — K21.9 GASTROESOPHAGEAL REFLUX DISEASE, UNSPECIFIED WHETHER ESOPHAGITIS PRESENT: ICD-10-CM

## 2021-06-09 RX ORDER — PRAMIPEXOLE DIHYDROCHLORIDE 0.5 MG/1
TABLET ORAL
Qty: 90 TABLET | Refills: 0 | Status: SHIPPED | OUTPATIENT
Start: 2021-06-09 | End: 2021-07-04

## 2021-06-09 RX ORDER — AZELASTINE 1 MG/ML
1 SPRAY, METERED NASAL NIGHTLY
Qty: 1 EACH | Refills: 2 | Status: SHIPPED | OUTPATIENT
Start: 2021-06-09

## 2021-06-09 NOTE — PROGRESS NOTES
6/9/2021  Spoke to Clara for CCM.       Updates to patient care team/ comments: UTD  Patient reported changes in medications: UTD  Med Adherence  Comment: Pt taking medications as prescribed     Health Maintenance:     Diabetes Care Dilated Eye Exam due o test 1 time daily but because of thin fingers and poor circulation she typically gets one test done every 3 days. Pt was inquiring if a CGM would benefit her.  Informed pt that typically this is for type 1 diabetics or type 2 diabetics that test more freque

## 2021-06-09 NOTE — TELEPHONE ENCOUNTER
Pt also needs refills on:    Lancets   Test Strips    Can refills be sent to [facility]?     Thank You    Routed to [facility] clinical staff

## 2021-06-09 NOTE — PROGRESS NOTES
Contacting patient to follow up on CCM for the month.  PT was busy and will call back       Time with pt -  min  Chart review -  min  Total time -  min  Total Monthly time-  min

## 2021-06-09 NOTE — TELEPHONE ENCOUNTER
Pt is recommended to test sugars every day, but often times is only testing every 3 days because she has very thin fingers and unable to get valid testing specimen on many occassions. Would CGM be recommended for this pt?     Pt A1C ordered and due at en

## 2021-06-10 ENCOUNTER — TELEPHONE (OUTPATIENT)
Dept: CASE MANAGEMENT | Age: 86
End: 2021-06-10

## 2021-06-10 RX ORDER — LANCETS
EACH MISCELLANEOUS
Qty: 100 EACH | Refills: 2 | Status: SHIPPED | OUTPATIENT
Start: 2021-06-10

## 2021-06-10 RX ORDER — BLOOD SUGAR DIAGNOSTIC
STRIP MISCELLANEOUS
Qty: 100 EACH | Refills: 2 | Status: SHIPPED | OUTPATIENT
Start: 2021-06-10

## 2021-06-10 NOTE — TELEPHONE ENCOUNTER
Pt called into CCM stating that pharmacy needs new order on test strips and lancets. Thank You.     Routed to [facility] clinical

## 2021-06-11 NOTE — TELEPHONE ENCOUNTER
Repeat labs in June which is now and come in for OV after labs June or July  On metformin with fair control of BG, I do not believe medicare will pay for CGM for her

## 2021-06-15 ENCOUNTER — TELEPHONE (OUTPATIENT)
Dept: INTERNAL MEDICINE CLINIC | Facility: CLINIC | Age: 86
End: 2021-06-15

## 2021-06-15 NOTE — TELEPHONE ENCOUNTER
Pt stated she spoke to TB about getting a referral for a derm and cardiologist.     Pt would like to know the name of a dermatologist that TB recommends. She prefers a female. She stated its for a check up yearly.  She does have a few moles she would like a

## 2021-06-17 NOTE — TELEPHONE ENCOUNTER
Patient given Dr Lucio Gillette and Dr Juan Manuel Nguyễn Cardiologist information. Pt verbalizes understanding.

## 2021-06-25 ENCOUNTER — TELEPHONE (OUTPATIENT)
Dept: INTERNAL MEDICINE CLINIC | Facility: CLINIC | Age: 86
End: 2021-06-25

## 2021-06-30 PROCEDURE — 99439 CHRNC CARE MGMT STAF EA ADDL: CPT

## 2021-06-30 PROCEDURE — 99490 CHRNC CARE MGMT STAFF 1ST 20: CPT

## 2021-07-02 DIAGNOSIS — G25.81 RESTLESS LEG SYNDROME: ICD-10-CM

## 2021-07-03 NOTE — TELEPHONE ENCOUNTER
Last VISIT 03/23/21     Last REFILL 06/09/21 qty 90 w/0 refills    Last LABS 04/17/21 UA done    No future appointments. Per PROTOCOL? Not on protocol      Please Approve or Deny.

## 2021-07-04 RX ORDER — PRAMIPEXOLE DIHYDROCHLORIDE 0.5 MG/1
TABLET ORAL
Qty: 90 TABLET | Refills: 0 | Status: SHIPPED | OUTPATIENT
Start: 2021-07-04 | End: 2021-09-30

## 2021-07-13 ENCOUNTER — OFFICE VISIT (OUTPATIENT)
Dept: PODIATRY CLINIC | Facility: CLINIC | Age: 86
End: 2021-07-13
Payer: MEDICARE

## 2021-07-13 DIAGNOSIS — E11.42 DIABETIC POLYNEUROPATHY ASSOCIATED WITH TYPE 2 DIABETES MELLITUS (HCC): ICD-10-CM

## 2021-07-13 DIAGNOSIS — M79.675 PAIN IN TOES OF BOTH FEET: ICD-10-CM

## 2021-07-13 DIAGNOSIS — L60.0 ONYCHOCRYPTOSIS: ICD-10-CM

## 2021-07-13 DIAGNOSIS — M79.674 PAIN IN TOES OF BOTH FEET: ICD-10-CM

## 2021-07-13 DIAGNOSIS — B35.1 ONYCHOMYCOSIS: Primary | ICD-10-CM

## 2021-07-13 PROCEDURE — 11721 DEBRIDE NAIL 6 OR MORE: CPT | Performed by: PODIATRIST

## 2021-07-13 NOTE — PROGRESS NOTES
Mireya Gonzalez is a 80year old female. Patient presents with:  Toenail Care  Diabetic Foot Care: pt denies checking her BG this morning. last A1c was 7.4 on 2/23/21.  LOV with pcp was 3/23/21        HPI:   This pleasant 59-year-old female patient presents  MG Oral Tab TAKE TWO TABLETS BY MOUTH TWICE DAILY  360 tablet 0   • Ipratropium-Albuterol (COMBIVENT RESPIMAT)  MCG/ACT Inhalation Aero Soln Inhale 1 puff into the lungs every 6 (six) hours.  4 g 2   • docusate sodium 100 MG Oral Cap Take 1 ca lipitor due to side effects   • Incontinence     uses pads   • Indigestion    • Itch of skin    • Leaking of urine    • Leaky heart valve     slight per pt- had echo 12/2015   • Loss of appetite    • Macular degeneration    • Macular degeneration    • Naus Tobacco Use      Smoking status: Former Smoker        Packs/day: 1.00        Years: 35.00        Pack years: 28        Quit date: 1988        Years since quittin.8      Smokeless tobacco: Never Used    Vaping Use      Vaping Use: Never used    S uneventfully there was no hemorrhage. There is mild incurvation of the medial and lateral nail borders of the hallux which using a slant back technique were removed and they would not get ingrown.   This alleviated a lot of her symptoms advised follow-up e

## 2021-07-19 ENCOUNTER — PATIENT OUTREACH (OUTPATIENT)
Dept: CASE MANAGEMENT | Age: 86
End: 2021-07-19

## 2021-07-19 NOTE — PROGRESS NOTES
Contacting patient to follow up on CCM for the month.  LMCB       Time with pt -  min  Chart review -  min  Total time -3  min  Total Monthly time-3  min

## 2021-07-19 NOTE — PROGRESS NOTES
7/19/2021  Spoke to Clara for [address].       Updates to patient care team/ comments: UTD  Patient reported changes in medications: UTD  Med Adherence  Comment: Pt taking medications as prescribed     Health Maintenance:     Diabetes Care A1C due on 08/23/2021 Plan:  Future Appointments   Date Time Provider Karel Dockery   9/13/2021  5:45 PM Mili Obregon, STEPHANE [id number]       • Active goal from previous outreach:                        Active and out of hospital    • Patient reported progress wilfred

## 2021-07-31 PROCEDURE — 99490 CHRNC CARE MGMT STAFF 1ST 20: CPT

## 2021-08-11 DIAGNOSIS — K21.9 GASTROESOPHAGEAL REFLUX DISEASE: ICD-10-CM

## 2021-08-13 RX ORDER — OMEPRAZOLE 40 MG/1
CAPSULE, DELAYED RELEASE ORAL
Qty: 180 CAPSULE | Refills: 0 | Status: SHIPPED | OUTPATIENT
Start: 2021-08-13 | End: 2021-11-12

## 2021-08-13 NOTE — TELEPHONE ENCOUNTER
Been Following TB  Last OV 3/23/21  Last CPE 3/23/21  Last Labs CMP, CBC, Lipase 4/5/21  Appt Due Not listed    Last Rx fill Omeprazole 40mg #180 0R 1/25/21    Future Appointments   Date Time Provider Karel Dockery   9/3/2021  9:00 AM REFERENCE [id number] R

## 2021-08-18 ENCOUNTER — PATIENT OUTREACH (OUTPATIENT)
Dept: CASE MANAGEMENT | Age: 86
End: 2021-08-18

## 2021-08-18 DIAGNOSIS — E78.00 PURE HYPERCHOLESTEROLEMIA: ICD-10-CM

## 2021-08-18 DIAGNOSIS — R73.9 HYPERGLYCEMIA: ICD-10-CM

## 2021-08-18 DIAGNOSIS — M19.042 ARTHRITIS OF BOTH HANDS: ICD-10-CM

## 2021-08-18 DIAGNOSIS — I10 BENIGN ESSENTIAL HTN: ICD-10-CM

## 2021-08-18 DIAGNOSIS — G25.81 RLS (RESTLESS LEGS SYNDROME): ICD-10-CM

## 2021-08-18 DIAGNOSIS — M19.041 ARTHRITIS OF BOTH HANDS: ICD-10-CM

## 2021-08-18 DIAGNOSIS — D64.9 ANEMIA, UNSPECIFIED TYPE: ICD-10-CM

## 2021-08-18 DIAGNOSIS — J44.9 CHRONIC OBSTRUCTIVE PULMONARY DISEASE, UNSPECIFIED COPD TYPE (HCC): ICD-10-CM

## 2021-08-18 DIAGNOSIS — E11.42 DIABETIC POLYNEUROPATHY ASSOCIATED WITH TYPE 2 DIABETES MELLITUS (HCC): ICD-10-CM

## 2021-08-18 DIAGNOSIS — R11.0 NAUSEA: ICD-10-CM

## 2021-08-18 DIAGNOSIS — E78.2 MIXED DYSLIPIDEMIA: ICD-10-CM

## 2021-08-18 NOTE — PROGRESS NOTES
Contacting patient to follow up on CCM for the month.  Unable to leave message, will call back       Time with pt -  min  Chart review -  min  Total time -3  min  Total Monthly time- 3 min

## 2021-08-19 NOTE — PROGRESS NOTES
Contacting patient to follow up on CCM for the month.  LMCB       Time with pt -  min  Chart review -  min  Total time - 3 min  Total Monthly time- 3 min

## 2021-08-20 NOTE — PROGRESS NOTES
8/20/2021  Spoke to Clara for CCM.       Updates to patient care team/ comments: UTD  Patient reported changes in medications: UTD  Med Adherence  Comment: pt taking medications as prescribed     Health Maintenance:     Diabetes Care A1C due on 08/23/2021 time.   Pt testing sugars one time daily and is always within pcp acceptable limits.    Pt aware that labs are due before office visit, but might have to consolidate lab visit to the same day that office visit is scheduled because she cannot rely on son for

## 2021-08-25 ENCOUNTER — TELEPHONE (OUTPATIENT)
Dept: INTERNAL MEDICINE CLINIC | Facility: CLINIC | Age: 86
End: 2021-08-25

## 2021-08-25 ENCOUNTER — LAB ENCOUNTER (OUTPATIENT)
Dept: LAB | Facility: HOSPITAL | Age: 86
End: 2021-08-25
Attending: PEDIATRICS
Payer: MEDICARE

## 2021-08-25 DIAGNOSIS — E78.00 PURE HYPERCHOLESTEROLEMIA: ICD-10-CM

## 2021-08-25 DIAGNOSIS — R30.0 DYSURIA: ICD-10-CM

## 2021-08-25 DIAGNOSIS — E11.42 DIABETIC POLYNEUROPATHY ASSOCIATED WITH TYPE 2 DIABETES MELLITUS (HCC): ICD-10-CM

## 2021-08-25 DIAGNOSIS — Z87.440 HISTORY OF UTI: ICD-10-CM

## 2021-08-25 LAB
ALBUMIN SERPL-MCNC: 3.5 G/DL (ref 3.4–5)
ALBUMIN/GLOB SERPL: 0.9 {RATIO} (ref 1–2)
ALP LIVER SERPL-CCNC: 77 U/L
ALT SERPL-CCNC: 17 U/L
ANION GAP SERPL CALC-SCNC: 3 MMOL/L (ref 0–18)
AST SERPL-CCNC: 12 U/L (ref 15–37)
BILIRUB SERPL-MCNC: 0.6 MG/DL (ref 0.1–2)
BUN BLD-MCNC: 12 MG/DL (ref 7–18)
CALCIUM BLD-MCNC: 9.5 MG/DL (ref 8.5–10.1)
CHLORIDE SERPL-SCNC: 107 MMOL/L (ref 98–112)
CHOLEST SMN-MCNC: 182 MG/DL (ref ?–200)
CO2 SERPL-SCNC: 28 MMOL/L (ref 21–32)
CREAT BLD-MCNC: 0.8 MG/DL
EST. AVERAGE GLUCOSE BLD GHB EST-MCNC: 166 MG/DL (ref 68–126)
GLOBULIN PLAS-MCNC: 4.1 G/DL (ref 2.8–4.4)
GLUCOSE BLD-MCNC: 129 MG/DL (ref 70–99)
HBA1C MFR BLD HPLC: 7.4 % (ref ?–5.7)
HDLC SERPL-MCNC: 39 MG/DL (ref 40–59)
LDLC SERPL CALC-MCNC: 115 MG/DL (ref ?–100)
M PROTEIN MFR SERPL ELPH: 7.6 G/DL (ref 6.4–8.2)
NONHDLC SERPL-MCNC: 143 MG/DL (ref ?–130)
OSMOLALITY SERPL CALC.SUM OF ELEC: 287 MOSM/KG (ref 275–295)
PATIENT FASTING Y/N/NP: YES
PATIENT FASTING Y/N/NP: YES
POTASSIUM SERPL-SCNC: 3.7 MMOL/L (ref 3.5–5.1)
SODIUM SERPL-SCNC: 138 MMOL/L (ref 136–145)
TRIGL SERPL-MCNC: 155 MG/DL (ref 30–149)
VLDLC SERPL CALC-MCNC: 27 MG/DL (ref 0–30)

## 2021-08-25 PROCEDURE — 87086 URINE CULTURE/COLONY COUNT: CPT

## 2021-08-25 PROCEDURE — 80061 LIPID PANEL: CPT

## 2021-08-25 PROCEDURE — 36415 COLL VENOUS BLD VENIPUNCTURE: CPT

## 2021-08-25 PROCEDURE — 83036 HEMOGLOBIN GLYCOSYLATED A1C: CPT

## 2021-08-25 PROCEDURE — 80053 COMPREHEN METABOLIC PANEL: CPT

## 2021-08-25 NOTE — TELEPHONE ENCOUNTER
Pt called stating she has been seen at [facility] on 8/89 and 8/17 for uti and was given 2 doses of meds and still has urgency today-she is done with the meds as of yesterday-she stated she needs to get labs done and wants to go today as she is fasting already bu

## 2021-08-27 ENCOUNTER — TELEPHONE (OUTPATIENT)
Dept: INTERNAL MEDICINE CLINIC | Facility: CLINIC | Age: 86
End: 2021-08-27

## 2021-08-27 NOTE — TELEPHONE ENCOUNTER
Pt called and stated that she was referred to a Urologist and isnt able to get into one until late sept and would like to know if there is anything we can try to do to help her schedule sooner     Please advise

## 2021-08-31 ENCOUNTER — TELEPHONE (OUTPATIENT)
Dept: INTERNAL MEDICINE CLINIC | Facility: CLINIC | Age: 86
End: 2021-08-31

## 2021-08-31 DIAGNOSIS — M25.511 BILATERAL SHOULDER PAIN, UNSPECIFIED CHRONICITY: Primary | ICD-10-CM

## 2021-08-31 DIAGNOSIS — M25.512 BILATERAL SHOULDER PAIN, UNSPECIFIED CHRONICITY: Primary | ICD-10-CM

## 2021-08-31 PROCEDURE — 99490 CHRNC CARE MGMT STAFF 1ST 20: CPT

## 2021-08-31 NOTE — TELEPHONE ENCOUNTER
Pt is scheduled with TB on Thursday and wants a referral.  I've asked pt is she's discussed with TB before and she says it's been a long time. Pt having problem with a torn rotator cuff.   Pt stated she's having a lot of pain and pain level is 8-9/10 is hav

## 2021-08-31 NOTE — TELEPHONE ENCOUNTER
Patient reports right shoulder pain x few days. Patient had testing done in [place] years ago which supported bilateral torn rotator cuffs. Patient unsure if pushed/pulled triggering pain; no injuries she can recall.    Patient states still able to compl

## 2021-09-07 ENCOUNTER — TELEPHONE (OUTPATIENT)
Dept: OBGYN CLINIC | Facility: CLINIC | Age: 86
End: 2021-09-07

## 2021-09-07 ENCOUNTER — TELEPHONE (OUTPATIENT)
Dept: INTERNAL MEDICINE CLINIC | Facility: CLINIC | Age: 86
End: 2021-09-07

## 2021-09-07 ENCOUNTER — TELEPHONE (OUTPATIENT)
Dept: ORTHOPEDICS CLINIC | Facility: CLINIC | Age: 86
End: 2021-09-07

## 2021-09-07 DIAGNOSIS — M25.511 RIGHT SHOULDER PAIN, UNSPECIFIED CHRONICITY: Primary | ICD-10-CM

## 2021-09-07 NOTE — TELEPHONE ENCOUNTER
Pt stated she's overdue for her diabetic follow up. Pt had to cancel her last appts but knows she's due. Please advise if I can take a double appt slot.   Please advise

## 2021-09-07 NOTE — TELEPHONE ENCOUNTER
Pt.called with complaints of irritation and infection , no appointments in the next two weeks.  Wants [place] only

## 2021-09-07 NOTE — TELEPHONE ENCOUNTER
Patient coming in for Rt shoulder pain . Patient has not had imaging, if imaging needed please place Rx. Patient also requesting to be seen sooner t [facility] location, please advise if patient can be seen sooner.    Future Appointments   Date Time Provider D

## 2021-09-20 ENCOUNTER — PATIENT OUTREACH (OUTPATIENT)
Dept: FAMILY MEDICINE CLINIC | Facility: CLINIC | Age: 86
End: 2021-09-20

## 2021-09-20 ENCOUNTER — HOSPITAL ENCOUNTER (OUTPATIENT)
Dept: GENERAL RADIOLOGY | Facility: HOSPITAL | Age: 86
Discharge: HOME | End: 2021-09-20
Attending: PHYSICIAN ASSISTANT
Payer: MEDICARE

## 2021-09-20 ENCOUNTER — OFFICE VISIT (OUTPATIENT)
Dept: ORTHOPEDICS CLINIC | Facility: CLINIC | Age: 86
End: 2021-09-20
Payer: MEDICARE

## 2021-09-20 VITALS — WEIGHT: 160 LBS | BODY MASS INDEX: 30.21 KG/M2 | HEIGHT: 61 IN

## 2021-09-20 DIAGNOSIS — M75.41 IMPINGEMENT SYNDROME OF RIGHT SHOULDER: ICD-10-CM

## 2021-09-20 DIAGNOSIS — E78.00 PURE HYPERCHOLESTEROLEMIA: ICD-10-CM

## 2021-09-20 DIAGNOSIS — E86.0 DEHYDRATION: ICD-10-CM

## 2021-09-20 DIAGNOSIS — M19.011 PRIMARY OSTEOARTHRITIS OF RIGHT SHOULDER: Primary | ICD-10-CM

## 2021-09-20 DIAGNOSIS — E66.9 OBESITY (BMI 30-39.9): ICD-10-CM

## 2021-09-20 DIAGNOSIS — G25.81 RLS (RESTLESS LEGS SYNDROME): ICD-10-CM

## 2021-09-20 DIAGNOSIS — J44.9 CHRONIC OBSTRUCTIVE PULMONARY DISEASE, UNSPECIFIED COPD TYPE (HCC): ICD-10-CM

## 2021-09-20 DIAGNOSIS — I10 BENIGN ESSENTIAL HTN: ICD-10-CM

## 2021-09-20 DIAGNOSIS — M51.36 DDD (DEGENERATIVE DISC DISEASE), LUMBAR: ICD-10-CM

## 2021-09-20 DIAGNOSIS — M75.121 NONTRAUMATIC COMPLETE TEAR OF RIGHT ROTATOR CUFF: ICD-10-CM

## 2021-09-20 DIAGNOSIS — M19.041 ARTHRITIS OF BOTH HANDS: ICD-10-CM

## 2021-09-20 DIAGNOSIS — D64.9 ANEMIA, UNSPECIFIED TYPE: ICD-10-CM

## 2021-09-20 DIAGNOSIS — M19.042 ARTHRITIS OF BOTH HANDS: ICD-10-CM

## 2021-09-20 DIAGNOSIS — K21.9 GASTROESOPHAGEAL REFLUX DISEASE, UNSPECIFIED WHETHER ESOPHAGITIS PRESENT: ICD-10-CM

## 2021-09-20 DIAGNOSIS — H35.3230 BILATERAL EXUDATIVE AGE-RELATED MACULAR DEGENERATION, UNSPECIFIED STAGE (HCC): ICD-10-CM

## 2021-09-20 DIAGNOSIS — K22.70 BARRETT'S ESOPHAGUS WITHOUT DYSPLASIA: ICD-10-CM

## 2021-09-20 DIAGNOSIS — E11.42 DIABETIC POLYNEUROPATHY ASSOCIATED WITH TYPE 2 DIABETES MELLITUS (HCC): ICD-10-CM

## 2021-09-20 DIAGNOSIS — M25.511 RIGHT SHOULDER PAIN, UNSPECIFIED CHRONICITY: ICD-10-CM

## 2021-09-20 DIAGNOSIS — R73.9 HYPERGLYCEMIA: ICD-10-CM

## 2021-09-20 DIAGNOSIS — E78.2 MIXED DYSLIPIDEMIA: ICD-10-CM

## 2021-09-20 PROCEDURE — 73030 X-RAY EXAM OF SHOULDER: CPT | Performed by: PHYSICIAN ASSISTANT

## 2021-09-20 PROCEDURE — 99203 OFFICE O/P NEW LOW 30 MIN: CPT | Performed by: ORTHOPAEDIC SURGERY

## 2021-09-20 NOTE — PROGRESS NOTES
Contacting patient to follow up on CCM for the month.  LMCB       Time with pt -  min  Chart review -  min  Total time - 3 min  Total Monthly time-  min

## 2021-09-20 NOTE — PROGRESS NOTES
[facility] New Consult    CC: Patient presents with:  Shoulder Pain: Patient is here for right shoulder pain.        HPI: The patient is a 80year old female referred for orthopaedic consultation by Dr. Sinai Velázquez with complaints of bilateral Itch of skin    • Leaking of urine    • Leaky heart valve     slight per pt- had echo 12/2015   • Loss of appetite    • Macular degeneration    • Macular degeneration    • Nausea    • Neuropathy    • Osteoarthritis     all joints   • Osteoporosis    • Pain Intravitreal Solution Prefilled Syringe 0.5 mg by Intravitreal route. • Ondansetron HCl (ZOFRAN) 4 mg tablet Take 1 tablet (4 mg total) by mouth every 8 (eight) hours as needed for Nausea.  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 Functionally she is doing well and her pain is more so on the right than the left. We discussed treatment options including conservative management with medications, physiotherapy possible injection.  For now she feels that her symptoms a

## 2021-09-20 NOTE — TELEPHONE ENCOUNTER
Patient scheduled.   Future Appointments   Date Time Provider Karel Dockery   9/20/2021  3:00 PM Mary Mei MD [facility] [place]   9/21/2021  3:00 PM Mannie Hobbs MD [facility]   10/27/2021 10:20 AM Montserrat Cano MD [facility] NP

## 2021-09-21 ENCOUNTER — OFFICE VISIT (OUTPATIENT)
Dept: INTERNAL MEDICINE CLINIC | Facility: CLINIC | Age: 86
End: 2021-09-21
Payer: MEDICARE

## 2021-09-21 VITALS
RESPIRATION RATE: 18 BRPM | TEMPERATURE: 97 F | WEIGHT: 164 LBS | HEIGHT: 61 IN | OXYGEN SATURATION: 97 % | DIASTOLIC BLOOD PRESSURE: 64 MMHG | SYSTOLIC BLOOD PRESSURE: 128 MMHG | HEART RATE: 72 BPM | BODY MASS INDEX: 30.96 KG/M2

## 2021-09-21 DIAGNOSIS — E78.5 HYPERLIPIDEMIA LDL GOAL <100: ICD-10-CM

## 2021-09-21 DIAGNOSIS — N89.8 VAGINAL ITCHING: ICD-10-CM

## 2021-09-21 DIAGNOSIS — R11.0 NAUSEA: ICD-10-CM

## 2021-09-21 DIAGNOSIS — E11.40 TYPE 2 DIABETES, CONTROLLED, WITH NEUROPATHY (HCC): Primary | ICD-10-CM

## 2021-09-21 DIAGNOSIS — M79.671 BILATERAL FOOT PAIN: ICD-10-CM

## 2021-09-21 DIAGNOSIS — M79.672 BILATERAL FOOT PAIN: ICD-10-CM

## 2021-09-21 PROCEDURE — 99214 OFFICE O/P EST MOD 30 MIN: CPT | Performed by: INTERNAL MEDICINE

## 2021-09-21 NOTE — PROGRESS NOTES
Navid Fried is a 80year old female. Patient presents with: Follow - Up: RG rm 3 DM f/u, high cholesterol, wants referral for gyne and podiatry      HPI:     Very pleasant patient with dm2, htn, RLS, HL here for f/u.  Cholesterol is not controlled (LDL ([facility] [address]     Back pain with radiation     DDD (degenerative disc disease), lumbar    Current Outpatient Medications   Medication Sig Dispense Refill   • OMEPRAZOLE 40 MG Oral Capsule Delayed Release TAKE ONE CAPSULE BY MOUTH TWICE DAILY  180 capsule 0   • PRA Multiple Vitamins-Minerals (PRESERVISION AREDS) Oral Tab Take 1 tablet by mouth 2 (two) times daily. • Aspirin (ASPIR-81 OR) Take 81 mg by mouth daily.        • Ipratropium-Albuterol (COMBIVENT RESPIMAT)  MCG/ACT Inhalation Aero Soln Inhale 1 puff hernia; gaggs at times   • Restless leg syndrome    • Restless leg syndrome    • Shortness of breath     ON EXERTION   • Sleep apnea    • Sleep disturbance    • Torn rotator cuff     both shoulders per pt   • Uncomfortable fullness after meals    • Vertigo normal.  Bilateral monofilament/sensation of both feet is abnormal, decreased sensation bottom of toes  Pulsation pedal pulse exam of both lower legs/feet is WNL  NEURO: Alert and oriented, ambulates with walker    ASSESSMENT AND PLAN:      Type 2 diabetes

## 2021-09-22 NOTE — PROGRESS NOTES
9/22/2021  Spoke to Clara for CCM.       Updates to patient care team/ comments: UTD  Patient reported changes in medications: UTD  Med Adherence  Comment: Pt taking medications as prescribed     Health Maintenance:     Diabetes Care A1C due on 02/25/2022 night. Pt tries to take a nap every day but often her restless legs prevent her from getting a good sleep at night. Pt had tried melatonin but it was to slow to start working and it left her groggy in the moring and dizzy when she would wake over night.

## 2021-09-29 DIAGNOSIS — G25.81 RESTLESS LEG SYNDROME: ICD-10-CM

## 2021-09-30 PROCEDURE — 99490 CHRNC CARE MGMT STAFF 1ST 20: CPT

## 2021-09-30 PROCEDURE — 99439 CHRNC CARE MGMT STAF EA ADDL: CPT

## 2021-09-30 RX ORDER — PRAMIPEXOLE DIHYDROCHLORIDE 0.5 MG/1
TABLET ORAL
Qty: 90 TABLET | Refills: 0 | Status: SHIPPED | OUTPATIENT
Start: 2021-09-30

## 2021-09-30 NOTE — TELEPHONE ENCOUNTER
Been Following TB  Last OV 9/21/21  Last CPE 3/23/21  Last Labs Lipid, CMP, A1c 8/25/21    Last Rx fill  PRAMIPEXOLE DIHYDROCHLORIDE 0.5 MG Oral Tab 90 tablet 0 7/4/2021    Sig:   TAKE ONE TABLET BY MOUTH NIGHTLY     Route:   (none)     Order #:   [id number]

## 2021-10-26 ENCOUNTER — PATIENT OUTREACH (OUTPATIENT)
Dept: CASE MANAGEMENT | Age: 86
End: 2021-10-26

## 2021-10-26 DIAGNOSIS — M19.042 ARTHRITIS OF BOTH HANDS: ICD-10-CM

## 2021-10-26 DIAGNOSIS — E66.9 OBESITY (BMI 30-39.9): ICD-10-CM

## 2021-10-26 DIAGNOSIS — I10 BENIGN ESSENTIAL HTN: ICD-10-CM

## 2021-10-26 DIAGNOSIS — J44.9 CHRONIC OBSTRUCTIVE PULMONARY DISEASE, UNSPECIFIED COPD TYPE (HCC): ICD-10-CM

## 2021-10-26 DIAGNOSIS — G25.81 RLS (RESTLESS LEGS SYNDROME): ICD-10-CM

## 2021-10-26 DIAGNOSIS — M51.36 DDD (DEGENERATIVE DISC DISEASE), LUMBAR: ICD-10-CM

## 2021-10-26 DIAGNOSIS — E11.42 DIABETIC POLYNEUROPATHY ASSOCIATED WITH TYPE 2 DIABETES MELLITUS (HCC): ICD-10-CM

## 2021-10-26 DIAGNOSIS — D64.9 ANEMIA, UNSPECIFIED TYPE: ICD-10-CM

## 2021-10-26 DIAGNOSIS — R73.9 HYPERGLYCEMIA: ICD-10-CM

## 2021-10-26 DIAGNOSIS — M19.041 ARTHRITIS OF BOTH HANDS: ICD-10-CM

## 2021-10-26 DIAGNOSIS — K52.9 CHRONIC DIARRHEA: ICD-10-CM

## 2021-10-26 DIAGNOSIS — K21.9 GASTROESOPHAGEAL REFLUX DISEASE, UNSPECIFIED WHETHER ESOPHAGITIS PRESENT: ICD-10-CM

## 2021-10-26 DIAGNOSIS — E78.00 PURE HYPERCHOLESTEROLEMIA: ICD-10-CM

## 2021-10-26 DIAGNOSIS — E78.2 MIXED DYSLIPIDEMIA: ICD-10-CM

## 2021-10-26 NOTE — PROGRESS NOTES
10/26/2021  Spoke to [address] [place] [address] for [address] [place] [address].       Updates to patient care team/ comments: UTD  Patient reported changes in medications: UTD  Med Adherence  Comment: PT taking medications as prescribed     Health Maintenance:     Diabetes Care A1C due on 02/25/202 order for her records. Pt forgot to  after visit report from gastro at last visit and requested hard copy be mailed to her. [facility] mailed after visit report along with xray order. Pt is planning on getting third vaccine for [employer].   Pt has trip plann Patient agrees to goal action plan.   Self-Management Abilities (patient reported)             1= least confident in achieving goal, 5= very confident               - confidence: : 4          Care Manager Follow Up: 1 month  Reason For Follow

## 2021-10-27 PROBLEM — N39.0 RECURRENT UTI: Status: ACTIVE | Noted: 2020-10-10

## 2021-10-31 PROCEDURE — 99490 CHRNC CARE MGMT STAFF 1ST 20: CPT

## 2021-10-31 PROCEDURE — 99439 CHRNC CARE MGMT STAF EA ADDL: CPT

## 2021-11-01 ENCOUNTER — OFFICE VISIT (OUTPATIENT)
Dept: OBGYN CLINIC | Facility: CLINIC | Age: 86
End: 2021-11-01
Payer: MEDICARE

## 2021-11-01 VITALS
HEIGHT: 60 IN | SYSTOLIC BLOOD PRESSURE: 102 MMHG | HEART RATE: 76 BPM | WEIGHT: 160 LBS | BODY MASS INDEX: 31.41 KG/M2 | DIASTOLIC BLOOD PRESSURE: 60 MMHG

## 2021-11-01 DIAGNOSIS — N76.3 CHRONIC VULVITIS: Primary | ICD-10-CM

## 2021-11-01 DIAGNOSIS — R32 URINARY INCONTINENCE, UNSPECIFIED TYPE: ICD-10-CM

## 2021-11-01 DIAGNOSIS — Z12.39 SCREENING BREAST EXAMINATION: ICD-10-CM

## 2021-11-01 DIAGNOSIS — Z12.31 ENCOUNTER FOR SCREENING MAMMOGRAM FOR MALIGNANT NEOPLASM OF BREAST: ICD-10-CM

## 2021-11-01 DIAGNOSIS — Z01.411 ENCOUNTER FOR GYNECOLOGICAL EXAMINATION WITH ABNORMAL FINDING: ICD-10-CM

## 2021-11-01 PROCEDURE — 81002 URINALYSIS NONAUTO W/O SCOPE: CPT | Performed by: OBSTETRICS & GYNECOLOGY

## 2021-11-01 PROCEDURE — 87660 TRICHOMONAS VAGIN DIR PROBE: CPT | Performed by: OBSTETRICS & GYNECOLOGY

## 2021-11-01 PROCEDURE — G0101 CA SCREEN;PELVIC/BREAST EXAM: HCPCS | Performed by: OBSTETRICS & GYNECOLOGY

## 2021-11-01 PROCEDURE — 87510 GARDNER VAG DNA DIR PROBE: CPT | Performed by: OBSTETRICS & GYNECOLOGY

## 2021-11-01 PROCEDURE — 99203 OFFICE O/P NEW LOW 30 MIN: CPT | Performed by: OBSTETRICS & GYNECOLOGY

## 2021-11-01 PROCEDURE — 87480 CANDIDA DNA DIR PROBE: CPT | Performed by: OBSTETRICS & GYNECOLOGY

## 2021-11-01 RX ORDER — NYSTATIN 100000 U/G
1 OINTMENT TOPICAL 2 TIMES DAILY
Qty: 60 G | Refills: 1 | Status: SHIPPED | OUTPATIENT
Start: 2021-11-01

## 2021-11-01 NOTE — H&P
[facility]  Obstetrics and Gynecology   History & Physical  NEW    Chief complaint: Patient presents with:  New Patient: hearing aids are broken, patient comfortable if mask is off   Vaginal Problem: itching and irritated all the time, hasn't bee culture >100k E.coli   8/17/21 Urine Cx >100k Klebsiella. 8/25/21 Urine culture NEGATIVE     8/2021 Per EMR, Dr. Kwok Notice advised she see urologist Dr. Kwabena Riley for dysuria as the urine culture was negative 8/25/21. Has has not seen urologist yet.    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 Gastrointestinal: Positive for diarrhea. Negative for blood in stool.    Genitourinary: Positive for dif TWO TABLETS BY MOUTH TWICE DAILY  (Patient taking differently: Take 500 mg by mouth.  TAKE TWO TABLETS BY MOUTH DAILY), Disp: 360 tablet, Rfl: 0  Ipratropium-Albuterol (COMBIVENT RESPIMAT)  MCG/ACT Inhalation Aero Soln, Inhale 1 puff into the lungs ev Date   • Abdominal distention    • Abdominal hernia    • Abdominal pain    • Acute diverticulitis 10/10/2020    Noted on CT scan - acute diverticulitis of the sigmoid colon   • Arrhythmia    • Arthritis    • Atypical lobular hyperplasia of right breast 6/3 Per patient had pap in 2006. No abn pap.     • Pneumonia, organism unspecified(486)     04/2015   • PONV (postoperative nausea and vomiting)    • Problems with swallowing     due hiatal hernia; gaggs at times   • Restless leg syndrome    • Restless leg s Grandmother    • Breast Cancer Self 86        DCIS       Objective:      11/01/21  1731   BP: 102/60   Pulse: 76   Weight: 160 lb (72.6 kg)   Height: 60\"       Body mass index is 31.25 kg/m².     Physical Exam:  Physical Exam   Nursing note and vitals revi non-tender, no edema. Neurological: She is alert and oriented to person, place, and time. No cranial nerve deficit. Skin: Skin is warm and dry. No lesion and no rash noted. Psychiatric: She has a normal mood and affect.  Her speech is normal and beha ORAL)(CPT=74176)       COMPARISON:  [facility] , CT, CT ABDOMEN+PELVIS(CPT=74176), 3/14/2021, 12:51 PM.       INDICATIONS:  UTI       TECHNIQUE:  Unenhanced multislice CT scanning from above the kidneys to below the urinary bladder.  2D rendering are generated Assessment:     Ry Muniz is a 80year old  female here for chronic vulvitis issues, likely secondary to chemical irritation from urine & diarrhea, excessive wiping/cleaning of vulvar skin, but overdue breast & pelvic exam. Possible liche

## 2021-11-01 NOTE — PATIENT INSTRUCTIONS
Schedule with Dr. Segal Many out pad at least twice per day  Gentle washing  No rubbing.    Just blot/pat dry    Apply Nystatin ointment twice daily for 4 weeks  In the mornings after Nystatin ointment, place a thin layer of vaseline on vul

## 2021-11-02 DIAGNOSIS — I10 ESSENTIAL HYPERTENSION: ICD-10-CM

## 2021-11-02 PROBLEM — N76.3 CHRONIC VULVITIS: Status: ACTIVE | Noted: 2021-11-02

## 2021-11-02 PROBLEM — R32 URINARY INCONTINENCE: Status: ACTIVE | Noted: 2021-11-02

## 2021-11-02 RX ORDER — LOSARTAN POTASSIUM 100 MG/1
TABLET ORAL
Qty: 90 TABLET | Refills: 0 | Status: SHIPPED | OUTPATIENT
Start: 2021-11-02

## 2021-11-02 NOTE — TELEPHONE ENCOUNTER
PASSED per protocol, refill sent.   Last PE 3.23.21   Future Appointments   Date Time Provider [facility]   11/5/2021  2:15 PM Perla Sanchez DO [facility]   12/1/2021  1:00 PM Earnestine England MD [facility] OB[facility]N M [facility]

## 2021-11-05 ENCOUNTER — OFFICE VISIT (OUTPATIENT)
Dept: INTERNAL MEDICINE CLINIC | Facility: CLINIC | Age: 86
End: 2021-11-05
Payer: MEDICARE

## 2021-11-05 VITALS
SYSTOLIC BLOOD PRESSURE: 110 MMHG | BODY MASS INDEX: 31.84 KG/M2 | DIASTOLIC BLOOD PRESSURE: 60 MMHG | HEIGHT: 60 IN | TEMPERATURE: 97 F | RESPIRATION RATE: 16 BRPM | OXYGEN SATURATION: 97 % | WEIGHT: 162.19 LBS | HEART RATE: 71 BPM

## 2021-11-05 DIAGNOSIS — M25.471 BILATERAL SWELLING OF FEET AND ANKLES: Primary | ICD-10-CM

## 2021-11-05 DIAGNOSIS — H35.3230 BILATERAL EXUDATIVE AGE-RELATED MACULAR DEGENERATION, UNSPECIFIED STAGE (HCC): ICD-10-CM

## 2021-11-05 DIAGNOSIS — M25.475 BILATERAL SWELLING OF FEET AND ANKLES: Primary | ICD-10-CM

## 2021-11-05 DIAGNOSIS — E11.40 TYPE 2 DIABETES, CONTROLLED, WITH NEUROPATHY (HCC): ICD-10-CM

## 2021-11-05 DIAGNOSIS — M25.474 BILATERAL SWELLING OF FEET AND ANKLES: Primary | ICD-10-CM

## 2021-11-05 DIAGNOSIS — M25.472 BILATERAL SWELLING OF FEET AND ANKLES: Primary | ICD-10-CM

## 2021-11-05 DIAGNOSIS — J44.9 CHRONIC OBSTRUCTIVE PULMONARY DISEASE, UNSPECIFIED COPD TYPE (HCC): ICD-10-CM

## 2021-11-05 PROCEDURE — 99214 OFFICE O/P EST MOD 30 MIN: CPT | Performed by: FAMILY MEDICINE

## 2021-11-05 PROCEDURE — 82947 ASSAY GLUCOSE BLOOD QUANT: CPT | Performed by: FAMILY MEDICINE

## 2021-11-05 NOTE — PROGRESS NOTES
Paulina Man  5/12/1930    Patient presents with:  Swelling: RM 9 JY, bilateral ankle swelling       HPI:   Jh Mack is a 80year old female who presents for evaluation of bilateral ankle swelling that began approximately 2 weeks ago.   Since that t (90 Base) MCG/ACT Inhalation Aero Soln Inhale 2 puffs into the lungs every 4 (four) hours as needed for Wheezing. 1 Inhaler 6   • atorvastatin 40 MG Oral Tab Take 1 tablet (40 mg total) by mouth daily.  90 tablet 3   • METFORMIN  MG Oral Tab TAKE TWO diarrhea 12/4/2015    Started before metformin- related to IBS     • Constipation    • COPD (chronic obstructive pulmonary disease) (HCC)     NO OXYGEN- no pulmonologist   • Deviated septum     left side   • Diabetic polyneuropathy associated with type 2 d EXERTION   • Sleep apnea    • Sleep disturbance    • Splenic artery aneurysm (HCC) 3/23/2021   • Torn rotator cuff     both shoulders per pt   • Uncomfortable fullness after meals    • Vertigo    • Visual impairment     glasses   • Vulvar itching 8/13/2019 mastectomy   • NEEDLE BIOPSY RIGHT      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 1. Bilateral swelling of feet and ankles  Symptoms have significant improved and now nearly resolved since she has begun elevation. Symptoms likely secondary to venous insufficiency.   Her weight is stable and has no new dyspnea or ortho

## 2021-11-12 DIAGNOSIS — K21.9 GASTROESOPHAGEAL REFLUX DISEASE: ICD-10-CM

## 2021-11-12 RX ORDER — OMEPRAZOLE 40 MG/1
CAPSULE, DELAYED RELEASE ORAL
Qty: 180 CAPSULE | Refills: 0 | Status: SHIPPED | OUTPATIENT
Start: 2021-11-12

## 2021-11-12 NOTE — TELEPHONE ENCOUNTER
Last VISIT 11/05/21    Last CPE 03/23/21    Last REFILL 08/13/21 qty 180 w/0 refills    Last LABS 11/01/21 UA done    No Future Appointments      Per PROTOCOL? Not on protocol      Please Approve or Deny.

## 2021-11-26 ENCOUNTER — PATIENT OUTREACH (OUTPATIENT)
Dept: CASE MANAGEMENT | Age: 86
End: 2021-11-26

## 2021-11-26 NOTE — PROGRESS NOTES
Contacting patient to follow up on CCM for the month.  LMCB    Total time -  min  Total Monthly time- 3 min

## 2021-12-01 ENCOUNTER — OFFICE VISIT (OUTPATIENT)
Dept: OBGYN CLINIC | Facility: CLINIC | Age: 86
End: 2021-12-01
Payer: MEDICARE

## 2021-12-01 VITALS
SYSTOLIC BLOOD PRESSURE: 128 MMHG | HEART RATE: 67 BPM | BODY MASS INDEX: 31 KG/M2 | DIASTOLIC BLOOD PRESSURE: 54 MMHG | HEIGHT: 60 IN

## 2021-12-01 DIAGNOSIS — N95.2 ATROPHIC VAGINITIS: ICD-10-CM

## 2021-12-01 DIAGNOSIS — N36.2 URETHRAL CARUNCLE: ICD-10-CM

## 2021-12-01 DIAGNOSIS — N76.3 CHRONIC VULVITIS: Primary | ICD-10-CM

## 2021-12-01 PROCEDURE — 99213 OFFICE O/P EST LOW 20 MIN: CPT | Performed by: OBSTETRICS & GYNECOLOGY

## 2021-12-01 RX ORDER — ESTRADIOL 0.1 MG/G
CREAM VAGINAL
Qty: 42.5 G | Refills: 3 | Status: SHIPPED | OUTPATIENT
Start: 2021-12-01 | End: 2022-01-18

## 2021-12-01 RX ORDER — MOMETASONE FUROATE 1 MG/G
OINTMENT TOPICAL
Qty: 45 G | Refills: 3 | Status: SHIPPED | OUTPATIENT
Start: 2021-12-01

## 2021-12-01 NOTE — PROGRESS NOTES
[facility]  Obstetrics and Gynecology   Progress  Established     Chief complaint: Patient presents with:   Follow - Up: vulvar check, pt having vaginal discomfort  Other: pt broke hearing aid, please speak a little loud    Subjective:     HPI: Be 2-3 mm in left kidney. Mod-large hiatal hernia. Diverticulosis of colon. Small fat-containing umbilical hernia. Dextroscoliosis of thoracolumbar spine. Calcified fibroids    8/8/21 Urine culture >100k E.coli   8/17/21 Urine Cx >100k Klebsiella.    8/25/21 U and vagina itchy for months. Musculoskeletal: Positive for back pain and gait problem. Using walker to help with walking due to poor balance, +back pain. Skin:        Skin lesion right breast - has had for a long time.  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 Physical Exam:  Physical Exam   Nursing note and vitals reviewed. Constitutional: She appears well-developed and well-nourished. No distress.    HENT:   Head: GLOBULIN 4.1 08/25/2021     08/25/2021    K 3.7 08/25/2021     08/25/2021    CO2 28.0 08/25/2021       Lab Results   Component Value Date    CHOLEST 182 08/25/2021    TRIG 155 (H) 08/25/2021    HDL 39 (L) 08/25/2021     (H) 08/25/2021 dilatation, or atrophy.     SPLEEN:  No enlargement or focal lesion.     AORTA/VASCULAR:  Vascular calcifications.    RETROPERITONEUM:  No mass or adenopathy.     BOWEL/MESENTERY:  Moderate to large hiatal hernia is again noted.  Diverticulosis of the colon hygiene  -encouraged changing pad at least once a day to keep clean and avoid trapping sweat & bacteria/urine against the skin  -limited response to Nystatin ointment but tissue does look less white  -reviewed only patting (NOT wiping) to clean vulva   -en

## 2021-12-01 NOTE — PATIENT INSTRUCTIONS
Can place the vaginal cream (1 pea sized amount) inside the vagina nightly & use a small amount of cream around the opening of the vagina.     Mometasone ointment (steroid) nightly for 2 weeks then twice weekly thereafter    Please use vaseline at least onc

## 2021-12-09 ENCOUNTER — PATIENT OUTREACH (OUTPATIENT)
Dept: CASE MANAGEMENT | Age: 86
End: 2021-12-09

## 2021-12-09 DIAGNOSIS — M51.36 DDD (DEGENERATIVE DISC DISEASE), LUMBAR: ICD-10-CM

## 2021-12-09 DIAGNOSIS — J44.9 CHRONIC OBSTRUCTIVE PULMONARY DISEASE, UNSPECIFIED COPD TYPE (HCC): ICD-10-CM

## 2021-12-09 DIAGNOSIS — M19.042 ARTHRITIS OF BOTH HANDS: ICD-10-CM

## 2021-12-09 DIAGNOSIS — K21.9 GASTROESOPHAGEAL REFLUX DISEASE, UNSPECIFIED WHETHER ESOPHAGITIS PRESENT: ICD-10-CM

## 2021-12-09 DIAGNOSIS — I10 BENIGN ESSENTIAL HTN: ICD-10-CM

## 2021-12-09 DIAGNOSIS — L29.2 VULVAR ITCHING: ICD-10-CM

## 2021-12-09 DIAGNOSIS — E78.00 PURE HYPERCHOLESTEROLEMIA: ICD-10-CM

## 2021-12-09 DIAGNOSIS — G25.81 RLS (RESTLESS LEGS SYNDROME): ICD-10-CM

## 2021-12-09 DIAGNOSIS — R73.9 HYPERGLYCEMIA: ICD-10-CM

## 2021-12-09 DIAGNOSIS — E78.2 MIXED DYSLIPIDEMIA: ICD-10-CM

## 2021-12-09 DIAGNOSIS — E11.42 DIABETIC POLYNEUROPATHY ASSOCIATED WITH TYPE 2 DIABETES MELLITUS (HCC): ICD-10-CM

## 2021-12-09 DIAGNOSIS — M19.041 ARTHRITIS OF BOTH HANDS: ICD-10-CM

## 2021-12-10 NOTE — PROGRESS NOTES
12/10/2021  Spoke to Clara for CCM.       Updates to patient care team/ comments: UTD  Patient reported changes in medications: UTD  Med Adherence  Comment: Pt taking medications as prescribed     Health Maintenance:     COVID-19 Vaccine(3 - Booster for P some discomfort, but she can tolerate it. Pt shoulder pain on the right continues. Pt tries to not lie on her right side when sleeping. Pt uses capszasin. Pt hearing aides are broken.  When havng them serviced last month she was told that the model is t mammogram   Mailed labs and mammogram order   Continue to provide encouragement and education for healthy coping and dx  Time Spent This Encounter Total: 51 min medical record review, telephone communication, care plan updates where needed, education, goal

## 2021-12-14 ENCOUNTER — LAB ENCOUNTER (OUTPATIENT)
Dept: LAB | Age: 86
End: 2021-12-14
Attending: INTERNAL MEDICINE
Payer: MEDICARE

## 2021-12-14 ENCOUNTER — TELEPHONE (OUTPATIENT)
Dept: INTERNAL MEDICINE CLINIC | Facility: CLINIC | Age: 86
End: 2021-12-14

## 2021-12-14 DIAGNOSIS — E78.5 HYPERLIPIDEMIA LDL GOAL <100: ICD-10-CM

## 2021-12-14 DIAGNOSIS — E11.40 TYPE 2 DIABETES, CONTROLLED, WITH NEUROPATHY (HCC): ICD-10-CM

## 2021-12-14 PROCEDURE — 80061 LIPID PANEL: CPT

## 2021-12-14 PROCEDURE — 80053 COMPREHEN METABOLIC PANEL: CPT

## 2021-12-14 PROCEDURE — 36415 COLL VENOUS BLD VENIPUNCTURE: CPT

## 2021-12-14 PROCEDURE — 83036 HEMOGLOBIN GLYCOSYLATED A1C: CPT

## 2021-12-14 NOTE — TELEPHONE ENCOUNTER
Pt calling on status of metformin refill. She states she takes 2 pills ONCE daily now. She is leaving for [facility] tomorrow and would like this refilled today. Please advise, Lucila on file.  Pt would also like call once sent to pharmacy      METFORMIN  MG O

## 2021-12-14 NOTE — TELEPHONE ENCOUNTER
Last VISIT - 11/5/21 Bilateral ankle swelling    Last CPE - 3/23/21     Last REFILL -     METFORMIN  MG Oral Tab 360 tablet 0 10/19/2020     Last LABS - 8/25/21 cmp, lipid      Per PROTOCOL? PASSED     Please Approve or Deny.

## 2021-12-31 PROCEDURE — 99490 CHRNC CARE MGMT STAFF 1ST 20: CPT

## 2021-12-31 PROCEDURE — 99439 CHRNC CARE MGMT STAF EA ADDL: CPT

## 2022-01-14 ENCOUNTER — PATIENT OUTREACH (OUTPATIENT)
Dept: CASE MANAGEMENT | Age: 87
End: 2022-01-14

## 2022-01-18 ENCOUNTER — TELEPHONE (OUTPATIENT)
Dept: CASE MANAGEMENT | Age: 87
End: 2022-01-18

## 2022-01-18 ENCOUNTER — PATIENT OUTREACH (OUTPATIENT)
Dept: CASE MANAGEMENT | Age: 87
End: 2022-01-18

## 2022-01-18 DIAGNOSIS — D64.9 ANEMIA, UNSPECIFIED TYPE: ICD-10-CM

## 2022-01-18 DIAGNOSIS — H35.3230 BILATERAL EXUDATIVE AGE-RELATED MACULAR DEGENERATION, UNSPECIFIED STAGE (HCC): ICD-10-CM

## 2022-01-18 DIAGNOSIS — E78.2 MIXED DYSLIPIDEMIA: ICD-10-CM

## 2022-01-18 DIAGNOSIS — K52.9 CHRONIC DIARRHEA: ICD-10-CM

## 2022-01-18 DIAGNOSIS — R91.1 NODULE OF LEFT LUNG: ICD-10-CM

## 2022-01-18 DIAGNOSIS — M51.36 DDD (DEGENERATIVE DISC DISEASE), LUMBAR: ICD-10-CM

## 2022-01-18 DIAGNOSIS — J44.9 CHRONIC OBSTRUCTIVE PULMONARY DISEASE, UNSPECIFIED COPD TYPE (HCC): ICD-10-CM

## 2022-01-18 DIAGNOSIS — R73.9 HYPERGLYCEMIA: ICD-10-CM

## 2022-01-18 DIAGNOSIS — K21.9 GASTROESOPHAGEAL REFLUX DISEASE, UNSPECIFIED WHETHER ESOPHAGITIS PRESENT: ICD-10-CM

## 2022-01-18 DIAGNOSIS — N76.3 CHRONIC VULVITIS: ICD-10-CM

## 2022-01-18 DIAGNOSIS — N39.0 RECURRENT UTI: ICD-10-CM

## 2022-01-18 DIAGNOSIS — E87.1 HYPONATREMIA: ICD-10-CM

## 2022-01-18 DIAGNOSIS — G25.81 RLS (RESTLESS LEGS SYNDROME): ICD-10-CM

## 2022-01-18 DIAGNOSIS — R32 URINARY INCONTINENCE, UNSPECIFIED TYPE: Primary | ICD-10-CM

## 2022-01-18 DIAGNOSIS — M19.042 ARTHRITIS OF BOTH HANDS: ICD-10-CM

## 2022-01-18 DIAGNOSIS — M19.041 ARTHRITIS OF BOTH HANDS: ICD-10-CM

## 2022-01-18 DIAGNOSIS — E78.00 PURE HYPERCHOLESTEROLEMIA: ICD-10-CM

## 2022-01-18 DIAGNOSIS — I10 BENIGN ESSENTIAL HTN: ICD-10-CM

## 2022-01-18 DIAGNOSIS — E66.9 OBESITY (BMI 30-39.9): ICD-10-CM

## 2022-01-18 DIAGNOSIS — N95.2 ATROPHIC VAGINITIS: ICD-10-CM

## 2022-01-18 DIAGNOSIS — E11.42 DIABETIC POLYNEUROPATHY ASSOCIATED WITH TYPE 2 DIABETES MELLITUS (HCC): ICD-10-CM

## 2022-01-18 RX ORDER — ESTRADIOL 0.1 MG/G
CREAM VAGINAL
Qty: 42.5 G | Refills: 3 | Status: SHIPPED | OUTPATIENT
Start: 2022-01-18

## 2022-01-18 NOTE — PROGRESS NOTES
1/18/2022  Spoke to Clara for [address].       Updates to patient care team/ comments: UTD  Patient reported changes in medications: UTD  Med Adherence  Comment: Pt taking medication as prescribed     Health Maintenance:     COVID-19 Vaccine(3 - Booster for [facility] because there is a commercially available equivalent in the market place. [facility] requires a medical reason for why a compounded rx is required over the commercially available alternative.   [facility] sent TE to Dr Reéne Dunlap requesting Bethany Brand kenny for new rx estradiol   Continue to provide encouragement and education for healthy coping and dx    Time Spent This Encounter Total: 55 min medical record review, telephone communication, care plan updates where needed, education, goals and actio

## 2022-01-18 NOTE — TELEPHONE ENCOUNTER
Pt has not yet started Estradiol. Linn Yadav has not yet mailed it because it requires a \"Compound confirmation statement\".   FDA requires a medical reason for why compounded prescription is necessary when a commercially available equiv

## 2022-01-18 NOTE — TELEPHONE ENCOUNTER
I spoke with pt and let her know Dr. Lawanda Mahmood sent a new prescription to her local pharmacy. I told pt to call us back if she had any problems. Verbalized understanding.

## 2022-01-26 ENCOUNTER — TELEPHONE (OUTPATIENT)
Dept: ORTHOPEDICS CLINIC | Facility: CLINIC | Age: 87
End: 2022-01-26

## 2022-01-26 DIAGNOSIS — M79.672 LEFT FOOT PAIN: Primary | ICD-10-CM

## 2022-01-26 NOTE — TELEPHONE ENCOUNTER
Reviewed patients chart, xray orders are required. Order placed for left foot xrays.  Please contact patient advise to arrive 30 mins prior to patients appt to complete x-ray order and schedule patients xray appt-Thank you

## 2022-01-26 NOTE — TELEPHONE ENCOUNTER
Future Appointments   Date Time Provider Karel Dockery   2/16/2022  2:15 PM NAP XR RM1 NAP XRAY [facility] [place]   2/16/2022  2:30 PM Andre Trevino., ABRILM [facility]     Patient is scheduled and advised to cpmplete before appt.

## 2022-01-27 ENCOUNTER — TELEPHONE (OUTPATIENT)
Dept: INTERNAL MEDICINE CLINIC | Facility: CLINIC | Age: 87
End: 2022-01-27

## 2022-01-27 NOTE — TELEPHONE ENCOUNTER
Mirna Webb MD   12/18/2021 12:19 PM CST    High cholesterol- due to see me for extended follow up   Remind to take atorvastatin nightly (she was forgetting before)          Please contact patient to reschedule for follow up on management.

## 2022-01-31 ENCOUNTER — TELEPHONE (OUTPATIENT)
Dept: INTERNAL MEDICINE CLINIC | Facility: CLINIC | Age: 87
End: 2022-01-31

## 2022-01-31 PROCEDURE — 99439 CHRNC CARE MGMT STAF EA ADDL: CPT

## 2022-01-31 PROCEDURE — 99490 CHRNC CARE MGMT STAFF 1ST 20: CPT

## 2022-01-31 NOTE — TELEPHONE ENCOUNTER
Pt would like a call back to discuss some nausea she has been having     Please advise. Pt refused appt.  Wanted to speak with triage first

## 2022-02-01 RX ORDER — ONDANSETRON 4 MG/1
4 TABLET, FILM COATED ORAL EVERY 8 HOURS PRN
Qty: 30 TABLET | Refills: 1 | Status: SHIPPED | OUTPATIENT
Start: 2022-02-01 | End: 2022-03-25

## 2022-02-01 NOTE — TELEPHONE ENCOUNTER
Patient states she was in [address] [facility] for one month, checked COVID test on 1/25 which was negative, has been home from [address] [facility] for 2 weeks. Pt states she has had nausea on and off which is different from her chronic nausea she always has, dull h/a every am when she wakes up, vertigo yesterday, none of these symptoms seem to be causing a problem today so far. Pt takes Zofran for her nausea and has been out of medication for a couple of days now, gingerale helps. Patient states she always has some dizziness as well. Pt is overdue for her macular degeneration eye injections and has an appt with her Eye MD for tomorrow. Refill needed for Zofran.   Pt states TB had given her a referral for Dr Prema Diego in Sept 2021, all the nausea went away so she never went, should she schedule with GI MD?

## 2022-02-02 NOTE — TELEPHONE ENCOUNTER
Patient notified TB refilled Zofran to [facility] in [place] on file and notified to see Dr Mirna Garcia MD if nausea persists. Pt verbalizes understanding.

## 2022-02-05 ENCOUNTER — HOSPITAL ENCOUNTER (OUTPATIENT)
Dept: MAMMOGRAPHY | Facility: HOSPITAL | Age: 87
Discharge: HOME | End: 2022-02-05
Attending: OBSTETRICS & GYNECOLOGY
Payer: MEDICARE

## 2022-02-05 DIAGNOSIS — Z12.31 ENCOUNTER FOR SCREENING MAMMOGRAM FOR MALIGNANT NEOPLASM OF BREAST: ICD-10-CM

## 2022-02-05 PROCEDURE — 77067 SCR MAMMO BI INCL CAD: CPT | Performed by: OBSTETRICS & GYNECOLOGY

## 2022-02-05 PROCEDURE — 77063 BREAST TOMOSYNTHESIS BI: CPT | Performed by: OBSTETRICS & GYNECOLOGY

## 2022-02-08 RX ORDER — DOCUSATE SODIUM 100 MG/1
CAPSULE, LIQUID FILLED ORAL
Qty: 30 CAPSULE | Refills: 0 | Status: SHIPPED | OUTPATIENT
Start: 2022-02-08

## 2022-02-08 NOTE — TELEPHONE ENCOUNTER
Last OV 11/5/21  Last PE 3/23/21  Last REFILL   Medication Quantity Refills Start End   docusate sodium 100 MG Oral Cap 20 capsule 0 10/11/2020      Last LABS 12/14/21 cmp, a1c, lipid    Future Appointments   Date Time Provider Karel Nahed   2/16/2022  2:15 PM NAP XR RM1 NAP XRAY [facility] [place]   2/16/2022  2:30 PM Suman Aaron., DPM [facility]   2/17/2022  4:00 PM Dinesh Burk MD EM[facility] [id number]   3/7/2022  4:00 PM Lucia Baker MD [facility] OB/GYN M [facility]         Per PROTOCOL?   Not on protocol     Please Advise

## 2022-02-09 ENCOUNTER — TELEPHONE (OUTPATIENT)
Dept: INTERNAL MEDICINE CLINIC | Facility: CLINIC | Age: 87
End: 2022-02-09

## 2022-02-09 NOTE — TELEPHONE ENCOUNTER
I've scheduled pt for cpe as she was also due for cpe. Pt was supposed to be scheduled for high cholestrol. Pt scheduled on below. Future Appointments   Date Time Provider Karel Nahed   4/15/2022  3:40 PM Dakotah Jackman MD [facility]      Pt also stated that she's been nauseous and TB had recommended her to see Dr. Ronaldo Serrano but every time she called them they were never available. Pt stated she ended up seeing a nurse from Dr Jf Tobar office instead as she's trying to find out if she has diverticulitis.   CHELO

## 2022-02-09 NOTE — TELEPHONE ENCOUNTER
Orders to [facility] Pt aware to get labs done no sooner than 2 weeks prior to the appt. Pt aware to fast.  No call back required.     Future Appointments   Date Time Provider Karel Dockery   4/15/2022  3:40 PM Ramon Toussaint MD [facility]

## 2022-02-10 ENCOUNTER — HOSPITAL ENCOUNTER (OUTPATIENT)
Dept: CT IMAGING | Facility: HOSPITAL | Age: 87
Discharge: HOME | End: 2022-02-10
Payer: MEDICARE

## 2022-02-10 DIAGNOSIS — R10.30 LOWER ABDOMINAL PAIN: ICD-10-CM

## 2022-02-10 PROCEDURE — 82565 ASSAY OF CREATININE: CPT

## 2022-02-10 PROCEDURE — 74177 CT ABD & PELVIS W/CONTRAST: CPT

## 2022-02-10 RX ORDER — IOHEXOL 350 MG/ML
80 INJECTION, SOLUTION INTRAVENOUS
Status: COMPLETED | OUTPATIENT
Start: 2022-02-10 | End: 2022-02-10

## 2022-02-10 RX ADMIN — IOHEXOL 80 ML: 350 INJECTION, SOLUTION INTRAVENOUS at 19:23:00

## 2022-02-12 LAB — CREAT BLD-MCNC: 0.8 MG/DL

## 2022-02-16 ENCOUNTER — PATIENT OUTREACH (OUTPATIENT)
Dept: FAMILY MEDICINE CLINIC | Facility: CLINIC | Age: 87
End: 2022-02-16

## 2022-02-16 ENCOUNTER — HOSPITAL ENCOUNTER (OUTPATIENT)
Dept: GENERAL RADIOLOGY | Age: 87
Discharge: HOME | End: 2022-02-16
Attending: PODIATRIST
Payer: MEDICARE

## 2022-02-16 ENCOUNTER — OFFICE VISIT (OUTPATIENT)
Dept: ORTHOPEDICS CLINIC | Facility: CLINIC | Age: 87
End: 2022-02-16
Payer: MEDICARE

## 2022-02-16 ENCOUNTER — LAB ENCOUNTER (OUTPATIENT)
Dept: LAB | Facility: HOSPITAL | Age: 87
End: 2022-02-16
Attending: INTERNAL MEDICINE
Payer: MEDICARE

## 2022-02-16 VITALS — HEIGHT: 61.5 IN | BODY MASS INDEX: 29.82 KG/M2 | WEIGHT: 160 LBS

## 2022-02-16 DIAGNOSIS — Z20.822 ENCOUNTER FOR PREPROCEDURE SCREENING LABORATORY TESTING FOR COVID-19: ICD-10-CM

## 2022-02-16 DIAGNOSIS — M19.079 ARTHRITIS, MIDFOOT: ICD-10-CM

## 2022-02-16 DIAGNOSIS — M77.30 HEEL SPUR, UNSPECIFIED LATERALITY: ICD-10-CM

## 2022-02-16 DIAGNOSIS — Z01.812 ENCOUNTER FOR PREPROCEDURE SCREENING LABORATORY TESTING FOR COVID-19: ICD-10-CM

## 2022-02-16 DIAGNOSIS — M79.672 LEFT FOOT PAIN: ICD-10-CM

## 2022-02-16 DIAGNOSIS — M79.672 LEFT FOOT PAIN: Primary | ICD-10-CM

## 2022-02-16 DIAGNOSIS — M85.872 OSTEOPENIA OF BOTH FEET: ICD-10-CM

## 2022-02-16 DIAGNOSIS — M85.871 OSTEOPENIA OF BOTH FEET: ICD-10-CM

## 2022-02-16 LAB — SARS-COV-2 RNA RESP QL NAA+PROBE: NOT DETECTED

## 2022-02-16 PROCEDURE — 73630 X-RAY EXAM OF FOOT: CPT | Performed by: PODIATRIST

## 2022-02-16 PROCEDURE — 99203 OFFICE O/P NEW LOW 30 MIN: CPT | Performed by: PODIATRIST

## 2022-02-17 ENCOUNTER — OFFICE VISIT (OUTPATIENT)
Dept: ORTHOPEDICS CLINIC | Facility: CLINIC | Age: 87
End: 2022-02-17
Payer: MEDICARE

## 2022-02-17 ENCOUNTER — PATIENT OUTREACH (OUTPATIENT)
Dept: CASE MANAGEMENT | Age: 87
End: 2022-02-17

## 2022-02-17 DIAGNOSIS — M75.41 IMPINGEMENT SYNDROME OF RIGHT SHOULDER: Primary | ICD-10-CM

## 2022-02-17 DIAGNOSIS — M75.121 NONTRAUMATIC COMPLETE TEAR OF RIGHT ROTATOR CUFF: ICD-10-CM

## 2022-02-17 DIAGNOSIS — M19.011 PRIMARY OSTEOARTHRITIS OF RIGHT SHOULDER: ICD-10-CM

## 2022-02-17 PROCEDURE — 99214 OFFICE O/P EST MOD 30 MIN: CPT | Performed by: ORTHOPAEDIC SURGERY

## 2022-02-17 PROCEDURE — 20610 DRAIN/INJ JOINT/BURSA W/O US: CPT | Performed by: ORTHOPAEDIC SURGERY

## 2022-02-17 RX ORDER — TRIAMCINOLONE ACETONIDE 40 MG/ML
40 INJECTION, SUSPENSION INTRA-ARTICULAR; INTRAMUSCULAR ONCE
Status: COMPLETED | OUTPATIENT
Start: 2022-02-17 | End: 2022-02-17

## 2022-02-17 RX ADMIN — TRIAMCINOLONE ACETONIDE 40 MG: 40 INJECTION, SUSPENSION INTRA-ARTICULAR; INTRAMUSCULAR at 16:38:00

## 2022-02-19 ENCOUNTER — HOSPITAL ENCOUNTER (OUTPATIENT)
Dept: GENERAL RADIOLOGY | Facility: HOSPITAL | Age: 87
Discharge: HOME | End: 2022-02-19
Attending: INTERNAL MEDICINE
Payer: MEDICARE

## 2022-02-19 DIAGNOSIS — K21.9 GASTROESOPHAGEAL REFLUX DISEASE, UNSPECIFIED WHETHER ESOPHAGITIS PRESENT: ICD-10-CM

## 2022-02-19 DIAGNOSIS — Z87.19 HISTORY OF ESOPHAGEAL STRICTURE: ICD-10-CM

## 2022-02-28 PROCEDURE — 99490 CHRNC CARE MGMT STAFF 1ST 20: CPT

## 2022-03-02 PROBLEM — Z01.818 PRE-OP TESTING: Status: ACTIVE | Noted: 2022-03-02

## 2022-03-04 RX ORDER — NICOTINE POLACRILEX 4 MG
30 LOZENGE BUCCAL
Status: CANCELLED | OUTPATIENT
Start: 2022-03-04

## 2022-03-04 RX ORDER — NICOTINE POLACRILEX 4 MG
15 LOZENGE BUCCAL
Status: CANCELLED | OUTPATIENT
Start: 2022-03-04

## 2022-03-04 RX ORDER — SODIUM CHLORIDE, SODIUM LACTATE, POTASSIUM CHLORIDE, CALCIUM CHLORIDE 600; 310; 30; 20 MG/100ML; MG/100ML; MG/100ML; MG/100ML
INJECTION, SOLUTION INTRAVENOUS CONTINUOUS
Status: CANCELLED | OUTPATIENT
Start: 2022-03-04

## 2022-03-04 RX ORDER — DEXTROSE MONOHYDRATE 25 G/50ML
50 INJECTION, SOLUTION INTRAVENOUS
Status: CANCELLED | OUTPATIENT
Start: 2022-03-04

## 2022-03-04 RX ORDER — ACETAMINOPHEN 500 MG
1000 TABLET ORAL ONCE
Status: CANCELLED | OUTPATIENT
Start: 2022-03-04 | End: 2022-03-04

## 2022-03-07 ENCOUNTER — OFFICE VISIT (OUTPATIENT)
Dept: OBGYN CLINIC | Facility: CLINIC | Age: 87
End: 2022-03-07
Payer: MEDICARE

## 2022-03-07 ENCOUNTER — LAB ENCOUNTER (OUTPATIENT)
Dept: LAB | Facility: HOSPITAL | Age: 87
End: 2022-03-07
Attending: INTERNAL MEDICINE
Payer: MEDICARE

## 2022-03-07 VITALS
WEIGHT: 160 LBS | BODY MASS INDEX: 29.82 KG/M2 | HEIGHT: 61.5 IN | DIASTOLIC BLOOD PRESSURE: 60 MMHG | SYSTOLIC BLOOD PRESSURE: 104 MMHG

## 2022-03-07 DIAGNOSIS — N36.2 URETHRAL CARUNCLE: ICD-10-CM

## 2022-03-07 DIAGNOSIS — N95.2 ATROPHIC VAGINITIS: ICD-10-CM

## 2022-03-07 DIAGNOSIS — N76.3 CHRONIC VULVITIS: Primary | ICD-10-CM

## 2022-03-07 DIAGNOSIS — Z01.818 PRE-OP TESTING: ICD-10-CM

## 2022-03-07 PROCEDURE — 99214 OFFICE O/P EST MOD 30 MIN: CPT | Performed by: OBSTETRICS & GYNECOLOGY

## 2022-03-07 NOTE — PATIENT INSTRUCTIONS
Miralax - once daily to help with the constipation    Mometasone ointment (steroid - anti-inflammatory)  -apply to external skin around the vagina (the vulva) once nightly for 2 weeks    Vaseline ointment (or Nystatin ointment)  -apply to external skin around the vagina (the vulva) every morning and after every bowel movement. Please ask your gastroenterologist if it is ok for you to use a small amount of vaginal estrogen cream (pea sized amount) inside vagina nightly.      Please follow up with urologist  Please follow up with Dr. Willi Iverson (breast surgeon)

## 2022-03-08 LAB — SARS-COV-2 RNA RESP QL NAA+PROBE: NOT DETECTED

## 2022-03-08 RX ORDER — LOSARTAN POTASSIUM 100 MG/1
TABLET ORAL
Qty: 90 TABLET | Refills: 0 | Status: SHIPPED | OUTPATIENT
Start: 2022-03-08

## 2022-03-09 ENCOUNTER — HOSPITAL ENCOUNTER (OUTPATIENT)
Facility: HOSPITAL | Age: 87
Setting detail: HOSPITAL OUTPATIENT SURGERY
Discharge: HOME | End: 2022-03-09
Attending: INTERNAL MEDICINE | Admitting: INTERNAL MEDICINE
Payer: MEDICARE

## 2022-03-09 ENCOUNTER — ANESTHESIA EVENT (OUTPATIENT)
Dept: ENDOSCOPY | Facility: HOSPITAL | Age: 87
End: 2022-03-09
Payer: MEDICARE

## 2022-03-09 ENCOUNTER — ANESTHESIA (OUTPATIENT)
Dept: ENDOSCOPY | Facility: HOSPITAL | Age: 87
End: 2022-03-09
Payer: MEDICARE

## 2022-03-09 VITALS
TEMPERATURE: 97 F | HEIGHT: 61.5 IN | BODY MASS INDEX: 29.82 KG/M2 | WEIGHT: 160 LBS | RESPIRATION RATE: 18 BRPM | DIASTOLIC BLOOD PRESSURE: 62 MMHG | HEART RATE: 80 BPM | OXYGEN SATURATION: 97 % | SYSTOLIC BLOOD PRESSURE: 119 MMHG

## 2022-03-09 DIAGNOSIS — R93.3 ABNORMAL CT SCAN, GASTROINTESTINAL TRACT: ICD-10-CM

## 2022-03-09 DIAGNOSIS — R11.0 NAUSEA: ICD-10-CM

## 2022-03-09 DIAGNOSIS — Z01.818 PRE-OP TESTING: Primary | ICD-10-CM

## 2022-03-09 LAB — GLUCOSE BLD-MCNC: 135 MG/DL (ref 70–99)

## 2022-03-09 PROCEDURE — 0DB98ZX EXCISION OF DUODENUM, VIA NATURAL OR ARTIFICIAL OPENING ENDOSCOPIC, DIAGNOSTIC: ICD-10-PCS | Performed by: INTERNAL MEDICINE

## 2022-03-09 PROCEDURE — 0DB68ZX EXCISION OF STOMACH, VIA NATURAL OR ARTIFICIAL OPENING ENDOSCOPIC, DIAGNOSTIC: ICD-10-PCS | Performed by: INTERNAL MEDICINE

## 2022-03-09 PROCEDURE — 88305 TISSUE EXAM BY PATHOLOGIST: CPT | Performed by: INTERNAL MEDICINE

## 2022-03-09 PROCEDURE — 82962 GLUCOSE BLOOD TEST: CPT

## 2022-03-09 RX ORDER — SODIUM CHLORIDE, SODIUM LACTATE, POTASSIUM CHLORIDE, CALCIUM CHLORIDE 600; 310; 30; 20 MG/100ML; MG/100ML; MG/100ML; MG/100ML
INJECTION, SOLUTION INTRAVENOUS CONTINUOUS
Status: DISCONTINUED | OUTPATIENT
Start: 2022-03-09 | End: 2022-03-09

## 2022-03-09 RX ORDER — NICOTINE POLACRILEX 4 MG
30 LOZENGE BUCCAL
Status: DISCONTINUED | OUTPATIENT
Start: 2022-03-09 | End: 2022-03-09

## 2022-03-09 RX ORDER — DEXTROSE MONOHYDRATE 25 G/50ML
50 INJECTION, SOLUTION INTRAVENOUS
Status: DISCONTINUED | OUTPATIENT
Start: 2022-03-09 | End: 2022-03-09

## 2022-03-09 RX ORDER — NICOTINE POLACRILEX 4 MG
15 LOZENGE BUCCAL
Status: DISCONTINUED | OUTPATIENT
Start: 2022-03-09 | End: 2022-03-09

## 2022-03-09 RX ORDER — LIDOCAINE HYDROCHLORIDE 10 MG/ML
INJECTION, SOLUTION EPIDURAL; INFILTRATION; INTRACAUDAL; PERINEURAL AS NEEDED
Status: DISCONTINUED | OUTPATIENT
Start: 2022-03-09 | End: 2022-03-09 | Stop reason: SURG

## 2022-03-09 RX ADMIN — LIDOCAINE HYDROCHLORIDE 25 MG: 10 INJECTION, SOLUTION EPIDURAL; INFILTRATION; INTRACAUDAL; PERINEURAL at 08:39:00

## 2022-03-09 RX ADMIN — SODIUM CHLORIDE, SODIUM LACTATE, POTASSIUM CHLORIDE, CALCIUM CHLORIDE: 600; 310; 30; 20 INJECTION, SOLUTION INTRAVENOUS at 08:44:00

## 2022-03-09 RX ADMIN — SODIUM CHLORIDE, SODIUM LACTATE, POTASSIUM CHLORIDE, CALCIUM CHLORIDE: 600; 310; 30; 20 INJECTION, SOLUTION INTRAVENOUS at 08:34:00

## 2022-03-09 NOTE — ANESTHESIA POSTPROCEDURE EVALUATION
[facility], STVHCMARISEL Man Patient Status:  Hospital Outpatient Surgery   Age/Gender 80year old female MRN [id number]   Location [address] [facility] 28 Attending Vishal Rollins, [address] [facility] Day # 0 PCP Bryce Urena MD       Anesthesia Post-op Note    ESOPHAGOGASTRODUODENOSCOPY (EGD) WITH BIOPSIES    Procedure Summary     Date: 03/09/22 Room / Location: [facility] ENDOSCOPY 02 / 1404 [facility] ENDOSCOPY    Anesthesia Start: 3792 Anesthesia Stop: 1856    Procedure: ESOPHAGOGASTRODUODENOSCOPY (EGD) WITH BIOPSIES (N/A ) Diagnosis:       Abnormal CT scan, gastrointestinal tract      Nausea      (HIATAL HERNIA, BARRETTS ESOPHAGUS)    Surgeons: Vishal Rollins DO Anesthesiologist: Kierra Leon MD    Anesthesia Type: MAC ASA Status: 3          Anesthesia Type: MAC    Vitals Value Taken Time   BP 94/49 03/09/22 0853   Temp  03/09/22 0853   Pulse 74 03/09/22 0853   Resp 14 03/09/22 0853   SpO2 99% 03/09/22 0853       Patient Location: Endoscopy    Anesthesia Type: MAC    Airway Patency: patent    Postop Pain Control: adequate    Mental Status: mildly sedated but able to meaningfully participate in the post-anesthesia evaluation    Nausea/Vomiting: none    Cardiopulmonary/Hydration status: stable euvolemic    Complications: no apparent anesthesia related complications    Postop vital signs: stable    Dental Exam: Unchanged from Preop    Patient to be discharged home when criteria met.

## 2022-03-21 ENCOUNTER — PATIENT OUTREACH (OUTPATIENT)
Dept: CASE MANAGEMENT | Age: 87
End: 2022-03-21

## 2022-03-21 ENCOUNTER — TELEPHONE (OUTPATIENT)
Dept: INTERNAL MEDICINE CLINIC | Facility: CLINIC | Age: 87
End: 2022-03-21

## 2022-03-21 NOTE — TELEPHONE ENCOUNTER
LOV 12/31/21 with TB. Patient states she has been nauseated since 2/8/22, tired all the time, feels like she wants to vomit but doesn't, Zofran that she takes daily takes the edge off, Dr Yordan Lepe did an endoscopy on 3/9/22, 3/10/22 phone conversation with Binta Negron Last GI NP, changed medication from Omeprozole to Esomeprazole Magnesium 40mg BID before meals, has white specks in stool since starting medication, h/a's, thought of eating food makes her nauseated, no appetite, has lost 7 lbs in the last month or so,  No pattern to this -sometimes feels better but sometimes worse. Pt states she is trying to drink more water but that even makes her feel nauseated. Patient states her last Colonoscopy was 2006, was asking when the last imaging was for her rectum, wants to know if her thyroid is involved, last US Thyroid was 7/8/20, 2/23/21 last thyroid labs. Pt states she is trying to get hearing aids, was told she had wax in her ears, stopped at a walk in clinic for irrigation, could not remove the wax and asking for an ENT recommendations.

## 2022-03-21 NOTE — TELEPHONE ENCOUNTER
Patient referred to Dr DIAZ [facility] ENT, notified to call [facility] to talk with them about the ongoing nausea and new medication. Pt scheduled for 4/22/22 with TB to discuss thyroid issues, tired all the time and US Thyroid. Pt verbalizes understanding.

## 2022-03-21 NOTE — TELEPHONE ENCOUNTER
Pt has had nausea every day since Feb 8th. She is wanting to speak with a nurse. Also states needing a referral for ENT but wants to discuss this with a nurse as well.

## 2022-03-21 NOTE — TELEPHONE ENCOUNTER
ENT recommendation would be Dr. Kristen Brandon  She has chronic nausea so f/u with GI for those ongoing symptoms  She seems to have lots of other concerns too- I can see her for extended f/u in the next 1-2 months

## 2022-03-23 RX ORDER — PRAMIPEXOLE DIHYDROCHLORIDE 0.5 MG/1
TABLET ORAL
Qty: 90 TABLET | Refills: 0 | Status: SHIPPED | OUTPATIENT
Start: 2022-03-23

## 2022-03-23 NOTE — TELEPHONE ENCOUNTER
Last VISIT - 11/5/21 Bilateral ankle swelling    Last CPE - 3/23/21    Last REFILL -     PRAMIPEXOLE 0.5 MG Oral Tab 90 tablet 0 9/30/2021     Last LABS - 12/14/21 cmp, A1C, lipid    Future Appointments   Date Time Provider Karel Dockery   4/22/2022  3:00 PM Mikey Aguilera MD [facility]     Per PROTOCOL? None    Please Approve or Deny.

## 2022-03-25 ENCOUNTER — TELEPHONE (OUTPATIENT)
Dept: CASE MANAGEMENT | Age: 87
End: 2022-03-25

## 2022-03-25 ENCOUNTER — PATIENT OUTREACH (OUTPATIENT)
Dept: CASE MANAGEMENT | Age: 87
End: 2022-03-25

## 2022-03-25 NOTE — PROGRESS NOTES
Pt called in to [facility]    Pt was recently started on an increased dose of zofran and esomeprazole. Since starting increased doses pt has had a few episodes of shakiness and dizziness that have never previously occurred since her diabetes dx. Pt was able to resolve them with orange juice and a snack and some rest. Pt most recent episode occurred this afternoon. Pt wanted to discuss with nurse if medications are to blame for episodes of low blood sugar.     [facility] sent TE to emg35 clinical marked high priority    Time with Pt 10 min  TE to emg 35  5min   Charting 1 min

## 2022-03-25 NOTE — TELEPHONE ENCOUNTER
Pt has recently started increased dose of esomeprazole and zofran. Since increased dose pt has had episodes of dizziness and shaking that have not previously occurred, including one that started this afternoon. Pt is slowly improving drinking orange juice and eating a snack. Pt would like to speak with a nurse.     Thank you    Routed to [facility]

## 2022-03-25 NOTE — PROGRESS NOTES
Pt was still awaiting call from [facility] nurse to discuss symptoms. Pt states that she will call Dr Wes Pradhan on call doc if symptoms return after taking medication tomorrow. Pt states pcp did not prescribe esomeprazole or zofran.     Pt states that if shortness of breath occurs or if her nausea becomes worse she will involve her son and visit ER

## 2022-03-25 NOTE — TELEPHONE ENCOUNTER
Patient notified to go to the ER for evaluation, call Tammy MEDINA to evaluate her s/s. Pt states she feels better so she will monitor and if happens again she will go to the ER for evaluation. Pt verbalizes understanding. CHELO to TB.

## 2022-04-01 NOTE — Clinical Note
[facility]  A. Notifier:    LIZZETTE JOAQUIN  [id number]    Advance Beneficiary Notice of Noncoverage (ABN)    Note:  If Medicare doesn't pay for D.     below, you may have to pay.   Medicare does not pay for everything, even some care th Option 3:  I don't want the D.    listed above. I understand with this choice I am not responsible for payment, and I cannot appeal to see if Medicare would pay. H.  Additional Information:     This notice gives our opinion, not an official Medicare de Rhofade Counseling: Rhofade is a topical medication which can decrease superficial blood flow where applied. Side effects are uncommon and include stinging, redness and allergic reactions.

## 2022-04-01 NOTE — TELEPHONE ENCOUNTER
Awaiting result notes.
Please call pt for her results on her cell [phone]
Pt stated she is leaving for [place] tomorrow and doesn't want to be called for results. Pt stated she will call us for the results.  juliocesar
See result notes.
no discharge, no irritation, no pain, no redness, and no visual changes.

## 2022-04-02 ENCOUNTER — LAB ENCOUNTER (OUTPATIENT)
Dept: LAB | Facility: HOSPITAL | Age: 87
End: 2022-04-02
Attending: INTERNAL MEDICINE
Payer: MEDICARE

## 2022-04-02 DIAGNOSIS — I10 HYPERTENSION, UNSPECIFIED TYPE: ICD-10-CM

## 2022-04-02 DIAGNOSIS — E11.42 DIABETIC POLYNEUROPATHY ASSOCIATED WITH TYPE 2 DIABETES MELLITUS (HCC): ICD-10-CM

## 2022-04-02 DIAGNOSIS — Z00.00 ROUTINE GENERAL MEDICAL EXAMINATION AT A HEALTH CARE FACILITY: ICD-10-CM

## 2022-04-02 LAB
ALBUMIN SERPL-MCNC: 2.9 G/DL (ref 3.4–5)
ALBUMIN/GLOB SERPL: 0.8 {RATIO} (ref 1–2)
ALP LIVER SERPL-CCNC: 80 U/L
ALT SERPL-CCNC: 18 U/L
ANION GAP SERPL CALC-SCNC: 8 MMOL/L (ref 0–18)
AST SERPL-CCNC: 13 U/L (ref 15–37)
BASOPHILS # BLD AUTO: 0.06 X10(3) UL (ref 0–0.2)
BASOPHILS NFR BLD AUTO: 1 %
BILIRUB SERPL-MCNC: 0.5 MG/DL (ref 0.1–2)
BUN BLD-MCNC: 7 MG/DL (ref 7–18)
CALCIUM BLD-MCNC: 8.8 MG/DL (ref 8.5–10.1)
CHLORIDE SERPL-SCNC: 106 MMOL/L (ref 98–112)
CHOLEST SERPL-MCNC: 125 MG/DL (ref ?–200)
CO2 SERPL-SCNC: 28 MMOL/L (ref 21–32)
CREAT BLD-MCNC: 0.72 MG/DL
EOSINOPHIL # BLD AUTO: 0.15 X10(3) UL (ref 0–0.7)
EOSINOPHIL NFR BLD AUTO: 2.5 %
ERYTHROCYTE [DISTWIDTH] IN BLOOD BY AUTOMATED COUNT: 13.1 %
FASTING PATIENT LIPID ANSWER: YES
FASTING STATUS PATIENT QL REPORTED: YES
GLOBULIN PLAS-MCNC: 3.5 G/DL (ref 2.8–4.4)
GLUCOSE BLD-MCNC: 107 MG/DL (ref 70–99)
HCT VFR BLD AUTO: 36.1 %
HDLC SERPL-MCNC: 39 MG/DL (ref 40–59)
HGB BLD-MCNC: 11.4 G/DL
IMM GRANULOCYTES # BLD AUTO: 0.02 X10(3) UL (ref 0–1)
IMM GRANULOCYTES NFR BLD: 0.3 %
LDLC SERPL CALC-MCNC: 65 MG/DL (ref ?–100)
LYMPHOCYTES # BLD AUTO: 1 X10(3) UL (ref 1–4)
LYMPHOCYTES NFR BLD AUTO: 16.9 %
MCH RBC QN AUTO: 27.7 PG (ref 26–34)
MCHC RBC AUTO-ENTMCNC: 31.6 G/DL (ref 31–37)
MCV RBC AUTO: 87.6 FL
MONOCYTES # BLD AUTO: 0.62 X10(3) UL (ref 0.1–1)
MONOCYTES NFR BLD AUTO: 10.5 %
NEUTROPHILS # BLD AUTO: 4.05 X10 (3) UL (ref 1.5–7.7)
NEUTROPHILS # BLD AUTO: 4.05 X10(3) UL (ref 1.5–7.7)
NEUTROPHILS NFR BLD AUTO: 68.8 %
NONHDLC SERPL-MCNC: 86 MG/DL (ref ?–130)
OSMOLALITY SERPL CALC.SUM OF ELEC: 292 MOSM/KG (ref 275–295)
PLATELET # BLD AUTO: 234 10(3)UL (ref 150–450)
POTASSIUM SERPL-SCNC: 4.1 MMOL/L (ref 3.5–5.1)
PROT SERPL-MCNC: 6.4 G/DL (ref 6.4–8.2)
RBC # BLD AUTO: 4.12 X10(6)UL
SODIUM SERPL-SCNC: 142 MMOL/L (ref 136–145)
TRIGL SERPL-MCNC: 115 MG/DL (ref 30–149)
TSI SER-ACNC: 0.8 MIU/ML (ref 0.36–3.74)
VLDLC SERPL CALC-MCNC: 17 MG/DL (ref 0–30)
WBC # BLD AUTO: 5.9 X10(3) UL (ref 4–11)

## 2022-04-02 PROCEDURE — 85025 COMPLETE CBC W/AUTO DIFF WBC: CPT

## 2022-04-02 PROCEDURE — 80061 LIPID PANEL: CPT

## 2022-04-02 PROCEDURE — 80053 COMPREHEN METABOLIC PANEL: CPT

## 2022-04-02 PROCEDURE — 36415 COLL VENOUS BLD VENIPUNCTURE: CPT

## 2022-04-02 PROCEDURE — 84443 ASSAY THYROID STIM HORMONE: CPT

## 2022-04-02 PROCEDURE — 83036 HEMOGLOBIN GLYCOSYLATED A1C: CPT

## 2022-04-04 ENCOUNTER — TELEPHONE (OUTPATIENT)
Dept: INTERNAL MEDICINE CLINIC | Facility: CLINIC | Age: 87
End: 2022-04-04

## 2022-04-04 LAB
EST. AVERAGE GLUCOSE BLD GHB EST-MCNC: 171 MG/DL (ref 68–126)
HBA1C MFR BLD: 7.6 % (ref ?–5.7)

## 2022-04-22 ENCOUNTER — OFFICE VISIT (OUTPATIENT)
Dept: INTERNAL MEDICINE CLINIC | Facility: CLINIC | Age: 87
End: 2022-04-22
Payer: MEDICARE

## 2022-04-22 VITALS — HEART RATE: 62 BPM | OXYGEN SATURATION: 97 % | SYSTOLIC BLOOD PRESSURE: 136 MMHG | DIASTOLIC BLOOD PRESSURE: 60 MMHG

## 2022-04-22 DIAGNOSIS — N76.3 CHRONIC VULVITIS: ICD-10-CM

## 2022-04-22 DIAGNOSIS — I10 BENIGN ESSENTIAL HTN: ICD-10-CM

## 2022-04-22 DIAGNOSIS — K21.9 GASTROESOPHAGEAL REFLUX DISEASE, UNSPECIFIED WHETHER ESOPHAGITIS PRESENT: ICD-10-CM

## 2022-04-22 DIAGNOSIS — E11.42 DIABETIC POLYNEUROPATHY ASSOCIATED WITH TYPE 2 DIABETES MELLITUS (HCC): ICD-10-CM

## 2022-04-22 DIAGNOSIS — M51.36 DDD (DEGENERATIVE DISC DISEASE), LUMBAR: ICD-10-CM

## 2022-04-22 DIAGNOSIS — D64.9 NORMOCYTIC ANEMIA: ICD-10-CM

## 2022-04-22 DIAGNOSIS — Z78.0 POST-MENOPAUSAL: ICD-10-CM

## 2022-04-22 DIAGNOSIS — E78.00 PURE HYPERCHOLESTEROLEMIA: ICD-10-CM

## 2022-04-22 DIAGNOSIS — R63.4 WEIGHT LOSS: ICD-10-CM

## 2022-04-22 DIAGNOSIS — Z00.00 ENCOUNTER FOR ANNUAL HEALTH EXAMINATION: Primary | ICD-10-CM

## 2022-04-22 DIAGNOSIS — Z86.000 HISTORY OF DUCTAL CARCINOMA IN SITU (DCIS) OF BREAST: ICD-10-CM

## 2022-04-22 DIAGNOSIS — G25.81 RLS (RESTLESS LEGS SYNDROME): ICD-10-CM

## 2022-04-24 PROBLEM — K57.92 ACUTE DIVERTICULITIS: Status: RESOLVED | Noted: 2020-10-10 | Resolved: 2022-04-24

## 2022-04-24 PROBLEM — R50.9 FEVER, UNSPECIFIED FEVER CAUSE: Status: RESOLVED | Noted: 2018-10-10 | Resolved: 2022-04-24

## 2022-04-24 PROBLEM — K22.70 BARRETT'S ESOPHAGUS WITHOUT DYSPLASIA: Status: RESOLVED | Noted: 2018-11-28 | Resolved: 2022-04-24

## 2022-04-24 PROBLEM — R53.1 WEAKNESS GENERALIZED: Status: RESOLVED | Noted: 2018-10-10 | Resolved: 2022-04-24

## 2022-04-24 PROBLEM — R10.33 PERIUMBILICAL ABDOMINAL PAIN: Status: RESOLVED | Noted: 2018-11-28 | Resolved: 2022-04-24

## 2022-04-24 PROBLEM — J06.9 URI, ACUTE: Status: RESOLVED | Noted: 2018-11-02 | Resolved: 2022-04-24

## 2022-04-24 PROBLEM — R63.4 WEIGHT LOSS: Status: RESOLVED | Noted: 2018-11-28 | Resolved: 2022-04-24

## 2022-04-24 PROBLEM — R73.9 HYPERGLYCEMIA: Status: RESOLVED | Noted: 2019-08-17 | Resolved: 2022-04-24

## 2022-04-24 PROBLEM — N39.0 RECURRENT UTI: Status: RESOLVED | Noted: 2020-10-10 | Resolved: 2022-04-24

## 2022-04-24 PROBLEM — N36.2 URETHRAL CARUNCLE: Status: RESOLVED | Noted: 2022-03-07 | Resolved: 2022-04-24

## 2022-04-24 PROBLEM — Z01.818 PRE-OP TESTING: Status: RESOLVED | Noted: 2022-03-02 | Resolved: 2022-04-24

## 2022-04-24 PROBLEM — L29.2 VULVAR ITCHING: Status: RESOLVED | Noted: 2019-08-13 | Resolved: 2022-04-24

## 2022-04-24 PROBLEM — R53.83 OTHER FATIGUE: Status: RESOLVED | Noted: 2018-10-10 | Resolved: 2022-04-24

## 2022-04-24 PROBLEM — R05.9 COUGH: Status: RESOLVED | Noted: 2018-11-28 | Resolved: 2022-04-24

## 2022-04-25 ENCOUNTER — TELEPHONE (OUTPATIENT)
Dept: INTERNAL MEDICINE CLINIC | Facility: CLINIC | Age: 87
End: 2022-04-25

## 2022-04-25 NOTE — TELEPHONE ENCOUNTER
Received plan of care from [facility]. Pt receiving Lucentis injectoins in her right eye. Put in TB bin for review.

## 2022-04-27 ENCOUNTER — PATIENT OUTREACH (OUTPATIENT)
Dept: CASE MANAGEMENT | Age: 87
End: 2022-04-27

## 2022-04-30 PROCEDURE — 99490 CHRNC CARE MGMT STAFF 1ST 20: CPT

## 2022-04-30 PROCEDURE — 99439 CHRNC CARE MGMT STAF EA ADDL: CPT

## 2022-05-03 ENCOUNTER — TELEPHONE (OUTPATIENT)
Dept: INTERNAL MEDICINE CLINIC | Facility: CLINIC | Age: 87
End: 2022-05-03

## 2022-05-03 NOTE — TELEPHONE ENCOUNTER
Pt calling regarding getting a new walker. She states hers is about 6years old (got in [place]). She spoke to medicare and they told her she just needs a script sent. I asked her if she had the name of a supply company that had the type of walker she wants but she wasn't sure. She stated [facility] pharmacy but also wants a walker that has brakes. Not sure how to advise pt on process of getting a new walker.

## 2022-05-04 NOTE — TELEPHONE ENCOUNTER
[address] to see if they have walker with brakes and if they submit to Medicare. They are unable to submit to Medicare, all is paid out of pocket. Called pt and informed her of this. Advised pt to call insurance for list of DME companies. Informed her to get name of company, fax, and phone number. Pt stated understanding and will call us back with information.

## 2022-05-06 ENCOUNTER — WALK IN (OUTPATIENT)
Dept: URGENT CARE | Age: 87
End: 2022-05-06

## 2022-05-06 VITALS
OXYGEN SATURATION: 97 % | HEART RATE: 77 BPM | TEMPERATURE: 97.3 F | SYSTOLIC BLOOD PRESSURE: 135 MMHG | DIASTOLIC BLOOD PRESSURE: 58 MMHG | RESPIRATION RATE: 18 BRPM

## 2022-05-06 DIAGNOSIS — R30.0 DYSURIA: Primary | ICD-10-CM

## 2022-05-06 LAB
APPEARANCE, POC: ABNORMAL
BILIRUBIN, POC: ABNORMAL
COLOR, POC: ABNORMAL
GLUCOSE UR-MCNC: NEGATIVE MG/DL
KETONES, POC: ABNORMAL MG/DL
NITRITE, POC: NEGATIVE
OCCULT BLOOD, POC: ABNORMAL
PH UR: 5 [PH] (ref 5–7)
PROT UR-MCNC: ABNORMAL MG/DL
SP GR UR: 1.02 (ref 1–1.03)
UROBILINOGEN UR-MCNC: 0.2 MG/DL (ref 0–1)
WBC (LEUKOCYTE) ESTERASE, POC: ABNORMAL

## 2022-05-06 PROCEDURE — 87086 URINE CULTURE/COLONY COUNT: CPT | Performed by: FAMILY MEDICINE

## 2022-05-06 PROCEDURE — 81015 MICROSCOPIC EXAM OF URINE: CPT | Performed by: FAMILY MEDICINE

## 2022-05-06 PROCEDURE — 81002 URINALYSIS NONAUTO W/O SCOPE: CPT | Performed by: FAMILY MEDICINE

## 2022-05-06 PROCEDURE — 87186 SC STD MICRODIL/AGAR DIL: CPT | Performed by: FAMILY MEDICINE

## 2022-05-06 PROCEDURE — 99204 OFFICE O/P NEW MOD 45 MIN: CPT | Performed by: FAMILY MEDICINE

## 2022-05-06 PROCEDURE — 87088 URINE BACTERIA CULTURE: CPT | Performed by: FAMILY MEDICINE

## 2022-05-06 RX ORDER — BLOOD SUGAR DIAGNOSTIC
STRIP MISCELLANEOUS
COMMUNITY
Start: 2022-03-12

## 2022-05-06 RX ORDER — NITROFURANTOIN 25; 75 MG/1; MG/1
100 CAPSULE ORAL 2 TIMES DAILY
Qty: 14 CAPSULE | Refills: 0 | Status: SHIPPED | OUTPATIENT
Start: 2022-05-06 | End: 2022-05-13

## 2022-05-06 RX ORDER — NITROFURANTOIN 25; 75 MG/1; MG/1
100 CAPSULE ORAL 2 TIMES DAILY
Qty: 14 CAPSULE | Refills: 0 | Status: CANCELLED | OUTPATIENT
Start: 2022-05-06 | End: 2022-05-13

## 2022-05-06 RX ORDER — PRAMIPEXOLE DIHYDROCHLORIDE 0.5 MG/1
1 TABLET ORAL NIGHTLY
COMMUNITY
Start: 2022-03-23

## 2022-05-06 RX ORDER — METFORMIN HYDROCHLORIDE 500 MG/1
TABLET, FILM COATED, EXTENDED RELEASE ORAL
COMMUNITY

## 2022-05-06 RX ORDER — PRAMIPEXOLE DIHYDROCHLORIDE 0.5 MG/1
TABLET ORAL
COMMUNITY

## 2022-05-06 RX ORDER — DOCUSATE SODIUM 100 MG/1
CAPSULE, LIQUID FILLED ORAL
COMMUNITY
Start: 2022-02-08

## 2022-05-06 RX ORDER — LOSARTAN POTASSIUM 100 MG/1
1 TABLET ORAL DAILY
COMMUNITY
Start: 2022-03-08

## 2022-05-06 RX ORDER — ESOMEPRAZOLE MAGNESIUM 40 MG/1
40 CAPSULE, DELAYED RELEASE ORAL
COMMUNITY
Start: 2022-03-16

## 2022-05-06 RX ORDER — ATORVASTATIN CALCIUM 40 MG/1
TABLET, FILM COATED ORAL
COMMUNITY

## 2022-05-06 ASSESSMENT — PAIN SCALES - GENERAL: PAINLEVEL: 5

## 2022-05-07 LAB
BACTERIA: ABNORMAL
MUCOUS: ABNORMAL
RED BLOOD CELLS URINE: ABNORMAL (ref 0–3)
SQUAMOUS EPITHELIAL CELLS: ABNORMAL
WHITE BLOOD CELLS URINE: >100 (ref 0–5)

## 2022-05-10 LAB — FINAL REPORT: ABNORMAL

## 2022-06-02 DIAGNOSIS — I10 ESSENTIAL HYPERTENSION: ICD-10-CM

## 2022-06-02 RX ORDER — LOSARTAN POTASSIUM 100 MG/1
TABLET ORAL
Qty: 90 TABLET | Refills: 0 | Status: SHIPPED | OUTPATIENT
Start: 2022-06-02

## 2022-06-02 NOTE — TELEPHONE ENCOUNTER
Last VISIT - 4/22/22 Wellness visit    Last CPE - 4/22/22    Last REFILL -     LOSARTAN 100 MG Oral Tab 90 tablet 0 3/8/2022      Last LABS - 4/2/22 a1c, tsh, lipid, cmp, cbc    Per PROTOCOL? PASSED    Please Approve or Deny.

## 2022-06-06 DIAGNOSIS — G25.81 RESTLESS LEG SYNDROME: ICD-10-CM

## 2022-06-06 DIAGNOSIS — I10 ESSENTIAL HYPERTENSION: ICD-10-CM

## 2022-06-07 RX ORDER — LOSARTAN POTASSIUM 100 MG/1
TABLET ORAL
Qty: 90 TABLET | Refills: 0 | OUTPATIENT
Start: 2022-06-07

## 2022-06-07 RX ORDER — PRAMIPEXOLE DIHYDROCHLORIDE 0.5 MG/1
TABLET ORAL
Qty: 90 TABLET | Refills: 0 | Status: SHIPPED | OUTPATIENT
Start: 2022-06-07

## 2022-06-07 NOTE — TELEPHONE ENCOUNTER
Last VISIT - 4/22/22 Wellness visit    Last CPE - 4/22/22    Last REFILL -     LOSARTAN 100 MG Oral Tab 90 tablet 0 6/2/2022      PRAMIPEXOLE 0.5 MG Oral Tab 90 tablet 0 3/23/2022      Last LABS - 4/2/22 A1C, tsh, lipid, cmp, cbc    Future Appointments   Date Time Provider Karel Dockery   6/13/2022  5:00 PM Adrienne Bonner MD [facility] OB/GYN M [facility]     Per PROTOCOL? PASSED    Please Approve or Deny.

## 2022-06-09 ENCOUNTER — PATIENT OUTREACH (OUTPATIENT)
Dept: CASE MANAGEMENT | Age: 87
End: 2022-06-09

## 2022-06-09 DIAGNOSIS — M51.36 DDD (DEGENERATIVE DISC DISEASE), LUMBAR: ICD-10-CM

## 2022-06-09 DIAGNOSIS — E11.42 DIABETIC POLYNEUROPATHY ASSOCIATED WITH TYPE 2 DIABETES MELLITUS (HCC): ICD-10-CM

## 2022-06-09 DIAGNOSIS — I10 BENIGN ESSENTIAL HTN: ICD-10-CM

## 2022-06-09 DIAGNOSIS — E78.00 PURE HYPERCHOLESTEROLEMIA: ICD-10-CM

## 2022-06-09 DIAGNOSIS — H35.3230 BILATERAL EXUDATIVE AGE-RELATED MACULAR DEGENERATION, UNSPECIFIED STAGE (HCC): ICD-10-CM

## 2022-06-09 DIAGNOSIS — G56.03 BILATERAL CARPAL TUNNEL SYNDROME: ICD-10-CM

## 2022-06-09 DIAGNOSIS — H81.399 PERIPHERAL VERTIGO, UNSPECIFIED LATERALITY: ICD-10-CM

## 2022-06-09 DIAGNOSIS — K21.9 GASTROESOPHAGEAL REFLUX DISEASE, UNSPECIFIED WHETHER ESOPHAGITIS PRESENT: ICD-10-CM

## 2022-06-09 DIAGNOSIS — G25.81 RLS (RESTLESS LEGS SYNDROME): ICD-10-CM

## 2022-06-09 DIAGNOSIS — M54.9 BACK PAIN WITH RADIATION: ICD-10-CM

## 2022-06-09 DIAGNOSIS — N95.2 ATROPHIC VAGINITIS: ICD-10-CM

## 2022-06-09 DIAGNOSIS — M19.042 ARTHRITIS OF BOTH HANDS: ICD-10-CM

## 2022-06-09 DIAGNOSIS — K52.9 CHRONIC DIARRHEA: ICD-10-CM

## 2022-06-09 DIAGNOSIS — J44.9 CHRONIC OBSTRUCTIVE PULMONARY DISEASE, UNSPECIFIED COPD TYPE (HCC): ICD-10-CM

## 2022-06-09 DIAGNOSIS — M19.041 ARTHRITIS OF BOTH HANDS: ICD-10-CM

## 2022-06-13 ENCOUNTER — OFFICE VISIT (OUTPATIENT)
Dept: OBGYN CLINIC | Facility: CLINIC | Age: 87
End: 2022-06-13
Payer: MEDICARE

## 2022-06-13 VITALS
HEIGHT: 62 IN | SYSTOLIC BLOOD PRESSURE: 128 MMHG | DIASTOLIC BLOOD PRESSURE: 70 MMHG | HEART RATE: 73 BPM | WEIGHT: 160 LBS | BODY MASS INDEX: 29.44 KG/M2

## 2022-06-13 DIAGNOSIS — R32 URINARY INCONTINENCE, UNSPECIFIED TYPE: ICD-10-CM

## 2022-06-13 DIAGNOSIS — N95.2 ATROPHIC VAGINITIS: ICD-10-CM

## 2022-06-13 DIAGNOSIS — N76.3 CHRONIC VULVITIS: Primary | ICD-10-CM

## 2022-07-01 ENCOUNTER — HOSPITAL ENCOUNTER (OUTPATIENT)
Facility: HOSPITAL | Age: 87
Setting detail: OBSERVATION
Discharge: HOME | End: 2022-07-02
Attending: EMERGENCY MEDICINE | Admitting: HOSPITALIST
Payer: MEDICARE

## 2022-07-01 ENCOUNTER — APPOINTMENT (OUTPATIENT)
Dept: GENERAL RADIOLOGY | Facility: HOSPITAL | Age: 87
End: 2022-07-01
Attending: EMERGENCY MEDICINE
Payer: MEDICARE

## 2022-07-01 ENCOUNTER — WALK IN (OUTPATIENT)
Dept: URGENT CARE | Age: 87
End: 2022-07-01

## 2022-07-01 VITALS
TEMPERATURE: 97.5 F | RESPIRATION RATE: 20 BRPM | HEART RATE: 76 BPM | OXYGEN SATURATION: 96 % | DIASTOLIC BLOOD PRESSURE: 63 MMHG | SYSTOLIC BLOOD PRESSURE: 146 MMHG

## 2022-07-01 DIAGNOSIS — I20.8 ANGINAL EQUIVALENT (HCC): Primary | ICD-10-CM

## 2022-07-01 DIAGNOSIS — R42 LIGHTHEADEDNESS: ICD-10-CM

## 2022-07-01 DIAGNOSIS — R30.0 DYSURIA: ICD-10-CM

## 2022-07-01 DIAGNOSIS — R31.9 URINARY TRACT INFECTION WITH HEMATURIA, SITE UNSPECIFIED: Primary | ICD-10-CM

## 2022-07-01 DIAGNOSIS — N39.0 URINARY TRACT INFECTION WITH HEMATURIA, SITE UNSPECIFIED: Primary | ICD-10-CM

## 2022-07-01 PROBLEM — I20.89 ANGINAL EQUIVALENT: Status: ACTIVE | Noted: 2022-07-01

## 2022-07-01 PROBLEM — M51.369 DDD (DEGENERATIVE DISC DISEASE), LUMBAR: Status: ACTIVE | Noted: 2021-03-23

## 2022-07-01 PROBLEM — N36.2 URETHRAL CARUNCLE: Status: ACTIVE | Noted: 2022-03-07

## 2022-07-01 PROBLEM — I20.89 ANGINAL EQUIVALENT (HCC): Status: ACTIVE | Noted: 2022-07-01

## 2022-07-01 PROBLEM — J44.9 CHRONIC OBSTRUCTIVE PULMONARY DISEASE (CMD): Status: ACTIVE | Noted: 2020-02-04

## 2022-07-01 PROBLEM — E66.9 OBESITY (BMI 30-39.9): Status: ACTIVE | Noted: 2022-07-01

## 2022-07-01 PROBLEM — I72.8 SPLENIC ARTERY ANEURYSM (CMD): Status: ACTIVE | Noted: 2021-03-23

## 2022-07-01 PROBLEM — K22.70 BARRETT'S ESOPHAGUS WITHOUT DYSPLASIA: Status: ACTIVE | Noted: 2018-11-28

## 2022-07-01 PROBLEM — M51.36 DDD (DEGENERATIVE DISC DISEASE), LUMBAR: Status: ACTIVE | Noted: 2021-03-23

## 2022-07-01 PROBLEM — H81.399 PERIPHERAL VERTIGO: Status: ACTIVE | Noted: 2022-07-01

## 2022-07-01 PROBLEM — D64.9 NORMOCYTIC ANEMIA: Status: ACTIVE | Noted: 2022-07-01

## 2022-07-01 LAB
ALBUMIN SERPL-MCNC: 3.2 G/DL (ref 3.4–5)
ALBUMIN/GLOB SERPL: 0.9 {RATIO} (ref 1–2)
ALP LIVER SERPL-CCNC: 73 U/L
ALT SERPL-CCNC: 17 U/L
ANION GAP SERPL CALC-SCNC: 6 MMOL/L (ref 0–18)
APPEARANCE, POC: ABNORMAL
AST SERPL-CCNC: 24 U/L (ref 15–37)
BACTERIA: ABNORMAL
BASOPHILS # BLD AUTO: 0.05 X10(3) UL (ref 0–0.2)
BASOPHILS NFR BLD AUTO: 0.6 %
BILIRUB SERPL-MCNC: 0.3 MG/DL (ref 0.1–2)
BILIRUBIN, POC: NEGATIVE
BUN BLD-MCNC: 11 MG/DL (ref 7–18)
CALCIUM BLD-MCNC: 9.4 MG/DL (ref 8.5–10.1)
CHLORIDE SERPL-SCNC: 109 MMOL/L (ref 98–112)
CO2 SERPL-SCNC: 26 MMOL/L (ref 21–32)
COLOR, POC: YELLOW
CREAT BLD-MCNC: 0.92 MG/DL
EOSINOPHIL # BLD AUTO: 0.11 X10(3) UL (ref 0–0.7)
EOSINOPHIL NFR BLD AUTO: 1.4 %
ERYTHROCYTE [DISTWIDTH] IN BLOOD BY AUTOMATED COUNT: 13.3 %
EST. AVERAGE GLUCOSE BLD GHB EST-MCNC: 157 MG/DL (ref 68–126)
GLOBULIN PLAS-MCNC: 3.7 G/DL (ref 2.8–4.4)
GLUCOSE BLD-MCNC: 159 MG/DL (ref 70–99)
GLUCOSE BLD-MCNC: 192 MG/DL (ref 70–99)
GLUCOSE UR-MCNC: NEGATIVE MG/DL
HBA1C MFR BLD: 7.1 % (ref ?–5.7)
HCT VFR BLD AUTO: 39.2 %
HGB BLD-MCNC: 13.1 G/DL
IMM GRANULOCYTES # BLD AUTO: 0.02 X10(3) UL (ref 0–1)
IMM GRANULOCYTES NFR BLD: 0.3 %
KETONES, POC: NEGATIVE MG/DL
LYMPHOCYTES # BLD AUTO: 1.44 X10(3) UL (ref 1–4)
LYMPHOCYTES NFR BLD AUTO: 18.3 %
MCH RBC QN AUTO: 30.1 PG (ref 26–34)
MCHC RBC AUTO-ENTMCNC: 33.4 G/DL (ref 31–37)
MCV RBC AUTO: 90.1 FL
MONOCYTES # BLD AUTO: 0.52 X10(3) UL (ref 0.1–1)
MONOCYTES NFR BLD AUTO: 6.6 %
MUCOUS: ABNORMAL
NEUTROPHILS # BLD AUTO: 5.72 X10 (3) UL (ref 1.5–7.7)
NEUTROPHILS # BLD AUTO: 5.72 X10(3) UL (ref 1.5–7.7)
NEUTROPHILS NFR BLD AUTO: 72.8 %
NITRITE, POC: POSITIVE
OCCULT BLOOD, POC: ABNORMAL
OSMOLALITY SERPL CALC.SUM OF ELEC: 295 MOSM/KG (ref 275–295)
PH UR: 5.5 [PH] (ref 5–7)
PLATELET # BLD AUTO: 221 10(3)UL (ref 150–450)
POTASSIUM SERPL-SCNC: 3.8 MMOL/L (ref 3.5–5.1)
PROT SERPL-MCNC: 6.9 G/DL (ref 6.4–8.2)
PROT UR-MCNC: NEGATIVE MG/DL
RBC # BLD AUTO: 4.35 X10(6)UL
RED BLOOD CELLS URINE: ABNORMAL (ref 0–3)
SARS-COV-2 RNA RESP QL NAA+PROBE: NOT DETECTED
SODIUM SERPL-SCNC: 141 MMOL/L (ref 136–145)
SP GR UR: >= 1.03 (ref 1–1.03)
SQUAMOUS EPITHELIAL CELLS: ABNORMAL
TROPONIN I HIGH SENSITIVITY: 12 NG/L
TROPONIN I HIGH SENSITIVITY: 16 NG/L
UROBILINOGEN UR-MCNC: 0.2 MG/DL (ref 0–1)
WBC # BLD AUTO: 7.9 X10(3) UL (ref 4–11)
WBC (LEUKOCYTE) ESTERASE, POC: ABNORMAL
WBC CLUMPS: ABNORMAL
WHITE BLOOD CELLS URINE: >100 (ref 0–5)

## 2022-07-01 PROCEDURE — 87088 URINE BACTERIA CULTURE: CPT | Performed by: FAMILY MEDICINE

## 2022-07-01 PROCEDURE — 99214 OFFICE O/P EST MOD 30 MIN: CPT | Performed by: FAMILY MEDICINE

## 2022-07-01 PROCEDURE — 99220 INITIAL OBSERVATION CARE,LEVL III: CPT | Performed by: HOSPITALIST

## 2022-07-01 PROCEDURE — 81002 URINALYSIS NONAUTO W/O SCOPE: CPT | Performed by: FAMILY MEDICINE

## 2022-07-01 PROCEDURE — 71045 X-RAY EXAM CHEST 1 VIEW: CPT | Performed by: EMERGENCY MEDICINE

## 2022-07-01 PROCEDURE — 81015 MICROSCOPIC EXAM OF URINE: CPT | Performed by: FAMILY MEDICINE

## 2022-07-01 PROCEDURE — 87186 SC STD MICRODIL/AGAR DIL: CPT | Performed by: FAMILY MEDICINE

## 2022-07-01 PROCEDURE — 87086 URINE CULTURE/COLONY COUNT: CPT | Performed by: FAMILY MEDICINE

## 2022-07-01 RX ORDER — NITROFURANTOIN 25; 75 MG/1; MG/1
100 CAPSULE ORAL 2 TIMES DAILY
COMMUNITY
Start: 2022-07-01

## 2022-07-01 RX ORDER — BISACODYL 10 MG
10 SUPPOSITORY, RECTAL RECTAL
Status: DISCONTINUED | OUTPATIENT
Start: 2022-07-01 | End: 2022-07-02

## 2022-07-01 RX ORDER — ESTRADIOL 0.1 MG/G
CREAM VAGINAL
COMMUNITY
Start: 2022-01-18

## 2022-07-01 RX ORDER — PRAMIPEXOLE DIHYDROCHLORIDE 0.5 MG/1
0.5 TABLET ORAL NIGHTLY
COMMUNITY

## 2022-07-01 RX ORDER — ACETAMINOPHEN 500 MG
500 TABLET ORAL EVERY 4 HOURS PRN
Status: DISCONTINUED | OUTPATIENT
Start: 2022-07-01 | End: 2022-07-02

## 2022-07-01 RX ORDER — MELATONIN
3 NIGHTLY PRN
Status: DISCONTINUED | OUTPATIENT
Start: 2022-07-01 | End: 2022-07-02

## 2022-07-01 RX ORDER — LOSARTAN POTASSIUM 100 MG/1
TABLET ORAL DAILY
COMMUNITY

## 2022-07-01 RX ORDER — ONDANSETRON 2 MG/ML
8 INJECTION INTRAMUSCULAR; INTRAVENOUS EVERY 6 HOURS PRN
Status: DISCONTINUED | OUTPATIENT
Start: 2022-07-01 | End: 2022-07-02

## 2022-07-01 RX ORDER — ECHINACEA PURPUREA EXTRACT 125 MG
1 TABLET ORAL
Status: DISCONTINUED | OUTPATIENT
Start: 2022-07-01 | End: 2022-07-02

## 2022-07-01 RX ORDER — DOCUSATE SODIUM 100 MG/1
100 CAPSULE, LIQUID FILLED ORAL 2 TIMES DAILY PRN
COMMUNITY

## 2022-07-01 RX ORDER — FLUTICASONE PROPIONATE 50 MCG
SPRAY, SUSPENSION (ML) NASAL
COMMUNITY
Start: 2022-04-04

## 2022-07-01 RX ORDER — FLUTICASONE PROPIONATE 50 MCG
2 SPRAY, SUSPENSION (ML) NASAL DAILY
COMMUNITY
Start: 2022-04-04

## 2022-07-01 RX ORDER — ENOXAPARIN SODIUM 100 MG/ML
40 INJECTION SUBCUTANEOUS DAILY
Status: DISCONTINUED | OUTPATIENT
Start: 2022-07-02 | End: 2022-07-02

## 2022-07-01 RX ORDER — BENZONATATE 200 MG/1
200 CAPSULE ORAL 3 TIMES DAILY PRN
Status: DISCONTINUED | OUTPATIENT
Start: 2022-07-01 | End: 2022-07-02

## 2022-07-01 RX ORDER — SENNOSIDES 8.6 MG
17.2 TABLET ORAL NIGHTLY PRN
Status: DISCONTINUED | OUTPATIENT
Start: 2022-07-01 | End: 2022-07-02

## 2022-07-01 RX ORDER — NICOTINE POLACRILEX 4 MG
15 LOZENGE BUCCAL
Status: DISCONTINUED | OUTPATIENT
Start: 2022-07-01 | End: 2022-07-02

## 2022-07-01 RX ORDER — PRAMIPEXOLE DIHYDROCHLORIDE 0.25 MG/1
0.5 TABLET ORAL NIGHTLY
Status: DISCONTINUED | OUTPATIENT
Start: 2022-07-01 | End: 2022-07-02

## 2022-07-01 RX ORDER — DEXTROSE MONOHYDRATE 25 G/50ML
50 INJECTION, SOLUTION INTRAVENOUS
Status: DISCONTINUED | OUTPATIENT
Start: 2022-07-01 | End: 2022-07-02

## 2022-07-01 RX ORDER — NICOTINE POLACRILEX 4 MG
30 LOZENGE BUCCAL
Status: DISCONTINUED | OUTPATIENT
Start: 2022-07-01 | End: 2022-07-02

## 2022-07-01 RX ORDER — PANTOPRAZOLE SODIUM 40 MG/1
40 TABLET, DELAYED RELEASE ORAL
Status: DISCONTINUED | OUTPATIENT
Start: 2022-07-02 | End: 2022-07-02

## 2022-07-01 RX ORDER — POLYETHYLENE GLYCOL 3350 17 G/17G
17 POWDER, FOR SOLUTION ORAL DAILY PRN
Status: DISCONTINUED | OUTPATIENT
Start: 2022-07-01 | End: 2022-07-02

## 2022-07-01 RX ORDER — ASPIRIN 81 MG/1
324 TABLET, CHEWABLE ORAL ONCE
Status: COMPLETED | OUTPATIENT
Start: 2022-07-01 | End: 2022-07-01

## 2022-07-01 RX ORDER — LOPERAMIDE HYDROCHLORIDE 2 MG/1
2 CAPSULE ORAL PRN
COMMUNITY

## 2022-07-01 RX ORDER — ASPIRIN 81 MG/1
81 TABLET ORAL DAILY
Status: DISCONTINUED | OUTPATIENT
Start: 2022-07-01 | End: 2022-07-02

## 2022-07-01 RX ORDER — ALBUTEROL SULFATE 90 UG/1
2 AEROSOL, METERED RESPIRATORY (INHALATION) EVERY 4 HOURS PRN
Status: DISCONTINUED | OUTPATIENT
Start: 2022-07-01 | End: 2022-07-02

## 2022-07-01 RX ORDER — SODIUM CHLORIDE 9 MG/ML
INJECTION, SOLUTION INTRAVENOUS CONTINUOUS
Status: DISCONTINUED | OUTPATIENT
Start: 2022-07-01 | End: 2022-07-02

## 2022-07-01 RX ORDER — NITROFURANTOIN 25; 75 MG/1; MG/1
100 CAPSULE ORAL 2 TIMES DAILY
Qty: 10 CAPSULE | Refills: 0 | Status: SHIPPED | OUTPATIENT
Start: 2022-07-01 | End: 2022-07-06

## 2022-07-01 RX ORDER — ACETAMINOPHEN 500 MG
500 TABLET ORAL EVERY 6 HOURS PRN
COMMUNITY

## 2022-07-01 RX ORDER — ATORVASTATIN CALCIUM 40 MG/1
40 TABLET, FILM COATED ORAL DAILY
Status: DISCONTINUED | OUTPATIENT
Start: 2022-07-01 | End: 2022-07-02

## 2022-07-01 RX ORDER — LOSARTAN POTASSIUM 100 MG/1
100 TABLET ORAL DAILY
Status: DISCONTINUED | OUTPATIENT
Start: 2022-07-01 | End: 2022-07-02

## 2022-07-01 ASSESSMENT — PAIN SCALES - GENERAL: PAINLEVEL: 2

## 2022-07-01 NOTE — ED INITIAL ASSESSMENT (HPI)
A&Ox3 patient p/w c/o MELO and [address]    Patient endorses \"having a spell come on, I felt my throat get real hot and then it started to hurt and it was hard for me to breathe and I felt like I was going to faint\"    Denies any throat pain at this time, +MARX, hx of COPD, +LH    Also endorses on ABx for bladder infection    Denies any cp/n/v/d/c/f/vision changes/urinary sx at this time  RR even/NL, speaking in full clear sentences, ambulatory w/ steady gait using rolling walker

## 2022-07-02 ENCOUNTER — APPOINTMENT (OUTPATIENT)
Dept: CV DIAGNOSTICS | Facility: HOSPITAL | Age: 87
End: 2022-07-02
Attending: HOSPITALIST
Payer: MEDICARE

## 2022-07-02 VITALS
WEIGHT: 161 LBS | HEART RATE: 60 BPM | RESPIRATION RATE: 16 BRPM | DIASTOLIC BLOOD PRESSURE: 60 MMHG | TEMPERATURE: 98 F | BODY MASS INDEX: 29 KG/M2 | OXYGEN SATURATION: 98 % | SYSTOLIC BLOOD PRESSURE: 113 MMHG

## 2022-07-02 LAB
ANION GAP SERPL CALC-SCNC: 5 MMOL/L (ref 0–18)
BUN BLD-MCNC: 13 MG/DL (ref 7–18)
CALCIUM BLD-MCNC: 8.9 MG/DL (ref 8.5–10.1)
CHLORIDE SERPL-SCNC: 112 MMOL/L (ref 98–112)
CO2 SERPL-SCNC: 26 MMOL/L (ref 21–32)
CREAT BLD-MCNC: 0.86 MG/DL
ERYTHROCYTE [DISTWIDTH] IN BLOOD BY AUTOMATED COUNT: 13.3 %
GLUCOSE BLD-MCNC: 119 MG/DL (ref 70–99)
GLUCOSE BLD-MCNC: 122 MG/DL (ref 70–99)
HCT VFR BLD AUTO: 33.4 %
HGB BLD-MCNC: 11 G/DL
MCH RBC QN AUTO: 29.6 PG (ref 26–34)
MCHC RBC AUTO-ENTMCNC: 32.9 G/DL (ref 31–37)
MCV RBC AUTO: 89.8 FL
OSMOLALITY SERPL CALC.SUM OF ELEC: 297 MOSM/KG (ref 275–295)
PLATELET # BLD AUTO: 188 10(3)UL (ref 150–450)
POTASSIUM SERPL-SCNC: 3.8 MMOL/L (ref 3.5–5.1)
RBC # BLD AUTO: 3.72 X10(6)UL
SODIUM SERPL-SCNC: 143 MMOL/L (ref 136–145)
WBC # BLD AUTO: 6.5 X10(3) UL (ref 4–11)

## 2022-07-02 PROCEDURE — 93306 TTE W/DOPPLER COMPLETE: CPT | Performed by: HOSPITALIST

## 2022-07-02 PROCEDURE — 99217 OBSERVATION CARE DISCHARGE: CPT | Performed by: HOSPITALIST

## 2022-07-02 RX ORDER — NITROFURANTOIN 25; 75 MG/1; MG/1
100 CAPSULE ORAL 2 TIMES DAILY
Status: DISCONTINUED | OUTPATIENT
Start: 2022-07-02 | End: 2022-07-02

## 2022-07-02 RX ORDER — POTASSIUM CHLORIDE 1.5 G/1.77G
40 POWDER, FOR SOLUTION ORAL ONCE
Status: COMPLETED | OUTPATIENT
Start: 2022-07-02 | End: 2022-07-02

## 2022-07-02 NOTE — PLAN OF CARE
Pt alert and oriented x4. Continuous tele in place. NSR. Potassium replaced per protocol. Denies chest pain, SOB and dizziness. Reported pain to neck which is chronic. Tylenol given x1. Minimal result. Seen and cleared for dc by both hospitalist and cardiologist.  Alice Lipscomb papers reviewed with pt including follow up and medication regimen. Pt verbalized understanding. Pt dcd to home with all personal belongings in private vehicle with family. Problem: CARDIOVASCULAR - ADULT  Goal: Maintains optimal cardiac output and hemodynamic stability  Description: INTERVENTIONS:  - Monitor vital signs, rhythm, and trends  - Monitor for bleeding, hypotension and signs of decreased cardiac output  - Evaluate effectiveness of vasoactive medications to optimize hemodynamic stability  - Monitor arterial and/or venous puncture sites for bleeding and/or hematoma  - Assess quality of pulses, skin color and temperature  - Assess for signs of decreased coronary artery perfusion - ex.  Angina  - Evaluate fluid balance, assess for edema, trend weights  Outcome: Progressing  Goal: Absence of cardiac arrhythmias or at baseline  Description: INTERVENTIONS:  - Continuous cardiac monitoring, monitor vital signs, obtain 12 lead EKG if indicated  - Evaluate effectiveness of antiarrhythmic and heart rate control medications as ordered  - Initiate emergency measures for life threatening arrhythmias  - Monitor electrolytes and administer replacement therapy as ordered  Outcome: Progressing     Problem: Diabetes/Glucose Control  Goal: Glucose maintained within prescribed range  Description: INTERVENTIONS:  - Monitor Blood Glucose as ordered  - Assess for signs and symptoms of hyperglycemia and hypoglycemia  - Administer ordered medications to maintain glucose within target range  - Assess barriers to adequate nutritional intake and initiate nutrition consult as needed  - Instruct patient on self management of diabetes  Outcome: Progressing

## 2022-07-02 NOTE — PLAN OF CARE
A&Ox4. Denies chest pain/pain and SOB. NS running @100mL. Trop @ 5851 was negative. Pt did not want to take some of night meds due to it being late and did not want to get acid reflux. Left eye has macular degeneration. bilateral hearing aids but patient only brought left side hearing aid. Will continue to monitor. Problem: CARDIOVASCULAR - ADULT  Goal: Maintains optimal cardiac output and hemodynamic stability  Description: INTERVENTIONS:  - Monitor vital signs, rhythm, and trends  - Monitor for bleeding, hypotension and signs of decreased cardiac output  - Evaluate effectiveness of vasoactive medications to optimize hemodynamic stability  - Monitor arterial and/or venous puncture sites for bleeding and/or hematoma  - Assess quality of pulses, skin color and temperature  - Assess for signs of decreased coronary artery perfusion - ex.  Angina  - Evaluate fluid balance, assess for edema, trend weights  Outcome: Progressing  Goal: Absence of cardiac arrhythmias or at baseline  Description: INTERVENTIONS:  - Continuous cardiac monitoring, monitor vital signs, obtain 12 lead EKG if indicated  - Evaluate effectiveness of antiarrhythmic and heart rate control medications as ordered  - Initiate emergency measures for life threatening arrhythmias  - Monitor electrolytes and administer replacement therapy as ordered  Outcome: Progressing     Problem: Diabetes/Glucose Control  Goal: Glucose maintained within prescribed range  Description: INTERVENTIONS:  - Monitor Blood Glucose as ordered  - Assess for signs and symptoms of hyperglycemia and hypoglycemia  - Administer ordered medications to maintain glucose within target range  - Assess barriers to adequate nutritional intake and initiate nutrition consult as needed  - Instruct patient on self management of diabetes  Outcome: Progressing

## 2022-07-02 NOTE — ED QUICK NOTES
Orders for admission, patient is aware of plan and ready to go upstairs. Any questions, please call ED RN Bong Han at extension [phone] . Vaccinated? Yes  Type of COVID test sent: Rapid SARS  COVID Suspicion level: Low        Titratable drug(s) infusing:  Rate:     LOC at time of transport: Alert     Other pertinent information: fall precaution.  Ambulate w/ walker     CIWA score= N/A  NIH score=N/A

## 2022-07-04 LAB — FINAL REPORT: ABNORMAL

## 2022-07-05 ENCOUNTER — PATIENT OUTREACH (OUTPATIENT)
Dept: CASE MANAGEMENT | Age: 87
End: 2022-07-05

## 2022-07-05 ENCOUNTER — TELEPHONE (OUTPATIENT)
Dept: INTERNAL MEDICINE CLINIC | Facility: CLINIC | Age: 87
End: 2022-07-05

## 2022-07-05 DIAGNOSIS — Z02.9 ENCOUNTERS FOR ADMINISTRATIVE PURPOSE: ICD-10-CM

## 2022-07-05 PROCEDURE — 1111F DSCHRG MED/CURRENT MED MERGE: CPT

## 2022-07-05 NOTE — TELEPHONE ENCOUNTER
TC to pt to schedule HFU with TB. Patient states she called this morning and left a message for nurse to call her back. Pt wants to know what she should be doing since her discharge.     Future Appointments   Date Time Provider Karel Dockery   7/11/2022  3:00 PM Lam Yao MD [facility]   9/14/2022  4:00 PM Sangeeta Leon MD [facility] OB[facility]GYN M [facility]

## 2022-07-05 NOTE — TELEPHONE ENCOUNTER
Spoke to pt for TCM today. Pt does not have HFU appt scheduled at this time. TCM/HFU appt recommended by 7/9/22 as pt is a high risk for readmission. Please follow up with patient.  Thank you    BOOK BY DATE (last date for TCM): 7/16/2022

## 2022-07-06 NOTE — TELEPHONE ENCOUNTER
Pt states she was having throat burning on 7/1/22 which scared her because this make her short of breath. Pt went to the ER for evaluation, was admitted to the [address] [place] [address] for heart w/u which was negative. Pt has a HFU scheduled with TB for 7/11/22. Pt would like to know if TB wants her to do anything prior to her HFU appt, should she call [facility]? Pt states she is not having the s/s now.   LOV 4/22/22 with TB.

## 2022-07-06 NOTE — TELEPHONE ENCOUNTER
Patient notified to monitor s/s, can write down s/s and talk to TB at her appt on 7/11/22. Pt verbalizes understanding.

## 2022-07-10 ENCOUNTER — WALK IN (OUTPATIENT)
Dept: URGENT CARE | Age: 87
End: 2022-07-10

## 2022-07-10 VITALS
SYSTOLIC BLOOD PRESSURE: 132 MMHG | OXYGEN SATURATION: 97 % | DIASTOLIC BLOOD PRESSURE: 57 MMHG | RESPIRATION RATE: 16 BRPM | TEMPERATURE: 98.3 F | HEART RATE: 63 BPM

## 2022-07-10 DIAGNOSIS — R30.0 DYSURIA: Primary | ICD-10-CM

## 2022-07-10 LAB
APPEARANCE, POC: ABNORMAL
BILIRUBIN, POC: ABNORMAL
COLOR, POC: ABNORMAL
GLUCOSE UR-MCNC: NEGATIVE MG/DL
KETONES, POC: ABNORMAL MG/DL
NITRITE, POC: POSITIVE
OCCULT BLOOD, POC: ABNORMAL
PH UR: 5.5 [PH] (ref 5–7)
PROT UR-MCNC: ABNORMAL MG/DL
SP GR UR: >= 1.03 (ref 1–1.03)
UROBILINOGEN UR-MCNC: 0.2 MG/DL (ref 0–1)
WBC (LEUKOCYTE) ESTERASE, POC: ABNORMAL

## 2022-07-10 PROCEDURE — 81015 MICROSCOPIC EXAM OF URINE: CPT | Performed by: FAMILY MEDICINE

## 2022-07-10 PROCEDURE — 99214 OFFICE O/P EST MOD 30 MIN: CPT | Performed by: FAMILY MEDICINE

## 2022-07-10 PROCEDURE — 87088 URINE BACTERIA CULTURE: CPT | Performed by: FAMILY MEDICINE

## 2022-07-10 PROCEDURE — 81002 URINALYSIS NONAUTO W/O SCOPE: CPT | Performed by: FAMILY MEDICINE

## 2022-07-10 PROCEDURE — 87086 URINE CULTURE/COLONY COUNT: CPT | Performed by: FAMILY MEDICINE

## 2022-07-10 PROCEDURE — 87186 SC STD MICRODIL/AGAR DIL: CPT | Performed by: FAMILY MEDICINE

## 2022-07-10 RX ORDER — CEPHALEXIN 500 MG/1
1000 CAPSULE ORAL 2 TIMES DAILY
Qty: 28 CAPSULE | Refills: 0 | Status: SHIPPED | OUTPATIENT
Start: 2022-07-10 | End: 2022-07-17

## 2022-07-11 ENCOUNTER — OFFICE VISIT (OUTPATIENT)
Dept: INTERNAL MEDICINE CLINIC | Facility: CLINIC | Age: 87
End: 2022-07-11
Payer: MEDICARE

## 2022-07-11 VITALS
HEIGHT: 60.63 IN | DIASTOLIC BLOOD PRESSURE: 54 MMHG | SYSTOLIC BLOOD PRESSURE: 122 MMHG | BODY MASS INDEX: 30.48 KG/M2 | RESPIRATION RATE: 18 BRPM | HEART RATE: 65 BPM | TEMPERATURE: 98 F | OXYGEN SATURATION: 96 % | WEIGHT: 159.38 LBS

## 2022-07-11 DIAGNOSIS — G89.29 CHRONIC RIGHT SHOULDER PAIN: ICD-10-CM

## 2022-07-11 DIAGNOSIS — R30.0 DYSURIA: ICD-10-CM

## 2022-07-11 DIAGNOSIS — E11.42 DIABETIC POLYNEUROPATHY ASSOCIATED WITH TYPE 2 DIABETES MELLITUS (HCC): ICD-10-CM

## 2022-07-11 DIAGNOSIS — M25.511 CHRONIC RIGHT SHOULDER PAIN: ICD-10-CM

## 2022-07-11 DIAGNOSIS — K21.9 GASTROESOPHAGEAL REFLUX DISEASE, UNSPECIFIED WHETHER ESOPHAGITIS PRESENT: ICD-10-CM

## 2022-07-11 DIAGNOSIS — I10 BENIGN ESSENTIAL HTN: ICD-10-CM

## 2022-07-11 DIAGNOSIS — R07.0 THROAT DISCOMFORT: Primary | ICD-10-CM

## 2022-07-11 LAB
MUCOUS: ABNORMAL
RED BLOOD CELLS URINE: ABNORMAL (ref 0–3)
SQUAMOUS EPITHELIAL CELLS: ABNORMAL
WBC CLUMPS: ABNORMAL
WHITE BLOOD CELLS URINE: >100 (ref 0–5)

## 2022-07-11 PROCEDURE — 1126F AMNT PAIN NOTED NONE PRSNT: CPT | Performed by: INTERNAL MEDICINE

## 2022-07-11 PROCEDURE — 99214 OFFICE O/P EST MOD 30 MIN: CPT | Performed by: INTERNAL MEDICINE

## 2022-07-11 PROCEDURE — 1111F DSCHRG MED/CURRENT MED MERGE: CPT | Performed by: INTERNAL MEDICINE

## 2022-07-11 RX ORDER — CEPHALEXIN 500 MG/1
1000 CAPSULE ORAL 2 TIMES DAILY
COMMUNITY
Start: 2022-07-10 | End: 2022-07-17

## 2022-07-13 LAB — FINAL REPORT: ABNORMAL

## 2022-07-19 ENCOUNTER — PATIENT OUTREACH (OUTPATIENT)
Dept: CASE MANAGEMENT | Age: 87
End: 2022-07-19

## 2022-07-19 DIAGNOSIS — M19.041 ARTHRITIS OF BOTH HANDS: ICD-10-CM

## 2022-07-19 DIAGNOSIS — H35.3230 BILATERAL EXUDATIVE AGE-RELATED MACULAR DEGENERATION, UNSPECIFIED STAGE (HCC): ICD-10-CM

## 2022-07-19 DIAGNOSIS — J44.9 CHRONIC OBSTRUCTIVE PULMONARY DISEASE, UNSPECIFIED COPD TYPE (HCC): ICD-10-CM

## 2022-07-19 DIAGNOSIS — E11.42 DIABETIC POLYNEUROPATHY ASSOCIATED WITH TYPE 2 DIABETES MELLITUS (HCC): ICD-10-CM

## 2022-07-19 DIAGNOSIS — N76.3 CHRONIC VULVITIS: ICD-10-CM

## 2022-07-19 DIAGNOSIS — M51.36 DDD (DEGENERATIVE DISC DISEASE), LUMBAR: ICD-10-CM

## 2022-07-19 DIAGNOSIS — I20.8 ANGINAL EQUIVALENT (HCC): ICD-10-CM

## 2022-07-19 DIAGNOSIS — I10 BENIGN ESSENTIAL HTN: ICD-10-CM

## 2022-07-19 DIAGNOSIS — N95.2 ATROPHIC VAGINITIS: ICD-10-CM

## 2022-07-19 DIAGNOSIS — K52.9 CHRONIC DIARRHEA: ICD-10-CM

## 2022-07-19 DIAGNOSIS — G56.03 BILATERAL CARPAL TUNNEL SYNDROME: ICD-10-CM

## 2022-07-19 DIAGNOSIS — K21.9 GASTROESOPHAGEAL REFLUX DISEASE, UNSPECIFIED WHETHER ESOPHAGITIS PRESENT: ICD-10-CM

## 2022-07-19 DIAGNOSIS — D64.9 NORMOCYTIC ANEMIA: ICD-10-CM

## 2022-07-19 DIAGNOSIS — D05.10 DUCTAL CARCINOMA IN SITU (DCIS) OF BREAST, UNSPECIFIED LATERALITY: ICD-10-CM

## 2022-07-19 DIAGNOSIS — R42 LIGHTHEADEDNESS: ICD-10-CM

## 2022-07-19 DIAGNOSIS — G25.81 RLS (RESTLESS LEGS SYNDROME): ICD-10-CM

## 2022-07-19 DIAGNOSIS — N60.91 ATYPICAL LOBULAR HYPERPLASIA OF RIGHT BREAST: ICD-10-CM

## 2022-07-19 DIAGNOSIS — M19.042 ARTHRITIS OF BOTH HANDS: ICD-10-CM

## 2022-07-19 DIAGNOSIS — Z91.81 AT RISK FOR FALLING: ICD-10-CM

## 2022-07-19 DIAGNOSIS — E78.00 PURE HYPERCHOLESTEROLEMIA: ICD-10-CM

## 2022-08-06 ENCOUNTER — WALK IN (OUTPATIENT)
Dept: URGENT CARE | Age: 87
End: 2022-08-06

## 2022-08-06 VITALS
RESPIRATION RATE: 18 BRPM | OXYGEN SATURATION: 97 % | DIASTOLIC BLOOD PRESSURE: 50 MMHG | TEMPERATURE: 98.7 F | HEART RATE: 73 BPM | SYSTOLIC BLOOD PRESSURE: 104 MMHG

## 2022-08-06 DIAGNOSIS — R30.0 DYSURIA: Primary | ICD-10-CM

## 2022-08-06 LAB
APPEARANCE, POC: ABNORMAL
BILIRUBIN, POC: NEGATIVE
COLOR, POC: YELLOW
GLUCOSE UR-MCNC: NEGATIVE MG/DL
KETONES, POC: NEGATIVE MG/DL
NITRITE, POC: POSITIVE
OCCULT BLOOD, POC: ABNORMAL
PH UR: 5.5 [PH] (ref 5–7)
PROT UR-MCNC: NEGATIVE MG/DL
SP GR UR: 1.03 (ref 1–1.03)
UROBILINOGEN UR-MCNC: 0.2 MG/DL (ref 0–1)
WBC (LEUKOCYTE) ESTERASE, POC: ABNORMAL

## 2022-08-06 PROCEDURE — 81002 URINALYSIS NONAUTO W/O SCOPE: CPT | Performed by: INTERNAL MEDICINE

## 2022-08-06 PROCEDURE — 87086 URINE CULTURE/COLONY COUNT: CPT | Performed by: INTERNAL MEDICINE

## 2022-08-06 PROCEDURE — 81015 MICROSCOPIC EXAM OF URINE: CPT | Performed by: INTERNAL MEDICINE

## 2022-08-06 PROCEDURE — 99214 OFFICE O/P EST MOD 30 MIN: CPT | Performed by: INTERNAL MEDICINE

## 2022-08-06 PROCEDURE — 87088 URINE BACTERIA CULTURE: CPT | Performed by: INTERNAL MEDICINE

## 2022-08-06 PROCEDURE — 87186 SC STD MICRODIL/AGAR DIL: CPT | Performed by: INTERNAL MEDICINE

## 2022-08-06 RX ORDER — CEPHALEXIN 500 MG/1
500 CAPSULE ORAL 3 TIMES DAILY
Qty: 21 CAPSULE | Refills: 0 | Status: SHIPPED | OUTPATIENT
Start: 2022-08-06 | End: 2022-08-13

## 2022-08-06 ASSESSMENT — PAIN SCALES - GENERAL: PAINLEVEL: 5

## 2022-08-08 LAB
MUCOUS: ABNORMAL
RED BLOOD CELLS URINE: ABNORMAL (ref 0–3)
SQUAMOUS EPITHELIAL CELLS: ABNORMAL
WHITE BLOOD CELLS URINE: >100 (ref 0–5)

## 2022-08-10 LAB — FINAL REPORT: ABNORMAL

## 2022-08-22 ENCOUNTER — PATIENT OUTREACH (OUTPATIENT)
Dept: CASE MANAGEMENT | Age: 87
End: 2022-08-22

## 2022-08-22 DIAGNOSIS — M51.36 DDD (DEGENERATIVE DISC DISEASE), LUMBAR: ICD-10-CM

## 2022-08-22 DIAGNOSIS — M19.042 ARTHRITIS OF BOTH HANDS: ICD-10-CM

## 2022-08-22 DIAGNOSIS — G25.81 RLS (RESTLESS LEGS SYNDROME): ICD-10-CM

## 2022-08-22 DIAGNOSIS — M54.9 BACK PAIN WITH RADIATION: ICD-10-CM

## 2022-08-22 DIAGNOSIS — M19.041 ARTHRITIS OF BOTH HANDS: ICD-10-CM

## 2022-08-22 DIAGNOSIS — I10 BENIGN ESSENTIAL HTN: ICD-10-CM

## 2022-08-22 DIAGNOSIS — E78.00 PURE HYPERCHOLESTEROLEMIA: ICD-10-CM

## 2022-08-22 DIAGNOSIS — N95.2 ATROPHIC VAGINITIS: ICD-10-CM

## 2022-08-22 DIAGNOSIS — K21.9 GASTROESOPHAGEAL REFLUX DISEASE, UNSPECIFIED WHETHER ESOPHAGITIS PRESENT: ICD-10-CM

## 2022-08-22 DIAGNOSIS — H35.3230 BILATERAL EXUDATIVE AGE-RELATED MACULAR DEGENERATION, UNSPECIFIED STAGE (HCC): ICD-10-CM

## 2022-08-22 DIAGNOSIS — E11.42 DIABETIC POLYNEUROPATHY ASSOCIATED WITH TYPE 2 DIABETES MELLITUS (HCC): ICD-10-CM

## 2022-08-22 DIAGNOSIS — J44.9 CHRONIC OBSTRUCTIVE PULMONARY DISEASE, UNSPECIFIED COPD TYPE (HCC): ICD-10-CM

## 2022-09-07 RX ORDER — PRAMIPEXOLE DIHYDROCHLORIDE 0.5 MG/1
TABLET ORAL
Qty: 90 TABLET | Refills: 0 | Status: SHIPPED | OUTPATIENT
Start: 2022-09-07

## 2022-09-07 NOTE — TELEPHONE ENCOUNTER
Last VISIT - 7/11/22 HFU 7/1/22 for Anginal equivalent     Last CPE - 4/22/22    Last REFILL - pramipexole 0.5 MG Oral Tab   Externally reported    Last LABS - 7/2/22 cbc, a1c, cmp 4/2/22 tsh, lipid    Future Appointments   Date Time Provider Karel Dockery   10/11/2022  4:40 PM Demetrius Wright MD [facility]     Per PROTOCOL? None    Please Approve or Deny.

## 2022-09-16 ENCOUNTER — HOSPITAL ENCOUNTER (EMERGENCY)
Facility: HOSPITAL | Age: 87
Discharge: HOME | End: 2022-09-16
Attending: EMERGENCY MEDICINE

## 2022-09-16 ENCOUNTER — APPOINTMENT (OUTPATIENT)
Dept: GENERAL RADIOLOGY | Facility: HOSPITAL | Age: 87
End: 2022-09-16
Attending: EMERGENCY MEDICINE

## 2022-09-16 ENCOUNTER — TELEPHONE (OUTPATIENT)
Dept: INTERNAL MEDICINE CLINIC | Facility: CLINIC | Age: 87
End: 2022-09-16

## 2022-09-16 VITALS
HEART RATE: 77 BPM | TEMPERATURE: 98 F | DIASTOLIC BLOOD PRESSURE: 61 MMHG | RESPIRATION RATE: 18 BRPM | SYSTOLIC BLOOD PRESSURE: 160 MMHG | OXYGEN SATURATION: 93 %

## 2022-09-16 DIAGNOSIS — M54.2 NECK PAIN: Primary | ICD-10-CM

## 2022-09-16 DIAGNOSIS — M25.50 POLYARTHRALGIA: ICD-10-CM

## 2022-09-16 DIAGNOSIS — R60.9 PERIPHERAL EDEMA: ICD-10-CM

## 2022-09-16 LAB
ALBUMIN SERPL-MCNC: 3.4 G/DL (ref 3.4–5)
ALBUMIN/GLOB SERPL: 1 {RATIO} (ref 1–2)
ALP LIVER SERPL-CCNC: 73 U/L
ALT SERPL-CCNC: 25 U/L
ANION GAP SERPL CALC-SCNC: 4 MMOL/L (ref 0–18)
AST SERPL-CCNC: 17 U/L (ref 15–37)
BASOPHILS # BLD AUTO: 0.04 X10(3) UL (ref 0–0.2)
BASOPHILS NFR BLD AUTO: 0.7 %
BILIRUB SERPL-MCNC: 0.3 MG/DL (ref 0.1–2)
BUN BLD-MCNC: 8 MG/DL (ref 7–18)
CALCIUM BLD-MCNC: 8.3 MG/DL (ref 8.5–10.1)
CHLORIDE SERPL-SCNC: 111 MMOL/L (ref 98–112)
CO2 SERPL-SCNC: 27 MMOL/L (ref 21–32)
CREAT BLD-MCNC: 0.76 MG/DL
EOSINOPHIL # BLD AUTO: 0.22 X10(3) UL (ref 0–0.7)
EOSINOPHIL NFR BLD AUTO: 3.7 %
ERYTHROCYTE [DISTWIDTH] IN BLOOD BY AUTOMATED COUNT: 13.6 %
GFR SERPLBLD BASED ON 1.73 SQ M-ARVRAT: 73 ML/MIN/1.73M2 (ref 60–?)
GLOBULIN PLAS-MCNC: 3.5 G/DL (ref 2.8–4.4)
GLUCOSE BLD-MCNC: 124 MG/DL (ref 70–99)
HCT VFR BLD AUTO: 36.2 %
HGB BLD-MCNC: 11.8 G/DL
IMM GRANULOCYTES # BLD AUTO: 0.02 X10(3) UL (ref 0–1)
IMM GRANULOCYTES NFR BLD: 0.3 %
LYMPHOCYTES # BLD AUTO: 1.52 X10(3) UL (ref 1–4)
LYMPHOCYTES NFR BLD AUTO: 25.7 %
MCH RBC QN AUTO: 29.8 PG (ref 26–34)
MCHC RBC AUTO-ENTMCNC: 32.6 G/DL (ref 31–37)
MCV RBC AUTO: 91.4 FL
MONOCYTES # BLD AUTO: 0.44 X10(3) UL (ref 0.1–1)
MONOCYTES NFR BLD AUTO: 7.4 %
NEUTROPHILS # BLD AUTO: 3.67 X10 (3) UL (ref 1.5–7.7)
NEUTROPHILS # BLD AUTO: 3.67 X10(3) UL (ref 1.5–7.7)
NEUTROPHILS NFR BLD AUTO: 62.2 %
NT-PROBNP SERPL-MCNC: 278 PG/ML (ref ?–450)
OSMOLALITY SERPL CALC.SUM OF ELEC: 294 MOSM/KG (ref 275–295)
PLATELET # BLD AUTO: 243 10(3)UL (ref 150–450)
POTASSIUM SERPL-SCNC: 3.5 MMOL/L (ref 3.5–5.1)
PROT SERPL-MCNC: 6.9 G/DL (ref 6.4–8.2)
RBC # BLD AUTO: 3.96 X10(6)UL
SODIUM SERPL-SCNC: 142 MMOL/L (ref 136–145)
WBC # BLD AUTO: 5.9 X10(3) UL (ref 4–11)

## 2022-09-16 PROCEDURE — 93010 ELECTROCARDIOGRAM REPORT: CPT

## 2022-09-16 PROCEDURE — 99284 EMERGENCY DEPT VISIT MOD MDM: CPT

## 2022-09-16 PROCEDURE — 93005 ELECTROCARDIOGRAM TRACING: CPT

## 2022-09-16 PROCEDURE — 83880 ASSAY OF NATRIURETIC PEPTIDE: CPT | Performed by: EMERGENCY MEDICINE

## 2022-09-16 PROCEDURE — 36415 COLL VENOUS BLD VENIPUNCTURE: CPT

## 2022-09-16 PROCEDURE — 85025 COMPLETE CBC W/AUTO DIFF WBC: CPT | Performed by: EMERGENCY MEDICINE

## 2022-09-16 PROCEDURE — 80053 COMPREHEN METABOLIC PANEL: CPT | Performed by: EMERGENCY MEDICINE

## 2022-09-16 PROCEDURE — 71045 X-RAY EXAM CHEST 1 VIEW: CPT | Performed by: EMERGENCY MEDICINE

## 2022-09-16 RX ORDER — FUROSEMIDE 20 MG/1
20 TABLET ORAL DAILY
Qty: 4 TABLET | Refills: 0 | Status: SHIPPED | OUTPATIENT
Start: 2022-09-16 | End: 2022-09-20

## 2022-09-16 RX ORDER — METHYLPREDNISOLONE 4 MG/1
TABLET ORAL
Qty: 1 EACH | Refills: 0 | Status: SHIPPED | OUTPATIENT
Start: 2022-09-16

## 2022-09-16 NOTE — ED INITIAL ASSESSMENT (HPI)
Patient presents to the ED with c/o swelling in her legs and neck pain. She states that she has arthritis in her neck and it is radiating down her left arm. She also has swelling in her bilateral ankles, 1-2+ pitting. She does have MELO as well. Hx of COPD, 96% on RA.

## 2022-09-16 NOTE — TELEPHONE ENCOUNTER
Patient states she has been having neck pain, top of left hand and top of left foot pain, knows she has arthritis, Tylenol helps a little, trying heating pad, capsaicin cream , wanted to know if TB could order a pain med for her. Pt states she had company for a few days, spent a lot of time in a car and activity and believes the issue is from that. Pt states her ankles has also been swelling a bit on and off for the last month. Pt states she had a problem some time ago for this and was in the hospital for tests on her heart which were ok. Pt states she does have COPD, can't really tell if she is having issues breathing or not(talking in full sentences over the phone). Pt notified to elevate her legs during the day. Pt has an appt with TB on 1011/22 for Medicare Annual.  Pt states she has problems with transportation. TB, appt to discuss?

## 2022-09-16 NOTE — ED QUICK NOTES
Assumed pt care at this time. Pt. Is here with complains of peripheral edema bilateral lower extremities for the past month, reports SOB when walking, pt reports worse COPD for the last two days. 1-2 pitting edema. Reports neck pain for three days, reports ear pain to the right side. Pt reports left hand throbbing pain dorsal side. Per pt. She has a history of arthritis to the hands.

## 2022-09-16 NOTE — TELEPHONE ENCOUNTER
Patient feels her pain has increased to an 8 on 1-10 scale. Pt called her son who is going to take her to the ER for evaluation. FYI to TB.

## 2022-09-16 NOTE — TELEPHONE ENCOUNTER
Patient states that for 3/4 days she is having neck pain(arthritic) and not sleeping well. She also has pain on the top of her hand and her foot. Patient has been taking tylenol but it does not help. Can TB order something?

## 2022-09-17 LAB
ATRIAL RATE: 67 BPM
P AXIS: 77 DEGREES
P-R INTERVAL: 122 MS
Q-T INTERVAL: 412 MS
QRS DURATION: 106 MS
QTC CALCULATION (BEZET): 435 MS
R AXIS: -43 DEGREES
T AXIS: 59 DEGREES
VENTRICULAR RATE: 67 BPM

## 2022-09-20 RX ORDER — LOSARTAN POTASSIUM 100 MG/1
TABLET ORAL
Qty: 90 TABLET | Refills: 0 | Status: SHIPPED | OUTPATIENT
Start: 2022-09-20

## 2022-09-20 RX ORDER — PRAMIPEXOLE DIHYDROCHLORIDE 0.5 MG/1
TABLET ORAL
Qty: 90 TABLET | Refills: 0 | Status: SHIPPED | OUTPATIENT
Start: 2022-09-20

## 2022-09-21 ENCOUNTER — TELEPHONE (OUTPATIENT)
Dept: INTERNAL MEDICINE CLINIC | Facility: CLINIC | Age: 87
End: 2022-09-21

## 2022-09-21 RX ORDER — LOSARTAN POTASSIUM 100 MG/1
TABLET ORAL
Qty: 90 TABLET | Refills: 0 | OUTPATIENT
Start: 2022-09-21

## 2022-09-21 NOTE — TELEPHONE ENCOUNTER
Pt states she is almost done with the prednisone she was given at ER and the pain is back again in her neck and forehead. She doesn't know what to do. Has appt with TB scheduled for next Tues.  She would like a call back    Future Appointments   Date Time Provider Karel Dockery   9/27/2022  2:20 PM Ramon Toussaint MD [facility]

## 2022-09-21 NOTE — TELEPHONE ENCOUNTER
Pt c/o generalized neck pain that ends up causing a headache. Denies visual disturbances, unilateral weakness, injury, cp, or lightheadedness/dizziness. Able to move all extremities. No change in bowel control. She finishes her medrol dose pack tomorrow, which helped- but as she has gotten to the lower doses, her pain has returned. 7-8/10 today. Has ED f/u in 6 days. Wants to know if there's anything she can do in the mean time. The pain is very uncomfortable.

## 2022-09-23 ENCOUNTER — TELEPHONE (OUTPATIENT)
Dept: INTERNAL MEDICINE CLINIC | Facility: CLINIC | Age: 87
End: 2022-09-23

## 2022-09-23 ENCOUNTER — PATIENT OUTREACH (OUTPATIENT)
Dept: CASE MANAGEMENT | Age: 87
End: 2022-09-23

## 2022-09-23 DIAGNOSIS — N95.2 ATROPHIC VAGINITIS: ICD-10-CM

## 2022-09-23 DIAGNOSIS — I10 BENIGN ESSENTIAL HTN: ICD-10-CM

## 2022-09-23 DIAGNOSIS — G56.03 BILATERAL CARPAL TUNNEL SYNDROME: ICD-10-CM

## 2022-09-23 DIAGNOSIS — M19.042 ARTHRITIS OF BOTH HANDS: ICD-10-CM

## 2022-09-23 DIAGNOSIS — J44.9 CHRONIC OBSTRUCTIVE PULMONARY DISEASE, UNSPECIFIED COPD TYPE (HCC): ICD-10-CM

## 2022-09-23 DIAGNOSIS — H35.3230 BILATERAL EXUDATIVE AGE-RELATED MACULAR DEGENERATION, UNSPECIFIED STAGE (HCC): ICD-10-CM

## 2022-09-23 DIAGNOSIS — M19.041 ARTHRITIS OF BOTH HANDS: ICD-10-CM

## 2022-09-23 DIAGNOSIS — E78.00 PURE HYPERCHOLESTEROLEMIA: ICD-10-CM

## 2022-09-23 DIAGNOSIS — G25.81 RLS (RESTLESS LEGS SYNDROME): ICD-10-CM

## 2022-09-23 DIAGNOSIS — M51.36 DDD (DEGENERATIVE DISC DISEASE), LUMBAR: ICD-10-CM

## 2022-09-23 DIAGNOSIS — E11.42 DIABETIC POLYNEUROPATHY ASSOCIATED WITH TYPE 2 DIABETES MELLITUS (HCC): ICD-10-CM

## 2022-09-23 DIAGNOSIS — K21.9 GASTROESOPHAGEAL REFLUX DISEASE, UNSPECIFIED WHETHER ESOPHAGITIS PRESENT: ICD-10-CM

## 2022-09-23 DIAGNOSIS — K52.9 CHRONIC DIARRHEA: ICD-10-CM

## 2022-09-23 NOTE — TELEPHONE ENCOUNTER
Future Appointments   Date Time Provider Karel Dockery   9/27/2022  2:20 PM Laney Lambert MD [facility]   10/11/2022  4:40 PM Laney Lambert MD [facility]   10/14/2022  2:00 PM Lucia Baker MD [facility]     Pt had a cmp in ER on 9/16    Do you still want pt to go to lab to have BMP drawn prior to visit? Or can pt have microalbumin and A1c done at time of office visit?     Please advise    Thank You    Routed to Dr Sally Bradshaw

## 2022-09-27 ENCOUNTER — OFFICE VISIT (OUTPATIENT)
Dept: INTERNAL MEDICINE CLINIC | Facility: CLINIC | Age: 87
End: 2022-09-27

## 2022-09-27 VITALS
BODY MASS INDEX: 30.63 KG/M2 | DIASTOLIC BLOOD PRESSURE: 56 MMHG | HEART RATE: 84 BPM | TEMPERATURE: 97 F | HEIGHT: 60 IN | WEIGHT: 156 LBS | SYSTOLIC BLOOD PRESSURE: 116 MMHG

## 2022-09-27 DIAGNOSIS — R30.0 DYSURIA: ICD-10-CM

## 2022-09-27 DIAGNOSIS — R60.0 LEG EDEMA: ICD-10-CM

## 2022-09-27 DIAGNOSIS — E11.42 DIABETIC POLYNEUROPATHY ASSOCIATED WITH TYPE 2 DIABETES MELLITUS (HCC): ICD-10-CM

## 2022-09-27 DIAGNOSIS — M54.2 NECK PAIN: Primary | ICD-10-CM

## 2022-09-27 DIAGNOSIS — J44.9 CHRONIC OBSTRUCTIVE PULMONARY DISEASE, UNSPECIFIED COPD TYPE (HCC): ICD-10-CM

## 2022-09-27 LAB
BILIRUBIN: NEGATIVE
GLUCOSE (URINE DIPSTICK): NEGATIVE MG/DL
MULTISTIX LOT#: ABNORMAL NUMERIC
NITRITE, URINE: POSITIVE
PH, URINE: 5.5 (ref 4.5–8)
PROTEIN (URINE DIPSTICK): NEGATIVE MG/DL
SPECIFIC GRAVITY: 1.02 (ref 1–1.03)
URINE-COLOR: YELLOW
UROBILINOGEN,SEMI-QN: 0.2 MG/DL (ref 0–1.9)

## 2022-09-27 PROCEDURE — 87186 SC STD MICRODIL/AGAR DIL: CPT | Performed by: INTERNAL MEDICINE

## 2022-09-27 PROCEDURE — 81003 URINALYSIS AUTO W/O SCOPE: CPT | Performed by: INTERNAL MEDICINE

## 2022-09-27 PROCEDURE — 99214 OFFICE O/P EST MOD 30 MIN: CPT | Performed by: INTERNAL MEDICINE

## 2022-09-27 PROCEDURE — 87086 URINE CULTURE/COLONY COUNT: CPT | Performed by: INTERNAL MEDICINE

## 2022-09-27 PROCEDURE — 87077 CULTURE AEROBIC IDENTIFY: CPT | Performed by: INTERNAL MEDICINE

## 2022-09-27 RX ORDER — ALBUTEROL SULFATE 90 UG/1
2 AEROSOL, METERED RESPIRATORY (INHALATION) EVERY 4 HOURS PRN
Qty: 1 EACH | Refills: 1 | Status: SHIPPED | OUTPATIENT
Start: 2022-09-27

## 2022-09-27 RX ORDER — NITROFURANTOIN 25; 75 MG/1; MG/1
100 CAPSULE ORAL 2 TIMES DAILY
Qty: 10 CAPSULE | Refills: 0 | Status: SHIPPED | OUTPATIENT
Start: 2022-09-27

## 2022-09-27 NOTE — PROGRESS NOTES
MEDICARE PT - AWV Completed 4/22/2022  Visual Acuity - Completed 4/22/2022      DIABETIC PT  Eye Exam - Postponed Until 6/22/2023  Foot Exam - Completed 4/22/2022  Micro - Overdue Order Issued 7/11/2022  A1C - Last Done 7/1/2022 was 7.1

## 2022-10-10 ENCOUNTER — TELEPHONE (OUTPATIENT)
Dept: INTERNAL MEDICINE CLINIC | Facility: CLINIC | Age: 87
End: 2022-10-10

## 2022-10-10 ENCOUNTER — HOSPITAL ENCOUNTER (EMERGENCY)
Facility: HOSPITAL | Age: 87
Discharge: HOME | End: 2022-10-10
Attending: EMERGENCY MEDICINE
Payer: MEDICARE

## 2022-10-10 ENCOUNTER — APPOINTMENT (OUTPATIENT)
Dept: GENERAL RADIOLOGY | Facility: HOSPITAL | Age: 87
End: 2022-10-10
Attending: EMERGENCY MEDICINE
Payer: MEDICARE

## 2022-10-10 VITALS
SYSTOLIC BLOOD PRESSURE: 132 MMHG | DIASTOLIC BLOOD PRESSURE: 55 MMHG | TEMPERATURE: 97 F | RESPIRATION RATE: 17 BRPM | HEART RATE: 69 BPM | WEIGHT: 156.5 LBS | OXYGEN SATURATION: 95 % | BODY MASS INDEX: 31 KG/M2

## 2022-10-10 DIAGNOSIS — R60.0 BILATERAL LEG EDEMA: ICD-10-CM

## 2022-10-10 DIAGNOSIS — J44.1 COPD EXACERBATION (HCC): Primary | ICD-10-CM

## 2022-10-10 LAB
ALBUMIN SERPL-MCNC: 3.2 G/DL (ref 3.4–5)
ALBUMIN/GLOB SERPL: 0.9 {RATIO} (ref 1–2)
ALP LIVER SERPL-CCNC: 70 U/L
ALT SERPL-CCNC: 19 U/L
ANION GAP SERPL CALC-SCNC: 4 MMOL/L (ref 0–18)
AST SERPL-CCNC: 12 U/L (ref 15–37)
BASOPHILS # BLD AUTO: 0.04 X10(3) UL (ref 0–0.2)
BASOPHILS NFR BLD AUTO: 0.6 %
BILIRUB SERPL-MCNC: 0.5 MG/DL (ref 0.1–2)
BUN BLD-MCNC: 12 MG/DL (ref 7–18)
CALCIUM BLD-MCNC: 9.1 MG/DL (ref 8.5–10.1)
CHLORIDE SERPL-SCNC: 111 MMOL/L (ref 98–112)
CO2 SERPL-SCNC: 25 MMOL/L (ref 21–32)
CREAT BLD-MCNC: 0.99 MG/DL
D DIMER PPP FEU-MCNC: 0.91 UG/ML FEU (ref ?–0.92)
EOSINOPHIL # BLD AUTO: 0.11 X10(3) UL (ref 0–0.7)
EOSINOPHIL NFR BLD AUTO: 1.6 %
ERYTHROCYTE [DISTWIDTH] IN BLOOD BY AUTOMATED COUNT: 13.2 %
GFR SERPLBLD BASED ON 1.73 SQ M-ARVRAT: 53 ML/MIN/1.73M2 (ref 60–?)
GLOBULIN PLAS-MCNC: 3.6 G/DL (ref 2.8–4.4)
GLUCOSE BLD-MCNC: 135 MG/DL (ref 70–99)
HCT VFR BLD AUTO: 36.6 %
HGB BLD-MCNC: 12.3 G/DL
IMM GRANULOCYTES # BLD AUTO: 0.01 X10(3) UL (ref 0–1)
IMM GRANULOCYTES NFR BLD: 0.1 %
LYMPHOCYTES # BLD AUTO: 1.07 X10(3) UL (ref 1–4)
LYMPHOCYTES NFR BLD AUTO: 15.7 %
MCH RBC QN AUTO: 30.6 PG (ref 26–34)
MCHC RBC AUTO-ENTMCNC: 33.6 G/DL (ref 31–37)
MCV RBC AUTO: 91 FL
MONOCYTES # BLD AUTO: 0.39 X10(3) UL (ref 0.1–1)
MONOCYTES NFR BLD AUTO: 5.7 %
NEUTROPHILS # BLD AUTO: 5.2 X10 (3) UL (ref 1.5–7.7)
NEUTROPHILS # BLD AUTO: 5.2 X10(3) UL (ref 1.5–7.7)
NEUTROPHILS NFR BLD AUTO: 76.3 %
NT-PROBNP SERPL-MCNC: 182 PG/ML (ref ?–450)
OSMOLALITY SERPL CALC.SUM OF ELEC: 292 MOSM/KG (ref 275–295)
PLATELET # BLD AUTO: 223 10(3)UL (ref 150–450)
POTASSIUM SERPL-SCNC: 3.2 MMOL/L (ref 3.5–5.1)
PROT SERPL-MCNC: 6.8 G/DL (ref 6.4–8.2)
RBC # BLD AUTO: 4.02 X10(6)UL
SODIUM SERPL-SCNC: 140 MMOL/L (ref 136–145)
TROPONIN I HIGH SENSITIVITY: 9 NG/L
WBC # BLD AUTO: 6.8 X10(3) UL (ref 4–11)

## 2022-10-10 PROCEDURE — 99284 EMERGENCY DEPT VISIT MOD MDM: CPT

## 2022-10-10 PROCEDURE — 93010 ELECTROCARDIOGRAM REPORT: CPT

## 2022-10-10 PROCEDURE — 93005 ELECTROCARDIOGRAM TRACING: CPT

## 2022-10-10 PROCEDURE — 85379 FIBRIN DEGRADATION QUANT: CPT | Performed by: EMERGENCY MEDICINE

## 2022-10-10 PROCEDURE — 99285 EMERGENCY DEPT VISIT HI MDM: CPT

## 2022-10-10 PROCEDURE — 71045 X-RAY EXAM CHEST 1 VIEW: CPT | Performed by: EMERGENCY MEDICINE

## 2022-10-10 PROCEDURE — 94640 AIRWAY INHALATION TREATMENT: CPT

## 2022-10-10 PROCEDURE — 96374 THER/PROPH/DIAG INJ IV PUSH: CPT

## 2022-10-10 PROCEDURE — 80053 COMPREHEN METABOLIC PANEL: CPT | Performed by: EMERGENCY MEDICINE

## 2022-10-10 PROCEDURE — 84484 ASSAY OF TROPONIN QUANT: CPT | Performed by: EMERGENCY MEDICINE

## 2022-10-10 PROCEDURE — 85025 COMPLETE CBC W/AUTO DIFF WBC: CPT | Performed by: EMERGENCY MEDICINE

## 2022-10-10 PROCEDURE — 83880 ASSAY OF NATRIURETIC PEPTIDE: CPT | Performed by: EMERGENCY MEDICINE

## 2022-10-10 RX ORDER — PREDNISONE 20 MG/1
60 TABLET ORAL DAILY
Qty: 15 TABLET | Refills: 0 | Status: SHIPPED | OUTPATIENT
Start: 2022-10-10

## 2022-10-10 RX ORDER — FUROSEMIDE 10 MG/ML
40 INJECTION INTRAMUSCULAR; INTRAVENOUS ONCE
Status: COMPLETED | OUTPATIENT
Start: 2022-10-10 | End: 2022-10-10

## 2022-10-10 RX ORDER — POTASSIUM CHLORIDE 1.5 G/1.77G
40 POWDER, FOR SOLUTION ORAL ONCE
Status: COMPLETED | OUTPATIENT
Start: 2022-10-10 | End: 2022-10-10

## 2022-10-10 RX ORDER — FUROSEMIDE 20 MG/1
20 TABLET ORAL DAILY
Qty: 5 TABLET | Refills: 0 | Status: SHIPPED | OUTPATIENT
Start: 2022-10-10 | End: 2022-10-14 | Stop reason: ALTCHOICE

## 2022-10-10 RX ORDER — ALBUTEROL SULFATE 90 UG/1
2 AEROSOL, METERED RESPIRATORY (INHALATION) EVERY 4 HOURS PRN
Qty: 1 EACH | Refills: 0 | Status: SHIPPED | OUTPATIENT
Start: 2022-10-10 | End: 2022-11-09

## 2022-10-10 RX ORDER — POTASSIUM CHLORIDE 20 MEQ/1
20 TABLET, EXTENDED RELEASE ORAL 2 TIMES DAILY
Qty: 10 TABLET | Refills: 0 | Status: SHIPPED | OUTPATIENT
Start: 2022-10-10 | End: 2022-10-14 | Stop reason: ALTCHOICE

## 2022-10-10 RX ORDER — ALBUTEROL SULFATE 90 UG/1
4 AEROSOL, METERED RESPIRATORY (INHALATION) 4 TIMES DAILY
Status: DISCONTINUED | OUTPATIENT
Start: 2022-10-10 | End: 2022-10-10

## 2022-10-10 NOTE — ED INITIAL ASSESSMENT (HPI)
Pt reports MELO for the past month, worsening recently. Also with leg swelling for the past several months.

## 2022-10-10 NOTE — TELEPHONE ENCOUNTER
Pt stated she has SOB, has COPD but in the last week it's gotten worse. Pt stated she has water retention on her legs and is scheduled on below for this.   High TE    Future Appointments   Date Time Provider Karel Dockery   10/14/2022  2:40 PM Keshia Randhawa PA-C [facility]

## 2022-10-10 NOTE — ED QUICK NOTES
PT REFUSED MEDI CAR, AMBULANCE AND CAB. PT WANTED TO CALL PACER BY SELF.  PT WILL BE IN WAITING ROOM

## 2022-10-10 NOTE — TELEPHONE ENCOUNTER
Called and spoke with pt. Pt said that she was supposed to have appt with TB tomorrow, but got the opportunity to go with her niece to [address], so she cancelled appt. When she woke up on Sat, she had labored breathing. Pt decided not to go to [address] because of this. Labored breathing has continued. Lower extremities swollen. This RN could hear pt's breathing was labored while on phone. Advised pt to be seen in ED for eval. Pt stated understanding and was agreeable. She said that she will call [facility] to come get her. Will keep appt with CS to f/u. FYI to TB.

## 2022-10-11 LAB
ATRIAL RATE: 72 BPM
P AXIS: 32 DEGREES
P-R INTERVAL: 132 MS
Q-T INTERVAL: 396 MS
QRS DURATION: 108 MS
QTC CALCULATION (BEZET): 433 MS
R AXIS: -43 DEGREES
T AXIS: 65 DEGREES
VENTRICULAR RATE: 72 BPM

## 2022-10-14 ENCOUNTER — LAB ENCOUNTER (OUTPATIENT)
Dept: LAB | Age: 87
End: 2022-10-14
Attending: INTERNAL MEDICINE
Payer: MEDICARE

## 2022-10-14 ENCOUNTER — OFFICE VISIT (OUTPATIENT)
Dept: INTERNAL MEDICINE CLINIC | Facility: CLINIC | Age: 87
End: 2022-10-14
Payer: MEDICARE

## 2022-10-14 DIAGNOSIS — J44.9 CHRONIC OBSTRUCTIVE PULMONARY DISEASE, UNSPECIFIED COPD TYPE (HCC): ICD-10-CM

## 2022-10-14 DIAGNOSIS — R60.0 LEG EDEMA: ICD-10-CM

## 2022-10-14 DIAGNOSIS — I51.89 DIASTOLIC DYSFUNCTION: Primary | ICD-10-CM

## 2022-10-14 DIAGNOSIS — R30.0 DYSURIA: ICD-10-CM

## 2022-10-14 DIAGNOSIS — D64.9 NORMOCYTIC ANEMIA: ICD-10-CM

## 2022-10-14 DIAGNOSIS — I10 BENIGN ESSENTIAL HTN: ICD-10-CM

## 2022-10-14 DIAGNOSIS — E11.42 DIABETIC POLYNEUROPATHY ASSOCIATED WITH TYPE 2 DIABETES MELLITUS (HCC): ICD-10-CM

## 2022-10-14 LAB
ANION GAP SERPL CALC-SCNC: 6 MMOL/L (ref 0–18)
BASOPHILS # BLD AUTO: 0.09 X10(3) UL (ref 0–0.2)
BASOPHILS NFR BLD AUTO: 1.5 %
BUN BLD-MCNC: 16 MG/DL (ref 7–18)
BUN/CREAT SERPL: 13.8 (ref 10–20)
CALCIUM BLD-MCNC: 9.4 MG/DL (ref 8.5–10.1)
CHLORIDE SERPL-SCNC: 105 MMOL/L (ref 98–112)
CO2 SERPL-SCNC: 27 MMOL/L (ref 21–32)
CREAT BLD-MCNC: 1.16 MG/DL
CREAT UR-SCNC: 38.6 MG/DL
DEPRECATED HBV CORE AB SER IA-ACNC: 58.3 NG/ML
DEPRECATED RDW RBC AUTO: 44.6 FL (ref 35.1–46.3)
EOSINOPHIL # BLD AUTO: 0.19 X10(3) UL (ref 0–0.7)
EOSINOPHIL NFR BLD AUTO: 3.2 %
ERYTHROCYTE [DISTWIDTH] IN BLOOD BY AUTOMATED COUNT: 13.1 % (ref 11–15)
EST. AVERAGE GLUCOSE BLD GHB EST-MCNC: 157 MG/DL (ref 68–126)
FASTING STATUS PATIENT QL REPORTED: NO
FOLATE SERPL-MCNC: 3.9 NG/ML (ref 8.7–?)
GFR SERPLBLD BASED ON 1.73 SQ M-ARVRAT: 44 ML/MIN/1.73M2 (ref 60–?)
GLUCOSE BLD-MCNC: 139 MG/DL (ref 70–99)
HBA1C MFR BLD: 7.1 % (ref ?–5.7)
HCT VFR BLD AUTO: 37.7 %
HGB BLD-MCNC: 12.2 G/DL
IMM GRANULOCYTES # BLD AUTO: 0.02 X10(3) UL (ref 0–1)
IMM GRANULOCYTES NFR BLD: 0.3 %
IRON SATN MFR SERPL: 22 %
IRON SERPL-MCNC: 87 UG/DL
LYMPHOCYTES # BLD AUTO: 1.32 X10(3) UL (ref 1–4)
LYMPHOCYTES NFR BLD AUTO: 22 %
MCH RBC QN AUTO: 29.9 PG (ref 26–34)
MCHC RBC AUTO-ENTMCNC: 32.4 G/DL (ref 31–37)
MCV RBC AUTO: 92.4 FL
MICROALBUMIN UR-MCNC: 0.5 MG/DL
MICROALBUMIN/CREAT 24H UR-RTO: 13 UG/MG (ref ?–30)
MONOCYTES # BLD AUTO: 0.38 X10(3) UL (ref 0.1–1)
MONOCYTES NFR BLD AUTO: 6.3 %
NEUTROPHILS # BLD AUTO: 4.01 X10 (3) UL (ref 1.5–7.7)
NEUTROPHILS # BLD AUTO: 4.01 X10(3) UL (ref 1.5–7.7)
NEUTROPHILS NFR BLD AUTO: 66.7 %
OSMOLALITY SERPL CALC.SUM OF ELEC: 289 MOSM/KG (ref 275–295)
PLATELET # BLD AUTO: 263 10(3)UL (ref 150–450)
POTASSIUM SERPL-SCNC: 3.8 MMOL/L (ref 3.5–5.1)
RBC # BLD AUTO: 4.08 X10(6)UL
SODIUM SERPL-SCNC: 138 MMOL/L (ref 136–145)
TIBC SERPL-MCNC: 390 UG/DL (ref 240–450)
TRANSFERRIN SERPL-MCNC: 262 MG/DL (ref 200–360)
VIT B12 SERPL-MCNC: 244 PG/ML (ref 193–986)
WBC # BLD AUTO: 6 X10(3) UL (ref 4–11)

## 2022-10-14 PROCEDURE — 82570 ASSAY OF URINE CREATININE: CPT

## 2022-10-14 PROCEDURE — 80048 BASIC METABOLIC PNL TOTAL CA: CPT

## 2022-10-14 PROCEDURE — 82746 ASSAY OF FOLIC ACID SERUM: CPT

## 2022-10-14 PROCEDURE — 99214 OFFICE O/P EST MOD 30 MIN: CPT | Performed by: PHYSICIAN ASSISTANT

## 2022-10-14 PROCEDURE — 83540 ASSAY OF IRON: CPT

## 2022-10-14 PROCEDURE — 82607 VITAMIN B-12: CPT

## 2022-10-14 PROCEDURE — 82043 UR ALBUMIN QUANTITATIVE: CPT

## 2022-10-14 PROCEDURE — 84466 ASSAY OF TRANSFERRIN: CPT

## 2022-10-14 PROCEDURE — 85025 COMPLETE CBC W/AUTO DIFF WBC: CPT

## 2022-10-14 PROCEDURE — 82728 ASSAY OF FERRITIN: CPT

## 2022-10-14 RX ORDER — POTASSIUM CHLORIDE 750 MG/1
20 TABLET, FILM COATED, EXTENDED RELEASE ORAL 2 TIMES DAILY
Qty: 12 TABLET | Refills: 0 | Status: SHIPPED | OUTPATIENT
Start: 2022-10-14

## 2022-10-14 RX ORDER — BUDESONIDE AND FORMOTEROL FUMARATE DIHYDRATE 80; 4.5 UG/1; UG/1
2 AEROSOL RESPIRATORY (INHALATION) 2 TIMES DAILY
Qty: 1 EACH | Refills: 1 | Status: SHIPPED | OUTPATIENT
Start: 2022-10-14

## 2022-10-14 RX ORDER — ONDANSETRON 8 MG/1
8 TABLET, ORALLY DISINTEGRATING ORAL EVERY 8 HOURS PRN
Qty: 20 TABLET | Refills: 2 | Status: SHIPPED | OUTPATIENT
Start: 2022-10-14

## 2022-10-15 VITALS — WEIGHT: 155 LBS | HEIGHT: 60 IN | BODY MASS INDEX: 30.43 KG/M2

## 2022-10-31 ENCOUNTER — LAB ENCOUNTER (OUTPATIENT)
Dept: LAB | Facility: HOSPITAL | Age: 87
End: 2022-10-31
Attending: INTERNAL MEDICINE
Payer: MEDICARE

## 2022-10-31 ENCOUNTER — PATIENT OUTREACH (OUTPATIENT)
Dept: FAMILY MEDICINE CLINIC | Facility: CLINIC | Age: 87
End: 2022-10-31

## 2022-10-31 DIAGNOSIS — I51.89 HYPERKINETIC HEART DISEASE: Primary | ICD-10-CM

## 2022-10-31 DIAGNOSIS — R06.09 DYSPNEA ON EXERTION: ICD-10-CM

## 2022-10-31 LAB
ALBUMIN SERPL-MCNC: 3.4 G/DL (ref 3.4–5)
ALBUMIN/GLOB SERPL: 1.1 {RATIO} (ref 1–2)
ALP LIVER SERPL-CCNC: 66 U/L
ALT SERPL-CCNC: 19 U/L
ANION GAP SERPL CALC-SCNC: 2 MMOL/L (ref 0–18)
AST SERPL-CCNC: 14 U/L (ref 15–37)
BILIRUB SERPL-MCNC: 0.4 MG/DL (ref 0.1–2)
BUN BLD-MCNC: 12 MG/DL (ref 7–18)
CALCIUM BLD-MCNC: 9 MG/DL (ref 8.5–10.1)
CHLORIDE SERPL-SCNC: 107 MMOL/L (ref 98–112)
CO2 SERPL-SCNC: 29 MMOL/L (ref 21–32)
CREAT BLD-MCNC: 0.98 MG/DL
FASTING STATUS PATIENT QL REPORTED: YES
GFR SERPLBLD BASED ON 1.73 SQ M-ARVRAT: 54 ML/MIN/1.73M2 (ref 60–?)
GLOBULIN PLAS-MCNC: 3.1 G/DL (ref 2.8–4.4)
GLUCOSE BLD-MCNC: 135 MG/DL (ref 70–99)
NT-PROBNP SERPL-MCNC: 57 PG/ML (ref ?–450)
OSMOLALITY SERPL CALC.SUM OF ELEC: 288 MOSM/KG (ref 275–295)
POTASSIUM SERPL-SCNC: 4.4 MMOL/L (ref 3.5–5.1)
PROT SERPL-MCNC: 6.5 G/DL (ref 6.4–8.2)
SODIUM SERPL-SCNC: 138 MMOL/L (ref 136–145)

## 2022-10-31 PROCEDURE — 80053 COMPREHEN METABOLIC PANEL: CPT

## 2022-10-31 PROCEDURE — 36415 COLL VENOUS BLD VENIPUNCTURE: CPT

## 2022-10-31 PROCEDURE — 83880 ASSAY OF NATRIURETIC PEPTIDE: CPT

## 2022-10-31 NOTE — PROGRESS NOTES
Called and spoke to pt. Pt could not participate in outreach today because she was leaving for cardiologist appointment.  Will f/u tomorrow

## 2022-11-01 ENCOUNTER — PATIENT OUTREACH (OUTPATIENT)
Dept: CASE MANAGEMENT | Age: 87
End: 2022-11-01

## 2022-11-01 DIAGNOSIS — E11.42 DIABETIC POLYNEUROPATHY ASSOCIATED WITH TYPE 2 DIABETES MELLITUS (HCC): ICD-10-CM

## 2022-11-01 DIAGNOSIS — M19.042 ARTHRITIS OF BOTH HANDS: ICD-10-CM

## 2022-11-01 DIAGNOSIS — E78.00 PURE HYPERCHOLESTEROLEMIA: ICD-10-CM

## 2022-11-01 DIAGNOSIS — H35.3230 BILATERAL EXUDATIVE AGE-RELATED MACULAR DEGENERATION, UNSPECIFIED STAGE (HCC): ICD-10-CM

## 2022-11-01 DIAGNOSIS — M19.041 ARTHRITIS OF BOTH HANDS: ICD-10-CM

## 2022-11-01 DIAGNOSIS — J44.9 CHRONIC OBSTRUCTIVE PULMONARY DISEASE, UNSPECIFIED COPD TYPE (HCC): ICD-10-CM

## 2022-11-01 DIAGNOSIS — D05.10 DUCTAL CARCINOMA IN SITU (DCIS) OF BREAST, UNSPECIFIED LATERALITY: ICD-10-CM

## 2022-11-01 DIAGNOSIS — K21.9 GASTROESOPHAGEAL REFLUX DISEASE, UNSPECIFIED WHETHER ESOPHAGITIS PRESENT: ICD-10-CM

## 2022-11-01 DIAGNOSIS — M51.36 DDD (DEGENERATIVE DISC DISEASE), LUMBAR: ICD-10-CM

## 2022-11-01 DIAGNOSIS — I10 BENIGN ESSENTIAL HTN: ICD-10-CM

## 2022-11-01 DIAGNOSIS — G25.81 RLS (RESTLESS LEGS SYNDROME): ICD-10-CM

## 2022-11-01 RX ORDER — FUROSEMIDE 20 MG/1
20 TABLET ORAL DAILY
COMMUNITY
Start: 2022-10-24

## 2022-11-02 ENCOUNTER — TELEPHONE (OUTPATIENT)
Dept: INTERNAL MEDICINE CLINIC | Facility: CLINIC | Age: 87
End: 2022-11-02

## 2022-11-02 NOTE — TELEPHONE ENCOUNTER
Received consult from 10/24/22 [address] [place] [address] for diastolic dysfunction. Put in TB bin for review.

## 2022-11-17 ENCOUNTER — OFFICE VISIT (OUTPATIENT)
Facility: CLINIC | Age: 87
End: 2022-11-17
Payer: MEDICARE

## 2022-11-17 VITALS — HEART RATE: 72 BPM | SYSTOLIC BLOOD PRESSURE: 136 MMHG | DIASTOLIC BLOOD PRESSURE: 72 MMHG

## 2022-11-17 DIAGNOSIS — N76.3 CHRONIC VULVITIS: Primary | ICD-10-CM

## 2022-11-17 DIAGNOSIS — Z91.14 NONCOMPLIANCE WITH MEDICATION REGIMEN: ICD-10-CM

## 2022-11-17 DIAGNOSIS — N39.0 RECURRENT UTI: ICD-10-CM

## 2022-11-17 DIAGNOSIS — N95.2 ATROPHIC VAGINITIS: ICD-10-CM

## 2022-11-17 DIAGNOSIS — R32 URINARY INCONTINENCE, UNSPECIFIED TYPE: ICD-10-CM

## 2022-11-17 PROBLEM — Z91.148 NONCOMPLIANCE WITH MEDICATION REGIMEN: Status: ACTIVE | Noted: 2022-11-17

## 2022-11-17 LAB
BILIRUB UR QL STRIP.AUTO: NEGATIVE
BILIRUBIN: NEGATIVE
CLARITY UR REFRACT.AUTO: CLEAR
COLOR UR AUTO: YELLOW
GLUCOSE (URINE DIPSTICK): NEGATIVE MG/DL
GLUCOSE UR STRIP.AUTO-MCNC: NEGATIVE MG/DL
KETONES (URINE DIPSTICK): NEGATIVE MG/DL
KETONES UR STRIP.AUTO-MCNC: NEGATIVE MG/DL
LEUKOCYTES: NEGATIVE
MULTISTIX LOT#: NORMAL NUMERIC
NITRITE UR QL STRIP.AUTO: NEGATIVE
NITRITE, URINE: NEGATIVE
OCCULT BLOOD: NEGATIVE
PH UR STRIP.AUTO: 5 [PH] (ref 5–8)
PH, URINE: 5.5 (ref 4.5–8)
PROT UR STRIP.AUTO-MCNC: NEGATIVE MG/DL
PROTEIN (URINE DIPSTICK): NEGATIVE MG/DL
RBC UR QL AUTO: NEGATIVE
SP GR UR STRIP.AUTO: 1.02 (ref 1–1.03)
SPECIFIC GRAVITY: 1.02 (ref 1–1.03)
UROBILINOGEN UR STRIP.AUTO-MCNC: <2 MG/DL
UROBILINOGEN,SEMI-QN: 0.2 MG/DL (ref 0–1.9)

## 2022-11-17 PROCEDURE — 87086 URINE CULTURE/COLONY COUNT: CPT | Performed by: OBSTETRICS & GYNECOLOGY

## 2022-11-17 PROCEDURE — 87660 TRICHOMONAS VAGIN DIR PROBE: CPT | Performed by: OBSTETRICS & GYNECOLOGY

## 2022-11-17 PROCEDURE — 87510 GARDNER VAG DNA DIR PROBE: CPT | Performed by: OBSTETRICS & GYNECOLOGY

## 2022-11-17 PROCEDURE — 99213 OFFICE O/P EST LOW 20 MIN: CPT | Performed by: OBSTETRICS & GYNECOLOGY

## 2022-11-17 PROCEDURE — 87480 CANDIDA DNA DIR PROBE: CPT | Performed by: OBSTETRICS & GYNECOLOGY

## 2022-11-17 PROCEDURE — 81002 URINALYSIS NONAUTO W/O SCOPE: CPT | Performed by: OBSTETRICS & GYNECOLOGY

## 2022-11-17 PROCEDURE — 81001 URINALYSIS AUTO W/SCOPE: CPT | Performed by: OBSTETRICS & GYNECOLOGY

## 2022-11-17 NOTE — PATIENT INSTRUCTIONS
See urologist.   I don't think your bladder is emptying properly  The vulva skin appears to be irritated from the urinary leaking. Mometasone ointment to vulva nightly   Keep putting vaseline on vulva daily in morning to protect skin from the diaper/pad and after every bowel movement. Keep using pea sized amount of vaginal estrogen cream nightly.

## 2022-11-18 NOTE — PROGRESS NOTES
Patient returned call, informed in-office urine dip was negative for infection and are awaiting result of urine culture. Pt advised to see urologist as recommended by Dr. Shani Hawley, pt verbalizes understanding and states she has a female urologist she has seen in the past and will follow-up with her.

## 2022-11-21 ENCOUNTER — TELEPHONE (OUTPATIENT)
Dept: INTERNAL MEDICINE CLINIC | Facility: CLINIC | Age: 87
End: 2022-11-21

## 2022-11-21 NOTE — TELEPHONE ENCOUNTER
LMTCB 11/21    PSRs- please give TRISTAN Day information upon call back. Phone number is [phone]. Thanks!

## 2022-11-21 NOTE — TELEPHONE ENCOUNTER
Pt gyne wants her to see urologist-patient wants to see a female urologist do we have a name and number to give her?

## 2022-11-28 ENCOUNTER — LAB ENCOUNTER (OUTPATIENT)
Dept: LAB | Facility: HOSPITAL | Age: 87
End: 2022-11-28
Attending: INTERNAL MEDICINE
Payer: MEDICARE

## 2022-11-28 DIAGNOSIS — I51.89 DIASTOLIC DYSFUNCTION: Primary | ICD-10-CM

## 2022-11-28 LAB
ALBUMIN SERPL-MCNC: 3.3 G/DL (ref 3.4–5)
ALBUMIN/GLOB SERPL: 0.9 {RATIO} (ref 1–2)
ALP LIVER SERPL-CCNC: 69 U/L
ALT SERPL-CCNC: 17 U/L
ANION GAP SERPL CALC-SCNC: 5 MMOL/L (ref 0–18)
AST SERPL-CCNC: 11 U/L (ref 15–37)
BILIRUB SERPL-MCNC: 0.6 MG/DL (ref 0.1–2)
BUN BLD-MCNC: 9 MG/DL (ref 7–18)
CALCIUM BLD-MCNC: 9.1 MG/DL (ref 8.5–10.1)
CHLORIDE SERPL-SCNC: 110 MMOL/L (ref 98–112)
CO2 SERPL-SCNC: 28 MMOL/L (ref 21–32)
CREAT BLD-MCNC: 0.82 MG/DL
FASTING STATUS PATIENT QL REPORTED: YES
GFR SERPLBLD BASED ON 1.73 SQ M-ARVRAT: 67 ML/MIN/1.73M2 (ref 60–?)
GLOBULIN PLAS-MCNC: 3.5 G/DL (ref 2.8–4.4)
GLUCOSE BLD-MCNC: 150 MG/DL (ref 70–99)
OSMOLALITY SERPL CALC.SUM OF ELEC: 298 MOSM/KG (ref 275–295)
POTASSIUM SERPL-SCNC: 3.7 MMOL/L (ref 3.5–5.1)
PROT SERPL-MCNC: 6.8 G/DL (ref 6.4–8.2)
SODIUM SERPL-SCNC: 143 MMOL/L (ref 136–145)

## 2022-11-28 PROCEDURE — 36415 COLL VENOUS BLD VENIPUNCTURE: CPT

## 2022-11-28 PROCEDURE — 80053 COMPREHEN METABOLIC PANEL: CPT

## 2022-11-28 RX ORDER — LOSARTAN POTASSIUM 100 MG/1
TABLET ORAL
Qty: 90 TABLET | Refills: 0 | Status: SHIPPED | OUTPATIENT
Start: 2022-11-28

## 2022-12-07 ENCOUNTER — PATIENT OUTREACH (OUTPATIENT)
Dept: CASE MANAGEMENT | Age: 87
End: 2022-12-07

## 2022-12-07 DIAGNOSIS — E11.42 DIABETIC POLYNEUROPATHY ASSOCIATED WITH TYPE 2 DIABETES MELLITUS (HCC): ICD-10-CM

## 2022-12-07 DIAGNOSIS — J44.9 CHRONIC OBSTRUCTIVE PULMONARY DISEASE, UNSPECIFIED COPD TYPE (HCC): ICD-10-CM

## 2022-12-07 DIAGNOSIS — K21.9 GASTROESOPHAGEAL REFLUX DISEASE, UNSPECIFIED WHETHER ESOPHAGITIS PRESENT: ICD-10-CM

## 2022-12-07 DIAGNOSIS — K52.9 CHRONIC DIARRHEA: ICD-10-CM

## 2022-12-07 DIAGNOSIS — R32 URINARY INCONTINENCE, UNSPECIFIED TYPE: ICD-10-CM

## 2022-12-07 DIAGNOSIS — N76.3 CHRONIC VULVITIS: ICD-10-CM

## 2022-12-07 DIAGNOSIS — M51.36 DDD (DEGENERATIVE DISC DISEASE), LUMBAR: ICD-10-CM

## 2022-12-07 DIAGNOSIS — E78.00 PURE HYPERCHOLESTEROLEMIA: ICD-10-CM

## 2022-12-07 DIAGNOSIS — M19.041 ARTHRITIS OF BOTH HANDS: ICD-10-CM

## 2022-12-07 DIAGNOSIS — I10 BENIGN ESSENTIAL HTN: ICD-10-CM

## 2022-12-07 DIAGNOSIS — G25.81 RLS (RESTLESS LEGS SYNDROME): ICD-10-CM

## 2022-12-07 DIAGNOSIS — N95.2 ATROPHIC VAGINITIS: ICD-10-CM

## 2022-12-07 DIAGNOSIS — M19.042 ARTHRITIS OF BOTH HANDS: ICD-10-CM

## 2022-12-07 DIAGNOSIS — N60.91 ATYPICAL LOBULAR HYPERPLASIA OF RIGHT BREAST: ICD-10-CM

## 2022-12-07 DIAGNOSIS — H35.3230 BILATERAL EXUDATIVE AGE-RELATED MACULAR DEGENERATION, UNSPECIFIED STAGE (HCC): ICD-10-CM

## 2022-12-07 RX ORDER — POTASSIUM CHLORIDE 750 MG/1
1 CAPSULE, EXTENDED RELEASE ORAL EVERY 8 HOURS PRN
COMMUNITY
Start: 2022-10-24

## 2022-12-15 RX ORDER — PRAMIPEXOLE DIHYDROCHLORIDE 0.5 MG/1
TABLET ORAL
Qty: 90 TABLET | Refills: 0 | Status: SHIPPED | OUTPATIENT
Start: 2022-12-15

## 2022-12-26 ENCOUNTER — HOSPITAL ENCOUNTER (EMERGENCY)
Facility: HOSPITAL | Age: 87
Discharge: HOME | End: 2022-12-26
Attending: EMERGENCY MEDICINE
Payer: MEDICARE

## 2022-12-26 VITALS
DIASTOLIC BLOOD PRESSURE: 65 MMHG | OXYGEN SATURATION: 95 % | RESPIRATION RATE: 16 BRPM | TEMPERATURE: 98 F | WEIGHT: 157 LBS | BODY MASS INDEX: 29.64 KG/M2 | SYSTOLIC BLOOD PRESSURE: 135 MMHG | HEART RATE: 76 BPM | HEIGHT: 61.02 IN

## 2022-12-26 DIAGNOSIS — N30.00 ACUTE CYSTITIS WITHOUT HEMATURIA: Primary | ICD-10-CM

## 2022-12-26 DIAGNOSIS — L29.9 PRURITUS: ICD-10-CM

## 2022-12-26 LAB
ALBUMIN SERPL-MCNC: 3.3 G/DL (ref 3.4–5)
ALBUMIN/GLOB SERPL: 1 {RATIO} (ref 1–2)
ALP LIVER SERPL-CCNC: 67 U/L
ALT SERPL-CCNC: 16 U/L
ANION GAP SERPL CALC-SCNC: 4 MMOL/L (ref 0–18)
AST SERPL-CCNC: 21 U/L (ref 15–37)
BASOPHILS # BLD AUTO: 0.07 X10(3) UL (ref 0–0.2)
BASOPHILS NFR BLD AUTO: 1.2 %
BILIRUB SERPL-MCNC: 0.4 MG/DL (ref 0.1–2)
BILIRUB UR QL STRIP.AUTO: NEGATIVE
BUN BLD-MCNC: 10 MG/DL (ref 7–18)
CALCIUM BLD-MCNC: 8.8 MG/DL (ref 8.5–10.1)
CHLORIDE SERPL-SCNC: 111 MMOL/L (ref 98–112)
CLARITY UR REFRACT.AUTO: CLEAR
CO2 SERPL-SCNC: 26 MMOL/L (ref 21–32)
COLOR UR AUTO: YELLOW
CREAT BLD-MCNC: 0.91 MG/DL
EOSINOPHIL # BLD AUTO: 0.23 X10(3) UL (ref 0–0.7)
EOSINOPHIL NFR BLD AUTO: 4 %
ERYTHROCYTE [DISTWIDTH] IN BLOOD BY AUTOMATED COUNT: 13 %
GFR SERPLBLD BASED ON 1.73 SQ M-ARVRAT: 59 ML/MIN/1.73M2 (ref 60–?)
GLOBULIN PLAS-MCNC: 3.3 G/DL (ref 2.8–4.4)
GLUCOSE BLD-MCNC: 134 MG/DL (ref 70–99)
GLUCOSE UR STRIP.AUTO-MCNC: NEGATIVE MG/DL
HCT VFR BLD AUTO: 36.8 %
HGB BLD-MCNC: 12.1 G/DL
IMM GRANULOCYTES # BLD AUTO: 0.01 X10(3) UL (ref 0–1)
IMM GRANULOCYTES NFR BLD: 0.2 %
KETONES UR STRIP.AUTO-MCNC: NEGATIVE MG/DL
LYMPHOCYTES # BLD AUTO: 1.6 X10(3) UL (ref 1–4)
LYMPHOCYTES NFR BLD AUTO: 28.1 %
MCH RBC QN AUTO: 30 PG (ref 26–34)
MCHC RBC AUTO-ENTMCNC: 32.9 G/DL (ref 31–37)
MCV RBC AUTO: 91.1 FL
MONOCYTES # BLD AUTO: 0.46 X10(3) UL (ref 0.1–1)
MONOCYTES NFR BLD AUTO: 8.1 %
NEUTROPHILS # BLD AUTO: 3.32 X10 (3) UL (ref 1.5–7.7)
NEUTROPHILS # BLD AUTO: 3.32 X10(3) UL (ref 1.5–7.7)
NEUTROPHILS NFR BLD AUTO: 58.4 %
NITRITE UR QL STRIP.AUTO: POSITIVE
OSMOLALITY SERPL CALC.SUM OF ELEC: 293 MOSM/KG (ref 275–295)
PH UR STRIP.AUTO: 6 [PH] (ref 5–8)
PLATELET # BLD AUTO: 235 10(3)UL (ref 150–450)
POTASSIUM SERPL-SCNC: 3.6 MMOL/L (ref 3.5–5.1)
PROT SERPL-MCNC: 6.6 G/DL (ref 6.4–8.2)
PROT UR STRIP.AUTO-MCNC: NEGATIVE MG/DL
RBC # BLD AUTO: 4.04 X10(6)UL
RBC UR QL AUTO: NEGATIVE
SODIUM SERPL-SCNC: 141 MMOL/L (ref 136–145)
SP GR UR STRIP.AUTO: 1.01 (ref 1–1.03)
UROBILINOGEN UR STRIP.AUTO-MCNC: 0.2 MG/DL
WBC # BLD AUTO: 5.7 X10(3) UL (ref 4–11)

## 2022-12-26 PROCEDURE — 80053 COMPREHEN METABOLIC PANEL: CPT | Performed by: EMERGENCY MEDICINE

## 2022-12-26 PROCEDURE — 96365 THER/PROPH/DIAG IV INF INIT: CPT

## 2022-12-26 PROCEDURE — 87186 SC STD MICRODIL/AGAR DIL: CPT | Performed by: EMERGENCY MEDICINE

## 2022-12-26 PROCEDURE — 87086 URINE CULTURE/COLONY COUNT: CPT | Performed by: EMERGENCY MEDICINE

## 2022-12-26 PROCEDURE — 99284 EMERGENCY DEPT VISIT MOD MDM: CPT

## 2022-12-26 PROCEDURE — 87077 CULTURE AEROBIC IDENTIFY: CPT | Performed by: EMERGENCY MEDICINE

## 2022-12-26 PROCEDURE — 81015 MICROSCOPIC EXAM OF URINE: CPT | Performed by: EMERGENCY MEDICINE

## 2022-12-26 PROCEDURE — 85025 COMPLETE CBC W/AUTO DIFF WBC: CPT | Performed by: EMERGENCY MEDICINE

## 2022-12-26 PROCEDURE — 81001 URINALYSIS AUTO W/SCOPE: CPT | Performed by: EMERGENCY MEDICINE

## 2022-12-26 RX ORDER — HYDROXYZINE HYDROCHLORIDE 25 MG/1
25 TABLET, FILM COATED ORAL EVERY 6 HOURS PRN
Qty: 20 TABLET | Refills: 0 | Status: SHIPPED | OUTPATIENT
Start: 2022-12-26 | End: 2023-01-25

## 2022-12-26 RX ORDER — AMOXICILLIN 500 MG/1
500 TABLET, FILM COATED ORAL 2 TIMES DAILY
Qty: 14 TABLET | Refills: 0 | Status: SHIPPED | OUTPATIENT
Start: 2022-12-26 | End: 2023-01-02

## 2022-12-26 NOTE — DISCHARGE INSTRUCTIONS
Amoxicillin twice per day, next dose tonight. Hydroxyzine up to 4 times a day as needed for itchiness. This will make you drowsy so be careful about when you take it. You can let him know all labs were negative, he does not have primary polycythemia vera  Recommend follow up with Dr. Herrera with CBC when he returns to the clinic for further discussion and any additional recommendations

## 2023-01-14 ENCOUNTER — WALK IN (OUTPATIENT)
Dept: URGENT CARE | Age: 88
End: 2023-01-14

## 2023-01-14 VITALS
RESPIRATION RATE: 16 BRPM | TEMPERATURE: 96 F | HEART RATE: 75 BPM | SYSTOLIC BLOOD PRESSURE: 130 MMHG | DIASTOLIC BLOOD PRESSURE: 58 MMHG | OXYGEN SATURATION: 97 %

## 2023-01-14 DIAGNOSIS — R10.9 ABDOMINAL PRESSURE: ICD-10-CM

## 2023-01-14 DIAGNOSIS — R30.0 DYSURIA: ICD-10-CM

## 2023-01-14 DIAGNOSIS — N39.0 URINARY TRACT INFECTION WITHOUT HEMATURIA, SITE UNSPECIFIED: Primary | ICD-10-CM

## 2023-01-14 LAB
APPEARANCE, POC: ABNORMAL
BILIRUBIN, POC: ABNORMAL
COLOR, POC: YELLOW
GLUCOSE UR-MCNC: NEGATIVE MG/DL
KETONES, POC: ABNORMAL MG/DL
NITRITE, POC: POSITIVE
OCCULT BLOOD, POC: ABNORMAL
PH UR: 5 [PH] (ref 5–7)
PROT UR-MCNC: ABNORMAL MG/DL
SP GR UR: >= 1.03 (ref 1–1.03)
UROBILINOGEN UR-MCNC: 0.2 MG/DL (ref 0–1)
WBC (LEUKOCYTE) ESTERASE, POC: ABNORMAL

## 2023-01-14 PROCEDURE — 87186 SC STD MICRODIL/AGAR DIL: CPT | Performed by: CLINICAL MEDICAL LABORATORY

## 2023-01-14 PROCEDURE — 99214 OFFICE O/P EST MOD 30 MIN: CPT | Performed by: FAMILY MEDICINE

## 2023-01-14 PROCEDURE — 87086 URINE CULTURE/COLONY COUNT: CPT | Performed by: CLINICAL MEDICAL LABORATORY

## 2023-01-14 PROCEDURE — 81002 URINALYSIS NONAUTO W/O SCOPE: CPT | Performed by: FAMILY MEDICINE

## 2023-01-14 PROCEDURE — 87088 URINE BACTERIA CULTURE: CPT | Performed by: CLINICAL MEDICAL LABORATORY

## 2023-01-14 RX ORDER — FUROSEMIDE 20 MG/1
20 TABLET ORAL DAILY
COMMUNITY
Start: 2022-12-16

## 2023-01-14 RX ORDER — CEPHALEXIN 500 MG/1
500 CAPSULE ORAL 3 TIMES DAILY
Qty: 21 CAPSULE | Refills: 0 | Status: SHIPPED | OUTPATIENT
Start: 2023-01-14 | End: 2023-01-21

## 2023-01-16 ENCOUNTER — PATIENT OUTREACH (OUTPATIENT)
Dept: CASE MANAGEMENT | Age: 88
End: 2023-01-16

## 2023-01-17 ENCOUNTER — TELEPHONE (OUTPATIENT)
Dept: URGENT CARE | Age: 88
End: 2023-01-17

## 2023-01-17 LAB — BACTERIA UR CULT: ABNORMAL

## 2023-01-27 ENCOUNTER — PATIENT OUTREACH (OUTPATIENT)
Dept: CASE MANAGEMENT | Age: 88
End: 2023-01-27

## 2023-01-27 DIAGNOSIS — E78.00 PURE HYPERCHOLESTEROLEMIA: ICD-10-CM

## 2023-01-27 DIAGNOSIS — I10 BENIGN ESSENTIAL HTN: ICD-10-CM

## 2023-01-27 DIAGNOSIS — K21.9 GASTROESOPHAGEAL REFLUX DISEASE, UNSPECIFIED WHETHER ESOPHAGITIS PRESENT: ICD-10-CM

## 2023-01-27 DIAGNOSIS — K52.9 CHRONIC DIARRHEA: ICD-10-CM

## 2023-01-27 DIAGNOSIS — M51.36 DDD (DEGENERATIVE DISC DISEASE), LUMBAR: ICD-10-CM

## 2023-01-27 DIAGNOSIS — N39.0 RECURRENT UTI: ICD-10-CM

## 2023-01-27 DIAGNOSIS — M19.041 ARTHRITIS OF BOTH HANDS: ICD-10-CM

## 2023-01-27 DIAGNOSIS — J44.9 CHRONIC OBSTRUCTIVE PULMONARY DISEASE, UNSPECIFIED COPD TYPE (HCC): ICD-10-CM

## 2023-01-27 DIAGNOSIS — M19.042 ARTHRITIS OF BOTH HANDS: ICD-10-CM

## 2023-01-27 DIAGNOSIS — H35.3230 BILATERAL EXUDATIVE AGE-RELATED MACULAR DEGENERATION, UNSPECIFIED STAGE (HCC): ICD-10-CM

## 2023-01-27 DIAGNOSIS — G25.81 RLS (RESTLESS LEGS SYNDROME): ICD-10-CM

## 2023-01-27 DIAGNOSIS — N95.2 ATROPHIC VAGINITIS: ICD-10-CM

## 2023-01-27 DIAGNOSIS — E11.42 DIABETIC POLYNEUROPATHY ASSOCIATED WITH TYPE 2 DIABETES MELLITUS (HCC): ICD-10-CM

## 2023-01-27 RX ORDER — CEPHALEXIN 500 MG/1
CAPSULE ORAL
COMMUNITY
Start: 2023-01-14

## 2023-02-27 ENCOUNTER — PATIENT OUTREACH (OUTPATIENT)
Dept: CASE MANAGEMENT | Age: 88
End: 2023-02-27

## 2023-02-27 DIAGNOSIS — G25.81 RLS (RESTLESS LEGS SYNDROME): ICD-10-CM

## 2023-02-27 DIAGNOSIS — K21.9 GASTROESOPHAGEAL REFLUX DISEASE, UNSPECIFIED WHETHER ESOPHAGITIS PRESENT: ICD-10-CM

## 2023-02-27 DIAGNOSIS — M19.042 ARTHRITIS OF BOTH HANDS: ICD-10-CM

## 2023-02-27 DIAGNOSIS — Z91.81 AT RISK FOR FALLING: ICD-10-CM

## 2023-02-27 DIAGNOSIS — J44.9 CHRONIC OBSTRUCTIVE PULMONARY DISEASE, UNSPECIFIED COPD TYPE (HCC): ICD-10-CM

## 2023-02-27 DIAGNOSIS — K52.9 CHRONIC DIARRHEA: ICD-10-CM

## 2023-02-27 DIAGNOSIS — N60.91 ATYPICAL LOBULAR HYPERPLASIA OF RIGHT BREAST: ICD-10-CM

## 2023-02-27 DIAGNOSIS — M19.041 ARTHRITIS OF BOTH HANDS: ICD-10-CM

## 2023-02-27 DIAGNOSIS — M51.36 DDD (DEGENERATIVE DISC DISEASE), LUMBAR: ICD-10-CM

## 2023-02-27 DIAGNOSIS — G56.03 BILATERAL CARPAL TUNNEL SYNDROME: ICD-10-CM

## 2023-02-27 DIAGNOSIS — N39.0 RECURRENT UTI: ICD-10-CM

## 2023-02-27 DIAGNOSIS — I20.8 ANGINAL EQUIVALENT (HCC): ICD-10-CM

## 2023-02-27 DIAGNOSIS — N76.3 CHRONIC VULVITIS: ICD-10-CM

## 2023-02-27 DIAGNOSIS — E11.42 DIABETIC POLYNEUROPATHY ASSOCIATED WITH TYPE 2 DIABETES MELLITUS (HCC): ICD-10-CM

## 2023-02-27 DIAGNOSIS — H81.399 PERIPHERAL VERTIGO, UNSPECIFIED LATERALITY: ICD-10-CM

## 2023-02-27 DIAGNOSIS — E78.00 PURE HYPERCHOLESTEROLEMIA: ICD-10-CM

## 2023-02-27 DIAGNOSIS — D05.10 DUCTAL CARCINOMA IN SITU (DCIS) OF BREAST, UNSPECIFIED LATERALITY: ICD-10-CM

## 2023-02-27 DIAGNOSIS — I10 BENIGN ESSENTIAL HTN: ICD-10-CM

## 2023-02-27 DIAGNOSIS — N95.2 ATROPHIC VAGINITIS: ICD-10-CM

## 2023-02-27 DIAGNOSIS — H35.3230 BILATERAL EXUDATIVE AGE-RELATED MACULAR DEGENERATION, UNSPECIFIED STAGE (HCC): ICD-10-CM

## 2023-02-27 RX ORDER — NITROFURANTOIN 25; 75 MG/1; MG/1
100 CAPSULE ORAL 2 TIMES DAILY
COMMUNITY
Start: 2023-02-01

## 2023-03-05 ENCOUNTER — WALK IN (OUTPATIENT)
Dept: URGENT CARE | Age: 88
End: 2023-03-05

## 2023-03-05 VITALS
SYSTOLIC BLOOD PRESSURE: 135 MMHG | RESPIRATION RATE: 18 BRPM | HEART RATE: 69 BPM | DIASTOLIC BLOOD PRESSURE: 60 MMHG | OXYGEN SATURATION: 97 % | TEMPERATURE: 98.2 F

## 2023-03-05 DIAGNOSIS — R30.0 DYSURIA: Primary | ICD-10-CM

## 2023-03-05 LAB
APPEARANCE, POC: ABNORMAL
BILIRUBIN, POC: NEGATIVE
COLOR, POC: YELLOW
GLUCOSE UR-MCNC: NEGATIVE MG/DL
KETONES, POC: NEGATIVE MG/DL
NITRITE, POC: POSITIVE
OCCULT BLOOD, POC: ABNORMAL
PH UR: 6.5 [PH] (ref 5–7)
PROT UR-MCNC: ABNORMAL MG/DL
SP GR UR: 1.02 (ref 1–1.03)
UROBILINOGEN UR-MCNC: 0.2 MG/DL (ref 0–1)
WBC (LEUKOCYTE) ESTERASE, POC: ABNORMAL

## 2023-03-05 PROCEDURE — 87086 URINE CULTURE/COLONY COUNT: CPT | Performed by: CLINICAL MEDICAL LABORATORY

## 2023-03-05 PROCEDURE — 81002 URINALYSIS NONAUTO W/O SCOPE: CPT | Performed by: FAMILY MEDICINE

## 2023-03-05 PROCEDURE — 87186 SC STD MICRODIL/AGAR DIL: CPT | Performed by: CLINICAL MEDICAL LABORATORY

## 2023-03-05 PROCEDURE — 99214 OFFICE O/P EST MOD 30 MIN: CPT | Performed by: FAMILY MEDICINE

## 2023-03-05 PROCEDURE — 87088 URINE BACTERIA CULTURE: CPT | Performed by: CLINICAL MEDICAL LABORATORY

## 2023-03-05 RX ORDER — FERROUS SULFATE 325(65) MG
TABLET ORAL
COMMUNITY

## 2023-03-05 RX ORDER — PYRIDOXINE HCL (VITAMIN B6) 100 MG
TABLET ORAL
COMMUNITY

## 2023-03-05 RX ORDER — NITROFURANTOIN 25; 75 MG/1; MG/1
100 CAPSULE ORAL 2 TIMES DAILY
Qty: 10 CAPSULE | Refills: 0 | Status: SHIPPED | OUTPATIENT
Start: 2023-03-05 | End: 2023-03-10

## 2023-03-05 RX ORDER — BUDESONIDE AND FORMOTEROL FUMARATE DIHYDRATE 80; 4.5 UG/1; UG/1
2 AEROSOL RESPIRATORY (INHALATION)
COMMUNITY
Start: 2022-10-14

## 2023-03-05 RX ORDER — POTASSIUM CHLORIDE 750 MG/1
1 CAPSULE, EXTENDED RELEASE ORAL
COMMUNITY
Start: 2022-10-24

## 2023-03-07 ENCOUNTER — TELEPHONE (OUTPATIENT)
Facility: CLINIC | Age: 88
End: 2023-03-07

## 2023-03-07 DIAGNOSIS — Z12.31 ENCOUNTER FOR SCREENING MAMMOGRAM FOR MALIGNANT NEOPLASM OF BREAST: Primary | ICD-10-CM

## 2023-03-07 NOTE — TELEPHONE ENCOUNTER
Last mammo 2/2022; due 12 months from this date. Last WWE 11/01/21 (Medicare); last problem visit 11/17/22  H/O DCIS    SANDY Mammo pended for Dr. NOBLES's review.

## 2023-03-07 NOTE — TELEPHONE ENCOUNTER
Patient made aware mammogram order has been placed; provided pt with Central Scheduling's contact number. Pt transferred to front office staff for scheduling of Tequila Wade. Pt verbalizes understanding of the above and agrees with the plan.

## 2023-03-07 NOTE — TELEPHONE ENCOUNTER
Patient called with central scheduling on the line. Patient would like a mammogram order placed so she can make an appt. Please advise.

## 2023-03-08 LAB — BACTERIA UR CULT: ABNORMAL

## 2023-03-11 ENCOUNTER — APPOINTMENT (OUTPATIENT)
Dept: CT IMAGING | Facility: HOSPITAL | Age: 88
End: 2023-03-11
Attending: EMERGENCY MEDICINE
Payer: MEDICARE

## 2023-03-11 ENCOUNTER — APPOINTMENT (OUTPATIENT)
Dept: GENERAL RADIOLOGY | Facility: HOSPITAL | Age: 88
End: 2023-03-11
Attending: EMERGENCY MEDICINE
Payer: MEDICARE

## 2023-03-11 ENCOUNTER — HOSPITAL ENCOUNTER (INPATIENT)
Facility: HOSPITAL | Age: 88
LOS: 1 days | Discharge: HOME | End: 2023-03-13
Attending: EMERGENCY MEDICINE | Admitting: HOSPITALIST
Payer: MEDICARE

## 2023-03-11 DIAGNOSIS — N39.0 URINARY TRACT INFECTION WITHOUT HEMATURIA, SITE UNSPECIFIED: Primary | ICD-10-CM

## 2023-03-11 DIAGNOSIS — R26.81 UNSTEADY GAIT: ICD-10-CM

## 2023-03-11 LAB
ALBUMIN SERPL-MCNC: 3.4 G/DL (ref 3.4–5)
ALBUMIN/GLOB SERPL: 0.9 {RATIO} (ref 1–2)
ALP LIVER SERPL-CCNC: 79 U/L
ALT SERPL-CCNC: 18 U/L
ANION GAP SERPL CALC-SCNC: 5 MMOL/L (ref 0–18)
AST SERPL-CCNC: 15 U/L (ref 15–37)
BASOPHILS # BLD AUTO: 0.08 X10(3) UL (ref 0–0.2)
BASOPHILS NFR BLD AUTO: 1.1 %
BILIRUB SERPL-MCNC: 0.4 MG/DL (ref 0.1–2)
BILIRUB UR QL STRIP.AUTO: NEGATIVE
BUN BLD-MCNC: 11 MG/DL (ref 7–18)
CALCIUM BLD-MCNC: 9.6 MG/DL (ref 8.5–10.1)
CHLORIDE SERPL-SCNC: 108 MMOL/L (ref 98–112)
CO2 SERPL-SCNC: 27 MMOL/L (ref 21–32)
COLOR UR AUTO: YELLOW
CREAT BLD-MCNC: 0.87 MG/DL
EOSINOPHIL # BLD AUTO: 0.16 X10(3) UL (ref 0–0.7)
EOSINOPHIL NFR BLD AUTO: 2.3 %
ERYTHROCYTE [DISTWIDTH] IN BLOOD BY AUTOMATED COUNT: 13 %
GFR SERPLBLD BASED ON 1.73 SQ M-ARVRAT: 62 ML/MIN/1.73M2 (ref 60–?)
GLOBULIN PLAS-MCNC: 3.9 G/DL (ref 2.8–4.4)
GLUCOSE BLD-MCNC: 125 MG/DL (ref 70–99)
GLUCOSE BLD-MCNC: 150 MG/DL (ref 70–99)
GLUCOSE UR STRIP.AUTO-MCNC: NEGATIVE MG/DL
HCT VFR BLD AUTO: 39.5 %
HGB BLD-MCNC: 13.2 G/DL
IMM GRANULOCYTES # BLD AUTO: 0.02 X10(3) UL (ref 0–1)
IMM GRANULOCYTES NFR BLD: 0.3 %
KETONES UR STRIP.AUTO-MCNC: NEGATIVE MG/DL
LYMPHOCYTES # BLD AUTO: 1.4 X10(3) UL (ref 1–4)
LYMPHOCYTES NFR BLD AUTO: 19.7 %
MCH RBC QN AUTO: 29.9 PG (ref 26–34)
MCHC RBC AUTO-ENTMCNC: 33.4 G/DL (ref 31–37)
MCV RBC AUTO: 89.4 FL
MONOCYTES # BLD AUTO: 0.5 X10(3) UL (ref 0.1–1)
MONOCYTES NFR BLD AUTO: 7 %
NEUTROPHILS # BLD AUTO: 4.94 X10 (3) UL (ref 1.5–7.7)
NEUTROPHILS # BLD AUTO: 4.94 X10(3) UL (ref 1.5–7.7)
NEUTROPHILS NFR BLD AUTO: 69.6 %
NITRITE UR QL STRIP.AUTO: NEGATIVE
OSMOLALITY SERPL CALC.SUM OF ELEC: 291 MOSM/KG (ref 275–295)
PH UR STRIP.AUTO: 5 [PH] (ref 5–8)
PLATELET # BLD AUTO: 250 10(3)UL (ref 150–450)
POTASSIUM SERPL-SCNC: 3.7 MMOL/L (ref 3.5–5.1)
PROT SERPL-MCNC: 7.3 G/DL (ref 6.4–8.2)
PROT UR STRIP.AUTO-MCNC: NEGATIVE MG/DL
RBC # BLD AUTO: 4.42 X10(6)UL
RBC UR QL AUTO: NEGATIVE
SARS-COV-2 RNA RESP QL NAA+PROBE: NOT DETECTED
SODIUM SERPL-SCNC: 140 MMOL/L (ref 136–145)
SP GR UR STRIP.AUTO: 1.02 (ref 1–1.03)
TROPONIN I HIGH SENSITIVITY: 7 NG/L
UROBILINOGEN UR STRIP.AUTO-MCNC: <2 MG/DL
WBC # BLD AUTO: 7.1 X10(3) UL (ref 4–11)

## 2023-03-11 PROCEDURE — 99223 1ST HOSP IP/OBS HIGH 75: CPT | Performed by: HOSPITALIST

## 2023-03-11 PROCEDURE — 71045 X-RAY EXAM CHEST 1 VIEW: CPT | Performed by: EMERGENCY MEDICINE

## 2023-03-11 PROCEDURE — 70450 CT HEAD/BRAIN W/O DYE: CPT | Performed by: EMERGENCY MEDICINE

## 2023-03-11 RX ORDER — SODIUM CHLORIDE 9 MG/ML
125 INJECTION, SOLUTION INTRAVENOUS CONTINUOUS
Status: DISCONTINUED | OUTPATIENT
Start: 2023-03-11 | End: 2023-03-12

## 2023-03-12 ENCOUNTER — APPOINTMENT (OUTPATIENT)
Dept: MRI IMAGING | Facility: HOSPITAL | Age: 88
End: 2023-03-12
Attending: EMERGENCY MEDICINE
Payer: MEDICARE

## 2023-03-12 PROBLEM — R26.81 UNSTEADY GAIT: Status: ACTIVE | Noted: 2023-03-12

## 2023-03-12 LAB
ALBUMIN SERPL-MCNC: 3.2 G/DL (ref 3.4–5)
ALBUMIN/GLOB SERPL: 1 {RATIO} (ref 1–2)
ALP LIVER SERPL-CCNC: 73 U/L
ALT SERPL-CCNC: 17 U/L
ANION GAP SERPL CALC-SCNC: 8 MMOL/L (ref 0–18)
AST SERPL-CCNC: 13 U/L (ref 15–37)
ATRIAL RATE: 65 BPM
BASOPHILS # BLD AUTO: 0.07 X10(3) UL (ref 0–0.2)
BASOPHILS NFR BLD AUTO: 1 %
BILIRUB SERPL-MCNC: 0.5 MG/DL (ref 0.1–2)
BUN BLD-MCNC: 14 MG/DL (ref 7–18)
CALCIUM BLD-MCNC: 9.2 MG/DL (ref 8.5–10.1)
CHLORIDE SERPL-SCNC: 108 MMOL/L (ref 98–112)
CHOLEST SERPL-MCNC: 178 MG/DL (ref ?–200)
CO2 SERPL-SCNC: 26 MMOL/L (ref 21–32)
CREAT BLD-MCNC: 0.81 MG/DL
EOSINOPHIL # BLD AUTO: 0.17 X10(3) UL (ref 0–0.7)
EOSINOPHIL NFR BLD AUTO: 2.4 %
ERYTHROCYTE [DISTWIDTH] IN BLOOD BY AUTOMATED COUNT: 13.1 %
EST. AVERAGE GLUCOSE BLD GHB EST-MCNC: 157 MG/DL (ref 68–126)
GFR SERPLBLD BASED ON 1.73 SQ M-ARVRAT: 68 ML/MIN/1.73M2 (ref 60–?)
GLOBULIN PLAS-MCNC: 3.3 G/DL (ref 2.8–4.4)
GLUCOSE BLD-MCNC: 118 MG/DL (ref 70–99)
GLUCOSE BLD-MCNC: 119 MG/DL (ref 70–99)
GLUCOSE BLD-MCNC: 133 MG/DL (ref 70–99)
GLUCOSE BLD-MCNC: 150 MG/DL (ref 70–99)
GLUCOSE BLD-MCNC: 157 MG/DL (ref 70–99)
HBA1C MFR BLD: 7.1 % (ref ?–5.7)
HCT VFR BLD AUTO: 36.7 %
HDLC SERPL-MCNC: 39 MG/DL (ref 40–59)
HGB BLD-MCNC: 12.2 G/DL
IMM GRANULOCYTES # BLD AUTO: 0.01 X10(3) UL (ref 0–1)
IMM GRANULOCYTES NFR BLD: 0.1 %
LDLC SERPL CALC-MCNC: 103 MG/DL (ref ?–100)
LYMPHOCYTES # BLD AUTO: 1.88 X10(3) UL (ref 1–4)
LYMPHOCYTES NFR BLD AUTO: 26.6 %
MAGNESIUM SERPL-MCNC: 1.4 MG/DL (ref 1.6–2.6)
MCH RBC QN AUTO: 29.7 PG (ref 26–34)
MCHC RBC AUTO-ENTMCNC: 33.2 G/DL (ref 31–37)
MCV RBC AUTO: 89.3 FL
MONOCYTES # BLD AUTO: 0.54 X10(3) UL (ref 0.1–1)
MONOCYTES NFR BLD AUTO: 7.6 %
NEUTROPHILS # BLD AUTO: 4.39 X10 (3) UL (ref 1.5–7.7)
NEUTROPHILS # BLD AUTO: 4.39 X10(3) UL (ref 1.5–7.7)
NEUTROPHILS NFR BLD AUTO: 62.3 %
NONHDLC SERPL-MCNC: 139 MG/DL (ref ?–130)
OSMOLALITY SERPL CALC.SUM OF ELEC: 296 MOSM/KG (ref 275–295)
P AXIS: 78 DEGREES
P-R INTERVAL: 134 MS
PLATELET # BLD AUTO: 229 10(3)UL (ref 150–450)
POTASSIUM SERPL-SCNC: 3.7 MMOL/L (ref 3.5–5.1)
PROT SERPL-MCNC: 6.5 G/DL (ref 6.4–8.2)
Q-T INTERVAL: 400 MS
QRS DURATION: 98 MS
QTC CALCULATION (BEZET): 416 MS
R AXIS: -38 DEGREES
RBC # BLD AUTO: 4.11 X10(6)UL
SODIUM SERPL-SCNC: 142 MMOL/L (ref 136–145)
T AXIS: 74 DEGREES
TRIGL SERPL-MCNC: 207 MG/DL (ref 30–149)
VENTRICULAR RATE: 65 BPM
VLDLC SERPL CALC-MCNC: 35 MG/DL (ref 0–30)
WBC # BLD AUTO: 7.1 X10(3) UL (ref 4–11)

## 2023-03-12 PROCEDURE — 99233 SBSQ HOSP IP/OBS HIGH 50: CPT | Performed by: STUDENT IN AN ORGANIZED HEALTH CARE EDUCATION/TRAINING PROGRAM

## 2023-03-12 PROCEDURE — 70551 MRI BRAIN STEM W/O DYE: CPT | Performed by: EMERGENCY MEDICINE

## 2023-03-12 RX ORDER — PRAMIPEXOLE DIHYDROCHLORIDE 0.25 MG/1
0.5 TABLET ORAL NIGHTLY
Status: DISCONTINUED | OUTPATIENT
Start: 2023-03-12 | End: 2023-03-13

## 2023-03-12 RX ORDER — NICOTINE POLACRILEX 4 MG
15 LOZENGE BUCCAL
Status: DISCONTINUED | OUTPATIENT
Start: 2023-03-12 | End: 2023-03-13

## 2023-03-12 RX ORDER — ENOXAPARIN SODIUM 100 MG/ML
40 INJECTION SUBCUTANEOUS DAILY
Status: DISCONTINUED | OUTPATIENT
Start: 2023-03-12 | End: 2023-03-13

## 2023-03-12 RX ORDER — NICOTINE POLACRILEX 4 MG
30 LOZENGE BUCCAL
Status: DISCONTINUED | OUTPATIENT
Start: 2023-03-12 | End: 2023-03-13

## 2023-03-12 RX ORDER — SODIUM CHLORIDE 9 MG/ML
INJECTION, SOLUTION INTRAVENOUS CONTINUOUS
Status: DISCONTINUED | OUTPATIENT
Start: 2023-03-12 | End: 2023-03-12

## 2023-03-12 RX ORDER — ASPIRIN 81 MG/1
81 TABLET ORAL DAILY
Status: DISCONTINUED | OUTPATIENT
Start: 2023-03-13 | End: 2023-03-13

## 2023-03-12 RX ORDER — METOCLOPRAMIDE HYDROCHLORIDE 5 MG/ML
5 INJECTION INTRAMUSCULAR; INTRAVENOUS EVERY 8 HOURS PRN
Status: DISCONTINUED | OUTPATIENT
Start: 2023-03-12 | End: 2023-03-13

## 2023-03-12 RX ORDER — DEXTROSE MONOHYDRATE 25 G/50ML
50 INJECTION, SOLUTION INTRAVENOUS
Status: DISCONTINUED | OUTPATIENT
Start: 2023-03-12 | End: 2023-03-13

## 2023-03-12 RX ORDER — LOSARTAN POTASSIUM 100 MG/1
100 TABLET ORAL DAILY
Status: DISCONTINUED | OUTPATIENT
Start: 2023-03-12 | End: 2023-03-13

## 2023-03-12 RX ORDER — ATORVASTATIN CALCIUM 80 MG/1
80 TABLET, FILM COATED ORAL NIGHTLY
Status: DISCONTINUED | OUTPATIENT
Start: 2023-03-12 | End: 2023-03-13

## 2023-03-12 RX ORDER — BISACODYL 10 MG
10 SUPPOSITORY, RECTAL RECTAL
Status: DISCONTINUED | OUTPATIENT
Start: 2023-03-12 | End: 2023-03-13

## 2023-03-12 RX ORDER — MECLIZINE HCL 12.5 MG/1
12.5 TABLET ORAL 3 TIMES DAILY
Status: DISCONTINUED | OUTPATIENT
Start: 2023-03-12 | End: 2023-03-13

## 2023-03-12 RX ORDER — SENNOSIDES 8.6 MG
17.2 TABLET ORAL NIGHTLY PRN
Status: DISCONTINUED | OUTPATIENT
Start: 2023-03-12 | End: 2023-03-13

## 2023-03-12 RX ORDER — MELATONIN
3 NIGHTLY PRN
Status: DISCONTINUED | OUTPATIENT
Start: 2023-03-12 | End: 2023-03-13

## 2023-03-12 RX ORDER — SODIUM CHLORIDE 9 MG/ML
INJECTION, SOLUTION INTRAVENOUS CONTINUOUS
Status: ACTIVE | OUTPATIENT
Start: 2023-03-12 | End: 2023-03-12

## 2023-03-12 RX ORDER — MAGNESIUM OXIDE 400 MG/1
800 TABLET ORAL ONCE
Status: COMPLETED | OUTPATIENT
Start: 2023-03-12 | End: 2023-03-12

## 2023-03-12 RX ORDER — SODIUM PHOSPHATE, DIBASIC AND SODIUM PHOSPHATE, MONOBASIC 7; 19 G/133ML; G/133ML
1 ENEMA RECTAL ONCE AS NEEDED
Status: DISCONTINUED | OUTPATIENT
Start: 2023-03-12 | End: 2023-03-13

## 2023-03-12 RX ORDER — ACETAMINOPHEN 650 MG/1
650 SUPPOSITORY RECTAL EVERY 4 HOURS PRN
Status: DISCONTINUED | OUTPATIENT
Start: 2023-03-12 | End: 2023-03-13

## 2023-03-12 RX ORDER — PANTOPRAZOLE SODIUM 40 MG/1
40 TABLET, DELAYED RELEASE ORAL
Status: DISCONTINUED | OUTPATIENT
Start: 2023-03-12 | End: 2023-03-13

## 2023-03-12 RX ORDER — ACETAMINOPHEN 325 MG/1
650 TABLET ORAL EVERY 4 HOURS PRN
Status: DISCONTINUED | OUTPATIENT
Start: 2023-03-12 | End: 2023-03-13

## 2023-03-12 RX ORDER — ALBUTEROL SULFATE 90 UG/1
2 AEROSOL, METERED RESPIRATORY (INHALATION) EVERY 4 HOURS PRN
Status: DISCONTINUED | OUTPATIENT
Start: 2023-03-12 | End: 2023-03-13

## 2023-03-12 RX ORDER — ACETAMINOPHEN 500 MG
1000 TABLET ORAL EVERY 4 HOURS PRN
Status: DISCONTINUED | OUTPATIENT
Start: 2023-03-12 | End: 2023-03-13

## 2023-03-12 RX ORDER — DOCUSATE SODIUM 100 MG/1
100 CAPSULE, LIQUID FILLED ORAL 2 TIMES DAILY PRN
Status: DISCONTINUED | OUTPATIENT
Start: 2023-03-12 | End: 2023-03-13

## 2023-03-12 RX ORDER — ONDANSETRON 2 MG/ML
4 INJECTION INTRAMUSCULAR; INTRAVENOUS EVERY 6 HOURS PRN
Status: DISCONTINUED | OUTPATIENT
Start: 2023-03-12 | End: 2023-03-13

## 2023-03-12 RX ORDER — FLUTICASONE PROPIONATE 50 MCG
2 SPRAY, SUSPENSION (ML) NASAL DAILY
Status: DISCONTINUED | OUTPATIENT
Start: 2023-03-12 | End: 2023-03-13

## 2023-03-12 RX ORDER — ESTRADIOL 0.1 MG/G
CREAM VAGINAL NIGHTLY
Status: DISCONTINUED | OUTPATIENT
Start: 2023-03-12 | End: 2023-03-13

## 2023-03-12 RX ORDER — ASPIRIN 300 MG/1
300 SUPPOSITORY RECTAL DAILY
Status: DISCONTINUED | OUTPATIENT
Start: 2023-03-12 | End: 2023-03-12

## 2023-03-12 RX ORDER — FUROSEMIDE 20 MG/1
20 TABLET ORAL DAILY
Status: DISCONTINUED | OUTPATIENT
Start: 2023-03-12 | End: 2023-03-13

## 2023-03-12 RX ORDER — BENZONATATE 100 MG/1
200 CAPSULE ORAL 3 TIMES DAILY PRN
Status: DISCONTINUED | OUTPATIENT
Start: 2023-03-12 | End: 2023-03-13

## 2023-03-12 RX ORDER — ASPIRIN 325 MG
325 TABLET ORAL DAILY
Status: DISCONTINUED | OUTPATIENT
Start: 2023-03-12 | End: 2023-03-12

## 2023-03-12 RX ORDER — POLYETHYLENE GLYCOL 3350 17 G/17G
17 POWDER, FOR SOLUTION ORAL DAILY PRN
Status: DISCONTINUED | OUTPATIENT
Start: 2023-03-12 | End: 2023-03-13

## 2023-03-12 NOTE — PLAN OF CARE
Assumed care at around 0100   A&Ox4, RA, NSR on tele  Q2 Neuro, dizziness noted   Prn tylenol given for back pain   Voiding up in bathroom   IVF infusing per order   Call light within reach    Patient updated on plan of care

## 2023-03-12 NOTE — ED INITIAL ASSESSMENT (HPI)
Pt has been dizzy X 2 hours. HX of vertigo and DM. Blood sugar 150 mg/dl in triage. Pupils PERRLA, A+OX4, Cranial nerves 2-12 intact.

## 2023-03-13 VITALS
WEIGHT: 156.5 LBS | DIASTOLIC BLOOD PRESSURE: 63 MMHG | SYSTOLIC BLOOD PRESSURE: 134 MMHG | RESPIRATION RATE: 18 BRPM | TEMPERATURE: 99 F | BODY MASS INDEX: 30 KG/M2 | OXYGEN SATURATION: 93 % | HEART RATE: 66 BPM

## 2023-03-13 LAB
GLUCOSE BLD-MCNC: 111 MG/DL (ref 70–99)
GLUCOSE BLD-MCNC: 117 MG/DL (ref 70–99)
GLUCOSE BLD-MCNC: 120 MG/DL (ref 70–99)
MAGNESIUM SERPL-MCNC: 1.3 MG/DL (ref 1.6–2.6)

## 2023-03-13 PROCEDURE — 99239 HOSP IP/OBS DSCHRG MGMT >30: CPT | Performed by: STUDENT IN AN ORGANIZED HEALTH CARE EDUCATION/TRAINING PROGRAM

## 2023-03-13 RX ORDER — MECLIZINE HCL 12.5 MG/1
12.5 TABLET ORAL 3 TIMES DAILY PRN
Qty: 30 TABLET | Refills: 0 | Status: SHIPPED | OUTPATIENT
Start: 2023-03-13

## 2023-03-13 RX ORDER — MAGNESIUM OXIDE 400 MG/1
800 TABLET ORAL ONCE
Status: COMPLETED | OUTPATIENT
Start: 2023-03-13 | End: 2023-03-13

## 2023-03-13 RX ORDER — SODIUM CHLORIDE 9 MG/ML
INJECTION, SOLUTION INTRAVENOUS ONCE
Status: DISCONTINUED | OUTPATIENT
Start: 2023-03-13 | End: 2023-03-13

## 2023-03-13 NOTE — PROGRESS NOTES
Orthostatic BP results       03/13/23 1030 03/13/23 1032 03/13/23 1034   Vital Signs   /34 155/61 158/66   MAP (mmHg) 57 77 88   BP Location Left arm Left arm Left arm   BP Method Automatic Automatic Automatic   Patient Position Lying Sitting Standing

## 2023-03-13 NOTE — HOME CARE LIAISON
Received referral via [facility] for Home Health services. Spoke w/ patient and provided with list of [facility] providers from [facility], choice is [facility]. Agency reserved in [facility] and contact information placed on AVS.Financial interest disclosure provided. Notified LISET.

## 2023-03-13 NOTE — PROGRESS NOTES
NURSING DISCHARGE NOTE    Assumed care of pt at 07:30am  OT evaluated pt. Orthostatic pressures completed. 500 ml bolus given per order. Repeat orthostatic BP completed, stable. Pt denies dizziness. Pt medically cleared to discharge home. Discharge AVS completed. Discharge orders, instructions and Rx given and reviewed with patient. All questions answered, pt v/u. Pt discharged home via son and son updated on POC. Discharged Home via Wheelchair. Accompanied by Family member and Support staff  Belongings Taken by patient/family.

## 2023-03-13 NOTE — PROGRESS NOTES
Repeat orthostatic BP       03/13/23 1525 03/13/23 1527 03/13/23 1529   Vital Signs   /59 129/57 134/63   MAP (mmHg) 76 75 79   BP Location Left arm Left arm Left arm   BP Method Automatic Automatic Automatic   Patient Position Lying Sitting Standing

## 2023-03-13 NOTE — DISCHARGE INSTRUCTIONS
Sometimes managing your health at home requires assistance. The [place] team has recognized your preference to use [facility]. They can be reached by phone at [phone]. The fax number for your reference is [phone]. A representative from the home health agency will contact you or your family to schedule your first visit.

## 2023-03-13 NOTE — CM/SW NOTE
SW placed [facility] orders for [facility] to follow at dc. SW met with pt at bedside. Pt inquiring on looking for a /maid every few months. SW does not have contacts for cleaning ladies, but does have contacts for caregivers. Pt states she only needs something every couple months for a \"deep clean\" of the home. Pt disclosed monthly income, is over income for [facility] services. Pt does not have any further dc needs at this time. Son will transport home.      ERAN Kemp  Discharge [facility].

## 2023-03-15 ENCOUNTER — PATIENT OUTREACH (OUTPATIENT)
Dept: CASE MANAGEMENT | Age: 88
End: 2023-03-15

## 2023-03-15 ENCOUNTER — TELEPHONE (OUTPATIENT)
Dept: INTERNAL MEDICINE CLINIC | Facility: CLINIC | Age: 88
End: 2023-03-15

## 2023-03-15 DIAGNOSIS — H81.399 PERIPHERAL VERTIGO, UNSPECIFIED LATERALITY: ICD-10-CM

## 2023-03-15 DIAGNOSIS — Z02.9 ENCOUNTERS FOR UNSPECIFIED ADMINISTRATIVE PURPOSE: ICD-10-CM

## 2023-03-15 PROCEDURE — 1111F DSCHRG MED/CURRENT MED MERGE: CPT

## 2023-03-15 RX ORDER — LOSARTAN POTASSIUM 100 MG/1
100 TABLET ORAL DAILY
Qty: 90 TABLET | Refills: 0 | Status: SHIPPED | OUTPATIENT
Start: 2023-03-15

## 2023-03-15 RX ORDER — GLIPIZIDE 2.5 MG/1
2.5 TABLET, EXTENDED RELEASE ORAL
Qty: 30 TABLET | Refills: 2 | Status: SHIPPED | OUTPATIENT
Start: 2023-03-15

## 2023-03-15 NOTE — TELEPHONE ENCOUNTER
Pt asking to replace metformin to something that does not cause loose stools or GI upset. She declined TCM. Encourage TCM and can discuss DM med then too?

## 2023-03-15 NOTE — TELEPHONE ENCOUNTER
Have her stop metformin and then start glipizide lowest dose with breakfast, I will send to pharmacy

## 2023-03-15 NOTE — TELEPHONE ENCOUNTER
Spoke with patient for TCM today. Patient reports having 4 UTI's fairly close together. Patient states that she is attributing this to diarrhea. Patient states that she has been experimenting with her diet without change. Patient states that it happens after she takes the Metformin and its a sudden onset and she has trouble making it to the bathroom on time. Patient has been taking Metformin for several years but always thought it was from something she ate. Patient is asking if there is an alternative medication that would not cause diarrhea. Please advise. Also, patient does not have HFU appt scheduled at this time. Patient declines to schedule because she needs her son to take her to appointments and she has several others already booked. TCM/HFU appt recommended by 3/13/23 as pt is a high risk for readmission. Please advise. BOOK BY DATE (last date for TCM): 3/27/23    Clinical staff:  Please f/u with pt and try to get them to schedule as pt would greatly benefit from TCM/HFU appt. Thank you!

## 2023-03-15 NOTE — TELEPHONE ENCOUNTER
Does she have supplies to check BG? She will need to at least check daily for 1 week with new medication. I can do virtual visit (even phone visit) if she absolutely cannot come in. Ideally she should come in.

## 2023-03-16 NOTE — TELEPHONE ENCOUNTER
Called and spoke to pt. Advised to stop metformin and start glipizide with breakfast. Informed her this has been sent to her pharmacy. Per pt, she does have supplies to check BG. Advised to check BG at least once daily for 1 week with new med. We would also like to see her for f/u, ideally in office. Pt said that her son is the only one that can drive her. She does not like using the  program as they are always late. Advised pt to discuss with her son to see if he would be able to take her. Several openings next week with TB. If he cannot, can do phone visit, but again would ideally like to see her in office. Pt stated understanding and agreed to plan. Stated she will call back to schedule.      FYI to TB

## 2023-03-21 ENCOUNTER — TELEPHONE (OUTPATIENT)
Dept: INTERNAL MEDICINE CLINIC | Facility: CLINIC | Age: 88
End: 2023-03-21

## 2023-03-21 NOTE — TELEPHONE ENCOUNTER
Paperwork was received from [facility] regarding missed nursing visit. It was placed on CS desk for review only.

## 2023-03-25 ENCOUNTER — WALK IN (OUTPATIENT)
Dept: URGENT CARE | Age: 88
End: 2023-03-25

## 2023-03-25 VITALS
SYSTOLIC BLOOD PRESSURE: 140 MMHG | DIASTOLIC BLOOD PRESSURE: 60 MMHG | RESPIRATION RATE: 22 BRPM | OXYGEN SATURATION: 97 % | HEART RATE: 74 BPM | TEMPERATURE: 98.3 F

## 2023-03-25 DIAGNOSIS — R30.0 DYSURIA: Primary | ICD-10-CM

## 2023-03-25 DIAGNOSIS — N39.0 URINARY TRACT INFECTION WITHOUT HEMATURIA, SITE UNSPECIFIED: ICD-10-CM

## 2023-03-25 LAB
APPEARANCE, POC: CLEAR
BILIRUBIN, POC: NEGATIVE
COLOR, POC: YELLOW
GLUCOSE UR-MCNC: NEGATIVE MG/DL
KETONES, POC: NEGATIVE MG/DL
NITRITE, POC: POSITIVE
OCCULT BLOOD, POC: NEGATIVE
PH UR: 6 [PH] (ref 5–7)
PROT UR-MCNC: NEGATIVE MG/DL
SP GR UR: 1.02 (ref 1–1.03)
UROBILINOGEN UR-MCNC: 0.2 MG/DL (ref 0–1)
WBC (LEUKOCYTE) ESTERASE, POC: ABNORMAL

## 2023-03-25 PROCEDURE — 87086 URINE CULTURE/COLONY COUNT: CPT | Performed by: CLINICAL MEDICAL LABORATORY

## 2023-03-25 PROCEDURE — 87088 URINE BACTERIA CULTURE: CPT | Performed by: CLINICAL MEDICAL LABORATORY

## 2023-03-25 PROCEDURE — 99214 OFFICE O/P EST MOD 30 MIN: CPT | Performed by: FAMILY MEDICINE

## 2023-03-25 PROCEDURE — 81002 URINALYSIS NONAUTO W/O SCOPE: CPT | Performed by: FAMILY MEDICINE

## 2023-03-25 PROCEDURE — 87186 SC STD MICRODIL/AGAR DIL: CPT | Performed by: CLINICAL MEDICAL LABORATORY

## 2023-03-25 RX ORDER — MECLIZINE HCL 12.5 MG/1
12.5 TABLET ORAL
COMMUNITY
Start: 2023-03-13

## 2023-03-25 RX ORDER — CEFUROXIME AXETIL 500 MG/1
500 TABLET ORAL 2 TIMES DAILY
Qty: 14 TABLET | Refills: 0 | Status: SHIPPED | OUTPATIENT
Start: 2023-03-25 | End: 2023-04-01

## 2023-03-25 RX ORDER — GLIPIZIDE 2.5 MG/1
2.5 TABLET, EXTENDED RELEASE ORAL
COMMUNITY
Start: 2023-03-15

## 2023-03-25 RX ORDER — LOSARTAN POTASSIUM 100 MG/1
100 TABLET ORAL
COMMUNITY
Start: 2023-03-15

## 2023-03-25 ASSESSMENT — PAIN SCALES - GENERAL: PAINLEVEL: 0

## 2023-03-27 ENCOUNTER — HOSPITAL ENCOUNTER (OUTPATIENT)
Dept: MAMMOGRAPHY | Facility: HOSPITAL | Age: 88
Discharge: HOME | End: 2023-03-27
Attending: OBSTETRICS & GYNECOLOGY
Payer: MEDICARE

## 2023-03-27 ENCOUNTER — PATIENT OUTREACH (OUTPATIENT)
Dept: CASE MANAGEMENT | Age: 88
End: 2023-03-27

## 2023-03-27 DIAGNOSIS — I10 PRIMARY HYPERTENSION: ICD-10-CM

## 2023-03-27 DIAGNOSIS — Z12.31 ENCOUNTER FOR SCREENING MAMMOGRAM FOR MALIGNANT NEOPLASM OF BREAST: ICD-10-CM

## 2023-03-27 DIAGNOSIS — J44.9 CHRONIC OBSTRUCTIVE PULMONARY DISEASE, UNSPECIFIED COPD TYPE (HCC): ICD-10-CM

## 2023-03-27 DIAGNOSIS — M19.042 ARTHRITIS OF BOTH HANDS: ICD-10-CM

## 2023-03-27 DIAGNOSIS — E78.00 PURE HYPERCHOLESTEROLEMIA: ICD-10-CM

## 2023-03-27 DIAGNOSIS — H81.399 PERIPHERAL VERTIGO, UNSPECIFIED LATERALITY: ICD-10-CM

## 2023-03-27 DIAGNOSIS — M19.041 ARTHRITIS OF BOTH HANDS: ICD-10-CM

## 2023-03-27 DIAGNOSIS — E11.42 DIABETIC POLYNEUROPATHY ASSOCIATED WITH TYPE 2 DIABETES MELLITUS (HCC): ICD-10-CM

## 2023-03-27 DIAGNOSIS — R26.81 UNSTEADY GAIT: ICD-10-CM

## 2023-03-27 DIAGNOSIS — M51.36 DDD (DEGENERATIVE DISC DISEASE), LUMBAR: ICD-10-CM

## 2023-03-27 DIAGNOSIS — K21.9 GASTROESOPHAGEAL REFLUX DISEASE, UNSPECIFIED WHETHER ESOPHAGITIS PRESENT: ICD-10-CM

## 2023-03-27 DIAGNOSIS — G25.81 RLS (RESTLESS LEGS SYNDROME): ICD-10-CM

## 2023-03-27 DIAGNOSIS — N39.0 RECURRENT UTI: ICD-10-CM

## 2023-03-27 DIAGNOSIS — K52.9 CHRONIC DIARRHEA: ICD-10-CM

## 2023-03-27 DIAGNOSIS — N76.3 CHRONIC VULVITIS: ICD-10-CM

## 2023-03-27 PROCEDURE — 77063 BREAST TOMOSYNTHESIS BI: CPT | Performed by: OBSTETRICS & GYNECOLOGY

## 2023-03-27 PROCEDURE — 77067 SCR MAMMO BI INCL CAD: CPT | Performed by: OBSTETRICS & GYNECOLOGY

## 2023-03-27 RX ORDER — CEFUROXIME AXETIL 500 MG/1
TABLET ORAL
COMMUNITY
Start: 2023-03-25

## 2023-03-28 LAB — BACTERIA UR CULT: ABNORMAL

## 2023-03-29 RX ORDER — NITROFURANTOIN 25; 75 MG/1; MG/1
100 CAPSULE ORAL 2 TIMES DAILY
Qty: 14 CAPSULE | Refills: 0 | Status: SHIPPED | OUTPATIENT
Start: 2023-03-29 | End: 2023-04-05

## 2023-04-02 DIAGNOSIS — K21.9 GASTROESOPHAGEAL REFLUX DISEASE: ICD-10-CM

## 2023-04-04 RX ORDER — OMEPRAZOLE 40 MG/1
CAPSULE, DELAYED RELEASE ORAL
Qty: 180 CAPSULE | Refills: 0 | OUTPATIENT
Start: 2023-04-04

## 2023-04-07 ENCOUNTER — OFFICE VISIT (OUTPATIENT)
Dept: INTERNAL MEDICINE CLINIC | Facility: CLINIC | Age: 88
End: 2023-04-07
Payer: MEDICARE

## 2023-04-07 VITALS
OXYGEN SATURATION: 96 % | HEART RATE: 64 BPM | WEIGHT: 158.81 LBS | HEIGHT: 61 IN | SYSTOLIC BLOOD PRESSURE: 128 MMHG | BODY MASS INDEX: 29.98 KG/M2 | DIASTOLIC BLOOD PRESSURE: 74 MMHG | TEMPERATURE: 98 F | RESPIRATION RATE: 16 BRPM

## 2023-04-07 DIAGNOSIS — Z78.0 POST-MENOPAUSAL: ICD-10-CM

## 2023-04-07 DIAGNOSIS — R06.83 SNORING: ICD-10-CM

## 2023-04-07 DIAGNOSIS — E66.9 DIABETES MELLITUS TYPE 2 IN OBESE (HCC): ICD-10-CM

## 2023-04-07 DIAGNOSIS — H81.10 BPPV (BENIGN PAROXYSMAL POSITIONAL VERTIGO), UNSPECIFIED LATERALITY: Primary | ICD-10-CM

## 2023-04-07 DIAGNOSIS — E11.69 DIABETES MELLITUS TYPE 2 IN OBESE (HCC): ICD-10-CM

## 2023-04-07 PROCEDURE — 1111F DSCHRG MED/CURRENT MED MERGE: CPT | Performed by: INTERNAL MEDICINE

## 2023-04-07 PROCEDURE — 99214 OFFICE O/P EST MOD 30 MIN: CPT | Performed by: INTERNAL MEDICINE

## 2023-04-07 PROCEDURE — 1126F AMNT PAIN NOTED NONE PRSNT: CPT | Performed by: INTERNAL MEDICINE

## 2023-04-07 NOTE — TELEPHONE ENCOUNTER
Called pt regarding message below. Asked pt to return call.
Is she taking this or nexium?  It was changed by GI
Refill message says this was discontinued for alternative therapy 3-16-23 please confirm if should be refilled.
no diabetes and no thyroid trouble.

## 2023-04-21 ENCOUNTER — TELEPHONE (OUTPATIENT)
Dept: INTERNAL MEDICINE CLINIC | Facility: CLINIC | Age: 88
End: 2023-04-21

## 2023-04-21 ENCOUNTER — PATIENT OUTREACH (OUTPATIENT)
Dept: CASE MANAGEMENT | Age: 88
End: 2023-04-21

## 2023-04-21 DIAGNOSIS — R42 VERTIGO: ICD-10-CM

## 2023-04-21 DIAGNOSIS — E78.00 PURE HYPERCHOLESTEROLEMIA: ICD-10-CM

## 2023-04-21 DIAGNOSIS — E66.9 DIABETES MELLITUS TYPE 2 IN OBESE (HCC): ICD-10-CM

## 2023-04-21 DIAGNOSIS — M19.041 ARTHRITIS OF BOTH HANDS: ICD-10-CM

## 2023-04-21 DIAGNOSIS — G56.03 BILATERAL CARPAL TUNNEL SYNDROME: ICD-10-CM

## 2023-04-21 DIAGNOSIS — M19.042 ARTHRITIS OF BOTH HANDS: ICD-10-CM

## 2023-04-21 DIAGNOSIS — J44.9 CHRONIC OBSTRUCTIVE PULMONARY DISEASE, UNSPECIFIED COPD TYPE (HCC): ICD-10-CM

## 2023-04-21 DIAGNOSIS — H81.399 PERIPHERAL VERTIGO, UNSPECIFIED LATERALITY: ICD-10-CM

## 2023-04-21 DIAGNOSIS — M54.9 BACK PAIN WITH RADIATION: ICD-10-CM

## 2023-04-21 DIAGNOSIS — E11.42 DIABETIC POLYNEUROPATHY ASSOCIATED WITH TYPE 2 DIABETES MELLITUS (HCC): ICD-10-CM

## 2023-04-21 DIAGNOSIS — K21.9 GASTROESOPHAGEAL REFLUX DISEASE, UNSPECIFIED WHETHER ESOPHAGITIS PRESENT: ICD-10-CM

## 2023-04-21 DIAGNOSIS — E11.69 DIABETES MELLITUS TYPE 2 IN OBESE (HCC): Primary | ICD-10-CM

## 2023-04-21 DIAGNOSIS — G25.81 RLS (RESTLESS LEGS SYNDROME): ICD-10-CM

## 2023-04-21 DIAGNOSIS — H35.3230 BILATERAL EXUDATIVE AGE-RELATED MACULAR DEGENERATION, UNSPECIFIED STAGE (HCC): ICD-10-CM

## 2023-04-21 DIAGNOSIS — I10 BENIGN ESSENTIAL HTN: ICD-10-CM

## 2023-04-21 DIAGNOSIS — I10 PRIMARY HYPERTENSION: ICD-10-CM

## 2023-04-21 DIAGNOSIS — E66.9 DIABETES MELLITUS TYPE 2 IN OBESE (HCC): Primary | ICD-10-CM

## 2023-04-21 DIAGNOSIS — R26.81 UNSTEADY GAIT: ICD-10-CM

## 2023-04-21 DIAGNOSIS — K52.9 CHRONIC DIARRHEA: ICD-10-CM

## 2023-04-21 DIAGNOSIS — E11.69 DIABETES MELLITUS TYPE 2 IN OBESE (HCC): ICD-10-CM

## 2023-04-21 DIAGNOSIS — D05.10 DUCTAL CARCINOMA IN SITU (DCIS) OF BREAST, UNSPECIFIED LATERALITY: ICD-10-CM

## 2023-04-21 DIAGNOSIS — H81.10 BPPV (BENIGN PAROXYSMAL POSITIONAL VERTIGO), UNSPECIFIED LATERALITY: ICD-10-CM

## 2023-04-21 DIAGNOSIS — N95.2 ATROPHIC VAGINITIS: ICD-10-CM

## 2023-04-21 DIAGNOSIS — M51.36 DDD (DEGENERATIVE DISC DISEASE), LUMBAR: ICD-10-CM

## 2023-04-21 NOTE — TELEPHONE ENCOUNTER
Pt returned call. States over past few weeks, she will have \"surging\" sensation in her legs. This only occurs in the morning. Pt said she does have RLS, but this feels different. Denies abnormal swelling/redness. Sensation is bilateral.       TB, would you like to see pt sooner than scheduled 5/9 appt for eval of legs?

## 2023-04-21 NOTE — TELEPHONE ENCOUNTER
Future Appointments   Date Time Provider Karel Dockery   5/9/2023  1:40 PM Ramon Toussaint MD [facility]   11/13/2023  4:30 PM Heriberto Goldstein MD [facility]     Pt has upcoming appointment.     If labs are necessary please review chart and place appropriate orders    Thank You    Routed to [facility]

## 2023-04-21 NOTE — TELEPHONE ENCOUNTER
Pt has been experiencing episodes of discomfort in her legs that she describes a surging sensation that leaves her legs throbbing. She first sensed it about 2 weeks ago and it occurs in the morning. Once she stands up and walks a few paces with walker the feeling goes away and she does not experience it the rest of the day.      Pt would like to discuss with a nurse    Routed to [facility]

## 2023-04-24 NOTE — TELEPHONE ENCOUNTER
Please contact patient to schedule within one week with either TB or partner. Will need extended visit. Visit for leg sensation changes.

## 2023-04-26 NOTE — TELEPHONE ENCOUNTER
LMTCB to schedule the below appt. Please advise if appt below will suffice. Pt scheduled for [address] on below. Does she need two separate appts?      Future Appointments   Date Time Provider Karel Nahed   5/9/2023  1:40 PM Sia Schaffer MD [facility]

## 2023-04-26 NOTE — TELEPHONE ENCOUNTER
Looks like TB wanted an extended visit for just the acute concern, so I would imagine this would not suffice.

## 2023-04-27 NOTE — TELEPHONE ENCOUNTER
Pt scheduled on below with CS, prefers TB but pt can't make it today (TB's only spot - between today and tomorrow).   FYI    Future Appointments   Date Time Provider Karel Dockery   4/28/2023  2:00 PM Bakari Phelps., PA-C [facility]

## 2023-04-27 NOTE — TELEPHONE ENCOUNTER
RODRIGUEZI CS. Pt scheduled for change in sensation in legs only in mornings. Please see below message for more details on sxs.

## 2023-04-28 ENCOUNTER — OFFICE VISIT (OUTPATIENT)
Dept: INTERNAL MEDICINE CLINIC | Facility: CLINIC | Age: 88
End: 2023-04-28
Payer: MEDICARE

## 2023-04-28 VITALS
WEIGHT: 154.38 LBS | BODY MASS INDEX: 29.15 KG/M2 | SYSTOLIC BLOOD PRESSURE: 124 MMHG | HEIGHT: 61 IN | DIASTOLIC BLOOD PRESSURE: 70 MMHG | TEMPERATURE: 97 F | HEART RATE: 70 BPM | OXYGEN SATURATION: 98 %

## 2023-04-28 DIAGNOSIS — M79.605 BILATERAL LEG PAIN: ICD-10-CM

## 2023-04-28 DIAGNOSIS — M79.604 BILATERAL LEG PAIN: ICD-10-CM

## 2023-04-28 DIAGNOSIS — G25.81 RLS (RESTLESS LEGS SYNDROME): Primary | ICD-10-CM

## 2023-04-28 DIAGNOSIS — R20.2 PARESTHESIAS: ICD-10-CM

## 2023-04-28 PROCEDURE — 99213 OFFICE O/P EST LOW 20 MIN: CPT | Performed by: PHYSICIAN ASSISTANT

## 2023-05-04 ENCOUNTER — LAB ENCOUNTER (OUTPATIENT)
Dept: LAB | Facility: HOSPITAL | Age: 88
End: 2023-05-04
Attending: PHYSICIAN ASSISTANT
Payer: MEDICARE

## 2023-05-04 DIAGNOSIS — I10 BENIGN ESSENTIAL HTN: ICD-10-CM

## 2023-05-04 DIAGNOSIS — E66.9 DIABETES MELLITUS TYPE 2 IN OBESE (HCC): ICD-10-CM

## 2023-05-04 DIAGNOSIS — R20.2 PARESTHESIAS: ICD-10-CM

## 2023-05-04 DIAGNOSIS — E11.69 DIABETES MELLITUS TYPE 2 IN OBESE (HCC): ICD-10-CM

## 2023-05-04 DIAGNOSIS — E78.00 PURE HYPERCHOLESTEROLEMIA: ICD-10-CM

## 2023-05-04 DIAGNOSIS — G25.81 RLS (RESTLESS LEGS SYNDROME): ICD-10-CM

## 2023-05-04 LAB
ALBUMIN SERPL-MCNC: 3.4 G/DL (ref 3.4–5)
ALBUMIN/GLOB SERPL: 1 {RATIO} (ref 1–2)
ALP LIVER SERPL-CCNC: 67 U/L
ALT SERPL-CCNC: 19 U/L
ANION GAP SERPL CALC-SCNC: 4 MMOL/L (ref 0–18)
AST SERPL-CCNC: 15 U/L (ref 15–37)
BASOPHILS # BLD AUTO: 0.08 X10(3) UL (ref 0–0.2)
BASOPHILS NFR BLD AUTO: 1.2 %
BILIRUB SERPL-MCNC: 0.7 MG/DL (ref 0.1–2)
BUN BLD-MCNC: 9 MG/DL (ref 7–18)
CALCIUM BLD-MCNC: 9 MG/DL (ref 8.5–10.1)
CHLORIDE SERPL-SCNC: 113 MMOL/L (ref 98–112)
CHOLEST SERPL-MCNC: 114 MG/DL (ref ?–200)
CO2 SERPL-SCNC: 27 MMOL/L (ref 21–32)
CREAT BLD-MCNC: 0.83 MG/DL
CREAT UR-SCNC: 176 MG/DL
EOSINOPHIL # BLD AUTO: 0.18 X10(3) UL (ref 0–0.7)
EOSINOPHIL NFR BLD AUTO: 2.6 %
ERYTHROCYTE [DISTWIDTH] IN BLOOD BY AUTOMATED COUNT: 12.9 %
EST. AVERAGE GLUCOSE BLD GHB EST-MCNC: 151 MG/DL (ref 68–126)
FASTING PATIENT LIPID ANSWER: YES
FASTING STATUS PATIENT QL REPORTED: YES
GFR SERPLBLD BASED ON 1.73 SQ M-ARVRAT: 66 ML/MIN/1.73M2 (ref 60–?)
GLOBULIN PLAS-MCNC: 3.4 G/DL (ref 2.8–4.4)
GLUCOSE BLD-MCNC: 125 MG/DL (ref 70–99)
HBA1C MFR BLD: 6.9 % (ref ?–5.7)
HCT VFR BLD AUTO: 39.1 %
HDLC SERPL-MCNC: 45 MG/DL (ref 40–59)
HGB BLD-MCNC: 12.7 G/DL
IMM GRANULOCYTES # BLD AUTO: 0.02 X10(3) UL (ref 0–1)
IMM GRANULOCYTES NFR BLD: 0.3 %
LDLC SERPL CALC-MCNC: 44 MG/DL (ref ?–100)
LYMPHOCYTES # BLD AUTO: 1.45 X10(3) UL (ref 1–4)
LYMPHOCYTES NFR BLD AUTO: 21.2 %
MCH RBC QN AUTO: 29.1 PG (ref 26–34)
MCHC RBC AUTO-ENTMCNC: 32.5 G/DL (ref 31–37)
MCV RBC AUTO: 89.5 FL
MICROALBUMIN UR-MCNC: 3.56 MG/DL
MICROALBUMIN/CREAT 24H UR-RTO: 20.2 UG/MG (ref ?–30)
MONOCYTES # BLD AUTO: 0.45 X10(3) UL (ref 0.1–1)
MONOCYTES NFR BLD AUTO: 6.6 %
NEUTROPHILS # BLD AUTO: 4.67 X10 (3) UL (ref 1.5–7.7)
NEUTROPHILS # BLD AUTO: 4.67 X10(3) UL (ref 1.5–7.7)
NEUTROPHILS NFR BLD AUTO: 68.1 %
NONHDLC SERPL-MCNC: 69 MG/DL (ref ?–130)
OSMOLALITY SERPL CALC.SUM OF ELEC: 298 MOSM/KG (ref 275–295)
PLATELET # BLD AUTO: 215 10(3)UL (ref 150–450)
POTASSIUM SERPL-SCNC: 3.7 MMOL/L (ref 3.5–5.1)
PROT SERPL-MCNC: 6.8 G/DL (ref 6.4–8.2)
RBC # BLD AUTO: 4.37 X10(6)UL
SODIUM SERPL-SCNC: 144 MMOL/L (ref 136–145)
TRIGL SERPL-MCNC: 149 MG/DL (ref 30–149)
TSI SER-ACNC: 1.21 MIU/ML (ref 0.36–3.74)
VIT B12 SERPL-MCNC: 205 PG/ML (ref 193–986)
VLDLC SERPL CALC-MCNC: 21 MG/DL (ref 0–30)
WBC # BLD AUTO: 6.9 X10(3) UL (ref 4–11)

## 2023-05-04 PROCEDURE — 82043 UR ALBUMIN QUANTITATIVE: CPT

## 2023-05-04 PROCEDURE — 80053 COMPREHEN METABOLIC PANEL: CPT

## 2023-05-04 PROCEDURE — 84443 ASSAY THYROID STIM HORMONE: CPT

## 2023-05-04 PROCEDURE — 83036 HEMOGLOBIN GLYCOSYLATED A1C: CPT

## 2023-05-04 PROCEDURE — 80061 LIPID PANEL: CPT

## 2023-05-04 PROCEDURE — 85025 COMPLETE CBC W/AUTO DIFF WBC: CPT

## 2023-05-04 PROCEDURE — 36415 COLL VENOUS BLD VENIPUNCTURE: CPT

## 2023-05-04 PROCEDURE — 82570 ASSAY OF URINE CREATININE: CPT

## 2023-05-04 PROCEDURE — 82607 VITAMIN B-12: CPT

## 2023-05-07 ENCOUNTER — WALK IN (OUTPATIENT)
Dept: URGENT CARE | Age: 88
End: 2023-05-07

## 2023-05-07 VITALS
TEMPERATURE: 97.6 F | RESPIRATION RATE: 18 BRPM | SYSTOLIC BLOOD PRESSURE: 128 MMHG | OXYGEN SATURATION: 97 % | HEART RATE: 71 BPM | DIASTOLIC BLOOD PRESSURE: 54 MMHG

## 2023-05-07 DIAGNOSIS — H61.20 IMPACTED CERUMEN, UNSPECIFIED LATERALITY: Primary | ICD-10-CM

## 2023-05-07 PROCEDURE — 99214 OFFICE O/P EST MOD 30 MIN: CPT | Performed by: FAMILY MEDICINE

## 2023-05-07 ASSESSMENT — PAIN SCALES - GENERAL: PAINLEVEL: 0

## 2023-05-08 ENCOUNTER — TELEPHONE (OUTPATIENT)
Dept: SLEEP CENTER | Age: 88
End: 2023-05-08

## 2023-05-10 ENCOUNTER — TELEPHONE (OUTPATIENT)
Facility: CLINIC | Age: 88
End: 2023-05-10

## 2023-05-22 ENCOUNTER — PATIENT OUTREACH (OUTPATIENT)
Dept: CASE MANAGEMENT | Age: 88
End: 2023-05-22

## 2023-06-02 ENCOUNTER — TELEPHONE (OUTPATIENT)
Dept: INTERNAL MEDICINE CLINIC | Facility: CLINIC | Age: 88
End: 2023-06-02

## 2023-06-02 RX ORDER — LOSARTAN POTASSIUM 100 MG/1
TABLET ORAL
Qty: 90 TABLET | Refills: 0 | Status: SHIPPED | OUTPATIENT
Start: 2023-06-02

## 2023-06-02 NOTE — TELEPHONE ENCOUNTER
Hypertension Medications Protocol Passed 06/01/2023 06:29 PM   Protocol Details  CMP or BMP in past 12 months    Last serum creatinine< 2.0    Appointment in past 6 or next 3 months     Upcoming visit 7/2023 with TB

## 2023-06-02 NOTE — TELEPHONE ENCOUNTER
Pt called stating she has had back pain on and off for years and recently it has really been bothering her-she is looking for the name of someone to see for the back pain

## 2023-06-05 NOTE — TELEPHONE ENCOUNTER
TCB 6/5. PSRs- please give Dr. Griffith Lunch information (ortho spine) upon call back. Phone number is [phone]. Thanks!

## 2023-06-07 RX ORDER — HYDROCORTISONE 25 MG/G
CREAM TOPICAL
Qty: 28 G | Refills: 0 | OUTPATIENT
Start: 2023-06-07

## 2023-06-07 RX ORDER — PRAMIPEXOLE DIHYDROCHLORIDE 0.5 MG/1
TABLET ORAL
Qty: 90 TABLET | Refills: 1 | Status: SHIPPED | OUTPATIENT
Start: 2023-06-07

## 2023-06-17 NOTE — PROGRESS NOTES
Due to COVID-19 ACTION PLAN, the patient's office visit was converted to a phone visit with informed patient consent. Patient understands and accepts financial responsibility for any deductible, co-insurance and/or co-pays associated with this service. You are seen in the Emergency Department today and evaluated for your symptoms.  Your magnesium is a little low at  1.7.  You received 2 g of IV replacement.  Your potassium was 3.8 which is technically in normal range but due to your palpitations and history you are given oral replacement to bring it up to 4.0.  As discussed with your cardiology team it was decided to start you on metoprolol succinate, you got 1 dose here, the prescription was sent to your [facility] pharmacy to continue taking.  Please call your physician to schedule outpatient follow-up with them.      Return to the emergency department for any new concerning or worsening symptoms.   real-time interactive audio communication.   This has been done in good hilda to provide continuity of care in the best interest of the provider-patient relationship, due to the on-going public health crisis/national emergency and because of restrictions of

## 2023-06-22 ENCOUNTER — PATIENT OUTREACH (OUTPATIENT)
Dept: CASE MANAGEMENT | Age: 88
End: 2023-06-22

## 2023-07-06 ENCOUNTER — PATIENT OUTREACH (OUTPATIENT)
Dept: CASE MANAGEMENT | Age: 88
End: 2023-07-06

## 2023-07-20 ENCOUNTER — HOSPITAL ENCOUNTER (EMERGENCY)
Facility: HOSPITAL | Age: 88
Discharge: HOME | End: 2023-07-20
Attending: EMERGENCY MEDICINE
Payer: MEDICARE

## 2023-07-20 ENCOUNTER — APPOINTMENT (OUTPATIENT)
Dept: GENERAL RADIOLOGY | Facility: HOSPITAL | Age: 88
End: 2023-07-20
Attending: EMERGENCY MEDICINE
Payer: MEDICARE

## 2023-07-20 VITALS
SYSTOLIC BLOOD PRESSURE: 139 MMHG | TEMPERATURE: 98 F | DIASTOLIC BLOOD PRESSURE: 74 MMHG | RESPIRATION RATE: 18 BRPM | HEIGHT: 61 IN | OXYGEN SATURATION: 95 % | BODY MASS INDEX: 29.64 KG/M2 | WEIGHT: 157 LBS | HEART RATE: 65 BPM

## 2023-07-20 DIAGNOSIS — N30.00 ACUTE CYSTITIS WITHOUT HEMATURIA: Primary | ICD-10-CM

## 2023-07-20 LAB
ALBUMIN SERPL-MCNC: 3.2 G/DL (ref 3.4–5)
ALBUMIN/GLOB SERPL: 0.8 {RATIO} (ref 1–2)
ALP LIVER SERPL-CCNC: 75 U/L
ALT SERPL-CCNC: 13 U/L
ANION GAP SERPL CALC-SCNC: 4 MMOL/L (ref 0–18)
AST SERPL-CCNC: 15 U/L (ref 15–37)
BASOPHILS # BLD AUTO: 0.07 X10(3) UL (ref 0–0.2)
BASOPHILS NFR BLD AUTO: 1 %
BILIRUB SERPL-MCNC: 0.4 MG/DL (ref 0.1–2)
BILIRUB UR QL STRIP.AUTO: NEGATIVE
BUN BLD-MCNC: 9 MG/DL (ref 7–18)
CALCIUM BLD-MCNC: 9.6 MG/DL (ref 8.5–10.1)
CHLORIDE SERPL-SCNC: 110 MMOL/L (ref 98–112)
CO2 SERPL-SCNC: 26 MMOL/L (ref 21–32)
COLOR UR AUTO: YELLOW
CREAT BLD-MCNC: 0.89 MG/DL
EGFRCR SERPLBLD CKD-EPI 2021: 60 ML/MIN/1.73M2 (ref 60–?)
EOSINOPHIL # BLD AUTO: 0.25 X10(3) UL (ref 0–0.7)
EOSINOPHIL NFR BLD AUTO: 3.5 %
ERYTHROCYTE [DISTWIDTH] IN BLOOD BY AUTOMATED COUNT: 13.2 %
GLOBULIN PLAS-MCNC: 3.9 G/DL (ref 2.8–4.4)
GLUCOSE BLD-MCNC: 110 MG/DL (ref 70–99)
GLUCOSE BLD-MCNC: 123 MG/DL (ref 70–99)
GLUCOSE UR STRIP.AUTO-MCNC: NEGATIVE MG/DL
HCT VFR BLD AUTO: 40 %
HGB BLD-MCNC: 12.6 G/DL
IMM GRANULOCYTES # BLD AUTO: 0.02 X10(3) UL (ref 0–1)
IMM GRANULOCYTES NFR BLD: 0.3 %
KETONES UR STRIP.AUTO-MCNC: NEGATIVE MG/DL
LYMPHOCYTES # BLD AUTO: 1.55 X10(3) UL (ref 1–4)
LYMPHOCYTES NFR BLD AUTO: 21.6 %
MCH RBC QN AUTO: 28.9 PG (ref 26–34)
MCHC RBC AUTO-ENTMCNC: 31.5 G/DL (ref 31–37)
MCV RBC AUTO: 91.7 FL
MONOCYTES # BLD AUTO: 0.61 X10(3) UL (ref 0.1–1)
MONOCYTES NFR BLD AUTO: 8.5 %
NEUTROPHILS # BLD AUTO: 4.66 X10 (3) UL (ref 1.5–7.7)
NEUTROPHILS # BLD AUTO: 4.66 X10(3) UL (ref 1.5–7.7)
NEUTROPHILS NFR BLD AUTO: 65.1 %
NITRITE UR QL STRIP.AUTO: POSITIVE
NT-PROBNP SERPL-MCNC: 260 PG/ML (ref ?–450)
OSMOLALITY SERPL CALC.SUM OF ELEC: 290 MOSM/KG (ref 275–295)
PH UR STRIP.AUTO: 7 [PH] (ref 5–8)
PLATELET # BLD AUTO: 242 10(3)UL (ref 150–450)
POTASSIUM SERPL-SCNC: 3.8 MMOL/L (ref 3.5–5.1)
PROT SERPL-MCNC: 7.1 G/DL (ref 6.4–8.2)
PROT UR STRIP.AUTO-MCNC: NEGATIVE MG/DL
RBC # BLD AUTO: 4.36 X10(6)UL
SODIUM SERPL-SCNC: 140 MMOL/L (ref 136–145)
SP GR UR STRIP.AUTO: 1.02 (ref 1–1.03)
TROPONIN I HIGH SENSITIVITY: 9 NG/L
UROBILINOGEN UR STRIP.AUTO-MCNC: <2 MG/DL
WBC # BLD AUTO: 7.2 X10(3) UL (ref 4–11)
WBC #/AREA URNS AUTO: >50 /HPF

## 2023-07-20 PROCEDURE — 87086 URINE CULTURE/COLONY COUNT: CPT | Performed by: EMERGENCY MEDICINE

## 2023-07-20 PROCEDURE — 93010 ELECTROCARDIOGRAM REPORT: CPT

## 2023-07-20 PROCEDURE — 80053 COMPREHEN METABOLIC PANEL: CPT | Performed by: EMERGENCY MEDICINE

## 2023-07-20 PROCEDURE — 82962 GLUCOSE BLOOD TEST: CPT

## 2023-07-20 PROCEDURE — 85025 COMPLETE CBC W/AUTO DIFF WBC: CPT | Performed by: EMERGENCY MEDICINE

## 2023-07-20 PROCEDURE — 87088 URINE BACTERIA CULTURE: CPT | Performed by: EMERGENCY MEDICINE

## 2023-07-20 PROCEDURE — 81001 URINALYSIS AUTO W/SCOPE: CPT

## 2023-07-20 PROCEDURE — 83880 ASSAY OF NATRIURETIC PEPTIDE: CPT | Performed by: EMERGENCY MEDICINE

## 2023-07-20 PROCEDURE — 80053 COMPREHEN METABOLIC PANEL: CPT

## 2023-07-20 PROCEDURE — 87186 SC STD MICRODIL/AGAR DIL: CPT | Performed by: EMERGENCY MEDICINE

## 2023-07-20 PROCEDURE — 81001 URINALYSIS AUTO W/SCOPE: CPT | Performed by: EMERGENCY MEDICINE

## 2023-07-20 PROCEDURE — 84484 ASSAY OF TROPONIN QUANT: CPT | Performed by: EMERGENCY MEDICINE

## 2023-07-20 PROCEDURE — 71045 X-RAY EXAM CHEST 1 VIEW: CPT | Performed by: EMERGENCY MEDICINE

## 2023-07-20 PROCEDURE — 99285 EMERGENCY DEPT VISIT HI MDM: CPT

## 2023-07-20 PROCEDURE — 93005 ELECTROCARDIOGRAM TRACING: CPT

## 2023-07-20 PROCEDURE — 85025 COMPLETE CBC W/AUTO DIFF WBC: CPT

## 2023-07-20 PROCEDURE — 96365 THER/PROPH/DIAG IV INF INIT: CPT

## 2023-07-20 RX ORDER — CEFUROXIME AXETIL 500 MG/1
500 TABLET ORAL 2 TIMES DAILY
Qty: 14 TABLET | Refills: 0 | Status: SHIPPED | OUTPATIENT
Start: 2023-07-20 | End: 2023-07-27

## 2023-07-21 LAB
ATRIAL RATE: 72 BPM
P AXIS: 47 DEGREES
P-R INTERVAL: 134 MS
Q-T INTERVAL: 390 MS
QRS DURATION: 110 MS
QTC CALCULATION (BEZET): 427 MS
R AXIS: -43 DEGREES
T AXIS: 83 DEGREES
VENTRICULAR RATE: 72 BPM

## 2023-07-21 NOTE — ED INITIAL ASSESSMENT (HPI)
Pt presents to ed ambulatory with walker c/o burning with urination and rash to upper body that started yesterday.  Denies fever, denies n/v, denies blood in urine

## 2023-07-25 ENCOUNTER — OFFICE VISIT (OUTPATIENT)
Dept: INTERNAL MEDICINE CLINIC | Facility: CLINIC | Age: 88
End: 2023-07-25
Payer: MEDICARE

## 2023-07-25 VITALS
OXYGEN SATURATION: 95 % | DIASTOLIC BLOOD PRESSURE: 72 MMHG | HEIGHT: 61 IN | RESPIRATION RATE: 16 BRPM | SYSTOLIC BLOOD PRESSURE: 134 MMHG | TEMPERATURE: 97 F | HEART RATE: 70 BPM | BODY MASS INDEX: 28.55 KG/M2 | WEIGHT: 151.19 LBS

## 2023-07-25 DIAGNOSIS — K21.9 GASTROESOPHAGEAL REFLUX DISEASE, UNSPECIFIED WHETHER ESOPHAGITIS PRESENT: ICD-10-CM

## 2023-07-25 DIAGNOSIS — M19.041 ARTHRITIS OF BOTH HANDS: ICD-10-CM

## 2023-07-25 DIAGNOSIS — I72.8 SPLENIC ARTERY ANEURYSM (HCC): ICD-10-CM

## 2023-07-25 DIAGNOSIS — E53.8 B12 DEFICIENCY: ICD-10-CM

## 2023-07-25 DIAGNOSIS — H35.3231 EXUDATIVE AGE-RELATED MACULAR DEGENERATION, BILATERAL, WITH ACTIVE CHOROIDAL NEOVASCULARIZATION (HCC): ICD-10-CM

## 2023-07-25 DIAGNOSIS — Z00.00 ENCOUNTER FOR INITIAL ANNUAL WELLNESS VISIT IN MEDICARE PATIENT: Primary | ICD-10-CM

## 2023-07-25 DIAGNOSIS — M54.9 BACK PAIN WITH RADIATION: ICD-10-CM

## 2023-07-25 DIAGNOSIS — G25.81 RLS (RESTLESS LEGS SYNDROME): ICD-10-CM

## 2023-07-25 DIAGNOSIS — M19.042 ARTHRITIS OF BOTH HANDS: ICD-10-CM

## 2023-07-25 DIAGNOSIS — N95.2 ATROPHIC VAGINITIS: ICD-10-CM

## 2023-07-25 DIAGNOSIS — H81.10 BPPV (BENIGN PAROXYSMAL POSITIONAL VERTIGO), UNSPECIFIED LATERALITY: ICD-10-CM

## 2023-07-25 DIAGNOSIS — E11.69 DIABETES MELLITUS TYPE 2 IN OBESE: ICD-10-CM

## 2023-07-25 DIAGNOSIS — I10 PRIMARY HYPERTENSION: ICD-10-CM

## 2023-07-25 DIAGNOSIS — N39.0 RECURRENT UTI: ICD-10-CM

## 2023-07-25 DIAGNOSIS — J43.8 OTHER EMPHYSEMA (HCC): ICD-10-CM

## 2023-07-25 DIAGNOSIS — E11.42 DIABETIC POLYNEUROPATHY ASSOCIATED WITH TYPE 2 DIABETES MELLITUS (HCC): ICD-10-CM

## 2023-07-25 DIAGNOSIS — E66.9 DIABETES MELLITUS TYPE 2 IN OBESE: ICD-10-CM

## 2023-07-25 DIAGNOSIS — E78.00 PURE HYPERCHOLESTEROLEMIA: ICD-10-CM

## 2023-07-25 DIAGNOSIS — R26.81 UNSTEADY GAIT: ICD-10-CM

## 2023-07-25 PROBLEM — R06.83 SNORING: Status: RESOLVED | Noted: 2023-04-07 | Resolved: 2023-07-25

## 2023-07-25 PROBLEM — R32 URINARY INCONTINENCE: Status: RESOLVED | Noted: 2021-11-02 | Resolved: 2023-07-25

## 2023-07-25 PROBLEM — R42 LIGHTHEADEDNESS: Status: RESOLVED | Noted: 2022-07-01 | Resolved: 2023-07-25

## 2023-07-25 PROBLEM — Z91.148 NONCOMPLIANCE WITH MEDICATION REGIMEN: Status: RESOLVED | Noted: 2022-11-17 | Resolved: 2023-07-25

## 2023-07-25 PROBLEM — I20.89 ANGINAL EQUIVALENT (HCC): Status: RESOLVED | Noted: 2022-07-01 | Resolved: 2023-07-25

## 2023-07-25 PROBLEM — I20.8 ANGINAL EQUIVALENT: Status: RESOLVED | Noted: 2022-07-01 | Resolved: 2023-07-25

## 2023-07-25 PROBLEM — R11.0 NAUSEA: Status: RESOLVED | Noted: 2018-11-28 | Resolved: 2023-07-25

## 2023-07-25 PROBLEM — I20.8 ANGINAL EQUIVALENT (HCC): Status: RESOLVED | Noted: 2022-07-01 | Resolved: 2023-07-25

## 2023-07-25 PROBLEM — I20.89 ANGINAL EQUIVALENT: Status: RESOLVED | Noted: 2022-07-01 | Resolved: 2023-07-25

## 2023-07-25 PROBLEM — Z78.0 POST-MENOPAUSAL: Status: RESOLVED | Noted: 2023-04-07 | Resolved: 2023-07-25

## 2023-07-25 PROCEDURE — 99214 OFFICE O/P EST MOD 30 MIN: CPT | Performed by: INTERNAL MEDICINE

## 2023-07-25 PROCEDURE — G0439 PPPS, SUBSEQ VISIT: HCPCS | Performed by: INTERNAL MEDICINE

## 2023-07-25 PROCEDURE — 1126F AMNT PAIN NOTED NONE PRSNT: CPT | Performed by: INTERNAL MEDICINE

## 2023-07-25 PROCEDURE — 96372 THER/PROPH/DIAG INJ SC/IM: CPT | Performed by: INTERNAL MEDICINE

## 2023-07-25 RX ORDER — CYANOCOBALAMIN 1000 UG/ML
1000 INJECTION, SOLUTION INTRAMUSCULAR; SUBCUTANEOUS ONCE
Status: COMPLETED | OUTPATIENT
Start: 2023-07-25 | End: 2023-07-25

## 2023-07-25 RX ADMIN — CYANOCOBALAMIN 1000 MCG: 1000 INJECTION, SOLUTION INTRAMUSCULAR; SUBCUTANEOUS at 17:48:00

## 2023-07-26 ENCOUNTER — TELEPHONE (OUTPATIENT)
Dept: ORTHOPEDICS CLINIC | Facility: CLINIC | Age: 88
End: 2023-07-26

## 2023-07-26 DIAGNOSIS — M54.50 LUMBAR PAIN: Primary | ICD-10-CM

## 2023-07-26 NOTE — TELEPHONE ENCOUNTER
Patient has an upcoming appt for lumbar spine pain . At this time patient has not had any imaging done, please place RX accordingly. I have notified the patient to come in 20-30 min prior to appointment time to have the imaging done . Please forward to the  pool to schedule imaging. Thank you.       Future Appointments   Date Time Provider Karel Dockery   8/3/2023  2:40 PM Nolan Cortez MD [facility]   8/16/2023 11:20 AM Jeni Bhagat MD [facility]   11/13/2023  4:30 PM Zehra Phillip MD [facility] [place]

## 2023-08-03 ENCOUNTER — HOSPITAL ENCOUNTER (OUTPATIENT)
Dept: GENERAL RADIOLOGY | Age: 88
Discharge: HOME | End: 2023-08-03
Attending: STUDENT IN AN ORGANIZED HEALTH CARE EDUCATION/TRAINING PROGRAM
Payer: MEDICARE

## 2023-08-03 ENCOUNTER — OFFICE VISIT (OUTPATIENT)
Dept: ORTHOPEDICS CLINIC | Facility: CLINIC | Age: 88
End: 2023-08-03
Payer: MEDICARE

## 2023-08-03 VITALS — HEIGHT: 61 IN | WEIGHT: 151 LBS | BODY MASS INDEX: 28.51 KG/M2

## 2023-08-03 DIAGNOSIS — M54.2 NECK PAIN: ICD-10-CM

## 2023-08-03 DIAGNOSIS — M43.17 SPONDYLOLISTHESIS AT L5-S1 LEVEL: ICD-10-CM

## 2023-08-03 DIAGNOSIS — M43.16 SPONDYLOLISTHESIS AT L4-L5 LEVEL: Primary | ICD-10-CM

## 2023-08-03 DIAGNOSIS — M54.50 LUMBAR PAIN: ICD-10-CM

## 2023-08-03 DIAGNOSIS — M41.50 DEGENERATIVE SCOLIOSIS IN ADULT PATIENT: ICD-10-CM

## 2023-08-03 DIAGNOSIS — M54.2 CERVICALGIA: ICD-10-CM

## 2023-08-03 PROCEDURE — 1125F AMNT PAIN NOTED PAIN PRSNT: CPT | Performed by: STUDENT IN AN ORGANIZED HEALTH CARE EDUCATION/TRAINING PROGRAM

## 2023-08-03 PROCEDURE — 99214 OFFICE O/P EST MOD 30 MIN: CPT | Performed by: STUDENT IN AN ORGANIZED HEALTH CARE EDUCATION/TRAINING PROGRAM

## 2023-08-03 PROCEDURE — 72100 X-RAY EXAM L-S SPINE 2/3 VWS: CPT | Performed by: STUDENT IN AN ORGANIZED HEALTH CARE EDUCATION/TRAINING PROGRAM

## 2023-08-03 PROCEDURE — 72050 X-RAY EXAM NECK SPINE 4/5VWS: CPT | Performed by: STUDENT IN AN ORGANIZED HEALTH CARE EDUCATION/TRAINING PROGRAM

## 2023-08-05 ENCOUNTER — APPOINTMENT (OUTPATIENT)
Dept: CT IMAGING | Facility: HOSPITAL | Age: 88
End: 2023-08-05
Attending: EMERGENCY MEDICINE
Payer: MEDICARE

## 2023-08-05 ENCOUNTER — HOSPITAL ENCOUNTER (EMERGENCY)
Facility: HOSPITAL | Age: 88
Discharge: HOME | End: 2023-08-05
Attending: EMERGENCY MEDICINE
Payer: MEDICARE

## 2023-08-05 ENCOUNTER — APPOINTMENT (OUTPATIENT)
Dept: GENERAL RADIOLOGY | Facility: HOSPITAL | Age: 88
End: 2023-08-05
Attending: EMERGENCY MEDICINE
Payer: MEDICARE

## 2023-08-05 VITALS
BODY MASS INDEX: 28.32 KG/M2 | WEIGHT: 150 LBS | HEART RATE: 62 BPM | SYSTOLIC BLOOD PRESSURE: 146 MMHG | OXYGEN SATURATION: 97 % | TEMPERATURE: 97 F | HEIGHT: 61 IN | RESPIRATION RATE: 16 BRPM | DIASTOLIC BLOOD PRESSURE: 58 MMHG

## 2023-08-05 DIAGNOSIS — M54.12 CERVICAL RADICULAR PAIN: Primary | ICD-10-CM

## 2023-08-05 DIAGNOSIS — R82.90 ABNORMAL URINALYSIS: ICD-10-CM

## 2023-08-05 DIAGNOSIS — J98.01 BRONCHOSPASM: ICD-10-CM

## 2023-08-05 LAB
ALBUMIN SERPL-MCNC: 3 G/DL (ref 3.4–5)
ALBUMIN/GLOB SERPL: 0.8 {RATIO} (ref 1–2)
ALP LIVER SERPL-CCNC: 76 U/L
ALT SERPL-CCNC: 13 U/L
ANION GAP SERPL CALC-SCNC: 2 MMOL/L (ref 0–18)
AST SERPL-CCNC: 13 U/L (ref 15–37)
BASOPHILS # BLD AUTO: 0.08 X10(3) UL (ref 0–0.2)
BASOPHILS NFR BLD AUTO: 1.2 %
BILIRUB SERPL-MCNC: 0.5 MG/DL (ref 0.1–2)
BILIRUB UR QL STRIP.AUTO: NEGATIVE
BUN BLD-MCNC: 10 MG/DL (ref 7–18)
CALCIUM BLD-MCNC: 9.1 MG/DL (ref 8.5–10.1)
CHLORIDE SERPL-SCNC: 107 MMOL/L (ref 98–112)
CLARITY UR REFRACT.AUTO: CLEAR
CO2 SERPL-SCNC: 28 MMOL/L (ref 21–32)
COLOR UR AUTO: YELLOW
CREAT BLD-MCNC: 0.85 MG/DL
EGFRCR SERPLBLD CKD-EPI 2021: 64 ML/MIN/1.73M2 (ref 60–?)
EOSINOPHIL # BLD AUTO: 0.13 X10(3) UL (ref 0–0.7)
EOSINOPHIL NFR BLD AUTO: 1.9 %
ERYTHROCYTE [DISTWIDTH] IN BLOOD BY AUTOMATED COUNT: 12.6 %
GLOBULIN PLAS-MCNC: 3.6 G/DL (ref 2.8–4.4)
GLUCOSE BLD-MCNC: 137 MG/DL (ref 70–99)
GLUCOSE UR STRIP.AUTO-MCNC: NEGATIVE MG/DL
HCT VFR BLD AUTO: 37.8 %
HGB BLD-MCNC: 12.4 G/DL
IMM GRANULOCYTES # BLD AUTO: 0.03 X10(3) UL (ref 0–1)
IMM GRANULOCYTES NFR BLD: 0.4 %
KETONES UR STRIP.AUTO-MCNC: NEGATIVE MG/DL
LIPASE SERPL-CCNC: 42 U/L (ref 13–75)
LYMPHOCYTES # BLD AUTO: 1.25 X10(3) UL (ref 1–4)
LYMPHOCYTES NFR BLD AUTO: 18.7 %
MCH RBC QN AUTO: 29.2 PG (ref 26–34)
MCHC RBC AUTO-ENTMCNC: 32.8 G/DL (ref 31–37)
MCV RBC AUTO: 88.9 FL
MONOCYTES # BLD AUTO: 0.45 X10(3) UL (ref 0.1–1)
MONOCYTES NFR BLD AUTO: 6.7 %
NEUTROPHILS # BLD AUTO: 4.76 X10 (3) UL (ref 1.5–7.7)
NEUTROPHILS # BLD AUTO: 4.76 X10(3) UL (ref 1.5–7.7)
NEUTROPHILS NFR BLD AUTO: 71.1 %
NITRITE UR QL STRIP.AUTO: NEGATIVE
OSMOLALITY SERPL CALC.SUM OF ELEC: 285 MOSM/KG (ref 275–295)
PH UR STRIP.AUTO: 5 [PH] (ref 5–8)
PLATELET # BLD AUTO: 242 10(3)UL (ref 150–450)
POTASSIUM SERPL-SCNC: 3.9 MMOL/L (ref 3.5–5.1)
PROT SERPL-MCNC: 6.6 G/DL (ref 6.4–8.2)
PROT UR STRIP.AUTO-MCNC: NEGATIVE MG/DL
RBC # BLD AUTO: 4.25 X10(6)UL
RBC UR QL AUTO: NEGATIVE
SODIUM SERPL-SCNC: 137 MMOL/L (ref 136–145)
SP GR UR STRIP.AUTO: 1.02 (ref 1–1.03)
TROPONIN I HIGH SENSITIVITY: 7 NG/L
UROBILINOGEN UR STRIP.AUTO-MCNC: <2 MG/DL
WBC # BLD AUTO: 6.7 X10(3) UL (ref 4–11)

## 2023-08-05 PROCEDURE — 99285 EMERGENCY DEPT VISIT HI MDM: CPT

## 2023-08-05 PROCEDURE — 72125 CT NECK SPINE W/O DYE: CPT | Performed by: EMERGENCY MEDICINE

## 2023-08-05 PROCEDURE — 94640 AIRWAY INHALATION TREATMENT: CPT

## 2023-08-05 PROCEDURE — 87086 URINE CULTURE/COLONY COUNT: CPT | Performed by: EMERGENCY MEDICINE

## 2023-08-05 PROCEDURE — 87077 CULTURE AEROBIC IDENTIFY: CPT | Performed by: EMERGENCY MEDICINE

## 2023-08-05 PROCEDURE — 84484 ASSAY OF TROPONIN QUANT: CPT | Performed by: EMERGENCY MEDICINE

## 2023-08-05 PROCEDURE — 71045 X-RAY EXAM CHEST 1 VIEW: CPT | Performed by: EMERGENCY MEDICINE

## 2023-08-05 PROCEDURE — 96374 THER/PROPH/DIAG INJ IV PUSH: CPT

## 2023-08-05 PROCEDURE — 96361 HYDRATE IV INFUSION ADD-ON: CPT

## 2023-08-05 PROCEDURE — 83690 ASSAY OF LIPASE: CPT | Performed by: EMERGENCY MEDICINE

## 2023-08-05 PROCEDURE — 81001 URINALYSIS AUTO W/SCOPE: CPT | Performed by: EMERGENCY MEDICINE

## 2023-08-05 PROCEDURE — 87186 SC STD MICRODIL/AGAR DIL: CPT | Performed by: EMERGENCY MEDICINE

## 2023-08-05 PROCEDURE — 85025 COMPLETE CBC W/AUTO DIFF WBC: CPT | Performed by: EMERGENCY MEDICINE

## 2023-08-05 PROCEDURE — 80053 COMPREHEN METABOLIC PANEL: CPT | Performed by: EMERGENCY MEDICINE

## 2023-08-05 PROCEDURE — 96375 TX/PRO/DX INJ NEW DRUG ADDON: CPT

## 2023-08-05 RX ORDER — HYDROCODONE BITARTRATE AND ACETAMINOPHEN 5; 325 MG/1; MG/1
1 TABLET ORAL EVERY 6 HOURS PRN
Qty: 10 TABLET | Refills: 0 | OUTPATIENT
Start: 2023-08-05 | End: 2023-08-10

## 2023-08-05 RX ORDER — HYDROCODONE BITARTRATE AND ACETAMINOPHEN 5; 325 MG/1; MG/1
1 TABLET ORAL ONCE
Status: COMPLETED | OUTPATIENT
Start: 2023-08-05 | End: 2023-08-05

## 2023-08-05 RX ORDER — ONDANSETRON 2 MG/ML
4 INJECTION INTRAMUSCULAR; INTRAVENOUS
Status: DISCONTINUED | OUTPATIENT
Start: 2023-08-05 | End: 2023-08-05

## 2023-08-05 RX ORDER — METHYLPREDNISOLONE 4 MG/1
TABLET ORAL
Qty: 21 TABLET | Refills: 0
Start: 2023-08-05

## 2023-08-05 RX ORDER — KETOROLAC TROMETHAMINE 15 MG/ML
15 INJECTION, SOLUTION INTRAMUSCULAR; INTRAVENOUS ONCE
Status: COMPLETED | OUTPATIENT
Start: 2023-08-05 | End: 2023-08-05

## 2023-08-05 RX ORDER — CEPHALEXIN 500 MG/1
500 CAPSULE ORAL 3 TIMES DAILY
Qty: 15 CAPSULE | Refills: 0 | Status: SHIPPED | OUTPATIENT
Start: 2023-08-05 | End: 2023-08-08

## 2023-08-05 RX ORDER — METHYLPREDNISOLONE SODIUM SUCCINATE 125 MG/2ML
125 INJECTION, POWDER, LYOPHILIZED, FOR SOLUTION INTRAMUSCULAR; INTRAVENOUS ONCE
Status: COMPLETED | OUTPATIENT
Start: 2023-08-05 | End: 2023-08-05

## 2023-08-05 RX ORDER — CEPHALEXIN 500 MG/1
500 CAPSULE ORAL ONCE
Status: COMPLETED | OUTPATIENT
Start: 2023-08-05 | End: 2023-08-05

## 2023-08-05 RX ORDER — ORPHENADRINE CITRATE 100 MG/1
100 TABLET, EXTENDED RELEASE ORAL 2 TIMES DAILY PRN
Qty: 10 TABLET | Refills: 0 | OUTPATIENT
Start: 2023-08-05

## 2023-08-05 RX ORDER — SODIUM CHLORIDE 9 MG/ML
INJECTION, SOLUTION INTRAVENOUS CONTINUOUS
Status: DISCONTINUED | OUTPATIENT
Start: 2023-08-05 | End: 2023-08-05

## 2023-08-05 RX ORDER — ORPHENADRINE CITRATE 30 MG/ML
30 INJECTION INTRAMUSCULAR; INTRAVENOUS ONCE
Status: COMPLETED | OUTPATIENT
Start: 2023-08-05 | End: 2023-08-05

## 2023-08-05 RX ORDER — ALBUTEROL SULFATE 90 UG/1
2 AEROSOL, METERED RESPIRATORY (INHALATION) 4 TIMES DAILY
Status: DISCONTINUED | OUTPATIENT
Start: 2023-08-05 | End: 2023-08-05

## 2023-08-05 NOTE — CM/SW NOTE
CM assistance requested by Dr. Karen Seo to assist patient with getting a sooner appt with the Pain doctor. Pt currently has an appointment in October. CM to pt room, pt is currently out of the department and no family at bedside. Rylan Sanchez RN will obtain the information from the patient about who she is scheduled to see in October.

## 2023-08-05 NOTE — DISCHARGE INSTRUCTIONS
Continue Tylenol for pain  A muscle relaxant may help. Use this medication with caution as it may make you sleepy  Norco for severe pain. Use this medication with great caution as it may make you very sleepy, dizzy, and nauseous. If you are prone to constipation and take the Norco, make sure you take a fiber supplement and stool softener to prevent becoming constipated  Topical over-the-counter lidocaine patch like Salonpas may help    Use the albuterol inhaler with spacer chamber-2 puffs about every 6-8 hours as needed for wheezing and congestion    Follow-up with your primary care doctor  The ER  has been notified and will work on trying to get you an expeditious appointment with a pain specialist  You may receive a call from the  updating you    Your urine test was abnormal.  Start Keflex antibiotic. Your doctor can follow-up on the culture results which will be back in about 3 to 5 days    Neurologic Instructions    Call your doctor if you have:  increased pain or headache.   trouble seeing, walking or using your arms or legs. dizziness or passing out. any change in behavior (agitated or sleepy). trouble being awakened from sleep.   numbness in your face, arm or leg.   extreme weakness. trouble talking. nausea and vomiting. any new or severe symptoms. Take your medicines as prescribed. Most important, see a doctor again as discussed. If you have problems that we have not discussed, call or visit your doctor right away. If you cannot reach your doctor, return to the Emergency Department.

## 2023-08-07 ENCOUNTER — PATIENT OUTREACH (OUTPATIENT)
Dept: CASE MANAGEMENT | Age: 88
End: 2023-08-07

## 2023-08-07 ENCOUNTER — TELEPHONE (OUTPATIENT)
Dept: INTERNAL MEDICINE CLINIC | Facility: CLINIC | Age: 88
End: 2023-08-07

## 2023-08-07 DIAGNOSIS — E66.9 DIABETES MELLITUS TYPE 2 IN OBESE: Primary | ICD-10-CM

## 2023-08-07 DIAGNOSIS — I10 PRIMARY HYPERTENSION: ICD-10-CM

## 2023-08-07 DIAGNOSIS — E11.69 DIABETES MELLITUS TYPE 2 IN OBESE: Primary | ICD-10-CM

## 2023-08-07 DIAGNOSIS — M19.042 ARTHRITIS OF BOTH HANDS: ICD-10-CM

## 2023-08-07 DIAGNOSIS — E78.00 PURE HYPERCHOLESTEROLEMIA: ICD-10-CM

## 2023-08-07 DIAGNOSIS — M19.041 ARTHRITIS OF BOTH HANDS: ICD-10-CM

## 2023-08-07 DIAGNOSIS — N76.3 CHRONIC VULVITIS: ICD-10-CM

## 2023-08-07 DIAGNOSIS — M51.36 DDD (DEGENERATIVE DISC DISEASE), LUMBAR: ICD-10-CM

## 2023-08-07 DIAGNOSIS — N39.0 RECURRENT UTI: ICD-10-CM

## 2023-08-07 DIAGNOSIS — J44.9 CHRONIC OBSTRUCTIVE PULMONARY DISEASE, UNSPECIFIED COPD TYPE (HCC): ICD-10-CM

## 2023-08-07 DIAGNOSIS — K21.9 GASTROESOPHAGEAL REFLUX DISEASE, UNSPECIFIED WHETHER ESOPHAGITIS PRESENT: ICD-10-CM

## 2023-08-07 RX ORDER — CYCLOBENZAPRINE HCL 5 MG
5 TABLET ORAL NIGHTLY PRN
Qty: 30 TABLET | Refills: 0 | Status: SHIPPED | OUTPATIENT
Start: 2023-08-07

## 2023-08-07 RX ORDER — ONDANSETRON 8 MG/1
8 TABLET, ORALLY DISINTEGRATING ORAL EVERY 8 HOURS PRN
Qty: 30 TABLET | Refills: 0 | Status: SHIPPED | OUTPATIENT
Start: 2023-08-07

## 2023-08-07 NOTE — PROGRESS NOTES
Spoke to Clara for CCM. Updates to patient care team/comments: UTD  Patient reported changes in medications: UTD  Med Adherence  Comment: pt taking medications as prescribed     Health Maintenance: Diabetes Care Dilated Eye Exam due on 06/24/2022  COVID-19 Vaccine(7 - Bosch Peter series) due on 02/25/2023  Influenza Vaccine(1) due on 10/01/2023  Diabetes Care A1C due on 11/04/2023  Diabetes Care: Microalb/Creat Ratio due on 05/04/2024  Annual Physical due on 07/25/2024  Diabetes Care: GFR due on 08/05/2024  Annual Depression Screening Completed  Fall Risk Screening (Annual) Completed  Diabetes Care Foot Exam (Annual) Completed  Pneumococcal Vaccine: 65+ Years Completed  Zoster Vaccines Completed    Patient updates/concerns:       Pt called up [facility] to discuss recent trip to the ER. Pt had developed some neck pain ahead of her visit to ortho doc for her back. Pt states that ortho doc was not able to provide her with pain relief in office for her neck and was advised to visit er. Pt was discharged from ER with instructions to visit pain clinic. Pt states that  at hospital was working on getting an appointment sooner than originally scheduled visit on aug 16th. Pt left [facility] a message stating that  has indeed moved visit up to [facility] 14th. Pt requested [facility] assist her in reviewing instructions on after visit report. Pt has tylenol at home along with salonpas lidocaine patches. According to instructions pt was advised to use norco for pain muscle relaxer for flexibility and zofran for continued nausea. Pt was not prescribed any of these medications upon discharge and requested [facility] assistance in determining if pcp is able to write this rx. [facility] sent te to [facility] clinical staff to determine if rx can dr Kal Eldridge. Pt states that neck pain is starting to get more severe despite limiting her movement. Pt was on an extended vacation that involved a lot of activity.  Pt returned home with severe leg weakness and a heaviness in her steps. Pt was determined to have low vit b and began a supplement after receiving an injection. Pt states that the heaviness has improved and she feels her energy level has improved a little. Discussed with pt the importance of remaining hydrated. Pt has histrionically had problems with drinking water and has tried various strategies. Pt states that she has started to work on a candy sucker while she drinks water to improve the flavor. Pt has lost 10 lbs in recent months. Pt states that she has simply not been very hungry. Discussed with pt the importance of insuring that she gets enough protein In her diet. . Discussed how important protein is to energy. Pt verbalized understanding and will be trying to incorporate more protein and foods containing vitamin B. Pt has been using inhaler a little more since bout of bronchitis. Pt has been limiting her exercise outside during air quality alerts. Pt has upcoming visit with pulmonology and will discuss ongoing management of her breathing issues. Pt thinks that she will be put on oxygen. Pt is not having any urinary symptoms. Pt started abx and understands importance of completion despite not having any symptoms. Pt discussed how she benefits from PT and is aware that order has been placed. Pt will call ands schedule PT but plans to wait until after her trip to pain clinic  Goals/Action Plan:     Active goal from previous outreach: activity    Patient reported progress towards goals: pt is always active and takes daily walks to and from the stores close to her facility if weather cooperates               - What: increased activity           - When: every day           - How: walking           - How Often: 10 laps around her apartment            - Where: in apartment 2-3 times daily  Patient Reported Barriers and Concerns: air quality alerts                   - Plan for overcoming barriers: see above plan/per pt pt can get a lot of activity just walking around her apartment    Care Managers Interventions: te pcp question about discharge meds continue to provide encouragement and education for healthy coping and dx    Future Appointments:   Future Appointments   Date Time Provider Karel Nahed   8/9/2023 11:30 AM Sophia Howard MD [facility]   8/16/2023 11:20 AM Everardo Etienne MD [facility] [place]   11/13/2023  4:30 PM Radha Fine MD [facility]  [facility]         Next Care Manager Follow Up Date: one month    Reason For Follow Up: review progress and or barriers towards patient's goals. Time Spent This Encounter Total: 54 min medical record review, telephone communication, care plan updates where needed, education, goals, and action plan recreation/update. Provided acknowledgment and validation to patient's concerns.    Monthly Minute Total including today: 54  Physical assessment, complete health history, and need for CCM established by Rodolfo Camacho MD.

## 2023-08-07 NOTE — TELEPHONE ENCOUNTER
Pt was discharged from hospital and is awaiting update from hospital care manager about expedited visit to pain clinic. Instructions from ER Discharge mention using muscle relaxer and norco for pain but pt was not prescribed either medication, and has also run out of her zofran for nausea. Continue Tylenol for pain  A muscle relaxant may help. Use this medication with caution as it may make you sleepy  Norco for severe pain. Use this medication with great caution as it may make you very sleepy, dizzy, and  nauseous. If you are prone to constipation and take the Norco, make sure you take a fiber supplement and stool softener  to prevent becoming constipated  Topical over-the-counter lidocaine patch like Salonpas may help    Pt is managing pain using only tylenol and salonpas. Can Dr Gomez Skaggs write short term rx for norco, muscle relaxer and zofran while pt waits for visit to pain clinic?     Please advise    Thank You    Routed to [facility] clinical

## 2023-08-07 NOTE — TELEPHONE ENCOUNTER
Called and spoke to pt. Advised to start wit muscle relaxer as using that and norco increases her risk of falls. Muscle relaxer and zofran has been sent. Pt stated understanding and was thankful.  She is scheduled with pain clinic on 8/14

## 2023-08-07 NOTE — TELEPHONE ENCOUNTER
Let us start with muscle relaxer because muscle relaxer and narcotic at same time would likely cause sedation, risk of fall, etc  I am ok with zofran.  I will send those 2 until in with pain clinic

## 2023-08-07 NOTE — TELEPHONE ENCOUNTER
Pt was advised to take muscle relaxer and norco, but neither prescribed at discharge. Pt asking for TB to fill these along with zofran until she is able to get in with pain clinic.     TB, need HFU for any rx's?

## 2023-08-07 NOTE — CM/SW NOTE
CM called and spoke with [facility]. [facility] states she does not have a pain doctor appointment in October. Pt states she called the pain clinic to try and make an appointment but the pain was to severe she decided to just go to the ER. DANA spoke with Ashley Ko at [address] [facility] [id number] option 3, who made an appointment for [facility] on Monday August 14th at [address] called [facility] and gave her the above information, pt very appreciative.

## 2023-08-08 RX ORDER — CEFPODOXIME PROXETIL 100 MG/1
100 TABLET, FILM COATED ORAL 2 TIMES DAILY
Qty: 10 TABLET | Refills: 0 | Status: SHIPPED | OUTPATIENT
Start: 2023-08-08 | End: 2023-08-13

## 2023-08-08 NOTE — PROGRESS NOTES
ED Pharmacist Discharge Culture Review    Final urine culture positive for  Escherichia coli that is resistant to prescribed cephalexin therapy. Results discussed with Dr. Rachelle Calderon and a new prescription for cefpodoxime 100 mg PO BID x5 days was electronically sent to [facility] on [address] in [place]. Patient was contacted, informed of the results, and educated regarding the change in therapy. Patient was agreeable to the treatment plan and all questions were answered. No further culture follow-up required.     Thank you,  Willi Mcdonald, PharmD, BCPS, [facility]  Clinical Pharmacist Specialist - Emergency Medicine  [facility]; Ph: [phone]

## 2023-08-09 ENCOUNTER — OFFICE VISIT (OUTPATIENT)
Facility: CLINIC | Age: 88
End: 2023-08-09
Payer: MEDICARE

## 2023-08-09 VITALS
RESPIRATION RATE: 16 BRPM | BODY MASS INDEX: 28.51 KG/M2 | DIASTOLIC BLOOD PRESSURE: 60 MMHG | HEART RATE: 78 BPM | SYSTOLIC BLOOD PRESSURE: 128 MMHG | HEIGHT: 61 IN | OXYGEN SATURATION: 97 % | WEIGHT: 151 LBS

## 2023-08-09 DIAGNOSIS — J44.9 CHRONIC OBSTRUCTIVE PULMONARY DISEASE, UNSPECIFIED COPD TYPE (HCC): ICD-10-CM

## 2023-08-09 DIAGNOSIS — R06.02 SHORT OF BREATH ON EXERTION: Primary | ICD-10-CM

## 2023-08-09 PROCEDURE — 99204 OFFICE O/P NEW MOD 45 MIN: CPT | Performed by: INTERNAL MEDICINE

## 2023-08-09 NOTE — PATIENT INSTRUCTIONS
Plan:    Albuterol HFA MDI 2 puffs 4 times daily as needed.  Will teach technique how to use it   Continue Fluticasone 2 puffs each nostril daily   Diuretics per cardiology   Cardiology follow up   Check Spirometry before next visit     Follow up: 2  months     Rafael Nguyen MD

## 2023-08-11 ENCOUNTER — TELEPHONE (OUTPATIENT)
Dept: PHYSICAL THERAPY | Facility: HOSPITAL | Age: 88
End: 2023-08-11

## 2023-08-14 ENCOUNTER — OFFICE VISIT (OUTPATIENT)
Dept: PAIN CLINIC | Facility: CLINIC | Age: 88
End: 2023-08-14
Payer: MEDICARE

## 2023-08-14 VITALS — DIASTOLIC BLOOD PRESSURE: 70 MMHG | HEART RATE: 72 BPM | OXYGEN SATURATION: 96 % | SYSTOLIC BLOOD PRESSURE: 130 MMHG

## 2023-08-14 DIAGNOSIS — M47.812 CERVICAL SPONDYLOSIS WITHOUT MYELOPATHY: ICD-10-CM

## 2023-08-14 DIAGNOSIS — M41.80 DEXTROSCOLIOSIS: ICD-10-CM

## 2023-08-14 DIAGNOSIS — M51.36 DDD (DEGENERATIVE DISC DISEASE), LUMBAR: Primary | ICD-10-CM

## 2023-08-14 DIAGNOSIS — M47.816 SPONDYLOSIS OF LUMBAR REGION WITHOUT MYELOPATHY OR RADICULOPATHY: ICD-10-CM

## 2023-08-14 PROCEDURE — 99204 OFFICE O/P NEW MOD 45 MIN: CPT | Performed by: NURSE PRACTITIONER

## 2023-08-14 NOTE — PROGRESS NOTES
Patient presents in office today with reported pain in cervical area. Bilateral pain, but right side is hurting more    Pt was in ED 8/5/23 due to right sided neck pain. ED notates that xray and CT was done, spondylosis throughout was seen. Other complaints are bilateral leg pain, but PT is here regarding the cervical pain.     Current pain level reported = 6/10    Last reported dose of NA this morning      Narcotic Contract renewal NA    Urine Drug screen NA

## 2023-08-14 NOTE — PROGRESS NOTES
Location of Pain: neck pain, right side hurts more    Date Pain Began: 2 weeks ago, 8/1/2023          Work Related:   No        Receiving Work Comp/Disability:   No    Numeric Rating Scale:  Pain at Present:  6                                                                                                            (No Pain) 0  to  10 (Worst Pain)                 Minimum Pain:   6  Maximum Pain  10    Distribution of Pain:    bilateral    Quality of Pain:   sharp/stabbing    Origin of Pain:    Degenerative    Aggravating Factors:     Other Head movement    Past Treatments for Current Pain Condition:   Other Hospital    Prior diagnostic testing for your pain:  Xray, CT

## 2023-08-16 ENCOUNTER — OFFICE VISIT (OUTPATIENT)
Dept: NEUROLOGY | Facility: CLINIC | Age: 88
End: 2023-08-16
Payer: MEDICARE

## 2023-08-16 VITALS
DIASTOLIC BLOOD PRESSURE: 66 MMHG | HEART RATE: 68 BPM | SYSTOLIC BLOOD PRESSURE: 126 MMHG | RESPIRATION RATE: 16 BRPM | BODY MASS INDEX: 29 KG/M2 | WEIGHT: 153.19 LBS

## 2023-08-16 DIAGNOSIS — E11.42 DIABETIC POLYNEUROPATHY ASSOCIATED WITH TYPE 2 DIABETES MELLITUS (HCC): ICD-10-CM

## 2023-08-16 DIAGNOSIS — G25.81 RLS (RESTLESS LEGS SYNDROME): Primary | ICD-10-CM

## 2023-08-16 PROCEDURE — 99204 OFFICE O/P NEW MOD 45 MIN: CPT | Performed by: OTHER

## 2023-08-16 RX ORDER — PRAMIPEXOLE 0.75 MG/1
0.75 TABLET, EXTENDED RELEASE ORAL NIGHTLY
Qty: 30 TABLET | Refills: 3 | Status: SHIPPED | OUTPATIENT
Start: 2023-08-16 | End: 2023-12-14

## 2023-08-16 NOTE — PROGRESS NOTES
Pt states here for BLE heaviness pt was told she was low on b12. Pt states having RLS and lower back pain.

## 2023-08-18 DIAGNOSIS — N39.0 RECURRENT UTI: ICD-10-CM

## 2023-08-18 DIAGNOSIS — N76.3 CHRONIC VULVITIS: ICD-10-CM

## 2023-08-18 DIAGNOSIS — N95.2 ATROPHIC VAGINITIS: ICD-10-CM

## 2023-08-18 DIAGNOSIS — R32 URINARY INCONTINENCE, UNSPECIFIED TYPE: ICD-10-CM

## 2023-08-21 RX ORDER — ESTRADIOL 0.1 MG/G
CREAM VAGINAL
Qty: 42.5 G | Refills: 0 | Status: SHIPPED | OUTPATIENT
Start: 2023-08-21

## 2023-08-21 RX ORDER — MOMETASONE FUROATE 1 MG/G
OINTMENT TOPICAL
Qty: 45 G | Refills: 0 | Status: SHIPPED | OUTPATIENT
Start: 2023-08-21

## 2023-08-23 NOTE — TELEPHONE ENCOUNTER
LVM for pt to confirm refill request needed due to it not being in the current med list and not refilled since 7/20/18. The original prescription was discontinued on 7/1/2022 by Grabiel Soria for the following reason: Error.

## 2023-08-25 ENCOUNTER — WALK IN (OUTPATIENT)
Dept: URGENT CARE | Age: 88
End: 2023-08-25

## 2023-08-25 VITALS
TEMPERATURE: 97.5 F | RESPIRATION RATE: 20 BRPM | DIASTOLIC BLOOD PRESSURE: 62 MMHG | HEART RATE: 75 BPM | SYSTOLIC BLOOD PRESSURE: 144 MMHG | OXYGEN SATURATION: 96 %

## 2023-08-25 DIAGNOSIS — N39.0 ACUTE UTI: ICD-10-CM

## 2023-08-25 DIAGNOSIS — R30.0 DYSURIA: Primary | ICD-10-CM

## 2023-08-25 LAB
APPEARANCE, POC: ABNORMAL
BILIRUBIN, POC: NEGATIVE
COLOR, POC: YELLOW
GLUCOSE UR-MCNC: NEGATIVE MG/DL
KETONES, POC: NEGATIVE MG/DL
NITRITE, POC: POSITIVE
OCCULT BLOOD, POC: NEGATIVE
PH UR: 5.5 [PH] (ref 5–7)
PROT UR-MCNC: NEGATIVE MG/DL
SP GR UR: 1.02 (ref 1–1.03)
UROBILINOGEN UR-MCNC: 0.2 MG/DL (ref 0–1)
WBC (LEUKOCYTE) ESTERASE, POC: ABNORMAL

## 2023-08-25 PROCEDURE — 99214 OFFICE O/P EST MOD 30 MIN: CPT | Performed by: FAMILY MEDICINE

## 2023-08-25 PROCEDURE — 81002 URINALYSIS NONAUTO W/O SCOPE: CPT | Performed by: FAMILY MEDICINE

## 2023-08-25 PROCEDURE — 87086 URINE CULTURE/COLONY COUNT: CPT | Performed by: CLINICAL MEDICAL LABORATORY

## 2023-08-25 RX ORDER — NITROFURANTOIN 25; 75 MG/1; MG/1
100 CAPSULE ORAL 2 TIMES DAILY
Qty: 14 CAPSULE | Refills: 0 | Status: SHIPPED | OUTPATIENT
Start: 2023-08-25 | End: 2023-09-01

## 2023-08-25 RX ORDER — HYDROCORTISONE 25 MG/G
CREAM TOPICAL
Qty: 28 G | Refills: 0 | OUTPATIENT
Start: 2023-08-25

## 2023-08-25 ASSESSMENT — PAIN SCALES - GENERAL: PAINLEVEL: 0

## 2023-08-27 LAB — BACTERIA UR CULT: NORMAL

## 2023-09-05 ENCOUNTER — TELEPHONE (OUTPATIENT)
Dept: PHYSICAL THERAPY | Facility: HOSPITAL | Age: 88
End: 2023-09-05

## 2023-09-08 ENCOUNTER — APPOINTMENT (OUTPATIENT)
Dept: PHYSICAL THERAPY | Facility: HOSPITAL | Age: 88
End: 2023-09-08
Attending: NURSE PRACTITIONER
Payer: MEDICARE

## 2023-09-08 ENCOUNTER — PATIENT OUTREACH (OUTPATIENT)
Dept: CASE MANAGEMENT | Age: 88
End: 2023-09-08

## 2023-09-08 ENCOUNTER — TELEPHONE (OUTPATIENT)
Facility: CLINIC | Age: 88
End: 2023-09-08

## 2023-09-11 ENCOUNTER — OFFICE VISIT (OUTPATIENT)
Dept: PHYSICAL THERAPY | Facility: HOSPITAL | Age: 88
End: 2023-09-11
Attending: NURSE PRACTITIONER
Payer: MEDICARE

## 2023-09-11 ENCOUNTER — PATIENT OUTREACH (OUTPATIENT)
Dept: CASE MANAGEMENT | Age: 88
End: 2023-09-11

## 2023-09-11 PROCEDURE — 97110 THERAPEUTIC EXERCISES: CPT | Performed by: PHYSICAL THERAPIST

## 2023-09-11 PROCEDURE — 97161 PT EVAL LOW COMPLEX 20 MIN: CPT | Performed by: PHYSICAL THERAPIST

## 2023-09-12 ENCOUNTER — TELEPHONE (OUTPATIENT)
Facility: CLINIC | Age: 88
End: 2023-09-12

## 2023-09-15 ENCOUNTER — OFFICE VISIT (OUTPATIENT)
Dept: PHYSICAL THERAPY | Facility: HOSPITAL | Age: 88
End: 2023-09-15
Attending: NURSE PRACTITIONER
Payer: MEDICARE

## 2023-09-15 PROCEDURE — 97110 THERAPEUTIC EXERCISES: CPT | Performed by: PHYSICAL THERAPIST

## 2023-09-15 NOTE — PROGRESS NOTES
Diagnosis:   Spondylolisthesis at L4-L5 level (M43.16)  Spondylolisthesis at L5-S1 level (M43.17)  Cervicalgia (M54.2)  Degenerative scoliosis in adult patient (M41.50)      Referring Provider: Faviola Zaman  Date of Evaluation:    9/11/23    Precautions:  None Next MD visit:   none scheduled  Date of Surgery: n/a   Insurance Primary/Secondary: MEDICARE / COMMERCIAL     # Auth Visits: 8            Subjective: my neck is really sore today . Pain isn't as bad in my back. Pain: 5/10      Objective: seen for first treatment. Focus on cervical spine with TTP along bilateral UT, levators. Suboccipitalis with referred pain post cervical spine  and to back of head. Increased kyphotic posture   Assessment: good tolerance to treatment. Pain with passive stretching stalin to right       Goals:   1  Patient will demonstrate lumbar flexion within available range with no pain within 6 weeks in order to pick objects from the floor. 2. Patient will tolerate completing home, self care activities  for >30 minutes  with no increase in pain within 6 weeks to improve community involvement. 3. Patient will demonstrate proper squat form with no increase in pain within 6 weeks in order to lift objects from low surfaces. 4. Patient will demonstrate improvement in JAY score  within 6 weeks in order to improve functional mobility and return to PLOF. 5. Patient will have subjective pain 1/10      Plan: Progress with STM to back, cervical spine , emphasis sis on HEP   Date: 9/15/2023  TX#: 2/8 Date:                 TX#: 3/ Date:                 TX#: 4/ Date:                 TX#: 5/ Date:    Tx#: 6/   STM to UT, levators suboccipitals x30'   Passive UT stretch /contract relax x10   Manual cervical traction x5 pulls 30 sec each        Scap squeezes x10     AA sb neck stretch x5 each direction spine                      HEP: see patient instructions     Charges: 3 MT        Total Timed Treatment: 45 min  Total Treatment Time: 45 min      Home Exercise Program [XPPQZHT]    BRIDGING -  Repeat 10 Times, Hold 1 Second(s), Complete 1 Set, Perform 1 Times a Day    LOWER TRUNK ROTATIONS - LTR - WIG WAGS - KNEE ROCKS -  Repeat 10 Times, Hold 1 Second(s), Complete 1 Set, Perform 1 Times a Day    GERIATRIC - KNEE EXTENSION - LONG ARC QUAD (LAQ) -  Repeat 10 Times, Hold 1 Second(s), Complete 1 Set, Perform 1 Times a Day

## 2023-09-18 ENCOUNTER — OFFICE VISIT (OUTPATIENT)
Dept: PHYSICAL THERAPY | Facility: HOSPITAL | Age: 88
End: 2023-09-18
Attending: NURSE PRACTITIONER
Payer: MEDICARE

## 2023-09-18 PROCEDURE — 97110 THERAPEUTIC EXERCISES: CPT | Performed by: PHYSICAL THERAPIST

## 2023-09-18 PROCEDURE — 97140 MANUAL THERAPY 1/> REGIONS: CPT | Performed by: PHYSICAL THERAPIST

## 2023-09-20 NOTE — PROGRESS NOTES
Diagnosis:   Spondylolisthesis at L4-L5 level (M43.16)  Spondylolisthesis at L5-S1 level (M43.17)  Cervicalgia (M54.2)  Degenerative scoliosis in adult patient (M41.50)      Referring Provider: Annabel Swan  Date of Evaluation:    9/11/23    Precautions:  None Next MD visit:   none scheduled  Date of Surgery: n/a   Insurance Primary/Secondary: MEDICARE / COMMERCIAL     # Auth Visits: 8            Subjective: My back and neck are both sore and tight, but neck more so. Exercises are going good. Still have a hard time with moving my neck . Pain: 5/10      Objective: seen for treatment. DKTC exercise without sig. Pain in low back, but does note pain with LE elevated on bolster. Cervical spine with trigger points and along UT, levators bilaterally     Transfers to>from table with SBA /    Assessment: Progressing well with current treatment . Postural faults, stalin with increased thoracic kyphosis and forward head, rounded shoulder   contribute to her symptoms. Goals:   1  Patient will demonstrate lumbar flexion within available range with no pain within 6 weeks in order to pick objects from the floor. 2. Patient will tolerate completing home, self care activities  for >30 minutes  with no increase in pain within 6 weeks to improve community involvement. 3. Patient will demonstrate proper squat form with no increase in pain within 6 weeks in order to lift objects from low surfaces. 4. Patient will demonstrate improvement in JAY score  within 6 weeks in order to improve functional mobility and return to PLOF. 5. Patient will have subjective pain 1/10      Plan: Progress with STM to back, cervical spine , emphasis  on HEP 2x/week x4 weeks   Date: 9/15/2023  TX#: 2/8 Date:      9/18/23           TX#: 3/ Date:                 TX#: 4/ Date:                 TX#: 5/ Date:    Tx#: 6/   STM to UT, levators suboccipitals x30'   Passive UT stretch /contract relax x10   Manual cervical traction x5 pulls 30 sec each  STM to UT, levators suboccipitals x30'   Passive UT stretch /contract relax x10   Manual cervical traction x5 pulls 30 sec each   AA sb neck stretch x5 each direction spine       Scap squeezes x10     AA sb neck stretch x5 each direction spine  SB TE:   DKTC   LBR  Bridge x20   Hooklying RS multiple bouts   PROM b le's                       HEP: see patient instructions     Charges: 1 te  2 MT     Total Timed Treatment: 45 min  Total Treatment Time: 45 min      Home Exercise Program [XPPQZHT]    BRIDGING -  Repeat 10 Times, Hold 1 Second(s), Complete 1 Set, Perform 1 Times a Day    LOWER TRUNK ROTATIONS - LTR - WIG WAGS - KNEE ROCKS -  Repeat 10 Times, Hold 1 Second(s), Complete 1 Set, Perform 1 Times a Day    GERIATRIC - KNEE EXTENSION - LONG ARC QUAD (LAQ) -  Repeat 10 Times, Hold 1 Second(s), Complete 1 Set, Perform 1 Times a Day

## 2023-09-22 ENCOUNTER — OFFICE VISIT (OUTPATIENT)
Dept: PHYSICAL THERAPY | Facility: HOSPITAL | Age: 88
End: 2023-09-22
Attending: NURSE PRACTITIONER
Payer: MEDICARE

## 2023-09-22 PROCEDURE — 97140 MANUAL THERAPY 1/> REGIONS: CPT | Performed by: PHYSICAL THERAPIST

## 2023-09-22 PROCEDURE — 97110 THERAPEUTIC EXERCISES: CPT | Performed by: PHYSICAL THERAPIST

## 2023-09-22 NOTE — PROGRESS NOTES
Diagnosis:   Spondylolisthesis at L4-L5 level (M43.16)  Spondylolisthesis at L5-S1 level (M43.17)  Cervicalgia (M54.2)  Degenerative scoliosis in adult patient (M41.50)      Referring Provider: Ayesha Fuentes  Date of Evaluation:    9/11/23    Precautions:  None Next MD visit:   none scheduled  Date of Surgery: n/a   Insurance Primary/Secondary: MEDICARE / COMMERCIAL     # Auth Visits: 8            Subjective: Neck doesn't feel as tight as it used to. However did notice that she had vertigo after last session, but did calm down with medicine. Feels good today . Pain: 5/10      Objective: seen for treatment. Progressed with exercise including standing, balance, wall push ups. Transfers to>from table with SBA /    Assessment: Vertigo after last session , instructed her to monitor symptoms . Eased stm and did not perform PROM of her cervical spine as those influences may have contributed to it . Goals:   1  Patient will demonstrate lumbar flexion within available range with no pain within 6 weeks in order to pick objects from the floor. 2. Patient will tolerate completing home, self care activities  for >30 minutes  with no increase in pain within 6 weeks to improve community involvement. 3. Patient will demonstrate proper squat form with no increase in pain within 6 weeks in order to lift objects from low surfaces. 4. Patient will demonstrate improvement in JAY score  within 6 weeks in order to improve functional mobility and return to PLOF. 5. Patient will have subjective pain 1/10      Plan: Progress with STM to back, cervical spine , emphasis  on HEP 2x/week x4 weeks   Date: 9/15/2023  TX#: 2/8 Date:      9/18/23           TX#: 3/ Date:           9/22/23      TX#: 4/ Date:                 TX#: 5/ Date:    Tx#: 6/   STM to UT, levators suboccipitals x30'   Passive UT stretch /contract relax x10   Manual cervical traction x5 pulls 30 sec each  STM to UT, levators suboccipitals x30'   Passive UT stretch /contract relax x10   Manual cervical traction x5 pulls 30 sec each   AA sb neck stretch x5 each direction spine  STM to UT, levators suboccipitals x30'   Passive UT stretch /contract relax x10   Manual cervical traction x5 pulls 30 sec each        Scap squeezes x10     AA sb neck stretch x5 each direction spine  SB TE:   DKTC   LBR  Bridge x20   Hooklying RS multiple bouts   PROM b le's      Bridge x20   Hooklying RS multiple bouts   Sitting rows red tb x20   Shoulder rolls x20   Wall push ups x20   Standing at table; MIP x10   SLB x10 @5 sec hold                    HEP: see patient instructions     Charges: 1 te  2 MT     Total Timed Treatment: 45 min  Total Treatment Time: 45 min      Home Exercise Program [XPPQZHT]    BRIDGING -  Repeat 10 Times, Hold 1 Second(s), Complete 1 Set, Perform 1 Times a Day    LOWER TRUNK ROTATIONS - LTR - WIG WAGS - KNEE ROCKS -  Repeat 10 Times, Hold 1 Second(s), Complete 1 Set, Perform 1 Times a Day    GERIATRIC - KNEE EXTENSION - LONG ARC QUAD (LAQ) -  Repeat 10 Times, Hold 1 Second(s), Complete 1 Set, Perform 1 Times a Day

## 2023-09-25 ENCOUNTER — TELEPHONE (OUTPATIENT)
Dept: NEUROLOGY | Facility: CLINIC | Age: 88
End: 2023-09-25

## 2023-09-25 ENCOUNTER — APPOINTMENT (OUTPATIENT)
Dept: PHYSICAL THERAPY | Facility: HOSPITAL | Age: 88
End: 2023-09-25
Attending: NURSE PRACTITIONER
Payer: MEDICARE

## 2023-09-25 RX ORDER — GLIPIZIDE 2.5 MG/1
2.5 TABLET, EXTENDED RELEASE ORAL
Qty: 30 TABLET | Refills: 0 | Status: SHIPPED | OUTPATIENT
Start: 2023-09-25

## 2023-09-25 RX ORDER — LOSARTAN POTASSIUM 100 MG/1
TABLET ORAL
Qty: 90 TABLET | Refills: 0 | Status: SHIPPED | OUTPATIENT
Start: 2023-09-25

## 2023-09-25 NOTE — TELEPHONE ENCOUNTER
Pt is calling to inform [facility] that she will be continuing her current medication she is on Pramipexole Dihydrochloride ER (MIRAPEX ER) 0.75 MG Oral Tablet 24 Hr; Pt states that Dr. Jerrell Wong requested she let him know if she will be continuing to take the medication or not; Please advise

## 2023-09-27 ENCOUNTER — HOSPITAL ENCOUNTER (EMERGENCY)
Facility: HOSPITAL | Age: 88
Discharge: HOME | End: 2023-09-27
Attending: EMERGENCY MEDICINE
Payer: MEDICARE

## 2023-09-27 ENCOUNTER — APPOINTMENT (OUTPATIENT)
Dept: GENERAL RADIOLOGY | Facility: HOSPITAL | Age: 88
End: 2023-09-27
Attending: EMERGENCY MEDICINE
Payer: MEDICARE

## 2023-09-27 VITALS
RESPIRATION RATE: 16 BRPM | HEART RATE: 58 BPM | WEIGHT: 150 LBS | TEMPERATURE: 98 F | DIASTOLIC BLOOD PRESSURE: 67 MMHG | HEIGHT: 61 IN | BODY MASS INDEX: 28.32 KG/M2 | SYSTOLIC BLOOD PRESSURE: 123 MMHG | OXYGEN SATURATION: 98 %

## 2023-09-27 DIAGNOSIS — S90.111A CONTUSION OF RIGHT GREAT TOE WITHOUT DAMAGE TO NAIL, INITIAL ENCOUNTER: Primary | ICD-10-CM

## 2023-09-27 PROCEDURE — 73660 X-RAY EXAM OF TOE(S): CPT | Performed by: EMERGENCY MEDICINE

## 2023-09-27 PROCEDURE — 99284 EMERGENCY DEPT VISIT MOD MDM: CPT

## 2023-09-27 PROCEDURE — 99283 EMERGENCY DEPT VISIT LOW MDM: CPT

## 2023-09-27 NOTE — DISCHARGE INSTRUCTIONS
Tylenol, can ice. Not improving follow-up with your doctor.   Return if fevers, worsening redness, new complaints

## 2023-09-27 NOTE — ED INITIAL ASSESSMENT (HPI)
Pt c/o R big toe pain, states she stubbed her toe on the wheel of her walker on Monday.  Area noted with bruising and redness

## 2023-09-29 ENCOUNTER — APPOINTMENT (OUTPATIENT)
Dept: PHYSICAL THERAPY | Facility: HOSPITAL | Age: 88
End: 2023-09-29
Attending: NURSE PRACTITIONER
Payer: MEDICARE

## 2023-10-02 ENCOUNTER — TELEPHONE (OUTPATIENT)
Dept: PHYSICAL THERAPY | Facility: HOSPITAL | Age: 88
End: 2023-10-02

## 2023-10-02 ENCOUNTER — APPOINTMENT (OUTPATIENT)
Dept: PHYSICAL THERAPY | Facility: HOSPITAL | Age: 88
End: 2023-10-02
Attending: NURSE PRACTITIONER
Payer: MEDICARE

## 2023-10-05 ENCOUNTER — APPOINTMENT (OUTPATIENT)
Dept: PHYSICAL THERAPY | Facility: HOSPITAL | Age: 88
End: 2023-10-05
Attending: NURSE PRACTITIONER
Payer: MEDICARE

## 2023-10-05 ENCOUNTER — TELEPHONE (OUTPATIENT)
Dept: PHYSICAL THERAPY | Facility: HOSPITAL | Age: 88
End: 2023-10-05

## 2023-10-06 ENCOUNTER — APPOINTMENT (OUTPATIENT)
Dept: PHYSICAL THERAPY | Facility: HOSPITAL | Age: 88
End: 2023-10-06
Attending: NURSE PRACTITIONER
Payer: MEDICARE

## 2023-10-12 ENCOUNTER — TELEPHONE (OUTPATIENT)
Dept: INTERNAL MEDICINE CLINIC | Facility: CLINIC | Age: 88
End: 2023-10-12

## 2023-10-12 NOTE — TELEPHONE ENCOUNTER
It will be 2 weeks tomorrow that patient has had a cold.  Patient is coughing up mucous(clear).  Patient has no temp but is blowing her nose all the time and it's thick mucous.

## 2023-10-13 ENCOUNTER — TELEPHONE (OUTPATIENT)
Dept: INTERNAL MEDICINE CLINIC | Facility: CLINIC | Age: 88
End: 2023-10-13

## 2023-10-13 ENCOUNTER — PATIENT OUTREACH (OUTPATIENT)
Dept: CASE MANAGEMENT | Age: 88
End: 2023-10-13

## 2023-10-13 DIAGNOSIS — E53.8 B12 DEFICIENCY: ICD-10-CM

## 2023-10-13 DIAGNOSIS — E11.69 DIABETES MELLITUS TYPE 2 IN OBESE: ICD-10-CM

## 2023-10-13 DIAGNOSIS — M19.041 ARTHRITIS OF BOTH HANDS: Primary | ICD-10-CM

## 2023-10-13 DIAGNOSIS — E78.00 PURE HYPERCHOLESTEROLEMIA: ICD-10-CM

## 2023-10-13 DIAGNOSIS — M19.042 ARTHRITIS OF BOTH HANDS: Primary | ICD-10-CM

## 2023-10-13 DIAGNOSIS — J44.9 CHRONIC OBSTRUCTIVE PULMONARY DISEASE, UNSPECIFIED COPD TYPE (HCC): ICD-10-CM

## 2023-10-13 DIAGNOSIS — H35.3231 EXUDATIVE AGE-RELATED MACULAR DEGENERATION, BILATERAL, WITH ACTIVE CHOROIDAL NEOVASCULARIZATION (HCC): ICD-10-CM

## 2023-10-13 DIAGNOSIS — K21.9 GASTROESOPHAGEAL REFLUX DISEASE, UNSPECIFIED WHETHER ESOPHAGITIS PRESENT: ICD-10-CM

## 2023-10-13 DIAGNOSIS — E11.42 DIABETIC POLYNEUROPATHY ASSOCIATED WITH TYPE 2 DIABETES MELLITUS (HCC): ICD-10-CM

## 2023-10-13 DIAGNOSIS — E66.9 DIABETES MELLITUS TYPE 2 IN OBESE: ICD-10-CM

## 2023-10-13 DIAGNOSIS — I10 PRIMARY HYPERTENSION: ICD-10-CM

## 2023-10-13 NOTE — TELEPHONE ENCOUNTER
Pt requesting call from Nurses to discuss ongoing sinus congestion, drainage and cough. Mucous is clear and pt has not had fever. Pt would like to discuss how to deal with her symptoms, should she consider OTC allergy relief or cough suppressant?     She can be reached at     Thank You    Routed to [facility] clinical

## 2023-10-13 NOTE — TELEPHONE ENCOUNTER
Called and spoke to pt. Pt said that she believes she has allergies. For about 2 weeks, she has had a cough and nasal congestion. Drainage is clear and pt denies fever. Has not taken anything OTC. Advised to try non drowsy Claritin or zyrtec. Offered appt next week with CS but pt said she will just wait until the following week to see TB. Advised to call with any new or worsening sxs, WIC over weekend. Pt stated understanding and agreed to plan.     CHELO SAUNDERS

## 2023-10-13 NOTE — PROGRESS NOTES
Contacting patient to follow up on CCM for the month. Left message.  Intended to discuss phyiscal therapy outstanding orders  Total time -  min  Total Monthly time-3  min

## 2023-10-13 NOTE — PROGRESS NOTES
Spoke to Clara for CCM. Updates to patient care team/comments: UTD  Patient reported changes in medications: UTD  Med Adherence  Comment: pt taking medications as prescribed     Health Maintenance: Diabetes Care Dilated Eye Exam due on 06/24/2022  COVID-19 Vaccine(7 - 2023-24 season) due on 09/01/2023  Influenza Vaccine(1) due on 10/01/2023  Diabetes Care A1C due on 11/04/2023  Diabetes Care: Microalb/Creat Ratio due on 05/04/2024  Annual Physical due on 07/25/2024  Diabetes Care: GFR due on 08/05/2024  Annual Depression Screening Completed  Fall Risk Screening (Annual) Completed  Diabetes Care Foot Exam (Annual) Completed  Pneumococcal Vaccine: 65+ Years Completed  Zoster Vaccines Completed    Patient updates/concerns:       Pt requested assistance in contacting nurses at [facility]. Pt has had an ongoing cough, sinus congestion and drainage for the last 2 weeks. Pt states she has not had a fever and mucous/drainage was clear and not very thick. Pt feels it may be allergies but wants to discuss with a nurse. [facility] sent te to [facility] clinical staff. Because of sinus issues, pt has postponed her PT and she rescheduled visit for breathing test with pulmonology. Pt has been using her inhaler more and has been more short of breath because of the sinus issues. Pt has postponed her physical therapy until sinus issues improve. Pt reports benefiting greatly from the three sessions she has already had. Pt has not been as diligent about doing her exercises every day but understands that continued improvement is dependant on compliance with her therapists. Pt states that she has not had to use her muscle relaxer and when she returns they will be starting to focus on her back. Pt feels this will help her with balance concerns as well. Since starting b12 pt feels like she has more strength in her legs.  She will always use a walker but rarely if she has a cane she will use that in a restaurant as long as she can brace herself with someone when walking. Discussed fall risks with pt and she states that she is verify careful about making sure her walkways are free from obstruction. Pt is aware of the significance an injury can take with falls as she ages. Pt has eye appointment in December and she is routinely checked for glaucoma and her macular degeneration. Pt was told that new injection she is getting is stronger than her previous one and that her vision in good eye is strong, pt only has to return once every 9 weeks. Pt states that pcp was requesting retinopathy test and will f/u at upcoming appointment to insure the appropriate tests are performed to comply with pcp wishes. Pt performs a light stretching routine using walker and chair. Pt will routinely walk 6 blocks a day with trip to grocery store. Pt has not walked as much because of sinus issues. Pt had rsv vaccine on sept 15. Pt plans on getting the Covid vaccine but when she was at [facility] pharmacist states that they did not have Liv [id number]. Pt does not feel comfortable switching manufacturers after initial run of Covid shots. Pt will wait until monderna is available. Pt plans to wear a mask when she goes into public spaces. Pt routinely sees podiatry that comes to the home. Pt has appt scheduled tomorrow for routine nail care. Reviewed with pt tips for online safety. Pt verbalized understanding and will avoid unsolicited requests for personal information or visit websites links that she is unfamiliar. Pt prescriptions are up to date  Goals/Action Plan: Active goal from previous outreach: exercise    Patient reported progress towards goals: pt will restart Physical therapy when congestion improves               - What: exercise routine           - Where/When/How: pt will try and perform daily the exercises from PT to augment her chair/walker exercises.  Pt tries to walk 6 block walk several days a week  Patient Reported Barriers and Concerns: n/a - Plan for overcoming barriers: none    Care Managers Interventions: te [facility] pt sick call, continue to provide encouragement and education for healthy coping and dx    Future Appointments:   Future Appointments   Date Time Provider Karel Nahed   10/23/2023  2:15 PM Carmen Mendoza, PT [facility] PHYS [facility] AT [facility] AT [facility]   10/23/2023  3:40 PM Rhea Rosales MD [facility]   10/26/2023  2:15 PM Carmen Mendoza PT [facility] PHYS [address]   10/30/2023  2:15 PM Carmen Mendoza, [place] [facility] PHYS [address]   10/30/2023  3:00 PM Gagan Rogers MD [facility] [place]   11/28/2023  2:00 PM Sangita Hobson MD [facility] [place]         Next Care Manager Follow Up Date: one month    Reason For Follow Up: review progress and or barriers towards patient's goals. Time Spent This Encounter Total: 67 min medical record review, telephone communication, care plan updates where needed, education, goals, and action plan recreation/update. Provided acknowledgment and validation to patient's concerns.    Monthly Minute Total including today: 67  Physical assessment, complete health history, and need for CCM established by Jordana Mayes MD.

## 2023-10-20 ENCOUNTER — APPOINTMENT (OUTPATIENT)
Dept: GENERAL RADIOLOGY | Facility: HOSPITAL | Age: 88
End: 2023-10-20
Payer: MEDICARE

## 2023-10-20 ENCOUNTER — LAB ENCOUNTER (OUTPATIENT)
Dept: LAB | Facility: HOSPITAL | Age: 88
End: 2023-10-20
Attending: INTERNAL MEDICINE
Payer: MEDICARE

## 2023-10-20 ENCOUNTER — HOSPITAL ENCOUNTER (EMERGENCY)
Facility: HOSPITAL | Age: 88
Discharge: HOME | End: 2023-10-20
Attending: EMERGENCY MEDICINE
Payer: MEDICARE

## 2023-10-20 VITALS
OXYGEN SATURATION: 98 % | DIASTOLIC BLOOD PRESSURE: 57 MMHG | RESPIRATION RATE: 18 BRPM | BODY MASS INDEX: 28.7 KG/M2 | WEIGHT: 152 LBS | TEMPERATURE: 97 F | HEIGHT: 61 IN | HEART RATE: 82 BPM | SYSTOLIC BLOOD PRESSURE: 141 MMHG

## 2023-10-20 DIAGNOSIS — J40 BRONCHITIS: Primary | ICD-10-CM

## 2023-10-20 DIAGNOSIS — E53.8 B12 DEFICIENCY: ICD-10-CM

## 2023-10-20 DIAGNOSIS — E11.69 DIABETES MELLITUS TYPE 2 IN OBESE: ICD-10-CM

## 2023-10-20 DIAGNOSIS — E66.9 DIABETES MELLITUS TYPE 2 IN OBESE: ICD-10-CM

## 2023-10-20 LAB
ALBUMIN SERPL-MCNC: 3.5 G/DL (ref 3.4–5)
ALBUMIN/GLOB SERPL: 0.9 {RATIO} (ref 1–2)
ALP LIVER SERPL-CCNC: 73 U/L
ALT SERPL-CCNC: 16 U/L
ANION GAP SERPL CALC-SCNC: 5 MMOL/L (ref 0–18)
AST SERPL-CCNC: 7 U/L (ref 15–37)
BILIRUB SERPL-MCNC: 0.4 MG/DL (ref 0.1–2)
BUN BLD-MCNC: 9 MG/DL (ref 7–18)
CALCIUM BLD-MCNC: 9.7 MG/DL (ref 8.5–10.1)
CHLORIDE SERPL-SCNC: 107 MMOL/L (ref 98–112)
CO2 SERPL-SCNC: 27 MMOL/L (ref 21–32)
CREAT BLD-MCNC: 0.83 MG/DL
EGFRCR SERPLBLD CKD-EPI 2021: 66 ML/MIN/1.73M2 (ref 60–?)
EST. AVERAGE GLUCOSE BLD GHB EST-MCNC: 148 MG/DL (ref 68–126)
FASTING STATUS PATIENT QL REPORTED: NO
FLUAV + FLUBV RNA SPEC NAA+PROBE: NEGATIVE
FLUAV + FLUBV RNA SPEC NAA+PROBE: NEGATIVE
GLOBULIN PLAS-MCNC: 3.9 G/DL (ref 2.8–4.4)
GLUCOSE BLD-MCNC: 101 MG/DL (ref 70–99)
HBA1C MFR BLD: 6.8 % (ref ?–5.7)
OSMOLALITY SERPL CALC.SUM OF ELEC: 287 MOSM/KG (ref 275–295)
POTASSIUM SERPL-SCNC: 3.8 MMOL/L (ref 3.5–5.1)
PROT SERPL-MCNC: 7.4 G/DL (ref 6.4–8.2)
RSV RNA SPEC NAA+PROBE: NEGATIVE
SARS-COV-2 RNA RESP QL NAA+PROBE: NOT DETECTED
SODIUM SERPL-SCNC: 139 MMOL/L (ref 136–145)
VIT B12 SERPL-MCNC: 604 PG/ML (ref 193–986)

## 2023-10-20 PROCEDURE — 36415 COLL VENOUS BLD VENIPUNCTURE: CPT

## 2023-10-20 PROCEDURE — 82607 VITAMIN B-12: CPT

## 2023-10-20 PROCEDURE — 80053 COMPREHEN METABOLIC PANEL: CPT

## 2023-10-20 PROCEDURE — 99284 EMERGENCY DEPT VISIT MOD MDM: CPT

## 2023-10-20 PROCEDURE — 0241U SARS-COV-2/FLU A AND B/RSV BY PCR (GENEXPERT): CPT

## 2023-10-20 PROCEDURE — 83036 HEMOGLOBIN GLYCOSYLATED A1C: CPT

## 2023-10-20 PROCEDURE — 71045 X-RAY EXAM CHEST 1 VIEW: CPT

## 2023-10-20 PROCEDURE — 94640 AIRWAY INHALATION TREATMENT: CPT

## 2023-10-20 PROCEDURE — 0241U SARS-COV-2/FLU A AND B/RSV BY PCR (GENEXPERT): CPT | Performed by: EMERGENCY MEDICINE

## 2023-10-20 RX ORDER — ALBUTEROL SULFATE 90 UG/1
2 AEROSOL, METERED RESPIRATORY (INHALATION) 4 TIMES DAILY
Status: DISCONTINUED | OUTPATIENT
Start: 2023-10-20 | End: 2023-10-20

## 2023-10-20 RX ORDER — PREDNISONE 20 MG/1
40 TABLET ORAL ONCE
Status: COMPLETED | OUTPATIENT
Start: 2023-10-20 | End: 2023-10-20

## 2023-10-20 RX ORDER — AMOXICILLIN AND CLAVULANATE POTASSIUM 875; 125 MG/1; MG/1
1 TABLET, FILM COATED ORAL 2 TIMES DAILY
Qty: 20 TABLET | Refills: 0 | Status: SHIPPED | OUTPATIENT
Start: 2023-10-20 | End: 2023-10-30

## 2023-10-20 RX ORDER — METHYLPREDNISOLONE 4 MG/1
TABLET ORAL
Qty: 1 EACH | Refills: 0 | Status: SHIPPED | OUTPATIENT
Start: 2023-10-20

## 2023-10-20 NOTE — ED INITIAL ASSESSMENT (HPI)
Pt to ER with c/o cold symptoms for the past 2 weeks. Pt states she is still coughing phlegm. SOB with exertion.

## 2023-10-20 NOTE — ED QUICK NOTES
Patient's neighbor can pick patient up if needed and can be reached by phone.  Romina [place]: [phone]

## 2023-10-23 ENCOUNTER — APPOINTMENT (OUTPATIENT)
Dept: PHYSICAL THERAPY | Facility: HOSPITAL | Age: 88
End: 2023-10-23
Attending: NURSE PRACTITIONER
Payer: MEDICARE

## 2023-10-23 ENCOUNTER — OFFICE VISIT (OUTPATIENT)
Dept: INTERNAL MEDICINE CLINIC | Facility: CLINIC | Age: 88
End: 2023-10-23

## 2023-10-23 ENCOUNTER — TELEPHONE (OUTPATIENT)
Dept: PHYSICAL THERAPY | Facility: HOSPITAL | Age: 88
End: 2023-10-23

## 2023-10-23 VITALS
RESPIRATION RATE: 16 BRPM | BODY MASS INDEX: 28.82 KG/M2 | DIASTOLIC BLOOD PRESSURE: 62 MMHG | TEMPERATURE: 97 F | WEIGHT: 152.63 LBS | HEIGHT: 61 IN | OXYGEN SATURATION: 96 % | SYSTOLIC BLOOD PRESSURE: 136 MMHG | HEART RATE: 68 BPM

## 2023-10-23 DIAGNOSIS — E11.69 DIABETES MELLITUS TYPE 2 IN OBESE: ICD-10-CM

## 2023-10-23 DIAGNOSIS — H91.93 BILATERAL HEARING LOSS, UNSPECIFIED HEARING LOSS TYPE: ICD-10-CM

## 2023-10-23 DIAGNOSIS — H61.22 CERUMINOSIS, LEFT: ICD-10-CM

## 2023-10-23 DIAGNOSIS — E53.8 B12 DEFICIENCY: ICD-10-CM

## 2023-10-23 DIAGNOSIS — E66.9 DIABETES MELLITUS TYPE 2 IN OBESE: ICD-10-CM

## 2023-10-23 DIAGNOSIS — J40 BRONCHITIS: Primary | ICD-10-CM

## 2023-10-23 DIAGNOSIS — J44.1 COPD EXACERBATION (HCC): ICD-10-CM

## 2023-10-23 PROCEDURE — 1126F AMNT PAIN NOTED NONE PRSNT: CPT | Performed by: INTERNAL MEDICINE

## 2023-10-23 PROCEDURE — 99214 OFFICE O/P EST MOD 30 MIN: CPT | Performed by: INTERNAL MEDICINE

## 2023-10-26 ENCOUNTER — APPOINTMENT (OUTPATIENT)
Dept: PHYSICAL THERAPY | Facility: HOSPITAL | Age: 88
End: 2023-10-26
Attending: NURSE PRACTITIONER
Payer: MEDICARE

## 2023-10-30 ENCOUNTER — OFFICE VISIT (OUTPATIENT)
Facility: CLINIC | Age: 88
End: 2023-10-30

## 2023-10-30 ENCOUNTER — APPOINTMENT (OUTPATIENT)
Dept: PHYSICAL THERAPY | Facility: HOSPITAL | Age: 88
End: 2023-10-30
Attending: NURSE PRACTITIONER
Payer: MEDICARE

## 2023-10-30 VITALS
BODY MASS INDEX: 28.89 KG/M2 | HEIGHT: 61 IN | SYSTOLIC BLOOD PRESSURE: 124 MMHG | RESPIRATION RATE: 18 BRPM | HEART RATE: 57 BPM | OXYGEN SATURATION: 96 % | WEIGHT: 153 LBS | DIASTOLIC BLOOD PRESSURE: 62 MMHG | TEMPERATURE: 99 F

## 2023-10-30 DIAGNOSIS — G25.81 RLS (RESTLESS LEGS SYNDROME): ICD-10-CM

## 2023-10-30 DIAGNOSIS — R06.02 SHORTNESS OF BREATH: ICD-10-CM

## 2023-10-30 DIAGNOSIS — K21.9 GASTROESOPHAGEAL REFLUX DISEASE WITHOUT ESOPHAGITIS: ICD-10-CM

## 2023-10-30 DIAGNOSIS — I10 PRIMARY HYPERTENSION: Primary | ICD-10-CM

## 2023-10-30 NOTE — PATIENT INSTRUCTIONS
Plan:      Continue Albuterol HFA MDI 2 puffs 4 times daily as needed.     Continue Fluticasone 2 puffs each nostril daily   Diuretics per cardiology   Cardiology follow up   Check Spirometry before next visit     Follow up: 6  months     Julieta Arce MD

## 2023-11-14 ENCOUNTER — TELEPHONE (OUTPATIENT)
Dept: INTERNAL MEDICINE CLINIC | Facility: CLINIC | Age: 88
End: 2023-11-14

## 2023-11-14 NOTE — TELEPHONE ENCOUNTER
Patient calling has been having issues with bilateral swelling ankles patient has a hospital bed and tries to elevate her legs when sitting at her desk but this doesn't seem to help. Swelling has been happening for the last couple weeks. No openings available.

## 2023-11-14 NOTE — TELEPHONE ENCOUNTER
Patient states she has had bilateral ankle, foot, leg swelling for a couple of weeks, she believes she noticed this swelling started to be off and on 6 months ago. Pt does have COPD and lov with TB had bronchitis on 10/23/23. Pt scheduled  to see CS on 10/17/23 at 10:20am.  Pt verbalizes understanding.

## 2023-11-15 ENCOUNTER — PATIENT OUTREACH (OUTPATIENT)
Dept: CASE MANAGEMENT | Age: 88
End: 2023-11-15

## 2023-11-15 DIAGNOSIS — E11.42 DIABETIC POLYNEUROPATHY ASSOCIATED WITH TYPE 2 DIABETES MELLITUS (HCC): ICD-10-CM

## 2023-11-15 DIAGNOSIS — E66.9 DIABETES MELLITUS TYPE 2 IN OBESE: ICD-10-CM

## 2023-11-15 DIAGNOSIS — M19.041 ARTHRITIS OF BOTH HANDS: Primary | ICD-10-CM

## 2023-11-15 DIAGNOSIS — M19.042 ARTHRITIS OF BOTH HANDS: Primary | ICD-10-CM

## 2023-11-15 DIAGNOSIS — M54.9 BACK PAIN WITH RADIATION: ICD-10-CM

## 2023-11-15 DIAGNOSIS — H35.3231 EXUDATIVE AGE-RELATED MACULAR DEGENERATION, BILATERAL, WITH ACTIVE CHOROIDAL NEOVASCULARIZATION (HCC): ICD-10-CM

## 2023-11-15 DIAGNOSIS — K21.9 GASTROESOPHAGEAL REFLUX DISEASE WITHOUT ESOPHAGITIS: ICD-10-CM

## 2023-11-15 DIAGNOSIS — I10 PRIMARY HYPERTENSION: ICD-10-CM

## 2023-11-15 DIAGNOSIS — E53.8 B12 DEFICIENCY: ICD-10-CM

## 2023-11-15 DIAGNOSIS — H81.10 BPPV (BENIGN PAROXYSMAL POSITIONAL VERTIGO), UNSPECIFIED LATERALITY: ICD-10-CM

## 2023-11-15 DIAGNOSIS — J44.9 CHRONIC OBSTRUCTIVE PULMONARY DISEASE, UNSPECIFIED COPD TYPE (HCC): ICD-10-CM

## 2023-11-15 DIAGNOSIS — E78.00 PURE HYPERCHOLESTEROLEMIA: ICD-10-CM

## 2023-11-15 DIAGNOSIS — M51.36 DDD (DEGENERATIVE DISC DISEASE), LUMBAR: ICD-10-CM

## 2023-11-15 DIAGNOSIS — E11.69 DIABETES MELLITUS TYPE 2 IN OBESE: ICD-10-CM

## 2023-11-15 NOTE — PROGRESS NOTES
Spoke to Clara for CCM. Updates to patient care team/comments: UTD  Patient reported changes in medications: UTD  Med Adherence  Comment: Pt taking medications as prescribed     Health Maintenance:   Health Maintenance   Topic Date Due    COVID-19 Vaccine (7 - 2023-24 season) 09/01/2023    Diabetes Care A1C  04/20/2024    Diabetes Care: Microalb/Creat Ratio  05/04/2024    Diabetes Care Dilated Eye Exam  07/20/2024    Annual Physical  07/25/2024    Diabetes Care: GFR  10/20/2024    Influenza Vaccine  Completed    Annual Depression Screening  Completed    Fall Risk Screening (Annual)  Completed    Diabetes Care Foot Exam (Annual)  Completed    Pneumococcal Vaccine: 65+ Years  Completed    Zoster Vaccines  Completed       Patient updates/concerns:      Spoke to pt for outreach   Pt is still having difficulty getting a good nights rest because of RLS. Pt states it can take her 2-4 hours before falling asleep. Episodes have become more frequent during the day as well wich is a new development. If an episode happens in the middle of the night or during the day she will get up and walk around and start some project until it subsides. Because of this, pt takes frequent naps duirng the day and usually cannot get her day started until around noon   Pt states that her balance has become slightly less confident but she uses the walker almost always including inside the home. Pt welcomed [facility] resources for basic exercises she could do address this. Reviewed with pt other recommended suggestions to help which included massaging legs and feet, applying ice/heat, soaking legs in tub. Pt will try the massaging and the applying ice heat to legs. Pt cannot safely take a bath so she cannot submerge her legs, but she will see how effective it would be to soak her feet for relief. Pt has noticed an increase in her incontinence pt states that she will lose control of bladder function if laughing or lifting something.  Pt has not discussed this yet with pcp because it is a recent development. Pt welcomed [facility] resources on exercises to help pelvic floor strength. [facility] mailed pt beginner stretches endorsed by national incontinence foundation. Pt is aware of referral to ENT. Pt has appointment already scheduled to clean ear wax and test her hearing. Pt admits to not having an easy time following most phone conversations. Pt requested [facility] assistance in determining if amplified phones were sold at [employer]. [facility] confirmed that some are available at the store or order for in store  and range from  dollars. Pt verbalized understanding. Pt has visit today with eye doc. Pt gets shots in her right eye and has started a new medication that lasts longer. 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 Pt still uses a magnifying glass when reading  important things like rx instructions. Pt has routine visit with gyne at the end of the month. Reviewed immunizations with pt. Pt states that she received Covid and rsv at local pharmacy. Pt delayed pneumonia shot for a few weeks because she did not want to have so many so closely scheduled. Pt cancelled her neck therapy because of bronchitis and has not rescheduled pt does not anticipate that she will. Pt was not given home exercises but she does have examples to recreate from the sessions that she took part in. Pt continues to use her inhaler as prescribed and her nose spray at night. Pt states that she feels her copd has been effected by bronchitis and can sense her breathing a little heavier despite minimal output. Pt has visit with cardiology scheduled ot rule out and cardiac connection to shortness of breath. Pt is still trying to get herself to drink more water and has noticed that she gets some relief from her leg discomfort when she makes sure to drink to glasses.  Pt feels this will be a motivating factor to keep up her water consumption. Pt still walks to grocery store for exercise and has some stretches she does routinely. Goals/Action Plan: Active goal from previous outreach: exercise    Patient reported progress towards goals: pt still walks to store almost every day. Pt also has stretches from PT to do               - What: home exercise           - Where/When/How: pt has received chair exercises from [facility] and requested additional exercises for RLS and pelvic floor. Pt will then assemble a routine that can address her flexibility, rls and pelvic floor  Patient Reported Barriers and Concerns: n/a                   - Plan for overcoming barriers: none    Care Managers Interventions: sent info on rls exercise, sent info on pelvic floor exercises. Future Appointments:   Future Appointments   Date Time Provider Karel Dockery   11/17/2023 10:20 AM Juventino Cervantes PA-C [facility]   11/28/2023  2:00 PM Beverley Torres MD [facility] [place]   11/29/2023  9:30 AM [facility]   11/29/2023  9:50 AM Sue Hanson MD [facility]   3/5/2024  3:40 PM Colt Alegre MD [facility]   4/29/2024  2:30 PM Sebastián Payne MD [facility] [place]         Next Care Manager Follow Up Date: one month    Reason For Follow Up: review progress and or barriers towards patient's goals. Time Spent This Encounter Total: 69 min medical record review, telephone communication, care plan updates where needed, education, goals, and action plan recreation/update. Provided acknowledgment and validation to patient's concerns.    Monthly Minute Total including today: 69  Physical assessment, complete health history, and need for CCM established by Adina Colin MD.

## 2023-11-16 ENCOUNTER — APPOINTMENT (OUTPATIENT)
Dept: ULTRASOUND IMAGING | Facility: HOSPITAL | Age: 88
End: 2023-11-16
Attending: EMERGENCY MEDICINE
Payer: MEDICARE

## 2023-11-16 ENCOUNTER — HOSPITAL ENCOUNTER (EMERGENCY)
Facility: HOSPITAL | Age: 88
Discharge: HOME | End: 2023-11-16
Attending: EMERGENCY MEDICINE
Payer: MEDICARE

## 2023-11-16 VITALS
DIASTOLIC BLOOD PRESSURE: 64 MMHG | HEART RATE: 77 BPM | OXYGEN SATURATION: 97 % | SYSTOLIC BLOOD PRESSURE: 138 MMHG | TEMPERATURE: 98 F | RESPIRATION RATE: 18 BRPM

## 2023-11-16 DIAGNOSIS — G25.81 RESTLESS LEGS SYNDROME: Primary | ICD-10-CM

## 2023-11-16 LAB
ALBUMIN SERPL-MCNC: 3.1 G/DL (ref 3.4–5)
ALBUMIN/GLOB SERPL: 0.9 {RATIO} (ref 1–2)
ALP LIVER SERPL-CCNC: 63 U/L
ALT SERPL-CCNC: 17 U/L
ANION GAP SERPL CALC-SCNC: 4 MMOL/L (ref 0–18)
AST SERPL-CCNC: 13 U/L (ref 15–37)
BASOPHILS # BLD AUTO: 0.05 X10(3) UL (ref 0–0.2)
BASOPHILS NFR BLD AUTO: 0.8 %
BILIRUB SERPL-MCNC: 0.6 MG/DL (ref 0.1–2)
BILIRUB UR QL STRIP.AUTO: NEGATIVE
BUN BLD-MCNC: 11 MG/DL (ref 9–23)
CALCIUM BLD-MCNC: 9.7 MG/DL (ref 8.5–10.1)
CHLORIDE SERPL-SCNC: 109 MMOL/L (ref 98–112)
CLARITY UR REFRACT.AUTO: CLEAR
CO2 SERPL-SCNC: 26 MMOL/L (ref 21–32)
CREAT BLD-MCNC: 1.13 MG/DL
EGFRCR SERPLBLD CKD-EPI 2021: 45 ML/MIN/1.73M2 (ref 60–?)
EOSINOPHIL # BLD AUTO: 0.13 X10(3) UL (ref 0–0.7)
EOSINOPHIL NFR BLD AUTO: 2 %
ERYTHROCYTE [DISTWIDTH] IN BLOOD BY AUTOMATED COUNT: 13.9 %
GLOBULIN PLAS-MCNC: 3.5 G/DL (ref 2.8–4.4)
GLUCOSE BLD-MCNC: 107 MG/DL (ref 70–99)
GLUCOSE UR STRIP.AUTO-MCNC: NORMAL MG/DL
HCT VFR BLD AUTO: 37.2 %
HGB BLD-MCNC: 12.1 G/DL
HYALINE CASTS #/AREA URNS AUTO: PRESENT /LPF
IMM GRANULOCYTES # BLD AUTO: 0.02 X10(3) UL (ref 0–1)
IMM GRANULOCYTES NFR BLD: 0.3 %
KETONES UR STRIP.AUTO-MCNC: NEGATIVE MG/DL
LEUKOCYTE ESTERASE UR QL STRIP.AUTO: 250
LYMPHOCYTES # BLD AUTO: 1.06 X10(3) UL (ref 1–4)
LYMPHOCYTES NFR BLD AUTO: 16.1 %
MCH RBC QN AUTO: 28.8 PG (ref 26–34)
MCHC RBC AUTO-ENTMCNC: 32.5 G/DL (ref 31–37)
MCV RBC AUTO: 88.6 FL
MONOCYTES # BLD AUTO: 0.43 X10(3) UL (ref 0.1–1)
MONOCYTES NFR BLD AUTO: 6.5 %
NEUTROPHILS # BLD AUTO: 4.91 X10 (3) UL (ref 1.5–7.7)
NEUTROPHILS # BLD AUTO: 4.91 X10(3) UL (ref 1.5–7.7)
NEUTROPHILS NFR BLD AUTO: 74.3 %
NITRITE UR QL STRIP.AUTO: NEGATIVE
NT-PROBNP SERPL-MCNC: 319 PG/ML (ref ?–450)
OSMOLALITY SERPL CALC.SUM OF ELEC: 288 MOSM/KG (ref 275–295)
PH UR STRIP.AUTO: 5.5 [PH] (ref 5–8)
PLATELET # BLD AUTO: 228 10(3)UL (ref 150–450)
POTASSIUM SERPL-SCNC: 3.9 MMOL/L (ref 3.5–5.1)
PROT SERPL-MCNC: 6.6 G/DL (ref 6.4–8.2)
PROT UR STRIP.AUTO-MCNC: NEGATIVE MG/DL
RBC # BLD AUTO: 4.2 X10(6)UL
RBC UR QL AUTO: NEGATIVE
SODIUM SERPL-SCNC: 139 MMOL/L (ref 136–145)
SP GR UR STRIP.AUTO: 1.01 (ref 1–1.03)
UROBILINOGEN UR STRIP.AUTO-MCNC: NORMAL MG/DL
WBC # BLD AUTO: 6.6 X10(3) UL (ref 4–11)

## 2023-11-16 PROCEDURE — 93005 ELECTROCARDIOGRAM TRACING: CPT

## 2023-11-16 PROCEDURE — 99285 EMERGENCY DEPT VISIT HI MDM: CPT

## 2023-11-16 PROCEDURE — 85025 COMPLETE CBC W/AUTO DIFF WBC: CPT | Performed by: EMERGENCY MEDICINE

## 2023-11-16 PROCEDURE — 81001 URINALYSIS AUTO W/SCOPE: CPT | Performed by: EMERGENCY MEDICINE

## 2023-11-16 PROCEDURE — 83880 ASSAY OF NATRIURETIC PEPTIDE: CPT | Performed by: EMERGENCY MEDICINE

## 2023-11-16 PROCEDURE — 87086 URINE CULTURE/COLONY COUNT: CPT | Performed by: EMERGENCY MEDICINE

## 2023-11-16 PROCEDURE — 36415 COLL VENOUS BLD VENIPUNCTURE: CPT

## 2023-11-16 PROCEDURE — 80053 COMPREHEN METABOLIC PANEL: CPT | Performed by: EMERGENCY MEDICINE

## 2023-11-16 PROCEDURE — 87077 CULTURE AEROBIC IDENTIFY: CPT | Performed by: EMERGENCY MEDICINE

## 2023-11-16 PROCEDURE — 93010 ELECTROCARDIOGRAM REPORT: CPT

## 2023-11-16 PROCEDURE — 93970 EXTREMITY STUDY: CPT | Performed by: EMERGENCY MEDICINE

## 2023-11-16 NOTE — ED INITIAL ASSESSMENT (HPI)
Patient to the ER c/o bilateral leg pain and swelling. Patient reports she feels like her legs are burning and restless whenever she lays down. Patient also reports frequent urination and 2 dizzy spells. No fever no burning with urination.  No n/v/d

## 2023-11-16 NOTE — ED QUICK NOTES
Pt ambulated to bathroom with her walker - urine spec obtained and awaiting ordered from [facility].   Pt remains resting comfortably on cart = 2 blankets received and pt denies pain/nausea/dizziness.

## 2023-11-17 ENCOUNTER — OFFICE VISIT (OUTPATIENT)
Dept: INTERNAL MEDICINE CLINIC | Facility: CLINIC | Age: 88
End: 2023-11-17
Payer: MEDICARE

## 2023-11-17 VITALS
HEART RATE: 63 BPM | TEMPERATURE: 97 F | SYSTOLIC BLOOD PRESSURE: 128 MMHG | BODY MASS INDEX: 28.73 KG/M2 | WEIGHT: 152.19 LBS | HEIGHT: 61 IN | DIASTOLIC BLOOD PRESSURE: 68 MMHG | RESPIRATION RATE: 16 BRPM | OXYGEN SATURATION: 95 %

## 2023-11-17 DIAGNOSIS — N39.41 URGE INCONTINENCE: ICD-10-CM

## 2023-11-17 DIAGNOSIS — G25.81 RLS (RESTLESS LEGS SYNDROME): Primary | ICD-10-CM

## 2023-11-17 DIAGNOSIS — R60.0 LEG EDEMA: ICD-10-CM

## 2023-11-17 PROCEDURE — 99214 OFFICE O/P EST MOD 30 MIN: CPT | Performed by: PHYSICIAN ASSISTANT

## 2023-11-17 NOTE — ED QUICK NOTES
Pt with steady gait to nurs station with her walker. Updated on poc. Now sitting in chair at [facility] awaiting her dinner tray and imaging results - pt reports she'll need a medicar for home tonight.

## 2023-11-17 NOTE — DISCHARGE INSTRUCTIONS
Increase the Mirapex from 0.75 to 1.5 mg daily over the next 2 days and follow-up with your doctor regarding whether to stay on the increased dosage or to go back to your normal dosage.

## 2023-11-17 NOTE — PATIENT INSTRUCTIONS
Check your bottle of mirapex (pramipexole) to verify your dose  Call your neurologist to notify of your worsening restless leg syndrome  Call your cardiologist to request a 6 month follow up visit and to ask about the jardiance coupons (I cannot find any affordable coupon options today)  Try to purchase zip up compression socks   We will await the results of your urine culture

## 2023-11-18 LAB
ATRIAL RATE: 73 BPM
P AXIS: 67 DEGREES
P-R INTERVAL: 140 MS
Q-T INTERVAL: 372 MS
QRS DURATION: 102 MS
QTC CALCULATION (BEZET): 409 MS
R AXIS: -44 DEGREES
T AXIS: 79 DEGREES
VENTRICULAR RATE: 73 BPM

## 2023-11-20 ENCOUNTER — TELEPHONE (OUTPATIENT)
Dept: INTERNAL MEDICINE CLINIC | Facility: CLINIC | Age: 88
End: 2023-11-20

## 2023-11-20 NOTE — TELEPHONE ENCOUNTER
Culture shows probable contamination. Called and spoke to pt to see if she is having any sxs. Pt denies any sxs, feeling well.     Routing to CS to update on Pramipexole dose. LOV 11/17/23. Pt questions if dose needs to be increased as someone told her she is on low dose.

## 2023-11-20 NOTE — TELEPHONE ENCOUNTER
Patient was told to call back with her Pramipexole dose which is 0.5mg last prescribed by TB.    Patient asking if someone can give her her urine culture results that were done at the ER on 11/16/23.

## 2023-11-21 NOTE — TELEPHONE ENCOUNTER
0.5 mg qPM is the highest dose for RLS. She did not tolerate the ER version. Recommend that she f/u with neuro for other recommendations on next steps.

## 2023-11-21 NOTE — TELEPHONE ENCOUNTER
Called and spoke to pt. Informed her 0.5 mg is highest dose for RLS, did not tolerate ER in past. Advised to contact neuro for recs on next steps. Information given to pt, she stated understanding and agreed to plan.

## 2023-11-29 ENCOUNTER — OFFICE VISIT (OUTPATIENT)
Dept: NEUROLOGY | Facility: CLINIC | Age: 88
End: 2023-11-29
Payer: MEDICARE

## 2023-11-29 VITALS
DIASTOLIC BLOOD PRESSURE: 52 MMHG | BODY MASS INDEX: 29 KG/M2 | HEART RATE: 77 BPM | SYSTOLIC BLOOD PRESSURE: 134 MMHG | WEIGHT: 153 LBS

## 2023-11-29 DIAGNOSIS — G25.81 RLS (RESTLESS LEGS SYNDROME): Primary | ICD-10-CM

## 2023-11-29 PROCEDURE — 99215 OFFICE O/P EST HI 40 MIN: CPT | Performed by: OTHER

## 2023-11-29 RX ORDER — ROPINIROLE 0.25 MG/1
0.25 TABLET, FILM COATED ORAL NIGHTLY
Qty: 30 TABLET | Refills: 3 | Status: SHIPPED | OUTPATIENT
Start: 2023-11-29

## 2023-11-29 NOTE — PROGRESS NOTES
RLS is worse and wakes her up at night or trying to take a nap.  Patient stopped taking 0.75 pramipexole restarted the 0.5 mg pramipexole

## 2023-11-29 NOTE — TELEPHONE ENCOUNTER
Patient states she saw Dr Ahmadi Neurology for RLS.  Pt states she will not go back to see him again, states he was very rude.  Dr Ahmadi did give her a new med Ropinirole 0.25mg at bedtime, try for one month and call him back with update.  Pt states she will call him in a month but will not go in to see him.    Dr Bowers Cardiology [facility] indicates she saw  him for swelling in her ankles, instead of refilling the Furosemide he ordered Jardiance for her to protect her heart.  Pt states this medication was $600, she called [facility] back, was given coupons but still expensive.  Pt read the SE which include serious uti's and yeast infection which she has always had problems with as well as low blood sugar.  Pt notified she will need to call Dr Bowers's office back to ask them to change the med.    Pt wants to know if CS thinks she should take this medication?

## 2023-11-30 NOTE — TELEPHONE ENCOUNTER
Can see a different neuro if she would like.     Jardiance is more likely to cause yeast infections if sugar is very high. I think this is worth trying if she can get at an affordable cost. Otherwise, f/u with cardiology to discuss other options.

## 2023-12-04 ENCOUNTER — TELEPHONE (OUTPATIENT)
Dept: INTERNAL MEDICINE CLINIC | Facility: CLINIC | Age: 88
End: 2023-12-04

## 2023-12-04 NOTE — TELEPHONE ENCOUNTER
Received DM eye exam results from [facility] with no diabetic retinopathy. Abstracted. Put in TB bin for review.

## 2023-12-14 ENCOUNTER — PATIENT OUTREACH (OUTPATIENT)
Dept: CASE MANAGEMENT | Age: 88
End: 2023-12-14

## 2023-12-14 DIAGNOSIS — E11.42 DIABETIC POLYNEUROPATHY ASSOCIATED WITH TYPE 2 DIABETES MELLITUS (HCC): ICD-10-CM

## 2023-12-14 DIAGNOSIS — M51.36 DDD (DEGENERATIVE DISC DISEASE), LUMBAR: ICD-10-CM

## 2023-12-14 DIAGNOSIS — H35.3231 EXUDATIVE AGE-RELATED MACULAR DEGENERATION, BILATERAL, WITH ACTIVE CHOROIDAL NEOVASCULARIZATION (HCC): ICD-10-CM

## 2023-12-14 DIAGNOSIS — E11.69 DIABETES MELLITUS TYPE 2 IN OBESE  (HCC): ICD-10-CM

## 2023-12-14 DIAGNOSIS — K21.9 GASTROESOPHAGEAL REFLUX DISEASE WITHOUT ESOPHAGITIS: ICD-10-CM

## 2023-12-14 DIAGNOSIS — E66.9 DIABETES MELLITUS TYPE 2 IN OBESE  (HCC): ICD-10-CM

## 2023-12-14 DIAGNOSIS — M19.041 ARTHRITIS OF BOTH HANDS: Primary | ICD-10-CM

## 2023-12-14 DIAGNOSIS — G25.81 RLS (RESTLESS LEGS SYNDROME): ICD-10-CM

## 2023-12-14 DIAGNOSIS — E78.00 PURE HYPERCHOLESTEROLEMIA: ICD-10-CM

## 2023-12-14 DIAGNOSIS — I10 PRIMARY HYPERTENSION: ICD-10-CM

## 2023-12-14 DIAGNOSIS — M19.042 ARTHRITIS OF BOTH HANDS: Primary | ICD-10-CM

## 2023-12-14 DIAGNOSIS — E53.8 B12 DEFICIENCY: ICD-10-CM

## 2023-12-14 NOTE — PROGRESS NOTES
Spoke to Clara for CCM. Updates to patient care team/comments:UTD  Patient reported changes in medications: UTD  Med Adherence  Comment: Pt taking medications as prescribed     Health Maintenance:   Health Maintenance   Topic Date Due    COVID-19 Vaccine (7 - 2023-24 season) 09/01/2023    Diabetes Care A1C  04/20/2024    Diabetes Care: Microalb/Creat Ratio  05/04/2024    Diabetes Care Dilated Eye Exam  07/20/2024    Annual Physical  07/25/2024    Diabetes Care: GFR  11/16/2024    Influenza Vaccine  Completed    Annual Depression Screening  Completed    Fall Risk Screening (Annual)  Completed    Diabetes Care Foot Exam (Annual)  Completed    Pneumococcal Vaccine: 65+ Years  Completed    Zoster Vaccines  Completed       Patient updates/concerns:    Pt has been having a great deal of difficulty in recent months with increasingly painful legs at night. Pt has been diagnosed with rls and until recently was managing using pramipexole rx from pcp. Pt could not tolerate ER version of medication and had been taking . 5mg pramipexole which had been working appropriately until she had to visit ER last month because she could no longer get a full nights sleep. Per pt pcp pt is on highest dose and any further treatment should originate with neurologist.  Pt agreed to visit neuro dr Mor Key and was prescribed ropinirole. Pt was instructed to take it for a month and then call neuro. Pt states that there was no improvement after taking new medication for approximately 5 days. When she returned to taking pramipexole her sleep improved somewhat. She tried taking new medication a second time the next day and she was unable to sleep and now has decided to resume taking pramipexole and hope that the pain improves on some days and tolerate the episodes when they occur.     Pt states that in ER she was given an ultrasound and determined that no clotting was cause for pain, but she hopes that a determination can be made on the source of the pain and if any alternative treatments may still be available and requests [facility] assistance in finding a different neurologist. Pt prefers not to see Dr Kushal Brar again and would prefer seeing someone at a separate location if possible. Pt declined contact information for Pt experience and does not feel any need to escalate her concerns. She requested a female specialist if possible and one that convenient to visit. [facility] will compile the face sheets for appropriate providers and mail to pt and f/u at next outreach to determine if she has chosen a provider and needs a referral.    Pt tried the options given to her for saving money on jardiance and was unsuccessful. She f/u with cardiology and was instructed to return to taking lasix but requested she have labs drawn after a month. Pt has only been back on lasix for a few days and will schedule her lab test in a few weeks. [facility] confirmed for pt that diabetic eye results have been received by pcp office. Pt is very pleased with the recent switch to [facility] for her macular degeneration. With previous med pt was returning every 5 weeks and now she only needs to return every 15 weeks. Pt is very encouraged that specialist confirmed that her eyesight is getting stronger. Reviewed with pt request from dr vazquez to have CT scan in Feb 2024. Pt was prescribed atorvastatin from dr vazquez in April and never started it because of the listed side effect of leg cramping and referring to her historic cholesterol results. Pt agrees to schedule CT and f/u visit with DR vazquez to discuss her apprehension for taking lipitor. Pt continues to utilize dial a ride for her transportation. Pt son Steve Dutta still visits her once weekly to help with vacuuming and cleaning the floors. Pt is still capable of managing all adl's and household tasks but requests help completing any task that might strain her back.     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 Pt tries to write one story a month. She also spends a lot of time reading. Pt continues to take vit b12, but on days when RLS is bad she is often apprehensive about doing too much physical activity. Pt states that her legs feel weak and despite using walker she is not confident in her ability to get to and from the store, consequently her physical activity has declined in recent weeks. CCM will f/u with pt at next outreach to determine if any improvement has been made and whether she could tolerate exercises that she can do at home using walker for support. Goals/Action Plan: Active goal from previous outreach: exercise    Patient reported progress towards goals: pt continues with stretching routine from PT. Pt has not been doing as much walking since er visit for RLS. - What: At home exercise           - Where/When/How: Pt will review all materials from [facility] and determine which exercises she can put together. Patient Reported Barriers and Concerns: n/a                   - Plan for overcoming barriers: none    Care Managers Interventions: compile list of [facility] neurologists continue to provide encouragement and education for healthy coping and dx    Future Appointments:   Future Appointments   Date Time Provider Karel Dockery   1/3/2024 11:30 AM [facility] A[facility]IO Naresheil Paula [facility]   1/3/2024 11:50 AM MD Kwasi Milner [facility]   2/12/2024  3:00 PM Zehra Phillip MD [facility] [place]   3/5/2024  3:40 PM Sanjuana Fox MD [facility]   4/29/2024  2:30 PM Jasiel Charles MD [facility] [place]         Next Care Manager Follow Up Date: one month    Reason For Follow Up: review progress and or barriers towards patient's goals. Time Spent This Encounter Total: 84 min medical record review, telephone communication, care plan updates where needed, education, goals, and action plan recreation/update.  Provided acknowledgment and validation to patient's concerns.    Monthly Minute Total including today: 84  Physical assessment, complete health history, and need for CCM established by Keyla Stewart MD.

## 2023-12-19 NOTE — TELEPHONE ENCOUNTER
[facility] pharmacy:  [address], [phone] [[id number]]     Prescription Refill Request - Patient advised can take 48-72 hours. Name of Medication (strength, dose, qty requested:   LOSARTAN 100 MG Oral Tab 90 tablet 0 9/25/2023 --   Sig:   TAKE ONE TABLET BY MOUTH ONE TIME DAILY       GLIPIZIDE ER 2.5 MG Oral Tablet 24 Hr 30 tablet 0 9/25/2023 --   Sig:   Take 1 tablet by mouth daily with breakfast.       Patient requesting to discontinue Ropinirole and wants to go back to     Pramipexole Dihydrochloride (MIRAPEX) 0.25 MG Oral Tab  -- --  12/4/2015   Sig:   Take 0.25 mg by mouth 3 (three) times daily. Call pt with questions.

## 2023-12-20 RX ORDER — PRAMIPEXOLE DIHYDROCHLORIDE 0.25 MG/1
0.25 TABLET ORAL NIGHTLY
Qty: 90 TABLET | Refills: 3 | Status: SHIPPED | OUTPATIENT
Start: 2023-12-20 | End: 2024-12-14

## 2023-12-20 RX ORDER — ROPINIROLE 0.25 MG/1
0.25 TABLET, FILM COATED ORAL NIGHTLY
Qty: 30 TABLET | Refills: 3 | OUTPATIENT
Start: 2023-12-20

## 2023-12-20 RX ORDER — LOSARTAN POTASSIUM 100 MG/1
100 TABLET ORAL DAILY
Qty: 90 TABLET | Refills: 0 | Status: SHIPPED | OUTPATIENT
Start: 2023-12-20 | End: 2024-03-19

## 2023-12-20 RX ORDER — GLIPIZIDE 2.5 MG/1
2.5 TABLET, EXTENDED RELEASE ORAL
Qty: 30 TABLET | Refills: 0 | Status: SHIPPED | OUTPATIENT
Start: 2023-12-20

## 2023-12-22 ENCOUNTER — TELEPHONE (OUTPATIENT)
Dept: INTERNAL MEDICINE CLINIC | Facility: CLINIC | Age: 88
End: 2023-12-22

## 2023-12-22 NOTE — TELEPHONE ENCOUNTER
Pt restless leg syndrome has been so bad that she hasn't been able to get any sleep. She is wondering if TB has any suggestions for her as she doesn't want to end up back in the ER.

## 2023-12-22 NOTE — TELEPHONE ENCOUNTER
Called and spoke w/ pt. Notified CS states pt should contact neuro and nothing further can be recommended at this time. Pt stated she is trying to schedule with new neuro and refuses to schedule with Dr. David Dupont or David Dupont partner as she was not happy with last visit. Pt agreeable to plan and hopeful restless legs will quit sometime soon so she can get sleep. Appreciative of call.

## 2023-12-22 NOTE — TELEPHONE ENCOUNTER
LOV 11/17/23  Saw neuro 11/29/23     Called and spoke w/ pt. Pt stated her restless legs are constant and daily. Stated last night was the 3rd night in a month where her legs were restless ll night long. Pt stated she hasn't slept since. Pt was taking Ropinerole as ordered by Dr. Lluvia Figueroa but believed it was causing her to be up all night. Pt stated she has been taking Pramipexole nightly and it's not helping. Has not reached out to neuro about this. Planning to see a new neurologist. Has not scheduled yet. Notified will need to return to ER if symptoms become unbearable. Pt verbalizes understanding and is agreeable to plan. Total time spent on phone, 13 minutes    Routing to CS and TB for recs. No apts next week with CS or TB.

## 2023-12-26 ENCOUNTER — APPOINTMENT (OUTPATIENT)
Dept: GENERAL RADIOLOGY | Facility: HOSPITAL | Age: 88
End: 2023-12-26
Payer: MEDICARE

## 2023-12-26 LAB
ALBUMIN SERPL-MCNC: 3.4 G/DL (ref 3.4–5)
ALBUMIN/GLOB SERPL: 1 {RATIO} (ref 1–2)
ALP LIVER SERPL-CCNC: 76 U/L
ALT SERPL-CCNC: 14 U/L
ANION GAP SERPL CALC-SCNC: 4 MMOL/L (ref 0–18)
AST SERPL-CCNC: 10 U/L (ref 15–37)
BASOPHILS # BLD AUTO: 0.07 X10(3) UL (ref 0–0.2)
BASOPHILS NFR BLD AUTO: 1.1 %
BILIRUB SERPL-MCNC: 0.4 MG/DL (ref 0.1–2)
BUN BLD-MCNC: 11 MG/DL (ref 9–23)
CALCIUM BLD-MCNC: 8.9 MG/DL (ref 8.5–10.1)
CHLORIDE SERPL-SCNC: 110 MMOL/L (ref 98–112)
CO2 SERPL-SCNC: 27 MMOL/L (ref 21–32)
CREAT BLD-MCNC: 0.79 MG/DL
EGFRCR SERPLBLD CKD-EPI 2021: 70 ML/MIN/1.73M2 (ref 60–?)
EOSINOPHIL # BLD AUTO: 0.2 X10(3) UL (ref 0–0.7)
EOSINOPHIL NFR BLD AUTO: 3.1 %
ERYTHROCYTE [DISTWIDTH] IN BLOOD BY AUTOMATED COUNT: 13.3 %
GLOBULIN PLAS-MCNC: 3.5 G/DL (ref 2.8–4.4)
GLUCOSE BLD-MCNC: 108 MG/DL (ref 70–99)
GLUCOSE BLD-MCNC: 126 MG/DL (ref 70–99)
HCT VFR BLD AUTO: 39 %
HGB BLD-MCNC: 12.7 G/DL
IMM GRANULOCYTES # BLD AUTO: 0.03 X10(3) UL (ref 0–1)
IMM GRANULOCYTES NFR BLD: 0.5 %
LYMPHOCYTES # BLD AUTO: 1.46 X10(3) UL (ref 1–4)
LYMPHOCYTES NFR BLD AUTO: 23 %
MCH RBC QN AUTO: 28.3 PG (ref 26–34)
MCHC RBC AUTO-ENTMCNC: 32.6 G/DL (ref 31–37)
MCV RBC AUTO: 87.1 FL
MONOCYTES # BLD AUTO: 0.37 X10(3) UL (ref 0.1–1)
MONOCYTES NFR BLD AUTO: 5.8 %
NEUTROPHILS # BLD AUTO: 4.23 X10 (3) UL (ref 1.5–7.7)
NEUTROPHILS # BLD AUTO: 4.23 X10(3) UL (ref 1.5–7.7)
NEUTROPHILS NFR BLD AUTO: 66.5 %
NT-PROBNP SERPL-MCNC: 343 PG/ML (ref ?–450)
OSMOLALITY SERPL CALC.SUM OF ELEC: 293 MOSM/KG (ref 275–295)
PLATELET # BLD AUTO: 245 10(3)UL (ref 150–450)
POTASSIUM SERPL-SCNC: 3.9 MMOL/L (ref 3.5–5.1)
PROT SERPL-MCNC: 6.9 G/DL (ref 6.4–8.2)
RBC # BLD AUTO: 4.48 X10(6)UL
SODIUM SERPL-SCNC: 141 MMOL/L (ref 136–145)
TROPONIN I SERPL HS-MCNC: 8 NG/L
WBC # BLD AUTO: 6.4 X10(3) UL (ref 4–11)

## 2023-12-26 PROCEDURE — 71046 X-RAY EXAM CHEST 2 VIEWS: CPT

## 2023-12-26 NOTE — ED INITIAL ASSESSMENT (HPI)
Patient c/o SOB gotten worse over the past few days and feels lightheaded/ patient sts at night when she goes to bed feels her heart beats irregular.

## 2023-12-27 ENCOUNTER — HOSPITAL ENCOUNTER (OUTPATIENT)
Facility: HOSPITAL | Age: 88
Setting detail: OBSERVATION
LOS: 1 days | Discharge: HOME | End: 2023-12-28
Attending: EMERGENCY MEDICINE | Admitting: HOSPITALIST
Payer: MEDICARE

## 2023-12-27 ENCOUNTER — APPOINTMENT (OUTPATIENT)
Dept: CV DIAGNOSTICS | Facility: HOSPITAL | Age: 88
End: 2023-12-27
Attending: HOSPITALIST
Payer: MEDICARE

## 2023-12-27 DIAGNOSIS — J44.1 COPD EXACERBATION (HCC): Primary | ICD-10-CM

## 2023-12-27 DIAGNOSIS — R06.00 DYSPNEA, UNSPECIFIED TYPE: ICD-10-CM

## 2023-12-27 DIAGNOSIS — R00.2 PALPITATIONS: ICD-10-CM

## 2023-12-27 LAB
ATRIAL RATE: 70 BPM
C DIFF TOX B STL QL: NEGATIVE
FLUAV + FLUBV RNA SPEC NAA+PROBE: NEGATIVE
FLUAV + FLUBV RNA SPEC NAA+PROBE: NEGATIVE
GLUCOSE BLD-MCNC: 107 MG/DL (ref 70–99)
GLUCOSE BLD-MCNC: 109 MG/DL (ref 70–99)
GLUCOSE BLD-MCNC: 117 MG/DL (ref 70–99)
GLUCOSE BLD-MCNC: 119 MG/DL (ref 70–99)
P AXIS: 50 DEGREES
P-R INTERVAL: 126 MS
Q-T INTERVAL: 382 MS
QRS DURATION: 104 MS
QTC CALCULATION (BEZET): 412 MS
R AXIS: -42 DEGREES
RSV RNA SPEC NAA+PROBE: NEGATIVE
SARS-COV-2 RNA RESP QL NAA+PROBE: NOT DETECTED
T AXIS: 75 DEGREES
TROPONIN I SERPL HS-MCNC: 11 NG/L
TSI SER-ACNC: 1.04 MIU/ML (ref 0.36–3.74)
VENTRICULAR RATE: 70 BPM

## 2023-12-27 PROCEDURE — 93306 TTE W/DOPPLER COMPLETE: CPT | Performed by: HOSPITALIST

## 2023-12-27 PROCEDURE — 99223 1ST HOSP IP/OBS HIGH 75: CPT | Performed by: HOSPITALIST

## 2023-12-27 RX ORDER — FUROSEMIDE 20 MG/1
20 TABLET ORAL DAILY
Status: DISCONTINUED | OUTPATIENT
Start: 2023-12-27 | End: 2023-12-28

## 2023-12-27 RX ORDER — SENNOSIDES 8.6 MG
17.2 TABLET ORAL NIGHTLY PRN
Status: DISCONTINUED | OUTPATIENT
Start: 2023-12-27 | End: 2023-12-28

## 2023-12-27 RX ORDER — DEXTROSE MONOHYDRATE 25 G/50ML
50 INJECTION, SOLUTION INTRAVENOUS
Status: DISCONTINUED | OUTPATIENT
Start: 2023-12-27 | End: 2023-12-28

## 2023-12-27 RX ORDER — IPRATROPIUM BROMIDE AND ALBUTEROL SULFATE 2.5; .5 MG/3ML; MG/3ML
3 SOLUTION RESPIRATORY (INHALATION) ONCE
Status: COMPLETED | OUTPATIENT
Start: 2023-12-27 | End: 2023-12-27

## 2023-12-27 RX ORDER — ALBUTEROL SULFATE 90 UG/1
2 AEROSOL, METERED RESPIRATORY (INHALATION)
Status: DISCONTINUED | OUTPATIENT
Start: 2023-12-27 | End: 2023-12-28

## 2023-12-27 RX ORDER — PANTOPRAZOLE SODIUM 40 MG/1
40 TABLET, DELAYED RELEASE ORAL
Status: DISCONTINUED | OUTPATIENT
Start: 2023-12-27 | End: 2023-12-28

## 2023-12-27 RX ORDER — METOCLOPRAMIDE HYDROCHLORIDE 5 MG/ML
5 INJECTION INTRAMUSCULAR; INTRAVENOUS EVERY 8 HOURS PRN
Status: DISCONTINUED | OUTPATIENT
Start: 2023-12-27 | End: 2023-12-28

## 2023-12-27 RX ORDER — MELATONIN
3 NIGHTLY PRN
Status: DISCONTINUED | OUTPATIENT
Start: 2023-12-27 | End: 2023-12-28

## 2023-12-27 RX ORDER — POLYETHYLENE GLYCOL 3350 17 G/17G
17 POWDER, FOR SOLUTION ORAL DAILY PRN
Status: DISCONTINUED | OUTPATIENT
Start: 2023-12-27 | End: 2023-12-28

## 2023-12-27 RX ORDER — NICOTINE POLACRILEX 4 MG
30 LOZENGE BUCCAL
Status: DISCONTINUED | OUTPATIENT
Start: 2023-12-27 | End: 2023-12-28

## 2023-12-27 RX ORDER — BISACODYL 10 MG
10 SUPPOSITORY, RECTAL RECTAL
Status: DISCONTINUED | OUTPATIENT
Start: 2023-12-27 | End: 2023-12-28

## 2023-12-27 RX ORDER — PRAMIPEXOLE DIHYDROCHLORIDE 0.25 MG/1
0.25 TABLET ORAL NIGHTLY
Status: DISCONTINUED | OUTPATIENT
Start: 2023-12-27 | End: 2023-12-28

## 2023-12-27 RX ORDER — FLUTICASONE PROPIONATE 50 MCG
2 SPRAY, SUSPENSION (ML) NASAL DAILY
Status: DISCONTINUED | OUTPATIENT
Start: 2023-12-27 | End: 2023-12-28

## 2023-12-27 RX ORDER — ECHINACEA PURPUREA EXTRACT 125 MG
1 TABLET ORAL
Status: DISCONTINUED | OUTPATIENT
Start: 2023-12-27 | End: 2023-12-28

## 2023-12-27 RX ORDER — LOSARTAN POTASSIUM 100 MG/1
100 TABLET ORAL DAILY
Status: DISCONTINUED | OUTPATIENT
Start: 2023-12-27 | End: 2023-12-28

## 2023-12-27 RX ORDER — LOPERAMIDE HYDROCHLORIDE 2 MG/1
2 CAPSULE ORAL AS NEEDED
Status: DISCONTINUED | OUTPATIENT
Start: 2023-12-27 | End: 2023-12-28

## 2023-12-27 RX ORDER — FLUTICASONE FUROATE AND VILANTEROL 100; 25 UG/1; UG/1
1 POWDER RESPIRATORY (INHALATION) DAILY
Status: DISCONTINUED | OUTPATIENT
Start: 2023-12-27 | End: 2023-12-28

## 2023-12-27 RX ORDER — NICOTINE POLACRILEX 4 MG
15 LOZENGE BUCCAL
Status: DISCONTINUED | OUTPATIENT
Start: 2023-12-27 | End: 2023-12-28

## 2023-12-27 RX ORDER — ENEMA 19; 7 G/133ML; G/133ML
1 ENEMA RECTAL ONCE AS NEEDED
Status: DISCONTINUED | OUTPATIENT
Start: 2023-12-27 | End: 2023-12-28

## 2023-12-27 RX ORDER — PRAMIPEXOLE DIHYDROCHLORIDE 0.25 MG/1
0.5 TABLET ORAL NIGHTLY PRN
Status: DISCONTINUED | OUTPATIENT
Start: 2023-12-27 | End: 2023-12-27

## 2023-12-27 RX ORDER — ONDANSETRON 2 MG/ML
4 INJECTION INTRAMUSCULAR; INTRAVENOUS EVERY 6 HOURS PRN
Status: DISCONTINUED | OUTPATIENT
Start: 2023-12-27 | End: 2023-12-28

## 2023-12-27 RX ORDER — ACETAMINOPHEN 500 MG
1000 TABLET ORAL EVERY 4 HOURS PRN
Status: DISCONTINUED | OUTPATIENT
Start: 2023-12-27 | End: 2023-12-28

## 2023-12-27 NOTE — PLAN OF CARE
Problem: Diabetes/Glucose Control  Goal: Glucose maintained within prescribed range  Description: INTERVENTIONS:  - Monitor Blood Glucose as ordered  - Assess for signs and symptoms of hyperglycemia and hypoglycemia  - Administer ordered medications to maintain glucose within target range  - Assess barriers to adequate nutritional intake and initiate nutrition consult as needed  - Instruct patient on self management of diabetes  Outcome: Progressing     Problem: Patient/Family Goals  Goal: Patient/Family Long Term Goal  Description: Patient's Long Term Goal: to go home    Interventions:  - consults to see speech, PT, OT  - See additional Care Plan goals for specific interventions  Outcome: Progressing  Goal: Patient/Family Short Term Goal  Description: Patient's Short Term Goal: to breathe better    Interventions:   - medication adjustments   - See additional Care Plan goals for specific interventions  Outcome: Progressing     Problem: RESPIRATORY - ADULT  Goal: Achieves optimal ventilation and oxygenation  Description: INTERVENTIONS:  - Assess for changes in respiratory status  - Assess for changes in mentation and behavior  - Position to facilitate oxygenation and minimize respiratory effort  - Oxygen supplementation based on oxygen saturation or ABGs  - Provide Smoking Cessation handout, if applicable  - Encourage broncho-pulmonary hygiene including cough, deep breathe, Incentive Spirometry  - Assess the need for suctioning and perform as needed  - Assess and instruct to report SOB or any respiratory difficulty  - Respiratory Therapy support as indicated  - Manage/alleviate anxiety  - Monitor for signs/symptoms of CO2 retention  Outcome: Progressing

## 2023-12-27 NOTE — SLP NOTE
ADULT SWALLOWING EVALUATION    ASSESSMENT    ASSESSMENT/OVERALL IMPRESSION:  Patient is a 79 y/o female admitted with difficulty breathing and PMHx significant for COPD and CHF. SLP order received to evaluate oropharyngeal swallow d/t concern for aspiration. Patient received alert and oriented in bedside chair. She denied history of dysphagia symptoms and reported consuming a regular diet and thin liquids at baseline. She endorsed history esophageal reflux and Mckeon's esophagus. Patient presented with an intact oropharyngeal swallow. Bolus acceptance was adequate without evidence of anterior bolus loss. Mastication and AP bolus transit were thorough and efficient without evidence of oral residue. Pharyngeal swallow initiation appeared timely and hyolaryngeal excursion was adequate per palpation. No overt s/s of aspiration observed and patient denied odynophagia and globus sensation across consistencies. Recommend patient continue a regular diet and thin liquids with general safe swallowing precautions. No further SLP services warranted at this time as no deficits identified which require skilled intervention. Education provided re: results and recommendations. RECOMMENDATIONS   Diet Recommendations - Solids: Regular  Diet Recommendations - Liquids:  Thin Liquids                              Medication Administration Recommendations: No restrictions  Treatment Plan/Recommendations: No further inpatient SLP service warranted       HISTORY   MEDICAL HISTORY  Reason for Referral: R/O aspiration    Problem List  Principal Problem:    COPD exacerbation ([facility] [address]  Active Problems:    Dyspnea    Dyspnea, unspecified type      Past Medical History  Past Medical History:   Diagnosis Date    Abdominal distention     Abdominal hernia     Abdominal pain     Acute diverticulitis 10/10/2020    Noted on CT scan - acute diverticulitis of the sigmoid colon    Arrhythmia     Arthritis     Atypical lobular hyperplasia of right breast 06/30/2016    Back pain     Back problem     lower lumbar bad discs     Mckeon's esophagus without dysplasia 11/28/2018    CHF (congestive heart failure) (HCC)     Chronic cough     Chronic diarrhea 12/04/2015    Started before metformin- related to IBS      Constipation     COPD (chronic obstructive pulmonary disease) ([facility] [address]     NO OXYGEN- no pulmonologist    Deviated septum     left side    Diabetic polyneuropathy associated with type 2 diabetes mellitus ([facility] [address] 12/04/2015    Diarrhea, unspecified     Disorder of thyroid     has Thyroid nodules    Diverticulosis of large intestine     Dizziness     Ductal carcinoma in situ (DCIS) of right breast     Esophageal reflux     Essential hypertension     Exudative macular degeneration (HCC) 04/01/2016    Right Eye     Fatigue     Flatulence/gas pain/belching     Frequent urination     Frequent UTI     Full dentures     Gastroesophageal reflux disease 12/04/2015    GERD (gastroesophageal reflux disease)     Hearing impairment     bilateral hearing aids    Hearing loss     Heart palpitations     Hemorrhoids     High blood pressure     High cholesterol     History of cardiac murmur     History of tobacco use     Hoarseness, chronic     Hyperlipidemia     not taking lipitor due to side effects    Incontinence     uses pads    Indigestion     Irregular bowel habits     Itch of skin     Leaking of urine     Leaky heart valve     slight per pt- had echo 12/2015    Loss of appetite     Macular degeneration     Macular degeneration     Menopause     Nausea     Neuropathy     Nodule of left lung 01/07/2016    Obesity (BMI 30-39. 9)     Osteoarthritis     all joints    Osteoporosis     Other fatigue 10/10/2018    Pain in joints     Pap smear for cervical cancer screening 2006    Per patient had pap in 2006. No abn pap.      Pneumonia, organism unspecified(486)     04/2015    PONV (postoperative nausea and vomiting)     Problems with swallowing     due hiatal hernia; gaggs at times    Restless leg syndrome     Restless leg syndrome     RLS (restless legs syndrome) 12/04/2015    Screening breast examination 11/01/2021    Dr. Cesar Mckeon    Screening mammogram for breast cancer 02/06/2022    Benign     Shortness of breath     ON EXERTION    Sleep apnea     Sleep disturbance     Splenic artery aneurysm ([address] 03/23/2021    Torn rotator cuff     both shoulders per pt    Uncomfortable fullness after meals     Vertigo     Visual impairment     glasses    Vitamin B12 deficiency     Vulvar itching 08/13/2019    Wears glasses     Weight loss           Diet Prior to Admission: Regular; Thin liquids       Patient/Family Goals: none stated    SWALLOWING HISTORY  Current Diet Consistency: Regular; Thin liquids  Dysphagia History: as above  Imaging Results:   CXR 12/26/23  CONCLUSION:    Cardiomegaly. No sign of acute CHF. No pneumothorax. Blunting of the costophrenic angles bilateral somewhat greater on the right side most likely reflecting pleural effusion. Trace atelectasis right lung base. Hiatal hernia   redemonstrated. Scoliosis and degenerative changes the spine. LOCATION:  [facility]         Dictated by (CST): Josee Cooley MD on 12/26/2023 at 6:16 PM       Finalized by (CST): Jsoee Cooley MD on 12/26/2023 at 6:18 PM     SUBJECTIVE       OBJECTIVE   ORAL MOTOR EXAMINATION  Dentition: Upper dentures; Lower dentures  Symmetry: Within Functional Limits  Strength: Within Functional Limits     Range of Motion: Within Functional Limits       Voice Quality: Clear  Respiratory Status: Unlabored  Consistencies Trialed: Thin liquids;Puree;Hard solid  Method of Presentation: Self presentation  Patient Positioning: Upright;Midline    Oral Phase of Swallow: Within Functional Limits                      Pharyngeal Phase of Swallow: Within Functional Limits           (Please note: Silent aspiration cannot be evaluated clinically.  Videofluoroscopic Swallow Study is required to rule-out silent aspiration.)    Esophageal Phase of Swallow: No complaints consistent with possible esophageal involvement  Comments: d/w RN                FOLLOW UP  Treatment Plan/Recommendations: No further inpatient SLP service warranted     Follow Up Needed (Documentation Required): No       Thank you for your referral.   If you have any questions, please contact [facility]as

## 2023-12-27 NOTE — PROGRESS NOTES
NURSING ADMISSION NOTE      Patient admitted via Cart  Oriented to room. Safety precautions initiated. Bed in low position. Call light in reach. Patient alert and oriented x 4. Hard of hearing with hearing aids. Wears glasses. On RA, reports SOB with exertion. NSR on tele. Continent of bowel and bladder. No complaints of pain or chest pain/discomfort. Skin check completed with Layne Cullen RN per unit protocol, see flowsheet. POC discussed with patient. Fall precautions in place. Call light within reach.

## 2023-12-27 NOTE — ED QUICK NOTES
Pt awake and alert. Breathing easy. No francis, verbally conversant. Awaiting for her food tray and her room to get cleaned.  Awake and alert verbalizing understanding of her admission

## 2023-12-28 ENCOUNTER — TELEPHONE (OUTPATIENT)
Dept: INTERNAL MEDICINE CLINIC | Facility: CLINIC | Age: 88
End: 2023-12-28

## 2023-12-28 ENCOUNTER — TELEPHONE (OUTPATIENT)
Facility: CLINIC | Age: 88
End: 2023-12-28

## 2023-12-28 VITALS
HEART RATE: 81 BPM | BODY MASS INDEX: 29.38 KG/M2 | OXYGEN SATURATION: 97 % | SYSTOLIC BLOOD PRESSURE: 117 MMHG | TEMPERATURE: 98 F | DIASTOLIC BLOOD PRESSURE: 45 MMHG | RESPIRATION RATE: 18 BRPM | WEIGHT: 155.63 LBS | HEIGHT: 61 IN

## 2023-12-28 LAB
GLUCOSE BLD-MCNC: 110 MG/DL (ref 70–99)
GLUCOSE BLD-MCNC: 163 MG/DL (ref 70–99)

## 2023-12-28 PROCEDURE — 99239 HOSP IP/OBS DSCHRG MGMT >30: CPT | Performed by: INTERNAL MEDICINE

## 2023-12-28 NOTE — TELEPHONE ENCOUNTER
Pt calling from her hospital bed that the ER is releasing her \"there is nothing else they can do\" and she is worried her SOB will lead her to pass out and fall down. She doesn't want to go home but agreed to schedule a 1 wk f/up with JUSTINE and is aware we will express her concerns to the physician.

## 2023-12-28 NOTE — PLAN OF CARE
.Received patient at [address] [place] [address]. Alert and oriented x 4. Tele Rhythm-NSR, oxygen maintained on room air. Breath sounds are clear. Skin  is intact. Last bowel movement 12/27. Patient voiding with no issues. Patient reports no cardiac symptoms, No chest pain or shortness of breath. Bed is locked and in low position. Call light and personal items within reach. Pt up stand by with walker. Reviewed plan of care and patient verbalizes understanding.      Problem: Diabetes/Glucose Control  Goal: Glucose maintained within prescribed range  Description: INTERVENTIONS:  - Monitor Blood Glucose as ordered  - Assess for signs and symptoms of hyperglycemia and hypoglycemia  - Administer ordered medications to maintain glucose within target range  - Assess barriers to adequate nutritional intake and initiate nutrition consult as needed  - Instruct patient on self management of diabetes  Outcome: Progressing     Problem: Patient/Family Goals  Goal: Patient/Family Long Term Goal  Description: Patient's Long Term Goal: to go home    Interventions:  - consults to see speech, PT, OT  - See additional Care Plan goals for specific interventions  Outcome: Progressing  Goal: Patient/Family Short Term Goal  Description: Patient's Short Term Goal: to breathe better    Interventions:   - medication adjustments   - See additional Care Plan goals for specific interventions  Outcome: Progressing     Problem: RESPIRATORY - ADULT  Goal: Achieves optimal ventilation and oxygenation  Description: INTERVENTIONS:  - Assess for changes in respiratory status  - Assess for changes in mentation and behavior  - Position to facilitate oxygenation and minimize respiratory effort  - Oxygen supplementation based on oxygen saturation or ABGs  - Provide Smoking Cessation handout, if applicable  - Encourage broncho-pulmonary hygiene including cough, deep breathe, Incentive Spirometry  - Assess the need for suctioning and perform as needed  - Assess and instruct to report SOB or any respiratory difficulty  - Respiratory Therapy support as indicated  - Manage/alleviate anxiety  - Monitor for signs/symptoms of CO2 retention  Outcome: Progressing

## 2023-12-28 NOTE — TELEPHONE ENCOUNTER
Pt stated that she is in the hospital and she will be getting discharged at 4 pm. She was admitted for shortness of breath and light headedness.She has an appointment with a pulmonary dr on January 5 however she thinks she has copd and she is worried because she lives alone. She thinks she should be put on oxygen and wants to know what TB would suggest. Pt can be reached at     Contact Information  [phone] (Home Phone)    After 5 pm

## 2023-12-28 NOTE — TELEPHONE ENCOUNTER
LMTCB 12/28 to advise pt to see pulm as scheduled and schedule HFU here.         TB, agree with this?

## 2023-12-28 NOTE — CM/SW NOTE
This is a Code 40. Patient failed inpatient criteria. Second level of review completed and supports observation. UR committee in agreement. Discussed with Dr. Margarita Mims  who approves observation status. MOON given to the patient and order written.

## 2023-12-28 NOTE — PLAN OF CARE
Rec'd pt at 0730. A&O x 4. Tele shows NSR. O2 sats adequate on RA. Pt continent, up w/ 1 and walker. No C/O pain or SOB. Skin dry and intact. Bed locked and in low position, call light and personal items within reach. Will continue to monitor. POC - PT/OT to see, home later today once cleared for dc.     Problem: Diabetes/Glucose Control  Goal: Glucose maintained within prescribed range  Description: INTERVENTIONS:  - Monitor Blood Glucose as ordered  - Assess for signs and symptoms of hyperglycemia and hypoglycemia  - Administer ordered medications to maintain glucose within target range  - Assess barriers to adequate nutritional intake and initiate nutrition consult as needed  - Instruct patient on self management of diabetes  Outcome: Progressing     Problem: Patient/Family Goals  Goal: Patient/Family Long Term Goal  Description: Patient's Long Term Goal: to go home    Interventions:  - consults to see speech, PT, OT  - See additional Care Plan goals for specific interventions  Outcome: Progressing  Goal: Patient/Family Short Term Goal  Description: Patient's Short Term Goal: to breathe better    Interventions:   - medication adjustments   - See additional Care Plan goals for specific interventions  Outcome: Progressing     Problem: RESPIRATORY - ADULT  Goal: Achieves optimal ventilation and oxygenation  Description: INTERVENTIONS:  - Assess for changes in respiratory status  - Assess for changes in mentation and behavior  - Position to facilitate oxygenation and minimize respiratory effort  - Oxygen supplementation based on oxygen saturation or ABGs  - Provide Smoking Cessation handout, if applicable  - Encourage broncho-pulmonary hygiene including cough, deep breathe, Incentive Spirometry  - Assess the need for suctioning and perform as needed  - Assess and instruct to report SOB or any respiratory difficulty  - Respiratory Therapy support as indicated  - Manage/alleviate anxiety  - Monitor for signs/symptoms of CO2 retention  Outcome: Progressing

## 2023-12-28 NOTE — PHYSICAL THERAPY NOTE
PHYSICAL THERAPY EVALUATION - INPATIENT     Room Number: 6816/3840-T  Evaluation Date: 2023  Type of Evaluation: Initial  Physician Order: PT Eval and Treat    Presenting Problem: dyspnea  Co-Morbidities : COPD, DM, HTN  Reason for Therapy: Mobility Dysfunction and Discharge Planning    ASSESSMENT   Pt is a 80year old female admitted on 2023 for dyspnea. Pt with trace pleural effusion. Functional outcome measures completed include AMPA. The AM-PAC '6-Clicks' Inpatient Basic Mobility Short Form was completed and this patient is demonstrating a Approx Degree of Impairment: 35.83%  degree of impairment in mobility. Research supports that patients with this level of impairment may benefit from return HHPT follow up for endurance training in the home setting. PT Discharge Recommendations: Home with home health PT      PLAN  Patient has been evaluated and presents with no skilled Physical Therapy needs at this time. Patient discharged from Physical Therapy services. Please re-order if a new functional limitation presents during this admission. GOALS  Patient was able to achieve the following goals . .. Patient was able to transfer Safely and independently   Patient able to ambulate on level surfaces Safely and independently         HOME SITUATION  Type of Home: [facility]   Home Layout: One level                Lives With: Alone  Drives: No  Patient Owned Equipment: Rollator  Patient Regularly Uses: Glasses    Prior Level of Esmeralda: Pt lives alone in single story [facility]. Pt independent with ADL and mobility. Pt ambulates with rollator. Pts son visits a few times per week to assist with IADL. SUBJECTIVE  \"I still feel short of breath. My numbers always look good. \"       OBJECTIVE  Precautions: Hard of hearing  Fall Risk: Standard fall risk    WEIGHT BEARING RESTRICTION  Weight Bearing Restriction: None                PAIN ASSESSMENT  Ratin          COGNITION  Overall Cognitive Status:  WFL - within functional limits    RANGE OF MOTION AND STRENGTH ASSESSMENT  Upper extremity ROM and strength are within functional limits     Lower extremity ROM is within functional limits     Lower extremity strength is within functional limits except for the following:    Right Knee extension  3+/5  Left Knee extension  3+/5  Right Dorsiflexion  4/5  Left Dorsiflexion  4/5      BALANCE  Static Sitting: Good  Dynamic Sitting: Good  Static Standing: Fair -  Dynamic Standing: Fair -    ADDITIONAL TESTS                                    ACTIVITY TOLERANCE                         O2 WALK  Oxygen Therapy  SPO2% on Room Air at Rest: 98  SPO2% Ambulation on Room Air: 97    NEUROLOGICAL FINDINGS                        AM-PAC '6-Clicks' INPATIENT SHORT FORM - BASIC MOBILITY  How much difficulty does the patient currently have. .. Patient Difficulty: Turning over in bed (including adjusting bedclothes, sheets and blankets)?: None   Patient Difficulty: Sitting down on and standing up from a chair with arms (e.g., wheelchair, bedside commode, etc.): A Little   Patient Difficulty: Moving from lying on back to sitting on the side of the bed?: None   How much help from another person does the patient currently need. ..    Help from Another: Moving to and from a bed to a chair (including a wheelchair)?: A Little   Help from Another: Need to walk in hospital room?: A Little   Help from Another: Climbing 3-5 steps with a railing?: A Little       AM-PAC Score:  Raw Score: 20   Approx Degree of Impairment: 35.83%   Standardized Score (AM-PAC Scale): 47.67   CMS Modifier (G-Code): CJ    FUNCTIONAL ABILITY STATUS  Gait Assessment   Functional Mobility/Gait Assessment  Gait Assistance: Supervision  Distance (ft): 150  Assistive Device: Rolling walker  Pattern:  (excessive kyphosis, slow jonathan)    Skilled Therapy Provided     Bed Mobility:  Rolling: MOD I  Supine to sit: MOD I   Sit to supine: MOD I     Transfer Mobility:  Sit to stand: supervision   Stand to sit: supervision  Gait = supervision  Gait training: Pt ambulated 150ft with RW and supervision. Pt ambulates with excessive kyphosis and slow jonathan. No LOB noted. Pt educated on pacing and energy conservation. Therapist's comments:Pt reports MARX post ambulation. VC for pursed lip breathing. Reviewed activity recommendations and progression. Exercise/Education Provided:  Bed mobility  Energy conservation  Functional activity tolerated  Gait training  Posture  Strengthening  Transfer training    Patient End of Session: In bed;Needs met;Call light within reach;RN aware of session/findings; All patient questions and concerns addressed; Alarm set    Patient Evaluation Complexity Level:  History Moderate - 1 or 2 personal factors and/or co-morbidities   Examination of body systems Low - addressing 1-2 elements   Clinical Presentation Low - Stable   Clinical Decision Making Low Complexity       PT Session Time: 25 minutes  Gait Training: 10 minutes

## 2023-12-29 ENCOUNTER — PATIENT OUTREACH (OUTPATIENT)
Dept: CASE MANAGEMENT | Age: 88
End: 2023-12-29

## 2023-12-29 DIAGNOSIS — Z02.9 ENCOUNTERS FOR UNSPECIFIED ADMINISTRATIVE PURPOSE: Primary | ICD-10-CM

## 2023-12-29 PROCEDURE — 1111F DSCHRG MED/CURRENT MED MERGE: CPT

## 2024-01-03 ENCOUNTER — PATIENT OUTREACH (OUTPATIENT)
Dept: INTERNAL MEDICINE CLINIC | Facility: CLINIC | Age: 89
End: 2024-01-03

## 2024-01-03 NOTE — PROGRESS NOTES
Called pt to schedule a hospital follow-up appt :    JAKE Leblanc  [id number]  Friday 01/05 @12pm w/ Digna OREILLY    Confirmed appt date & time w/ pt.  She verbalized that she understands & no further assistance needed at this time.    Closing encounter

## 2024-01-05 ENCOUNTER — OFFICE VISIT (OUTPATIENT)
Dept: INTERNAL MEDICINE CLINIC | Facility: CLINIC | Age: 89
End: 2024-01-05
Payer: MEDICARE

## 2024-01-05 ENCOUNTER — OFFICE VISIT (OUTPATIENT)
Facility: CLINIC | Age: 89
End: 2024-01-05
Payer: MEDICARE

## 2024-01-05 VITALS
WEIGHT: 155 LBS | TEMPERATURE: 98 F | RESPIRATION RATE: 18 BRPM | SYSTOLIC BLOOD PRESSURE: 144 MMHG | BODY MASS INDEX: 29.27 KG/M2 | HEART RATE: 76 BPM | HEIGHT: 61 IN | DIASTOLIC BLOOD PRESSURE: 68 MMHG | OXYGEN SATURATION: 95 %

## 2024-01-05 VITALS
SYSTOLIC BLOOD PRESSURE: 122 MMHG | OXYGEN SATURATION: 98 % | BODY MASS INDEX: 29.64 KG/M2 | HEART RATE: 75 BPM | RESPIRATION RATE: 16 BRPM | WEIGHT: 157 LBS | DIASTOLIC BLOOD PRESSURE: 62 MMHG | HEIGHT: 61 IN

## 2024-01-05 DIAGNOSIS — E66.9 DIABETES MELLITUS TYPE 2 IN OBESE  (HCC): ICD-10-CM

## 2024-01-05 DIAGNOSIS — I10 PRIMARY HYPERTENSION: ICD-10-CM

## 2024-01-05 DIAGNOSIS — J44.1 COPD EXACERBATION (HCC): Primary | ICD-10-CM

## 2024-01-05 DIAGNOSIS — R06.09 DYSPNEA ON EXERTION: Primary | ICD-10-CM

## 2024-01-05 DIAGNOSIS — R06.02 SHORTNESS OF BREATH: ICD-10-CM

## 2024-01-05 DIAGNOSIS — E11.69 DIABETES MELLITUS TYPE 2 IN OBESE  (HCC): ICD-10-CM

## 2024-01-05 DIAGNOSIS — J44.9 CHRONIC OBSTRUCTIVE PULMONARY DISEASE, UNSPECIFIED COPD TYPE (HCC): ICD-10-CM

## 2024-01-05 DIAGNOSIS — R00.2 PALPITATIONS: ICD-10-CM

## 2024-01-05 PROCEDURE — 99495 TRANSJ CARE MGMT MOD F2F 14D: CPT | Performed by: NURSE PRACTITIONER

## 2024-01-05 PROCEDURE — 1111F DSCHRG MED/CURRENT MED MERGE: CPT | Performed by: NURSE PRACTITIONER

## 2024-01-05 PROCEDURE — 99214 OFFICE O/P EST MOD 30 MIN: CPT | Performed by: NURSE PRACTITIONER

## 2024-01-05 NOTE — PROGRESS NOTES
TCM VISIT  [facility]      HISTORY   CHIEF COMPLAINT: HFU-shortness of breath, COPD exacerbation, palpitations, DMII controlled, HTN.     HPI: Paulina Man is a 89 year old female here today for hospital follow up for shortness of breath, COPD exacerbation, palpitations, DMII controlled, HTN.  Paulina Man was discharged from [facility]  to Home or Self Care.  Admit Date: 12/27/23. Discharge Date: 12/28/23.     Hospital Discharge Diagnosis:      Dyspnea  Palpitations  Trace right pleural effusion  COPD  Diabetes mellitus type 2  Restless leg syndrome  Hypertension  GERD    Hospital stay was uncomplicated.  Paulina Man was discharge with stop Augmentin and Symbicort and a referral to the [facility] for continued follow up.      Today, patient is being seen for hospital follow-up.  She reports doing fine since discharge.    She presented to ED due to dyspnea.  She reports has been ongoing for the past several days PTA.  Notes that it is worse with exertion.  Reports mild palpitations.  She was admitted and remained on room air.  Troponin and BNP were normal.  Echo showed preserved LVEF and no change from previous.  She had no evidence of pneumonia or CHF on CXR.  She had no bronchospasm in the ED.  She was discharged with MCT to see if palpitations were causing her some dyspnea.  She is to follow-up with pulmonology in regards to her COPD.  Course was discussed with son in detail.  Symptoms improved and patient was discharged home in good condition.    Interactive contact within 2 business days post discharge first initiated on Date: 12/29/2023      Allergies:  Allergies   Allergen Reactions    Ciprofloxacin NAUSEA AND VOMITING and HIVES    Peanut-Containing Drug Products UNKNOWN and OTHER (SEE COMMENTS)     Bladder infection    Pollen ITCHING and HIVES    Sulfa Antibiotics NAUSEA AND VOMITING    Adhesive Tape ITCHING    Dander RASH     fur    Other ITCHING     feather      Current  Meds:  Current Outpatient Medications   Medication Sig Dispense Refill    glipiZIDE ER 2.5 MG Oral Tablet 24 Hr Take 1 tablet (2.5 mg total) by mouth daily with breakfast. 30 tablet 0    losartan 100 MG Oral Tab Take 1 tablet (100 mg total) by mouth daily. 90 tablet 0    pramipexole (MIRAPEX) 0.25 MG Oral Tab Take 1 tablet (0.25 mg total) by mouth nightly. 90 tablet 3    mometasone 0.1 % External Ointment apply a thin layer daily to vulva for 2 weeks then twice weekly 45 g 0    estradiol 0.1 MG/GM Vaginal Cream Apply 0.5 grams (pea-sized amount) of cream inside the vagina nightly. May use small amount at the vaginal opening as well in a thin layer. 42.5 g 0    ondansetron (ZOFRAN ODT) 8 MG Oral Tablet Dispersible Take 1 tablet (8 mg total) by mouth every 8 (eight) hours as needed for Nausea. 30 tablet 0    cyclobenzaprine 5 MG Oral Tab Take 1 tablet (5 mg total) by mouth nightly as needed for Muscle spasms. 30 tablet 0    ESOMEPRAZOLE MAGNESIUM 40 MG Oral Capsule Delayed Release TAKE ONE CAPSULE BY MOUTH TWICE DAILY BEFORE MEALS 180 capsule 0    meclizine 12.5 MG Oral Tab Take 1 tablet (12.5 mg total) by mouth 3 (three) times daily as needed. 30 tablet 0    furosemide 20 MG Oral Tab Take 1 tablet (20 mg total) by mouth daily.      albuterol (VENTOLIN HFA) 108 (90 Base) MCG/ACT Inhalation Aero Soln Inhale 2 puffs into the lungs every 4 (four) hours as needed for Wheezing. 1 each 1    fluticasone propionate 50 MCG/ACT Nasal Suspension 2 sprays by Nasal route daily.      acetaminophen 500 MG Oral Tab Take 1 tablet (500 mg total) by mouth every 6 (six) hours as needed for Pain.      docusate sodium 100 MG Oral Cap Take 1 capsule (100 mg total) by mouth 2 (two) times daily as needed for constipation.      CAPSAICIN APR EX Apply topically as needed.      Loperamide HCl 2 MG Oral Cap Take 1 capsule (2 mg total) by mouth as needed for Diarrhea.      Multiple Vitamins-Minerals (PRESERVISION AREDS) Oral Tab Take 1 tablet by  mouth 2 (two) times daily.       No current facility-administered medications for this visit.       HISTORY: reconciled and reviewed with patient  Past Medical History:   Diagnosis Date    Abdominal distention     Abdominal hernia     Abdominal pain     Acute diverticulitis 10/10/2020    Noted on CT scan - acute diverticulitis of the sigmoid colon    Arrhythmia     Arthritis     Atypical lobular hyperplasia of right breast 06/30/2016    Back pain     Back problem     lower lumbar bad discs     Mckeon's esophagus without dysplasia 11/28/2018    CHF (congestive heart failure) ([facility])     Chronic cough     Chronic diarrhea 12/04/2015    Started before metformin- related to IBS      Constipation     COPD (chronic obstructive pulmonary disease) ([facility])     NO OXYGEN- no pulmonologist    Deviated septum     left side    Diabetic polyneuropathy associated with type 2 diabetes mellitus ([facility]) 12/04/2015    Diarrhea, unspecified     Disorder of thyroid     has Thyroid nodules    Diverticulosis of large intestine     Dizziness     Ductal carcinoma in situ (DCIS) of right breast     Esophageal reflux     Essential hypertension     Exudative macular degeneration ([facility]) 04/01/2016    Right Eye     Fatigue     Flatulence/gas pain/belching     Frequent urination     Frequent UTI     Full dentures     Gastroesophageal reflux disease 12/04/2015    GERD (gastroesophageal reflux disease)     Hearing impairment     bilateral hearing aids    Hearing loss     Heart palpitations     Hemorrhoids     High blood pressure     High cholesterol     History of cardiac murmur     History of tobacco use     Hoarseness, chronic     Hyperlipidemia     not taking lipitor due to side effects    Incontinence     uses pads    Indigestion     Irregular bowel habits     Itch of skin     Leaking of urine     Leaky heart valve     slight per pt- had echo 12/2015    Loss of appetite     Macular degeneration     Macular degeneration     Menopause     Nausea      Neuropathy     Nodule of left lung 01/07/2016    Obesity (BMI 30-39.9)     Osteoarthritis     all joints    Osteoporosis     Other fatigue 10/10/2018    Pain in joints     Pap smear for cervical cancer screening 2006    Per patient had pap in 2006. No abn pap.     Pneumonia, organism unspecified(486)     04/2015    PONV (postoperative nausea and vomiting)     Problems with swallowing     due hiatal hernia; gaggs at times    Restless leg syndrome     Restless leg syndrome     RLS (restless legs syndrome) 12/04/2015    Screening breast examination 11/01/2021    Dr. Layla Pretty    Screening mammogram for breast cancer 02/06/2022    Benign     Shortness of breath     ON EXERTION    Sleep apnea     Sleep disturbance     Splenic artery aneurysm (HCC) 03/23/2021    Torn rotator cuff     both shoulders per pt    Uncomfortable fullness after meals     Vertigo     Visual impairment     glasses    Vitamin B12 deficiency     Vulvar itching 08/13/2019    Wears glasses     Weight loss       Past Surgical History:   Procedure Laterality Date    ANGIOGRAM      no intervention    APPENDECTOMY  1935    APPENDECTOMY      CHOLECYSTECTOMY  1980    COLONOSCOPY      EGD  03/09/2022    Reactive gastropathy    NIYAH LOCALIZATION WIRE 1 SITE LEFT (CPT=19281)      1957    NIYAH LOCALIZATION WIRE 1 SITE RIGHT (CPT=19281)      1980    NIYAH LOCALIZATION WIRE 1 SITE RIGHT (CPT=19281) Right 07/2016    DCIS    MASTECTOMY PARTIAL Right 07/01/2016    wire localized partial mastectomy    NEEDLE BIOPSY RIGHT      diag DCIS- 06/07/16    OTHER SURGICAL HISTORY  1980    L and R breast cyst removal      Family History   Problem Relation Age of Onset    Breast Cancer Paternal Aunt     Heart Disorder Mother     Hypertension Mother     Colon Polyps Mother     Heart Disorder Father     Hypertension Father     Lipids Father     Heart Disorder Paternal Grandfather     Diabetes Paternal Grandmother     Breast Cancer Self 86        DCIS      Social History      Socioeconomic History    Marital status:    Occupational History    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   Tobacco Use    Smoking status: Former     Packs/day: 1.00     Years: 35.00     Additional pack years: 0.00     Total pack years: 35.00     Types: Cigarettes     Quit date: 1988     Years since quittin.4    Smokeless tobacco: Former   Vaping Use    Vaping Use: Never used   Substance and Sexual Activity    Alcohol use: No    Drug use: No    Sexual activity: Not Currently   Other Topics Concern    Caffeine Concern Yes     Comment: coffee 2 cups daily    Exercise No     Comment: walking     Social Determinants of Health     Financial Resource Strain: Low Risk  (2023)    Financial Resource Strain     Difficulty of Paying Living Expenses: Not hard at all     Med Affordability: No   Food Insecurity: No Food Insecurity (2023)    Food Insecurity     Food Insecurity: Never true   Transportation Needs: No Transportation Needs (2023)    Transportation Needs     Lack of Transportation: No   Physical Activity: Insufficiently Active (2023)    Exercise Vital Sign     Days of Exercise per Week: 1 day     Minutes of Exercise per Session: 20 min   Stress: No Stress Concern Present (2023)    Stress     Feeling of Stress : No   Social Connections: Socially Integrated (2023)    Social Connections     Frequency of Socialization with Friends and Family: 2   Housing Stability: Low Risk  (2023)    Housing Stability     Housing Instability: No     Housing Instability Emergency: No        Imaging & Consults:        Lab Results   Component Value Date/Time    WBC 6.4 2023 05:53 PM    HGB 12.7 2023 05:53 PM    HCT 39.0 2023 05:53 PM    .0 2023 05:53 PM     (H) 2023 05:53 PM     2023 05:53 PM    K 3.9 2023 05:53 PM     2023 05:53 PM    CO2 27.0 2023 05:53 PM    BUN 11 2023 05:53 PM    CREATSERUM 0.79  12/26/2023 05:53 PM    CA 8.9 12/26/2023 05:53 PM    MG 1.3 (L) 03/13/2023 06:04 AM    ALB 3.4 12/26/2023 05:53 PM    ALT 14 12/26/2023 05:53 PM    AST 10 (L) 12/26/2023 05:53 PM    A1C 6.8 (H) 10/20/2023 06:11 PM       Immunization History   Administered Date(s) Administered    Covid-19 Vaccine Pfizer 30 mcg/0.3 ml 02/07/2021, 02/28/2021, 11/02/2021, 05/16/2022, 10/25/2022    Covid-19 Vaccine Pfizer Dani-Sucrose 30 mcg/0.3 ml 05/16/2022    FLU VAC High Dose 65 YRS & Older PRSV Free (80251) 09/13/2016, 09/30/2019, 09/08/2021, 09/26/2022, 08/25/2023    FLUAD High Dose 65 yr and older (12040) 09/20/2018    Fluzone Vaccine Medicare () 09/16/2015, 09/12/2016, 08/31/2017    HIGH DOSE FLU 65 YRS AND OLDER PRSV FREE SINGLE D (62867) FLU CLINIC 09/08/2021    Influenza 09/07/2015, 08/12/2020    Kenalog Per 10mg, Bursa/Joint Inj 03/30/2016    Pneumococcal (Prevnar 13) 09/16/2015    Pneumovax 23 05/23/2017    RSV, recombinant, RSVpreF, adjuvanted (Arexvy) 09/13/2023    TDAP 01/07/2016    Zoster Vaccine Recombinant Adjuvanted (Shingrix) 07/01/2019, 01/09/2020       ROS:  GENERAL: energy fair/stable, denies fever, weakness  SKIN: denies any skin changes  EYES: denies blurred vision, eye pain  HEENT: denies ear pain, loss of hearing, sore throat  RESPIRATORY: + occasional non productive cough, + dyspnea with exertion  CARDIOVASCULAR: denies syncope, chest pain, fatigue, palpitations   GI: denies abdominal pain, melena, constipation, diarrhea, nausea, vomiting   MUSCULOSKELETAL: denies pain, states normal range of motion of extremities  NEUROLOGIC: denies confusion, balance difficulty  PSYCHIATRIC: denies depression or anxiety  HEMATOLOGIC: denies history of anemia, bruising, bleeding    PHYSICAL EXAM:  Vitals:    01/05/24 1207   BP: 144/68   Pulse: 76   Resp: 18   Temp: 97.7 °F (36.5 °C)       GENERAL: well developed, well nourished, in no apparent distress, good historian  INTEGUMENTARY: warm, dry, no rashes  HEENT:  atraumatic, normocephalic, sclera white, conjunctivae pink  NECK: supple  CHEST: no chest tenderness  LUNGS: clear to auscultation bilaterally, no wheezes, rhonchi or rales, normal respiratory effort  CARDIO: S1, S2, RRR without audible murmur  GI: + BS to all quadrants, no tenderness  MUSCULOSKELETAL: + ROM in all joints, no evidence of swelling, pain   EXTREMITIES: no cyanosis, or edema  NEURO: Oriented x3  PSYCHIATRIC: appropriate affect    ASSESSMENT/ PLAN:   1. Shortness of breath/COPD exacerbation  She was treated symptomatically in the hospital with some improvement  She had extensive workup that was unremarkable-troponin/BNP were normal-echo with preserved LVEF-no evidence of pneumonia or CHF on chest x-ray-no bronchospasm  She was discharged with MCT monitor to be placed to see if palpitations are causing her dyspnea  She remained on room air throughout hospitalization-requested O2 for at home but did not meet requirements  Requested home O2 from pulmonology which she saw just prior to our visit  She is to have 6-minute walk test done to see if she qualifies for home O2  She is concerned as she lives alone about not having oxygen  Reports ongoing shortness of breath with exertion  Reports ongoing occasional nonproductive cough  Continue:  Albuterol inhaler 2 puffs every 4 hours as needed  Fluticasone 50 mcg/ACT 2 sprays each nostril daily  Denies F/C  Is able to ambulate and only gets short of breath with longer distances  Tolerating an oral diet  Appetite is good/drinking fair-discussed need to increase oral fluid intake  Moving bowels-reported alternating loose stools with constipation that have now resolved/passing gas  Follow-up with pulmonology Dr. Blackwell on 4/29 at 2:30 PM-saw pulmonology APN today at 11 AM  Complete 6-minute walk test to see if meets requirements for O2 at home  MCT monitor to be placed on 1/8  Follow-up with gynecology Dr. Pretty on 2/12 at 3 PM  Follow-up with [facility]  Dr. Milian on 2/29 at 11:50 AM  Follow-up with audiology on 1/3 and ENT Dr. Jones at 11:50 AM-patient plans to cancel  Follow-up with PCP Dr. Newton on 3/5 at 3:40 PM  All hospital records, labs, radiology reviewed    2. Palpitations  Denies any palpitations at today's exam and since discharge  To have MCT monitor placed on 1/8 x 30 days  Will need to follow-up with cardiology/PCP after monitor to discuss results    3. Diabetes mellitus type 2 in obese  (HCC)  Controlled with A1c of 6.8% in the hospital  Patient reports currently not checking BS  Continue:  Glipizide ER 2.5 mg with breakfast  Losartan 100 mg daily  Continue to monitor for S/S of hyper hypoglycemia  Further A1c monitoring per PCP    4. Primary hypertension  BP slightly elevated at today's exam at 144/68  Patient does not check BP at home  Recommended checking BP 2 times daily and morning before medications and at least 4 hours after medications and keep log of BP and HR and bring with to all follow-ups for review  Continue:  Losartan 100 mg daily  Continue to monitor for S/S of hyper hypotension      No orders of the defined types were placed in this encounter.      Medications & Refills for this Visit:   glipiZIDE ER 2.5 MG Oral Tablet 24 Hr Take 1 tablet (2.5 mg total) by mouth daily with breakfast. 30 tablet 0    losartan 100 MG Oral Tab Take 1 tablet (100 mg total) by mouth daily. 90 tablet 0    pramipexole (MIRAPEX) 0.25 MG Oral Tab Take 1 tablet (0.25 mg total) by mouth nightly. 90 tablet 3    mometasone 0.1 % External Ointment apply a thin layer daily to vulva for 2 weeks then twice weekly 45 g 0    estradiol 0.1 MG/GM Vaginal Cream Apply 0.5 grams (pea-sized amount) of cream inside the vagina nightly. May use small amount at the vaginal opening as well in a thin layer. 42.5 g 0    ondansetron (ZOFRAN ODT) 8 MG Oral Tablet Dispersible Take 1 tablet (8 mg total) by mouth every 8 (eight) hours as needed for Nausea. 30 tablet 0     cyclobenzaprine 5 MG Oral Tab Take 1 tablet (5 mg total) by mouth nightly as needed for Muscle spasms. 30 tablet 0    [DISCONTINUED] ESOMEPRAZOLE MAGNESIUM 40 MG Oral Capsule Delayed Release TAKE ONE CAPSULE BY MOUTH TWICE DAILY BEFORE MEALS 180 capsule 0    meclizine 12.5 MG Oral Tab Take 1 tablet (12.5 mg total) by mouth 3 (three) times daily as needed. 30 tablet 0    furosemide 20 MG Oral Tab Take 1 tablet (20 mg total) by mouth daily.      albuterol (VENTOLIN HFA) 108 (90 Base) MCG/ACT Inhalation Aero Soln Inhale 2 puffs into the lungs every 4 (four) hours as needed for Wheezing. 1 each 1    fluticasone propionate 50 MCG/ACT Nasal Suspension 2 sprays by Nasal route daily.      acetaminophen 500 MG Oral Tab Take 1 tablet (500 mg total) by mouth every 6 (six) hours as needed for Pain.      docusate sodium 100 MG Oral Cap Take 1 capsule (100 mg total) by mouth 2 (two) times daily as needed for constipation.      CAPSAICIN APR EX Apply topically as needed.      Loperamide HCl 2 MG Oral Cap Take 1 capsule (2 mg total) by mouth as needed for Diarrhea.      Multiple Vitamins-Minerals (PRESERVISION AREDS) Oral Tab Take 1 tablet by mouth 2 (two) times daily.       Requested Prescriptions      No prescriptions requested or ordered in this encounter         Health Maintenance:  Health Maintenance   Topic Date Due    COVID-19 Vaccine (7 - 2023-24 season) 09/01/2023    Annual Depression Screening  01/01/2024    Fall Risk Screening (Annual)  01/01/2024    Diabetes Care Foot Exam (Annual)  01/01/2024    Diabetes Care A1C  04/20/2024    Diabetes Care: Microalb/Creat Ratio  05/04/2024    Diabetes Care Dilated Eye Exam  07/20/2024    Annual Physical  07/25/2024    Diabetes Care: GFR  12/26/2024    Influenza Vaccine  Completed    Pneumococcal Vaccine: 65+ Years  Completed    Zoster Vaccines  Completed       Chronic Care Management Referral: N/A      Transitional Care Management Certification:  During the visit, the following was  completed:  Obtained and reviewed discharge summary, continuity of care documents, Hospitalist notes, and discharge information   Reviewed Labs (CBC, CMP), Labs (Cardiac markers), Labs (Microbiology), X-Ray radiology results, EKG, Echocardiogram, need for follow-up on pending tests, need for follow-up on diagnostic tests, and need for follow-up on treatments    Medication Reconciliation:  I am aware of an inpatient discharge within the last 30 days.  The discharge medication list has been reconciled with the patient's current medication list and reviewed by me.  See medication list for additions of new medication, and changes to current doses of medications and discontinued medications.        Discharge Disposition/Plan:     Future Appointments   Date Time Provider Department Center   2/12/2024  3:00 PM Layla Pretty MD [facility] [place]   3/5/2024  3:40 PM Annie Newton MD [facility] [facility]   4/29/2024  2:30 PM Bebeto Blackwell MD [facility] [place]      1.  Transitional Care Clinic Visit: Next visit Discharged  2.  PCP Visit: 3/5/2024  3.  Chronic Care Management: Yes     DENILSON Ruiz, 1/5/2024  [facility]  [phone]

## 2024-01-05 NOTE — PROGRESS NOTES
Subjective:   Paulina Man is a 89 year old female with hx of CHF and COPD who presents for hospital follow - up. Admitted for dyspnea and found to have elevated BNP, bilateral small pleural effusions and told she did not need oxygen. Pt reports having increased SOB with exertion; upper arm activities, ambulating long distances. No f/c/ns.     Previously 10/2023 Dr Blackwell  89 year old female who presents for follow up of COPD after visit on 2023   The patient states that she was diagnosed with acute bronchitis in 2023 and was treated with steroids and amoxil (but she could not swallow the pill)  Today she feels much improved.  The patient states that she sleeps OK at night. She lives alone at home.   She has  no pets .  Hx of tobacco use: She  reports that she quit smoking about 35 years ago. Her smoking use included cigarettes. She has a 35.00 pack-year smoking history. She has quit using smokeless tobacco.    History/Other:    Chief Complaint Reviewed and Verified  Nursing Notes Reviewed and   Verified  Tobacco Reviewed  Allergies Reviewed  Medications Reviewed    Problem List Reviewed  Medical History Reviewed  Surgical History   Reviewed  Family History Reviewed         Tobacco:  She smoked tobacco in the past but quit greater than 12 months ago.  Social History    Tobacco Use      Smoking status: Former        Packs/day: 1.00        Years: 35.00        Additional pack years: 0.00        Total pack years: 35.00        Types: Cigarettes        Quit date: 1988        Years since quittin.3      Smokeless tobacco: Former       Current Outpatient Medications   Medication Sig Dispense Refill    glipiZIDE ER 2.5 MG Oral Tablet 24 Hr Take 1 tablet (2.5 mg total) by mouth daily with breakfast. 30 tablet 0    losartan 100 MG Oral Tab Take 1 tablet (100 mg total) by mouth daily. 90 tablet 0    pramipexole (MIRAPEX) 0.25 MG Oral Tab Take 1 tablet (0.25 mg total) by mouth nightly. 90 tablet  3    mometasone 0.1 % External Ointment apply a thin layer daily to vulva for 2 weeks then twice weekly 45 g 0    estradiol 0.1 MG/GM Vaginal Cream Apply 0.5 grams (pea-sized amount) of cream inside the vagina nightly. May use small amount at the vaginal opening as well in a thin layer. 42.5 g 0    ondansetron (ZOFRAN ODT) 8 MG Oral Tablet Dispersible Take 1 tablet (8 mg total) by mouth every 8 (eight) hours as needed for Nausea. 30 tablet 0    cyclobenzaprine 5 MG Oral Tab Take 1 tablet (5 mg total) by mouth nightly as needed for Muscle spasms. 30 tablet 0    ESOMEPRAZOLE MAGNESIUM 40 MG Oral Capsule Delayed Release TAKE ONE CAPSULE BY MOUTH TWICE DAILY BEFORE MEALS 180 capsule 0    meclizine 12.5 MG Oral Tab Take 1 tablet (12.5 mg total) by mouth 3 (three) times daily as needed. 30 tablet 0    furosemide 20 MG Oral Tab Take 1 tablet (20 mg total) by mouth daily.      albuterol (VENTOLIN HFA) 108 (90 Base) MCG/ACT Inhalation Aero Soln Inhale 2 puffs into the lungs every 4 (four) hours as needed for Wheezing. 1 each 1    fluticasone propionate 50 MCG/ACT Nasal Suspension 2 sprays by Nasal route daily.      acetaminophen 500 MG Oral Tab Take 1 tablet (500 mg total) by mouth every 6 (six) hours as needed for Pain.      docusate sodium 100 MG Oral Cap Take 1 capsule (100 mg total) by mouth 2 (two) times daily as needed for constipation.      CAPSAICIN APR EX Apply topically as needed.      Loperamide HCl 2 MG Oral Cap Take 1 capsule (2 mg total) by mouth as needed for Diarrhea.      Multiple Vitamins-Minerals (PRESERVISION AREDS) Oral Tab Take 1 tablet by mouth 2 (two) times daily.           Review of Systems:  Review of Systems   Constitutional:  Negative for activity change, appetite change, chills, diaphoresis, fatigue, fever and unexpected weight change.   HENT:  Negative for congestion, postnasal drip, rhinorrhea, sinus pressure, sinus pain, sneezing, sore throat, trouble swallowing and voice change.     Respiratory:  Positive for shortness of breath. Negative for apnea, cough, choking, chest tightness, wheezing and stridor.    Cardiovascular:  Negative for chest pain, palpitations and leg swelling.   Musculoskeletal:  Negative for arthralgias.   Skin:  Negative for pallor and rash.   Allergic/Immunologic: Negative for immunocompromised state.   Neurological:  Negative for dizziness and headaches.   Hematological:  Negative for adenopathy.         Objective:   /62 (BP Location: Right arm, Patient Position: Sitting, Cuff Size: adult)   Pulse 75   Resp 16   Ht 5' 1\" (1.549 m)   Wt 157 lb (71.2 kg)   LMP  (LMP Unknown)   SpO2 98%   BMI 29.66 kg/m²  Estimated body mass index is 29.66 kg/m² as calculated from the following:    Height as of this encounter: 5' 1\" (1.549 m).    Weight as of this encounter: 157 lb (71.2 kg).  Physical Exam  Vitals reviewed.   Constitutional:       Appearance: Normal appearance. She is normal weight.   HENT:      Head: Normocephalic and atraumatic.      Mouth/Throat:      Mouth: Mucous membranes are moist.   Eyes:      Extraocular Movements: Extraocular movements intact.   Cardiovascular:      Rate and Rhythm: Normal rate and regular rhythm.      Pulses: Normal pulses.      Heart sounds: Normal heart sounds.   Pulmonary:      Effort: Pulmonary effort is normal.      Breath sounds: Normal air entry. Wheezing present.      Comments: Few wheezes  Abdominal:      Palpations: Abdomen is soft.   Musculoskeletal:         General: Normal range of motion.      Cervical back: Normal range of motion.   Skin:     General: Skin is warm and dry.   Neurological:      General: No focal deficit present.      Mental Status: She is alert and oriented to person, place, and time.   Psychiatric:         Mood and Affect: Mood normal.         Behavior: Behavior normal.             Impression:   Dyspnea on exertion: With history of environmental allergies and benefit from prednisone.  Suspect related  to mild intermittent asthma versus COPD along with deconditioning.  The patient reports history of mild CHF in the past and has mild edema of the lower extremities, volume overload would also be in differential diagnosis.:  Clinically improved since last visit  Allergic rhinitis   Dextroscoliosis   Balance issues   B12 deficiency per patient, currently on daily vitamin B12 tablets  GERD with Hx Barents esophagus   History of at least 35 pack years of tobacco smoking, suspect with underlying COPD  Type 2 diabetes mellitus  Hypertension  Hyperlipidemia macular degeneration osteoporosis history of nodule in left lung noted on 1/7/2016  Grade 2 diastolic dysfunction  Pulmonary Hypertenison (RSVP 40-45mmHg)                          Plan:  Obtain 6MWT  Continue Albuterol HFA MDI 2 puffs 4 times daily as needed.    Continue Fluticasone 2 puffs each nostril daily   Continue diuretics per cardiology   Cardiology follow up within the next 3-4 weeks  Check Spirometry before next visit 4/2024    DENILSON Cronin, 1/5/2024, 11:28 AM

## 2024-01-05 NOTE — PATIENT INSTRUCTIONS
Continue using your inhaler albuterol with the tube 3-4 times a day  Have the walking test to see if you need oxygen in the next week or so and we will call you with results  Please have the breathing test or pulmonary function test in April before you see Dr Blackwell on the 29th  See cardiologist within the month after the monitor to talk about the results, including the CXR, labs and echo from the hospitalization  Please contact office at [phone] with any decline in respiratory status, questions or concerns

## 2024-01-05 NOTE — PROGRESS NOTES
[facility] PHARMACIST MEDICATION RECONCILIATION        Paulina Man MRN [id number]    1930 PCP Annie Newton MD       Comments: Medication history completed by the [facility] Pharmacist with the patient in the office.     The following medication changes occurred while patient was hospitalized:  Medications Stopped:  Amoxicillin-Clavulanate 875-125mg tabs  Budesonide-Formoterol 80-4.5mcg/act inh    Outpatient Encounter Medications as of 2024   Medication Sig    glipiZIDE ER 2.5 MG Oral Tablet 24 Hr Take 1 tablet (2.5 mg total) by mouth daily with breakfast.    losartan 100 MG Oral Tab Take 1 tablet (100 mg total) by mouth daily.    pramipexole (MIRAPEX) 0.25 MG Oral Tab Take 1 tablet (0.25 mg total) by mouth nightly.    mometasone 0.1 % External Ointment apply a thin layer daily to vulva for 2 weeks then twice weekly    estradiol 0.1 MG/GM Vaginal Cream Apply 0.5 grams (pea-sized amount) of cream inside the vagina nightly. May use small amount at the vaginal opening as well in a thin layer.    ondansetron (ZOFRAN ODT) 8 MG Oral Tablet Dispersible Take 1 tablet (8 mg total) by mouth every 8 (eight) hours as needed for Nausea.    cyclobenzaprine 5 MG Oral Tab Take 1 tablet (5 mg total) by mouth nightly as needed for Muscle spasms.    ESOMEPRAZOLE MAGNESIUM 40 MG Oral Capsule Delayed Release TAKE ONE CAPSULE BY MOUTH TWICE DAILY BEFORE MEALS    meclizine 12.5 MG Oral Tab Take 1 tablet (12.5 mg total) by mouth 3 (three) times daily as needed.    furosemide 20 MG Oral Tab Take 1 tablet (20 mg total) by mouth daily.    albuterol (VENTOLIN HFA) 108 (90 Base) MCG/ACT Inhalation Aero Soln Inhale 2 puffs into the lungs every 4 (four) hours as needed for Wheezing.    fluticasone propionate 50 MCG/ACT Nasal Suspension 2 sprays by Nasal route daily.    acetaminophen 500 MG Oral Tab Take 1 tablet (500 mg total) by mouth every 6 (six) hours as needed for Pain.    docusate sodium 100 MG  Oral Cap Take 1 capsule (100 mg total) by mouth 2 (two) times daily as needed for constipation.    CAPSAICIN APR EX Apply topically as needed.    Loperamide HCl 2 MG Oral Cap Take 1 capsule (2 mg total) by mouth as needed for Diarrhea.    Multiple Vitamins-Minerals (PRESERVISION AREDS) Oral Tab Take 1 tablet by mouth 2 (two) times daily.     Medication Adherence Assessment:   Patient reports taking all medications as prescribed.  Denies any adverse effects with the medications.  Reports using her Albuterol inhaler 3-4 times per day, and says it does help her to breathe a little easier.  No questions.    Reviewed all medications in detail with patient including dose, indication, timing of administration, monitoring parameters, and potential side effects of medications.   Patient confirmed understanding.     Thank you,    Nichole Castillo, PharmD, 1/5/2024, 12:14 PM  [facility]

## 2024-01-08 ENCOUNTER — HOSPITAL ENCOUNTER (OUTPATIENT)
Dept: CV DIAGNOSTICS | Facility: HOSPITAL | Age: 89
Discharge: HOME | End: 2024-01-08
Attending: INTERNAL MEDICINE
Payer: MEDICARE

## 2024-01-08 DIAGNOSIS — R00.2 PALPITATIONS: ICD-10-CM

## 2024-01-08 PROCEDURE — 93272 ECG/REVIEW INTERPRET ONLY: CPT | Performed by: INTERNAL MEDICINE

## 2024-01-08 NOTE — PROGRESS NOTES
Multiple attempts to reach pt and messages left with no return call.  Patient went in for HFU appt with [facility] on 1/5/24.  Encounter closing.

## 2024-01-09 ENCOUNTER — PATIENT OUTREACH (OUTPATIENT)
Dept: CASE MANAGEMENT | Age: 89
End: 2024-01-09

## 2024-01-09 DIAGNOSIS — E11.42 DIABETIC POLYNEUROPATHY ASSOCIATED WITH TYPE 2 DIABETES MELLITUS (HCC): ICD-10-CM

## 2024-01-09 DIAGNOSIS — E11.69 DIABETES MELLITUS TYPE 2 IN OBESE  (HCC): ICD-10-CM

## 2024-01-09 DIAGNOSIS — M19.042 ARTHRITIS OF BOTH HANDS: ICD-10-CM

## 2024-01-09 DIAGNOSIS — H81.10 BPPV (BENIGN PAROXYSMAL POSITIONAL VERTIGO), UNSPECIFIED LATERALITY: ICD-10-CM

## 2024-01-09 DIAGNOSIS — J44.1 COPD EXACERBATION (HCC): ICD-10-CM

## 2024-01-09 DIAGNOSIS — M19.041 ARTHRITIS OF BOTH HANDS: ICD-10-CM

## 2024-01-09 DIAGNOSIS — I10 PRIMARY HYPERTENSION: ICD-10-CM

## 2024-01-09 DIAGNOSIS — H35.3231 EXUDATIVE AGE-RELATED MACULAR DEGENERATION, BILATERAL, WITH ACTIVE CHOROIDAL NEOVASCULARIZATION (HCC): ICD-10-CM

## 2024-01-09 DIAGNOSIS — R00.2 PALPITATIONS: ICD-10-CM

## 2024-01-09 DIAGNOSIS — E66.9 DIABETES MELLITUS TYPE 2 IN OBESE  (HCC): ICD-10-CM

## 2024-01-09 DIAGNOSIS — G25.81 RLS (RESTLESS LEGS SYNDROME): ICD-10-CM

## 2024-01-09 DIAGNOSIS — N60.91 ATYPICAL LOBULAR HYPERPLASIA OF RIGHT BREAST: ICD-10-CM

## 2024-01-09 DIAGNOSIS — E78.00 PURE HYPERCHOLESTEROLEMIA: ICD-10-CM

## 2024-01-09 DIAGNOSIS — M51.36 DDD (DEGENERATIVE DISC DISEASE), LUMBAR: ICD-10-CM

## 2024-01-09 DIAGNOSIS — E53.8 B12 DEFICIENCY: Primary | ICD-10-CM

## 2024-01-09 DIAGNOSIS — K21.9 GASTROESOPHAGEAL REFLUX DISEASE WITHOUT ESOPHAGITIS: ICD-10-CM

## 2024-01-09 NOTE — PROGRESS NOTES
Spoke to Paulina for CCM.      Updates to patient care team/comments: UTD  Patient reported changes in medications: UTD  Med Adherence  Comment: pt taking medications as prescribed     Health Maintenance:   Health Maintenance   Topic Date Due    COVID-19 Vaccine (7 - 2023-24 season) 09/01/2023    Diabetes Care Foot Exam (Annual)  01/01/2024    HTN: BP Follow-Up  02/05/2024    Diabetes Care A1C  04/20/2024    Diabetes Care: Microalb/Creat Ratio  05/04/2024    Diabetes Care Dilated Eye Exam  07/20/2024    Annual Physical  07/25/2024    Diabetes Care: GFR  12/26/2024    Influenza Vaccine  Completed    Annual Depression Screening  Completed    Fall Risk Screening (Annual)  Completed    Pneumococcal Vaccine: 65+ Years  Completed    Zoster Vaccines  Completed       Patient updates/concerns:    Pt has started to wear a 30 day cardiac monitor. Pt states that the device is in no way distracting or limiting her. Pt states that she tries to keep the  out of her pocket as much as possible, but will sometimes leave it in her pocket if she is moving around/walking a lot. Pt will count up her electrodes and then follow up today with cardiac clinic to determine their expectation for how often electrodes need changing.  Pt is aware of cardiology request for pt to have appt scheduled within 30 days of event monitor coming off. Pt verbalized understanding and will review her calendar and then schedule appt when she calls to talk to nurses.    Pt has seen pulmonology and has a breathing test scheduled for tomorrow. Pt has f/u with dr barber in April. Pt is still short of breath and she states it takes very little activity for this to happen. For this reason pt has been limiting her walks to the store. Pt states that she will feel more confident performing physical activity/exercise once cardiac monitor is complete. She is currently using inhaler at least 3 times daily   Pt discussed her concerns about receiving any cardiac  diagnoses.  Pt is very committed to adopting a cardiac diet. Reviewed with pt the tenets of this diet to watch salt, fat intake, eat grains, fish/lean meats, avoid frozen meals or buy low fat/salt options. Pt verbalized understanding and welcomed ccm resources from [employer] on menu planning for cardio diet. [facility] mailed pt detailed information on shopping for a cardiac diet along with guidelines on salt/fat intake from AHA along and guide on how to read nutrition labels.    Pt is relying on her son to shop for groceries temporarily while she limits her activity.    Pt requested [facility] review cardiac testing from the last few years. Pt wanted to confirm the dates of last echo test and any other no EKG testing that she has had. [facility] provided dates for pt records and stressed to pt that any interpretation of results has to originate with specialist that ordered testing and his/her staff.  Pt verbalized understanding but declined [facility] assistance in calling cardiology nurses at this time.  Goals/Action Plan:    Active goal from previous outreach: exercise    Patient reported progress towards goals: pt is limiting her strenuous exercise while wearing 30 day cardiac monitor.               - What: stretching            - Where/When/How: pt is stretching almost every day using exercises from PT. Pt has identified some exercises from [facility] to incorporate into her daily routine  Patient Reported Barriers and Concerns: n/a                   - Plan for overcoming barriers: none    Care Managers Interventions: one month    Future Appointments:   Future Appointments   Date Time Provider Department Center   1/10/2024  3:00 PM RT [facility] PULMONARY [facility] [place]   2/12/2024  3:00 PM Layla Pretty MD [facility] [place]   3/5/2024  3:40 PM Annie Newton MD [facility] [address] [facility]   4/29/2024  2:30 PM Bebeto Blackwell MD [facility] Pul[facility] [place]         Next Care Manager Follow Up Date: one month    Reason For Follow  Up: review progress and or barriers towards patient's goals.     Time Spent This Encounter Total: 51min medical record review, telephone communication, care plan updates where needed, education, goals, and action plan recreation/update. Provided acknowledgment and validation to patient's concerns.   Monthly Minute Total including today: 51  Physical assessment, complete health history, and need for CCM established by Annie Newton MD.

## 2024-01-10 ENCOUNTER — NURSE ONLY (OUTPATIENT)
Facility: CLINIC | Age: 89
End: 2024-01-10
Payer: MEDICARE

## 2024-01-10 VITALS — BODY MASS INDEX: 29.27 KG/M2 | HEIGHT: 61 IN | WEIGHT: 155 LBS

## 2024-01-10 DIAGNOSIS — R09.02 HYPOXEMIA: Primary | ICD-10-CM

## 2024-01-10 PROCEDURE — 94618 PULMONARY STRESS TESTING: CPT | Performed by: INTERNAL MEDICINE

## 2024-01-10 PROCEDURE — 1111F DSCHRG MED/CURRENT MED MERGE: CPT | Performed by: INTERNAL MEDICINE

## 2024-01-10 NOTE — PROGRESS NOTES
OXYGEN CLINIC ASSESSMENT    Date: 1/10/2024 Physician: Le FLETCHER   Diagnosis: dyspnea Technician: RT Nesha     Patient: Paulina Man MRN: [id number] : 1930     Height: 5' 1\" (1.549 m) Weight: 155 lb Age: 89 year old         BP HR SpO2 RR [place] [address]  (FT) TIME Reason Stopped   RA         REST 118/60   72   97     18     0     N/A         N/A         N/A           RA       PEAK  EXERCISE 128/70 90 95   20   3   800   6 MIN                    FINDINGS  0 LPM required to maintain a saturation of 97 % at rest   0 LPM required to maintain a saturation of 95 % with exertion     LPM Pulse dose to maintain a saturation of   % with exertion   Patient does not need supplemental oxygen at rest or for exertion.

## 2024-02-01 ENCOUNTER — APPOINTMENT (OUTPATIENT)
Dept: GENERAL RADIOLOGY | Facility: HOSPITAL | Age: 89
End: 2024-02-01
Payer: MEDICARE

## 2024-02-01 ENCOUNTER — HOSPITAL ENCOUNTER (EMERGENCY)
Facility: HOSPITAL | Age: 89
Discharge: HOME | End: 2024-02-01
Attending: EMERGENCY MEDICINE
Payer: MEDICARE

## 2024-02-01 VITALS
RESPIRATION RATE: 22 BRPM | DIASTOLIC BLOOD PRESSURE: 64 MMHG | SYSTOLIC BLOOD PRESSURE: 144 MMHG | BODY MASS INDEX: 27.6 KG/M2 | OXYGEN SATURATION: 100 % | WEIGHT: 150 LBS | TEMPERATURE: 98 F | HEIGHT: 62 IN | HEART RATE: 67 BPM

## 2024-02-01 DIAGNOSIS — R07.89 CHEST PAIN, ATYPICAL: Primary | ICD-10-CM

## 2024-02-01 LAB
ALBUMIN SERPL-MCNC: 3.5 G/DL (ref 3.4–5)
ALBUMIN/GLOB SERPL: 1 {RATIO} (ref 1–2)
ALP LIVER SERPL-CCNC: 76 U/L
ALT SERPL-CCNC: 15 U/L
ANION GAP SERPL CALC-SCNC: 2 MMOL/L (ref 0–18)
AST SERPL-CCNC: 11 U/L (ref 15–37)
ATRIAL RATE: 67 BPM
BASOPHILS # BLD AUTO: 0.06 X10(3) UL (ref 0–0.2)
BASOPHILS NFR BLD AUTO: 0.7 %
BILIRUB SERPL-MCNC: 0.8 MG/DL (ref 0.1–2)
BUN BLD-MCNC: 9 MG/DL (ref 9–23)
CALCIUM BLD-MCNC: 9 MG/DL (ref 8.5–10.1)
CHLORIDE SERPL-SCNC: 111 MMOL/L (ref 98–112)
CO2 SERPL-SCNC: 27 MMOL/L (ref 21–32)
CREAT BLD-MCNC: 0.87 MG/DL
EGFRCR SERPLBLD CKD-EPI 2021: 62 ML/MIN/1.73M2 (ref 60–?)
EOSINOPHIL # BLD AUTO: 0.08 X10(3) UL (ref 0–0.7)
EOSINOPHIL NFR BLD AUTO: 0.9 %
ERYTHROCYTE [DISTWIDTH] IN BLOOD BY AUTOMATED COUNT: 12.9 %
GLOBULIN PLAS-MCNC: 3.6 G/DL (ref 2.8–4.4)
GLUCOSE BLD-MCNC: 122 MG/DL (ref 70–99)
HCT VFR BLD AUTO: 40.1 %
HGB BLD-MCNC: 13.2 G/DL
IMM GRANULOCYTES # BLD AUTO: 0.03 X10(3) UL (ref 0–1)
IMM GRANULOCYTES NFR BLD: 0.3 %
LYMPHOCYTES # BLD AUTO: 1.06 X10(3) UL (ref 1–4)
LYMPHOCYTES NFR BLD AUTO: 12.1 %
MCH RBC QN AUTO: 29.1 PG (ref 26–34)
MCHC RBC AUTO-ENTMCNC: 32.9 G/DL (ref 31–37)
MCV RBC AUTO: 88.3 FL
MONOCYTES # BLD AUTO: 0.46 X10(3) UL (ref 0.1–1)
MONOCYTES NFR BLD AUTO: 5.3 %
NEUTROPHILS # BLD AUTO: 7.06 X10 (3) UL (ref 1.5–7.7)
NEUTROPHILS # BLD AUTO: 7.06 X10(3) UL (ref 1.5–7.7)
NEUTROPHILS NFR BLD AUTO: 80.7 %
OSMOLALITY SERPL CALC.SUM OF ELEC: 290 MOSM/KG (ref 275–295)
P AXIS: 61 DEGREES
P-R INTERVAL: 132 MS
PLATELET # BLD AUTO: 245 10(3)UL (ref 150–450)
POTASSIUM SERPL-SCNC: 3.3 MMOL/L (ref 3.5–5.1)
PROT SERPL-MCNC: 7.1 G/DL (ref 6.4–8.2)
Q-T INTERVAL: 396 MS
QRS DURATION: 114 MS
QTC CALCULATION (BEZET): 418 MS
R AXIS: -47 DEGREES
RBC # BLD AUTO: 4.54 X10(6)UL
SODIUM SERPL-SCNC: 140 MMOL/L (ref 136–145)
T AXIS: 84 DEGREES
TROPONIN I SERPL HS-MCNC: 11 NG/L
VENTRICULAR RATE: 67 BPM
WBC # BLD AUTO: 8.8 X10(3) UL (ref 4–11)

## 2024-02-01 PROCEDURE — 36415 COLL VENOUS BLD VENIPUNCTURE: CPT

## 2024-02-01 PROCEDURE — 80053 COMPREHEN METABOLIC PANEL: CPT | Performed by: EMERGENCY MEDICINE

## 2024-02-01 PROCEDURE — 93005 ELECTROCARDIOGRAM TRACING: CPT

## 2024-02-01 PROCEDURE — 84484 ASSAY OF TROPONIN QUANT: CPT | Performed by: EMERGENCY MEDICINE

## 2024-02-01 PROCEDURE — 99285 EMERGENCY DEPT VISIT HI MDM: CPT

## 2024-02-01 PROCEDURE — 71045 X-RAY EXAM CHEST 1 VIEW: CPT | Performed by: EMERGENCY MEDICINE

## 2024-02-01 PROCEDURE — 99284 EMERGENCY DEPT VISIT MOD MDM: CPT

## 2024-02-01 PROCEDURE — 93010 ELECTROCARDIOGRAM REPORT: CPT

## 2024-02-01 PROCEDURE — 85025 COMPLETE CBC W/AUTO DIFF WBC: CPT | Performed by: EMERGENCY MEDICINE

## 2024-02-01 NOTE — ED QUICK NOTES
Discharge paperwork provided, RN offered to assist pt with ride home. Pt declines, \"I'm going to [facility]\". Pt advised she should go home & rest as she was seen for chest pain. RN again offered twice to order pt a cab home and pt continues to decline. Pt states \"I'll wait for the bus\". Pt verbalizes understanding of available resources as mentioned per RN & discharge instructions as written.

## 2024-02-01 NOTE — ED PROVIDER NOTES
Patient Seen in: [facility] Emergency Department      History     Chief Complaint   Patient presents with    Chest Pain Angina     Stated Complaint: right sided chest pain    Subjective:   HPI    Patient here for several days of right-sided chest pain that is coming and going.  It is not exertional as suggested and triage notes.  She does feel it most often when she is standing up however it is not while she is exerting herself.    Hard for her to describe.  Last for a few minutes.  It is not associate with shortness of breath or lightheadedness or diaphoresis.  No syncope or near syncope.  She comments that she does have a degree of baseline shortness of breath  , She attributes this to her recent diagnosis of COPD but she does not feel this is changed in the last several weeks.    She states that she experienced the discomfort again this morning's wanted to come in for evaluation.    Occurred around 10:00 this morning.        Objective:   Past Medical History:   Diagnosis Date    Abdominal distention     Abdominal hernia     Abdominal pain     Acute diverticulitis 10/10/2020    Noted on CT scan - acute diverticulitis of the sigmoid colon    Arrhythmia     Arthritis     Atypical lobular hyperplasia of right breast 06/30/2016    Back pain     Back problem     lower lumbar bad discs     Mckeon's esophagus without dysplasia 11/28/2018    CHF (congestive heart failure) ([facility])     Chronic cough     Chronic diarrhea 12/04/2015    Started before metformin- related to IBS      Constipation     COPD (chronic obstructive pulmonary disease) ([facility])     NO OXYGEN- no pulmonologist    Deviated septum     left side    Diabetic polyneuropathy associated with type 2 diabetes mellitus ([facility]) 12/04/2015    Diarrhea, unspecified     Disorder of thyroid     has Thyroid nodules    Diverticulosis of large intestine     Dizziness     Ductal carcinoma in situ (DCIS) of right breast     Esophageal reflux     Essential hypertension      Exudative macular degeneration (HCC) 04/01/2016    Right Eye     Fatigue     Flatulence/gas pain/belching     Frequent urination     Frequent UTI     Full dentures     Gastroesophageal reflux disease 12/04/2015    GERD (gastroesophageal reflux disease)     Hearing impairment     bilateral hearing aids    Hearing loss     Heart palpitations     Hemorrhoids     High blood pressure     High cholesterol     History of cardiac murmur     History of tobacco use     Hoarseness, chronic     Hyperlipidemia     not taking lipitor due to side effects    Incontinence     uses pads    Indigestion     Irregular bowel habits     Itch of skin     Leaking of urine     Leaky heart valve     slight per pt- had echo 12/2015    Loss of appetite     Macular degeneration     Macular degeneration     Menopause     Nausea     Neuropathy     Nodule of left lung 01/07/2016    Obesity (BMI 30-39.9)     Osteoarthritis     all joints    Osteoporosis     Other fatigue 10/10/2018    Pain in joints     Pap smear for cervical cancer screening 2006    Per patient had pap in 2006. No abn pap.     Pneumonia, organism unspecified(486)     04/2015    PONV (postoperative nausea and vomiting)     Problems with swallowing     due hiatal hernia; gaggs at times    Restless leg syndrome     Restless leg syndrome     RLS (restless legs syndrome) 12/04/2015    Screening breast examination 11/01/2021    Dr. Layla Pretty    Screening mammogram for breast cancer 02/06/2022    Benign     Shortness of breath     ON EXERTION    Sleep apnea     Sleep disturbance     Splenic artery aneurysm (HCC) 03/23/2021    Torn rotator cuff     both shoulders per pt    Uncomfortable fullness after meals     Vertigo     Visual impairment     glasses    Vitamin B12 deficiency     Vulvar itching 08/13/2019    Wears glasses     Weight loss               Past Surgical History:   Procedure Laterality Date    ANGIOGRAM      no intervention    APPENDECTOMY  1935    APPENDECTOMY       CHOLECYSTECTOMY      COLONOSCOPY      EGD  2022    Reactive gastropathy    NIYAH LOCALIZATION WIRE 1 SITE LEFT (CPT=19281)          NIYAH LOCALIZATION WIRE 1 SITE RIGHT (CPT=19281)          NIYAH LOCALIZATION WIRE 1 SITE RIGHT (CPT=19281) Right 2016    DCIS    MASTECTOMY PARTIAL Right 2016    wire localized partial mastectomy    NEEDLE BIOPSY RIGHT      diag DCIS- 16    OTHER SURGICAL HISTORY      L and R breast cyst removal                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   Tobacco Use    Smoking status: Former     Packs/day: 1.00     Years: 35.00     Additional pack years: 0.00     Total pack years: 35.00     Types: Cigarettes     Quit date: 1988     Years since quittin.4    Smokeless tobacco: Former   Vaping Use    Vaping Use: Never used   Substance and Sexual Activity    Alcohol use: No    Drug use: No    Sexual activity: Not Currently   Other Topics Concern    Caffeine Concern Yes     Comment: coffee 2 cups daily    Exercise No     Comment: walking     Social Determinants of Health     Financial Resource Strain: Low Risk  (2023)    Financial Resource Strain     Difficulty of Paying Living Expenses: Not hard at all     Med Affordability: No   Food Insecurity: No Food Insecurity (2023)    Food Insecurity     Food Insecurity: Never true   Transportation Needs: No Transportation Needs (2023)    Transportation Needs     Lack of Transportation: No   Physical Activity: Insufficiently Active (2023)    Exercise Vital Sign     Days of Exercise per Week: 1 day     Minutes of Exercise per Session: 20 min   Stress: No Stress Concern Present (2023)    Stress     Feeling of Stress : No   Social Connections: Socially Integrated (2023)    Social Connections     Frequency of Socialization with Friends and Family: 2   Housing Stability: Low Risk  (2023)    Housing  Stability     Housing Instability: No     Housing Instability Emergency: No              Review of Systems    Positive for stated complaint: right sided chest pain  Other systems are as noted in HPI.  Constitutional and vital signs reviewed.      All other systems reviewed and negative except as noted above.    Physical Exam     ED Triage Vitals [02/01/24 1356]   /66   Pulse 72   Resp 15   Temp 98.3 °F (36.8 °C)   Temp src Oral   SpO2 99 %   O2 Device None (Room air)       Current:/64   Pulse 67   Temp 98.3 °F (36.8 °C) (Oral)   Resp 22   Ht 157.5 cm (5' 2\")   Wt 68 kg   LMP  (LMP Unknown)   SpO2 100%   BMI 27.44 kg/m²         Physical Exam      Constitutional: Awake, alert, age appearing, non-toxic  Head: Normocephalic and atraumatic.     Eyes: EOM are normal. Pupils are equal, round, and reactive to light.   Neck: Normal range of motion. Neck supple. No JVD present.     Cardiovascular: Normal rate and regular rhythm.  Normal peripheral perfusion with good color.  Pulmonary/Chest: Normal effort.  No accessory muscle use.  No clubbing, no cyanosis.  Abdominal: Soft. There is no tenderness. There is no guarding.     Musculoskeletal: No swelling, deformity or ecchymosis.    Neurological: Pt is alert and oriented to person, place, and time. No cranial nerve deficit.     Skin: Skin is warm and dry.   Psychiatric: Normal mood and affect. Thought content normal.       No murmurs lungs clear with adequate air exchange    ED Course     Labs Reviewed   COMP METABOLIC PANEL (14) - Abnormal; Notable for the following components:       Result Value    Glucose 122 (*)     Potassium 3.3 (*)     AST 11 (*)     All other components within normal limits   TROPONIN I HIGH SENSITIVITY - Normal   CBC WITH DIFFERENTIAL WITH PLATELET    Narrative:     The following orders were created for panel order CBC With Differential With Platelet.  Procedure                               Abnormality         Status                      ---------                               -----------         ------                     CBC W/ DIFFERENTIAL[[id number]]                              Final result                 Please view results for these tests on the individual orders.   RAINBOW DRAW LAVENDER   RAINBOW DRAW LIGHT GREEN   RAINBOW DRAW BLUE   CBC W/ DIFFERENTIAL     EKG    Rate, intervals and axes as noted on EKG Report.  Rate: 67  Rhythm: Sinus Rhythm  Reading: Sinus rhythm w/o acute ischemia                 Troponin negative mild hypokalemia, CBC CMP otherwise fine    XR CHEST AP PORTABLE  (CPT=71045)    Result Date: 2/1/2024  PROCEDURE:  XR CHEST AP PORTABLE  (CPT=71045)  TECHNIQUE:  AP chest radiograph was obtained.  COMPARISON:  [facility] , XR, XR CHEST PA + LAT CHEST (CPT=71046), 12/26/2023, 5:54 PM.  INDICATIONS:  right sided chest pain  PATIENT STATED HISTORY: (As transcribed by Technologist)  Patient offered no additional history at this time.    FINDINGS:  Heart size is within normal limits.  Pleural spaces appear clear.  Mediastinal and hilar contours are normal.  No focal consolidation.  The patient is rotated which somewhat limits evaluation.  There is a probable small hiatal hernia present.            CONCLUSION:  Accounting for rotation in technique the chest appears overall stable.  If clinical symptoms persist then consider follow-up imaging.   LOCATION:  [id number]      Dictated by (CST): Brennan Pastor MD on 2/01/2024 at 2:37 PM     Finalized by (CST): Brennan Pastor MD on 2/01/2024 at 2:38 PM               MDM              Differential diagnoses considered: Atypical presentation of ACS, pneumonia, pneumothorax, acute COPD exacerbation  -As noted above, the pain is more associated with standing up rather than exertion.  She has some stable exertional dyspnea related to her COPD that is unchanged today.    -EKG unchanged, troponin negative after several days with the symptoms.  Overall clinical scenario not suggestive of ACS.  No pneumonia  or pneumothorax on chest x-ray.  She satting high 90s on room air, no dyspnea, do not suspect COPD exacerbation.    -Patient comfortable discharge home.        *My independent interpretation of radiographs: No pneumonia pneumothorax    *Discussion of ongoing management of this patient's care included: n/a  *Comorbidities contributing to the complexity of decision making: n/a  *External charts reviewed: Patient was admitted late December 2023.  She had an echocardiogram done that showed a normal ejection fractions, some mild valvular disease.  *Additional sources of history: n/a    Shared decision making was done by: patient, myself.                                     Medical Decision Making      Disposition and Plan     Clinical Impression:  1. Chest pain, atypical         Disposition:  Discharge  2/1/2024  3:47 pm    Follow-up:  Annie Newton MD  [address] [place] [id number]  [phone]    Follow up            Medications Prescribed:  Discharge Medication List as of 2/1/2024  3:59 PM

## 2024-02-02 ENCOUNTER — PATIENT OUTREACH (OUTPATIENT)
Dept: CASE MANAGEMENT | Age: 89
End: 2024-02-02

## 2024-02-02 NOTE — PROGRESS NOTES
1st attempt ER f/up apt request    Annie Newton  PCP  [address]  [address]  [facility] [address]  [phone]  Apt: Feb 14 @12pm     Confirmed w/ pt  Closing encounter

## 2024-02-05 ENCOUNTER — TELEPHONE (OUTPATIENT)
Dept: INTERNAL MEDICINE CLINIC | Facility: CLINIC | Age: 89
End: 2024-02-05

## 2024-02-05 NOTE — TELEPHONE ENCOUNTER
Pt fell a couple days ago and she felt no pian then but her hand and back are hurting today. No one was there when she fell.

## 2024-02-05 NOTE — TELEPHONE ENCOUNTER
Patient states she fell asleep in her chair and fell out of the chair  but did not bother her at that time until after her ER visit on 2/1/24 for chest pain.  Pt did not mention to the ER MD that she had fallen and not sure if she had chest pain or not.  Pt states the testing at the ER did not show any abnormality or cardiac involvement .  Pt states she has mid right sided back pain mostly on and off since the fall as well as her right hand is swollen, soreness in the wrist.  Appt scheduled for 2/7/24 with SD at 2:30pm.  Pt offered earlier appt but has to use [employer] for transportation which needs 24 hour notice.  Pt verbalizes understanding.  RODRIGUEZI to SD.

## 2024-02-05 NOTE — TELEPHONE ENCOUNTER
Patient states she is not taking Jardiance due to the side effects which can cause severe uti's and yeast infections.  Pt has not been taking her BS but will start. Appt with SD on 2/7/24 at 2:30pm. And to discuss Neurologist.  Pt verbalizes understanding.

## 2024-02-07 ENCOUNTER — OFFICE VISIT (OUTPATIENT)
Dept: INTERNAL MEDICINE CLINIC | Facility: CLINIC | Age: 89
End: 2024-02-07
Payer: MEDICARE

## 2024-02-07 ENCOUNTER — HOSPITAL ENCOUNTER (OUTPATIENT)
Dept: GENERAL RADIOLOGY | Age: 89
Discharge: HOME | End: 2024-02-07
Attending: NURSE PRACTITIONER
Payer: MEDICARE

## 2024-02-07 VITALS
TEMPERATURE: 97 F | BODY MASS INDEX: 28.13 KG/M2 | OXYGEN SATURATION: 98 % | DIASTOLIC BLOOD PRESSURE: 68 MMHG | SYSTOLIC BLOOD PRESSURE: 120 MMHG | HEIGHT: 61 IN | RESPIRATION RATE: 20 BRPM | HEART RATE: 67 BPM | WEIGHT: 149 LBS

## 2024-02-07 DIAGNOSIS — M79.641 RIGHT HAND PAIN: ICD-10-CM

## 2024-02-07 DIAGNOSIS — I10 PRIMARY HYPERTENSION: ICD-10-CM

## 2024-02-07 DIAGNOSIS — M51.36 DDD (DEGENERATIVE DISC DISEASE), LUMBAR: ICD-10-CM

## 2024-02-07 DIAGNOSIS — M19.042 ARTHRITIS OF BOTH HANDS: ICD-10-CM
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
## 2024-02-07 DIAGNOSIS — R21 RASH: ICD-10-CM

## 2024-02-07 DIAGNOSIS — M25.531 RIGHT WRIST PAIN: ICD-10-CM

## 2024-02-07 DIAGNOSIS — M19.041 ARTHRITIS OF BOTH HANDS: ICD-10-CM
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
## 2024-02-07 PROCEDURE — 73130 X-RAY EXAM OF HAND: CPT | Performed by: NURSE PRACTITIONER

## 2024-02-07 PROCEDURE — 73110 X-RAY EXAM OF WRIST: CPT | Performed by: NURSE PRACTITIONER

## 2024-02-07 PROCEDURE — 99214 OFFICE O/P EST MOD 30 MIN: CPT | Performed by: NURSE PRACTITIONER

## 2024-02-07 RX ORDER — CLOTRIMAZOLE AND BETAMETHASONE DIPROPIONATE 10; .64 MG/G; MG/G
1 CREAM TOPICAL 2 TIMES DAILY PRN
Qty: 30 G | Refills: 1 | Status: SHIPPED | OUTPATIENT
Start: 2024-02-07

## 2024-02-07 NOTE — PROGRESS NOTES
Paulina Man is a 89 year old female.    Chief Complaint   Patient presents with    Fall     fell 5 days ago, now mid back right sided soreness and c/o right hand pain and swollen wrist and discomfort.        HPI:   here for evaluation post fall.   She fell asleep in her chair at her desk and fell out of the chair.  About 1 week ago.  She landed on her right side. She got up and did not hit her head.  No LOC.  She felt fine for a few days and then reports she woke with onset of neck pain that radiated down her left arm.   Increase pain right chest and arm.    No head trauma.    No back pain.  She continues to complain of right wrist pain.  Mild swelling.      Patient Active Problem List   Diagnosis    Diabetic polyneuropathy associated with type 2 diabetes mellitus (HCC)    Gastroesophageal reflux disease    Primary hypertension    RLS (restless legs syndrome)    Pure hypercholesterolemia    Exudative age-related macular degeneration, bilateral, with active choroidal neovascularization (HCC)    DCIS (ductal carcinoma in situ) of breast    DCIS (ductal carcinoma in situ) of breast    Atypical lobular hyperplasia of right breast    Arthritis of both hands    Bilateral carpal tunnel syndrome    Chronic obstructive pulmonary disease (HCC)    Splenic artery aneurysm (HCC)    Back pain with radiation    DDD (degenerative disc disease), lumbar    Chronic vulvitis    Atrophic vaginitis    Recurrent UTI    Unsteady gait    Diabetes mellitus type 2 in obese  (HCC)    BPPV (benign paroxysmal positional vertigo), unspecified laterality    B12 deficiency    Dextroscoliosis    Cervical spondylosis without myelopathy    Spondylosis of lumbar region without myelopathy or radiculopathy    Dyspnea    COPD exacerbation (HCC)    Dyspnea, unspecified type    Shortness of breath    Palpitations     Current Outpatient Medications   Medication Sig Dispense Refill    clotrimazole-betamethasone 1-0.05 % External Cream Apply 1 Application  topically 2 (two) times daily as needed. 30 g 1    ESOMEPRAZOLE MAGNESIUM 40 MG Oral Capsule Delayed Release TAKE ONE CAPSULE BY MOUTH TWICE DAILY BEFORE MEALS 180 capsule 0    glipiZIDE ER 2.5 MG Oral Tablet 24 Hr Take 1 tablet (2.5 mg total) by mouth daily with breakfast. 30 tablet 0    losartan 100 MG Oral Tab Take 1 tablet (100 mg total) by mouth daily. 90 tablet 0    pramipexole (MIRAPEX) 0.25 MG Oral Tab Take 1 tablet (0.25 mg total) by mouth nightly. 90 tablet 3    mometasone 0.1 % External Ointment apply a thin layer daily to vulva for 2 weeks then twice weekly 45 g 0    estradiol 0.1 MG/GM Vaginal Cream Apply 0.5 grams (pea-sized amount) of cream inside the vagina nightly. May use small amount at the vaginal opening as well in a thin layer. 42.5 g 0    ondansetron (ZOFRAN ODT) 8 MG Oral Tablet Dispersible Take 1 tablet (8 mg total) by mouth every 8 (eight) hours as needed for Nausea. 30 tablet 0    cyclobenzaprine 5 MG Oral Tab Take 1 tablet (5 mg total) by mouth nightly as needed for Muscle spasms. 30 tablet 0    meclizine 12.5 MG Oral Tab Take 1 tablet (12.5 mg total) by mouth 3 (three) times daily as needed. 30 tablet 0    furosemide 20 MG Oral Tab Take 1 tablet (20 mg total) by mouth daily.      albuterol (VENTOLIN HFA) 108 (90 Base) MCG/ACT Inhalation Aero Soln Inhale 2 puffs into the lungs every 4 (four) hours as needed for Wheezing. 1 each 1    fluticasone propionate 50 MCG/ACT Nasal Suspension 2 sprays by Nasal route daily.      acetaminophen 500 MG Oral Tab Take 1 tablet (500 mg total) by mouth every 6 (six) hours as needed for Pain.      docusate sodium 100 MG Oral Cap Take 1 capsule (100 mg total) by mouth 2 (two) times daily as needed for constipation.      CAPSAICIN APR EX Apply topically as needed.      Loperamide HCl 2 MG Oral Cap Take 1 capsule (2 mg total) by mouth as needed for Diarrhea.      Multiple Vitamins-Minerals (PRESERVISION AREDS) Oral Tab Take 1 tablet by mouth 2 (two) times  daily.        Past Medical History:   Diagnosis Date    Abdominal distention     Abdominal hernia     Abdominal pain     Acute diverticulitis 10/10/2020    Noted on CT scan - acute diverticulitis of the sigmoid colon    Arrhythmia     Arthritis     Atypical lobular hyperplasia of right breast 06/30/2016    Back pain     Back problem     lower lumbar bad discs     Mckeon's esophagus without dysplasia 11/28/2018    CHF (congestive heart failure) ([facility])     Chronic cough     Chronic diarrhea 12/04/2015    Started before metformin- related to IBS      Constipation     COPD (chronic obstructive pulmonary disease) ([facility])     NO OXYGEN- no pulmonologist    Deviated septum     left side    Diabetic polyneuropathy associated with type 2 diabetes mellitus ([facility]) 12/04/2015    Diarrhea, unspecified     Disorder of thyroid     has Thyroid nodules    Diverticulosis of large intestine     Dizziness     Ductal carcinoma in situ (DCIS) of right breast     Esophageal reflux     Essential hypertension     Exudative macular degeneration ([facility]) 04/01/2016    Right Eye     Fatigue     Flatulence/gas pain/belching     Frequent urination     Frequent UTI     Full dentures     Gastroesophageal reflux disease 12/04/2015    GERD (gastroesophageal reflux disease)     Hearing impairment     bilateral hearing aids    Hearing loss     Heart palpitations     Hemorrhoids     High blood pressure     High cholesterol     History of cardiac murmur     History of tobacco use     Hoarseness, chronic     Hyperlipidemia     not taking lipitor due to side effects    Incontinence     uses pads    Indigestion     Irregular bowel habits     Itch of skin     Leaking of urine     Leaky heart valve     slight per pt- had echo 12/2015    Loss of appetite     Macular degeneration     Macular degeneration     Menopause     Nausea     Neuropathy     Nodule of left lung 01/07/2016    Obesity (BMI 30-39.9)     Osteoarthritis     all joints    Osteoporosis     Other  fatigue 10/10/2018    Pain in joints     Pap smear for cervical cancer screening 2006    Per patient had pap in . No abn pap.     Pneumonia, organism unspecified(486)     2015    PONV (postoperative nausea and vomiting)     Problems with swallowing     due hiatal hernia; gaggs at times    Restless leg syndrome     Restless leg syndrome     RLS (restless legs syndrome) 2015    Screening breast examination 2021    Dr. 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   Tobacco Use    Smoking status: Former     Packs/day: 1.00     Years: 35.00     Additional pack years: 0.00     Total pack years: 35.00     Types: Cigarettes     Quit date: 1988     Years since quittin.4    Smokeless tobacco: Former   Vaping Use    Vaping Use: Never used   Substance and Sexual Activity    Alcohol use: No    Drug use: No    Sexual activity: Not Currently   Other Topics Concern    Caffeine Concern Yes     Comment: coffee 2 cups daily    Exercise No     Comment: walking     Social Determinants of Health     Financial Resource Strain: Low Risk  (2023)    Financial Resource Strain     Difficulty of Paying Living Expenses: Not hard at all     Med Affordability: No   Food Insecurity: No Food Insecurity (2023)    Food Insecurity     Food Insecurity: Never true   Transportation Needs: No Transportation Needs (2023)    Transportation Needs     Lack of Transportation: No   Physical Activity:  Insufficiently Active (2/27/2023)    Exercise Vital Sign     Days of Exercise per Week: 1 day     Minutes of Exercise per Session: 20 min   Stress: No Stress Concern Present (2/27/2023)    Stress     Feeling of Stress : No   Social Connections: Socially Integrated (2/27/2023)    Social Connections     Frequency of Socialization with Friends and Family: 2   Housing Stability: Low Risk  (12/27/2023)    Housing Stability     Housing Instability: No     Housing Instability Emergency: No     Family History   Problem Relation Age of Onset    Breast Cancer Paternal Aunt     Heart Disorder Mother     Hypertension Mother     Colon Polyps Mother     Heart Disorder Father     Hypertension Father     Lipids Father     Heart Disorder Paternal Grandfather     Diabetes Paternal Grandmother     Breast Cancer Self 86        DCIS        Allergies  Allergies   Allergen Reactions    Ciprofloxacin NAUSEA AND VOMITING and HIVES    Peanut-Containing Drug Products UNKNOWN and OTHER (SEE COMMENTS)     Bladder infection    Pollen ITCHING and HIVES    Sulfa Antibiotics NAUSEA AND VOMITING    Adhesive Tape ITCHING    Dander RASH     fur    Other ITCHING     feather       REVIEW OF SYSTEMS:   GENERAL HEALTH: as above   RESPIRATORY: baseline   CARDIOVASCULAR: denies chest pain on exertion, no palpatations  GI: denies abdominal pain and denies heartburn, no diarrhea or constipation  MUSCULOSKELETAL:  as above      EXAM:   /68   Pulse 67   Temp 97.3 °F (36.3 °C) (Temporal)   Resp 20   Ht 5' 1\" (1.549 m)   Wt 149 lb (67.6 kg)   LMP  (LMP Unknown)   SpO2 98%   BMI 28.15 kg/m²   GENERAL: well developed, well nourished,in no apparent distress  SKIN: no rashes,no suspicious lesions   she does have a rash under her breast which I noted on exam from her fall.      LUNGS: normal rate without respiratory distress, lungs clear to auscultation  CARDIO: RRR GI: normal bowel sounds, no masses, HSM or tenderness  EXTREMITIES: no edema, normal  strength and tone  MS  no point tenderness on her spine.  She was able to ambulate with assist onto the table   pain with palpaiton to right wrist.  Good pulses.   PSYCH: alert and oriented x 3    ASSESSMENT AND PLAN:     Encounter Diagnoses   Name Primary?    Fall, initial encounter Yes    Right hand pain check xray.     Right wrist pain     Rash  lotrsone to under breast area.       Primary hypertension  stable       DDD (degenerative disc disease), lumbar no point tenderness on spine no pain with movement.       Arthritis of both hands        No orders of the defined types were placed in this encounter.      Meds & Refills for this Visit:  Requested Prescriptions     Signed Prescriptions Disp Refills    clotrimazole-betamethasone 1-0.05 % External Cream 30 g 1     Sig: Apply 1 Application topically 2 (two) times daily as needed.       Imaging & Consults:  None    No follow-ups on file.  There are no Patient Instructions on file for this visit.

## 2024-02-09 ENCOUNTER — PATIENT OUTREACH (OUTPATIENT)
Dept: CASE MANAGEMENT | Age: 89
End: 2024-02-09

## 2024-02-12 ENCOUNTER — OFFICE VISIT (OUTPATIENT)
Facility: CLINIC | Age: 89
End: 2024-02-12
Payer: MEDICARE

## 2024-02-12 ENCOUNTER — TELEPHONE (OUTPATIENT)
Facility: CLINIC | Age: 89
End: 2024-02-12

## 2024-02-12 VITALS
WEIGHT: 150 LBS | DIASTOLIC BLOOD PRESSURE: 68 MMHG | BODY MASS INDEX: 28.32 KG/M2 | SYSTOLIC BLOOD PRESSURE: 120 MMHG | HEIGHT: 61 IN | HEART RATE: 96 BPM

## 2024-02-12 DIAGNOSIS — Z12.39 SCREENING BREAST EXAMINATION: ICD-10-CM

## 2024-02-12 DIAGNOSIS — Z01.411 ENCOUNTER FOR WELL WOMAN EXAM WITH ABNORMAL FINDINGS: Primary | ICD-10-CM

## 2024-02-12 DIAGNOSIS — N95.2 ATROPHIC VAGINITIS: ICD-10-CM

## 2024-02-12 DIAGNOSIS — R32 URINARY INCONTINENCE, UNSPECIFIED TYPE: ICD-10-CM

## 2024-02-12 DIAGNOSIS — N39.0 RECURRENT UTI: ICD-10-CM

## 2024-02-12 RX ORDER — ESTRADIOL 0.1 MG/G
CREAM VAGINAL
Qty: 42.5 G | Refills: 3 | Status: SHIPPED | OUTPATIENT
Start: 2024-02-12

## 2024-02-12 NOTE — PATIENT INSTRUCTIONS
Magnesium glycinate 500 mg nightly for restless legs     Fiber supplement - psyllium husk, metamucil     Assistive device for wiping bottom (to give her extended reach)

## 2024-02-12 NOTE — TELEPHONE ENCOUNTER
Pt requesting all of her medical records from 2015- present to be sent to her house. PETER filled out and sent to medical records/confirmation received, sent to scanning

## 2024-02-12 NOTE — H&P
[facility]  Obstetrics and Gynecology   H&P  Established     Chief complaint:   Chief Complaint   Patient presents with    Wellness Visit     No complaints     Subjective:     HPI: Paulina Man is a 89 year old  with No LMP recorded (lmp unknown). (Menstrual status: Menopause).   Here for WWE.     PMHx HFpEF , DM2, HTN, COPD, DLP, hiatal hernia, venous sufficiency, BLE edema     H/o chronic vulvitis likely secondary to irritants and atrophic vulvovaginitis - had been noncompliant with management. Hard of hearing. Wearing hearing aide today. Postmenopaual, diabetic (A1c 6.8% on 10/20/23), h/o recurrent UTI. Keeps going to Urgent Cares for her bladder. Did see NP in urology on 23 but has not returned.     Initially she presented on 21 with complaints of chronic vulvar & vaginal itching for months. She was using pramoxine medicated wipes multiple times daily & wearing 1 pad daily for urge incontinence & chronic diarrhea. Not using barrier ointments or creams. Recommended Nystatin ointment BID & Vaseline for barrier during the day. Reviewed only gently patting with wipes.     At multiple follow up visits she continued to report using the medicated wipes - was advised to avoid these on multiple occasions. She was prescribed Mometasone ointment & estrace vaginal cream, barrier for vulva encouraged.     At multiple follow up appointments she was not using the mometasone ointment, barrier ointment, or vaginal estrogen.     22 - Still using medicated wipes. Itching on perineal body. Forgetting to use mometasone ointment. Not remembering to use vaginal estrace cream.     2022 - stopped using medicated wipes. Vulva overall somewhat improved. No longer itching as much. Not consistent with mometasone ointment. Using vaseline or Nystatin ointment. Taking vaginal estrogen cream.Some constipation. More urinary incontinence since starting Lasix recently per cardiology. Reports for years has had  urge to void \"all the time\" but then not feeling like she could empty. Saw urologist long time ago (?name). Admits was told by PCP to see urologist. Has been going to Urgent Care often to be treated for possible UTI.     As of today, 2/12/2024   Reports not having as much vulvar itching    Did eventually see a urology PA on 2/1/23 but she has since gone to urgent cares at least 4 times during 2023 after the urology visit.     Not treating her constipation    Per CareEverywhere:     5/06/22 UCx  50k E.coli pan-susceptible - [facility] [address] [place] [address].     7/1/22 UCx > 100k Klebsiella pneumonia - resistant to ampicillin - [facility] [address] [place] [address].    7/10/22 UCx > 100k Klebsiella pneumonia - resistant to ampicillin - [facility] [address] [place] [address].    08/06/22 UCx >100k E.coli pan-susceptible - [facility] [address] [place] [address].    1/14/23 Walk in [facility]  Pt c/o burning sensation and urinary frequency x 3-4 days. Ucx >100k E.coli     2/1/23 Urology - Alena Thorne NP    3/5/23 Walk in [facility] - dysuria x 2 days. Ucx >100k E.coli     3/25/23 Walk in [facility] - C/o dysuria x few days. Ucx >100k E.coli - resistant to ampicillin, augmentin, unasyn, cefazolin.     7/3/23 [facility]. C/o burning with urination. Ucx 50k Proteus mirabilis      8/25/23 Urgent Care c/o dysuria & frequency x 1 day. Urine dip +nitrite, small leuk est. Ucx >100k multiple organisms no predominant type, consistent with contamination    Urology - Alena Thorne NP   Cardiology - [facility]   Ophthalmology - Pema Webster MD - Exudative age-related macular degeneration, bilateral, with  active choroidal neovascularization     PCP: Annie Newton MD    Review of Systems   Constitutional:  Negative for fever.   HENT:          Hard of hearing. NOT wearing hearing aides today   Gastrointestinal:  Positive for abdominal distention, abdominal pain and constipation. Negative for blood in stool and diarrhea.        +H/o some chronic diarrhea.   +H/o constipation with BM only once every 2-3 days.   +Hemorrhoids.   Gets sharp pain in abdomen prior to BM   Genitourinary:  Positive for difficulty urinating. Negative for dysuria, frequency, hematuria, pelvic pain, vaginal bleeding and vaginal discharge.        Feels that her \"bladder is tipped\" so she may not empty all the way at times. H/o recurrent UTI. No vaginal bleeding. No vaginal discharge.     Saw urologist Dr. Cano in 1/2018. Reported pushing to void. Slight UUI. Incomplete emptying.     8/2021 Per EMR, Dr. Newton advised she see urologist Dr. Cano for dysuria as the urine culture was negative 8/25/21.    Has not seen urologist recently.   Reports multiple urgent care visits for UTIs in 2022.     As of 10/2022 feels she is having sudden gushes of large amounts of urine.   Some odor    As of 2/12/24 - still leaking urine. Externally not itching any more. Vaginal estrogen. No bleeding. No pain with urination. Occasionally will have pain with bladder infection. Can't remember if saw urologist since she saw me.      Musculoskeletal:  Positive for back pain and gait problem.        Using walker to help with walking due to poor balance, +back pain.     Had fall recently. Was sleeping at her desk and fell off her chair.    Skin:         Skin lesion right breast - has had for a long time. Says derm looked at it recently & was not concerned about it.    Psychiatric/Behavioral:          Nap daily.   Doesn't sleep at night - restless legs syndrome.       GYN Hx  Menarche 14 x regular x 5-6 days    4/20/2017 GYN Dr. Ledy Milian - referred for genital warts that  are at times itchy. Exam note with some skin tags in left inguinal area, sebaceous cyst of labia. Pigmentation appeared to be normal variant    2019 Exam with Jayne Rebel. C/o vulvar itching. Exam note: External Genitalia:  no lesions, fair perineal support. No sebaceous cysts noted today. Does have multiple small flat pigmented lesions. Benign in appearance.     Pap 2006 per patient report. No abn pap.     H/o DCIS right breast. S/p partial mastectomy right breast 2016 Diagnostic mammogram benign - per breast surgeon Dr. Ayesha Bañuelos   2021 Breast exam done.   22 Mammogram benign   3/27/23 Mammogram benign     Colonoscopy  normal in [place] per patient.  EGD 2018 large hiatal hernia, long segment Mckeon's esophagus. Reactive gastropathy/chemical gastritis  DEXA scan 2016 osteopenia. Has order in system per PCP from 23    OB History:  OB History    Para Term  AB Living   3 3 3 0 0 0   SAB IAB Ectopic Multiple Live Births   0 0 0 0 3     OB History    Para Term  AB Living   3 3 3   0     SAB IAB Ectopic Multiple Live Births   0       3      # Outcome Date GA Lbr Charles/2nd Weight Sex Delivery Anes PTL Lv   3 Term 1964 [id number]   M NORMAL SPONT      2 Term     M NORMAL SPONT      1 Term     M NORMAL SPONT          Meds:  Current Outpatient Medications on File Prior to Visit   Medication Sig Dispense Refill    clotrimazole-betamethasone 1-0.05 % External Cream Apply 1 Application topically 2 (two) times daily as needed. 30 g 1    ESOMEPRAZOLE MAGNESIUM 40 MG Oral Capsule Delayed Release TAKE ONE CAPSULE BY MOUTH TWICE DAILY BEFORE MEALS 180 capsule 0    glipiZIDE ER 2.5 MG Oral Tablet 24 Hr Take 1 tablet (2.5 mg total) by mouth daily with breakfast. 30 tablet 0    losartan 100 MG Oral Tab Take 1 tablet (100 mg total) by mouth daily. 90 tablet 0    pramipexole (MIRAPEX) 0.25 MG Oral Tab Take 1 tablet (0.25 mg total) by mouth nightly. 90 tablet  3    mometasone 0.1 % External Ointment apply a thin layer daily to vulva for 2 weeks then twice weekly 45 g 0    ondansetron (ZOFRAN ODT) 8 MG Oral Tablet Dispersible Take 1 tablet (8 mg total) by mouth every 8 (eight) hours as needed for Nausea. 30 tablet 0    cyclobenzaprine 5 MG Oral Tab Take 1 tablet (5 mg total) by mouth nightly as needed for Muscle spasms. 30 tablet 0    meclizine 12.5 MG Oral Tab Take 1 tablet (12.5 mg total) by mouth 3 (three) times daily as needed. 30 tablet 0    furosemide 20 MG Oral Tab Take 1 tablet (20 mg total) by mouth daily.      albuterol (VENTOLIN HFA) 108 (90 Base) MCG/ACT Inhalation Aero Soln Inhale 2 puffs into the lungs every 4 (four) hours as needed for Wheezing. 1 each 1    fluticasone propionate 50 MCG/ACT Nasal Suspension 2 sprays by Nasal route daily.      acetaminophen 500 MG Oral Tab Take 1 tablet (500 mg total) by mouth every 6 (six) hours as needed for Pain.      docusate sodium 100 MG Oral Cap Take 1 capsule (100 mg total) by mouth 2 (two) times daily as needed for constipation.      CAPSAICIN APR EX Apply topically as needed.      Loperamide HCl 2 MG Oral Cap Take 1 capsule (2 mg total) by mouth as needed for Diarrhea.      Multiple Vitamins-Minerals (PRESERVISION AREDS) Oral Tab Take 1 tablet by mouth 2 (two) times daily.       No current facility-administered medications on file prior to visit.       All:  Allergies   Allergen Reactions    Ciprofloxacin NAUSEA AND VOMITING and HIVES    Peanut-Containing Drug Products UNKNOWN and OTHER (SEE COMMENTS)     Bladder infection    Pollen ITCHING and HIVES    Sulfa Antibiotics NAUSEA AND VOMITING    Adhesive Tape ITCHING    Dander RASH     fur    Other ITCHING     feather       PMH:  Past Medical History:   Diagnosis Date    Abdominal distention     Abdominal hernia     Abdominal pain     Acute diverticulitis 10/10/2020    Noted on CT scan - acute diverticulitis of the sigmoid colon    Arrhythmia     Arthritis      Atypical lobular hyperplasia of right breast 06/30/2016    Back pain     Back problem     lower lumbar bad discs     Mckeon's esophagus without dysplasia 11/28/2018    CHF (congestive heart failure) ([facility])     Chronic cough     Chronic diarrhea 12/04/2015    Started before metformin- related to IBS      Constipation     COPD (chronic obstructive pulmonary disease) ([facility])     NO OXYGEN- no pulmonologist    Deviated septum     left side    Diabetic polyneuropathy associated with type 2 diabetes mellitus ([facility]) 12/04/2015    Diarrhea, unspecified     Disorder of thyroid     has Thyroid nodules    Diverticulosis of large intestine     Dizziness     Ductal carcinoma in situ (DCIS) of right breast     Esophageal reflux     Essential hypertension     Exudative macular degeneration ([facility]) 04/01/2016    Right Eye     Fatigue     Flatulence/gas pain/belching     Frequent urination     Frequent UTI     Full dentures     Gastroesophageal reflux disease 12/04/2015    GERD (gastroesophageal reflux disease)     Hearing impairment     bilateral hearing aids    Hearing loss     Heart palpitations     Hemorrhoids     High blood pressure     High cholesterol     History of cardiac murmur     History of tobacco use     Hoarseness, chronic     Hyperlipidemia     not taking lipitor due to side effects    Incontinence     uses pads    Indigestion     Irregular bowel habits     Itch of skin     Leaking of urine     Leaky heart valve     slight per pt- had echo 12/2015    Loss of appetite     Macular degeneration     Macular degeneration     Menopause     Nausea     Neuropathy     Nodule of left lung 01/07/2016    Obesity (BMI 30-39.9)     Osteoarthritis     all joints    Osteoporosis     Other fatigue 10/10/2018    Pain in joints     Pap smear for cervical cancer screening 2006    Per patient had pap in 2006. No abn pap.     Pneumonia, organism unspecified(486)     04/2015    PONV (postoperative nausea and vomiting)     Problems with  swallowing     due hiatal hernia; gaggs at times    Restless leg syndrome     Restless leg syndrome     RLS (restless legs syndrome) 2015    Screening breast examination 2021    Dr. Layla Pretty    Screening mammogram for breast cancer 2022    Benign     Shortness of breath     ON EXERTION    Sleep apnea     Sleep disturbance     Splenic artery aneurysm (HCC) 2021    Torn rotator cuff     both shoulders per pt    Uncomfortable fullness after meals     Vertigo     Visual impairment     glasses    Vitamin B12 deficiency     Vulvar itching 2019    Wears glasses     Weight loss        PSH:  Past Surgical History:   Procedure Laterality Date    ANGIOGRAM      no intervention    APPENDECTOMY      APPENDECTOMY      CHOLECYSTECTOMY      COLONOSCOPY      EGD  2022    Reactive gastropathy    NIYAH LOCALIZATION WIRE 1 SITE LEFT (CPT=19281)          NIYAH LOCALIZATION WIRE 1 SITE RIGHT (CPT=19281)          NIYAH LOCALIZATION WIRE 1 SITE RIGHT (CPT=19281) Right 2016    DCIS    MASTECTOMY PARTIAL Right 2016    wire localized partial mastectomy    NEEDLE BIOPSY RIGHT      diag DCIS- 16    OTHER SURGICAL HISTORY      L and R breast cyst removal       Social History:  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   Tobacco Use    Smoking status: Former     Packs/day: 1.00     Years: 35.00     Additional pack years: 0.00     Total pack years: 35.00     Types: Cigarettes     Quit date: 1988     Years since quittin.4    Smokeless tobacco: Former   Vaping Use    Vaping Use: Never used   Substance and Sexual Activity    Alcohol use: No    Drug use: No    Sexual activity: Not Currently   Other Topics Concern    Caffeine Concern Yes     Comment: coffee 2 cups daily    Exercise No     Comment: walking     Social Determinants of Health     Financial Resource Strain: Low Risk  (2023)     Financial Resource Strain     Difficulty of Paying Living Expenses: Not hard at all     Med Affordability: No   Food Insecurity: No Food Insecurity (12/27/2023)    Food Insecurity     Food Insecurity: Never true   Transportation Needs: No Transportation Needs (12/27/2023)    Transportation Needs     Lack of Transportation: No   Physical Activity: Insufficiently Active (2/27/2023)    Exercise Vital Sign     Days of Exercise per Week: 1 day     Minutes of Exercise per Session: 20 min   Stress: No Stress Concern Present (2/27/2023)    Stress     Feeling of Stress : No   Social Connections: Socially Integrated (2/27/2023)    Social Connections     Frequency of Socialization with Friends and Family: 2   Housing Stability: Low Risk  (12/27/2023)    Housing Stability     Housing Instability: No     Housing Instability Emergency: No       Family History:  Family History   Problem Relation Age of Onset    Breast Cancer Paternal Aunt     Heart Disorder Mother     Hypertension Mother     Colon Polyps Mother     Heart Disorder Father     Hypertension Father     Lipids Father     Heart Disorder Paternal Grandfather     Diabetes Paternal Grandmother     Breast Cancer Self 86        DCIS       Objective:     Vitals:    02/12/24 1450   BP: 120/68   Pulse: 96   Weight: 150 lb (68 kg)   Height: 61\"       Body mass index is 28.34 kg/m².    Physical Exam:  Physical Exam   Nursing note and vitals reviewed.   Constitutional: She appears well-developed and well-nourished. No distress.   HENT:   Head: Normocephalic and atraumatic.   Hard of hearing. Struggling even with hearing aide   Eyes: Conjunctivae and EOM are normal.   +Glasses   Cardiovascular:             BLE 2+ edema at ankles.  Edema present.  Abdominal: Soft. She exhibits distension. She exhibits no mass. There is no abdominal tenderness. There is no rebound and no guarding. No hernia.   Mild diffuse distention. No masses noted   Genitourinary:    Genitourinary Comments:  VULVA:   Atrophic. Scattered brown macules bilateral labia majora. No erythema or masceration today.     URETHRA: atrophic, +large (about 1 cm) caruncle almost skin colored (used to be gabo colored)     PERINEUM: intact.  VAGINA: cystocele noted. Atrophic mucosa. Scant discharge. +Hard stool palpable through posterior vaginal wall  CERVIX: atrophic  UTERUS: small, NT  ADNEXA: no adnexal masses appreciated.   PELVIC FLOOR: deferred       Neurological: She is alert.   Hard of hearing   Skin:   See  exam   Psychiatric: She has a normal mood and affect. Her speech is normal and behavior is normal. Judgment and thought content normal.       Labs:  Lab Results   Component Value Date    WBC 8.8 02/01/2024    RBC 4.54 02/01/2024    HGB 13.2 02/01/2024    HCT 40.1 02/01/2024    MCV 88.3 02/01/2024    MCH 29.1 02/01/2024    MCHC 32.9 02/01/2024    RDW 12.9 02/01/2024    .0 02/01/2024        Lab Results   Component Value Date     (H) 02/01/2024    BUN 9 02/01/2024    BUNCREA 13.8 10/14/2022    CREATSERUM 0.87 02/01/2024    ANIONGAP 2 02/01/2024    GFR 64 12/01/2017    GFRNAA 59 (L) 07/02/2022    GFRAA 68 07/02/2022    CA 9.0 02/01/2024    OSMOCALC 290 02/01/2024    ALKPHO 76 02/01/2024    AST 11 (L) 02/01/2024    ALT 15 02/01/2024    BILT 0.8 02/01/2024    TP 7.1 02/01/2024    ALB 3.5 02/01/2024    GLOBULIN 3.6 02/01/2024     02/01/2024    K 3.3 (L) 02/01/2024     02/01/2024    CO2 27.0 02/01/2024       Lab Results   Component Value Date    CHOLEST 114 05/04/2023    TRIG 149 05/04/2023    HDL 45 05/04/2023    LDL 44 05/04/2023    VLDL 21 05/04/2023    TCHDLRATIO 4.67 (H) 07/17/2018    NONHDLC 69 05/04/2023        Lab Results   Component Value Date    T4F 1.1 07/08/2020    TSH 1.040 12/26/2023        Lab Results   Component Value Date     (H) 10/20/2023    A1C 6.8 (H) 10/20/2023       Imaging:  XR WRIST COMPLETE (MIN 3 VIEWS), RIGHT (CPT=73110)    Result Date: 2/7/2024  CONCLUSION:    Generalized osteopenia without appreciable fracture or traumatic malalignment.  Radiocarpal and intercarpal joint spaces are generally preserved with advanced osteoarthritis of the 1st CMC joint.  Mild soft tissue swelling noted diffusely about the wrist.  Moderate regional vascular calcification.   LOCATION:  [facility]   Dictated by (CST): Nola Chang MD on 2/07/2024 at 4:26 PM     Finalized by (CST): Nola Chang MD on 2/07/2024 at 4:27 PM       XR HAND (MIN 3 VIEWS), RIGHT (CPT=73130)    Result Date: 2/7/2024  CONCLUSION:   No appreciable fracture or traumatic malalignment.  Advanced arthropathy of the 1st CMC, 1st MCP, and scattered interphalangeal joints.  This is characterized by moderate joint space loss and overhanging marginal spurs, potentially a manifestation of erosive osteoarthritis.  Mild periarticular soft tissue swelling noted about the digits.   LOCATION:  [facility]   Dictated by (CST): Nola Chang MD on 2/07/2024 at 4:25 PM     Finalized by (CST): Nola Chang MD on 2/07/2024 at 4:26 PM       XR CHEST AP PORTABLE  (CPT=71045)    Result Date: 2/1/2024  CONCLUSION:  Accounting for rotation in technique the chest appears overall stable.  If clinical symptoms persist then consider follow-up imaging.   LOCATION:  [id number]      Dictated by (CST): Brennan Pastor MD on 2/01/2024 at 2:37 PM     Finalized by (CST): Brennan Pastor MD on 2/01/2024 at 2:38 PM       XR CHEST PA + LAT CHEST (CPT=71046)    Result Date: 12/26/2023  CONCLUSION:    Cardiomegaly.  No sign of acute CHF.  No pneumothorax.  Blunting of the costophrenic angles bilateral somewhat greater on the right side most likely reflecting pleural effusion.  Trace atelectasis right lung base.  Hiatal hernia redemonstrated.  Scoliosis and degenerative changes the spine.  LOCATION:  [facility]   Dictated by (CST): Alexis Oneil MD on 12/26/2023 at 6:16 PM     Finalized by (CST): Alexis Oneil MD on 12/26/2023 at 6:18 PM       US VENOUS DOPPLER LEG BILAT - DIAG IMG  (YQJ=[id number])    Result Date: 2023  CONCLUSION:  No evidence of DVT in either leg.   LOCATION:  [facility]   Dictated by (CST): Luis Benites MD on 2023 at 6:10 PM     Finalized by (CST): Luis Benites MD on 2023 at 6:11 PM          Assessment:     Paulina Man is a 89 year old  diabetic female - chronic vulvitis issues, likely secondary to chemical irritation from urine & diarrhea, excessive wiping/cleaning of vulvar skin. Relatively poor compliance with medications though improved since stopping medicated wipes.     She feels her vulva is now less itchy since starting vaginal estrogen cream. Mainly seems to be struggling with bladder & bowel function, which are likely the root cause of her vulvar issues.     She was referred to urology by PCP in 2021 and I recommended the same as she continued to report many urgent care visits for \"UTI.\" She saw a urology NP in 2023 but has since gone to urgent care at least 4 times since that visit in 2023. Does not appear to remember that she saw urology and keeps going to urgent care, which is not appropriate given her age. She probably needs additional work up.     Diagnoses and all orders for this visit:    Encounter for well woman exam with abnormal findings    Screening breast examination    Urinary incontinence, unspecified type  -     estradiol 0.1 MG/GM Vaginal Cream; Apply 0.5 grams (pea-sized amount) of cream inside the vagina nightly. May use small amount at the vaginal opening as well in a thin layer.    Recurrent UTI  -     estradiol 0.1 MG/GM Vaginal Cream; Apply 0.5 grams (pea-sized amount) of cream inside the vagina nightly. May use small amount at the vaginal opening as well in a thin layer.    Atrophic vaginitis  -     estradiol 0.1 MG/GM Vaginal Cream; Apply 0.5 grams (pea-sized amount) of cream inside the vagina nightly. May use small amount at the vaginal opening as well in a thin layer.           Plan:     Chronic vulvitis    -likely irritant related due to urine & stool  -stopped medicated wipes   -generally poor compliance with medications though seems to be doing somewhat better since more consistent with vaginal estrogen cream   -Mometasone ointment nightly to vulva was recommended - she is not remembering to do this   -Vaseline ointment (or Nystatin ointment) - apply to external skin around the vagina (the vulva) every morning and after every bowel movement.   -Reviewed the importance of getting urinary incontinence under control. She seems uninterested    Urinary incontinence & recurrent UTI  -many urgent care/walk in clinics for \"dysuria/UTI\"   -11/17/22 Ucx negative   -strongly advised she see a urologist. Order for Dr. Morelos placed - she saw NP 2/2023   -has since gone to urgent care multiple times for UTI  -message sent to PCP regarding patient's inappropriate use of urgent care for her bladder issues. Should return to urologist.     Bowel issues   -admits to eating chocolate that causes diarrhea & popcorn though she was told not to do this due to diverticulitis history   -alternating constipation & diarrhea per patient - 11/17/2022   -2/12/2024 c/o constipation. Not taking anything. Psyllium encouraged. Could also consider magnesium.     Atrophic vulvovaginitis/urethral caruncle  -estrace vaginal cream 0.5 g (pea sized amount) intravaginally nightly & can use small amount around vaginal opening   -2/12/2024 reports improvement in vulvovaginal itching, urethral caruncle more skin colored. Continue vaginal estrogen cream     Pap - deferred. No h/o abnormal. Last in 2006 per patient.   Breast exam benign 2/12/2024   3/27/23 Mammogram benign   Colonoscopy 2006 normal in [place] per patient.  EGD 12/12/2018 large hiatal hernia, long segment Mckeon's esophagus. Reactive gastropathy/chemical gastritis  DEXA scan 4/27/2016 osteopenia. Has order in system per PCP from 4/7/23    RTC as needed.     Layla Pretty MD  [facility] - OBGYN

## 2024-02-13 ENCOUNTER — OFFICE VISIT (OUTPATIENT)
Dept: NEUROLOGY | Facility: CLINIC | Age: 89
End: 2024-02-13
Payer: MEDICARE

## 2024-02-13 VITALS
SYSTOLIC BLOOD PRESSURE: 132 MMHG | RESPIRATION RATE: 16 BRPM | HEART RATE: 56 BPM | HEIGHT: 61 IN | WEIGHT: 150 LBS | DIASTOLIC BLOOD PRESSURE: 63 MMHG | BODY MASS INDEX: 28.32 KG/M2

## 2024-02-13 DIAGNOSIS — E11.49 TYPE 2 DIABETES MELLITUS WITH OTHER DIABETIC NEUROLOGICAL COMPLICATION (HCC): ICD-10-CM

## 2024-02-13 DIAGNOSIS — E11.42 DIABETIC POLYNEUROPATHY ASSOCIATED WITH TYPE 2 DIABETES MELLITUS (HCC): ICD-10-CM

## 2024-02-13 DIAGNOSIS — G25.81 RLS (RESTLESS LEGS SYNDROME): Primary | ICD-10-CM

## 2024-02-13 DIAGNOSIS — E13.40 OTHER SPECIFIED DIABETES MELLITUS WITH DIABETIC NEUROPATHY, UNSPECIFIED (HCC): ICD-10-CM

## 2024-02-13 RX ORDER — PRAMIPEXOLE DIHYDROCHLORIDE 0.12 MG/1
TABLET ORAL
Qty: 60 TABLET | Refills: 2 | Status: SHIPPED | OUTPATIENT
Start: 2024-02-13

## 2024-02-13 NOTE — PROGRESS NOTES
[facility] Progress Note    HPI  Chief Complaint   Patient presents with    Neurologic Problem     B/L Restless Leg Syndrome    Neuropathy     C/O of neuropathy on legs and feet         Patient has history of RLS and neuropathy and has previously been following with my neurology associate, Dr SHIRA Ahmadi. She has reportedly not been able to be on gabapentin; she has history of COPD with shortness of breath when she is up and active and this has been more recent.      In terms of leg symptoms, she has been having this for \"years\" and has some restless sensation and movement in both legs mainly at night but also during the day; she has numbness in feet as well.      She has some numbness/tingling in hands as well; symptoms typically occur when she is sitting, lying down starting around 7 PM, but then improve until the evening time when she takes her dose of Mirapex 0.25 mg nightly and lies down; she may sleep through the night       She has not been on iron supplement but thinks she may have been on this in the past.     Past Medical History:   Diagnosis Date    Abdominal distention     Abdominal hernia     Abdominal pain     Acute diverticulitis 10/10/2020    Noted on CT scan - acute diverticulitis of the sigmoid colon    Arrhythmia     Arthritis     Atypical lobular hyperplasia of right breast 06/30/2016    Back pain     Back problem     lower lumbar bad discs     Mckeon's esophagus without dysplasia 11/28/2018    CHF (congestive heart failure) (Formerly [facility])     Chronic cough     Chronic diarrhea 12/04/2015    Started before metformin- related to IBS      Constipation     COPD (chronic obstructive pulmonary disease) (Formerly [facility])     NO OXYGEN- no pulmonologist    Deviated septum     left side    Diabetic polyneuropathy associated with type 2 diabetes mellitus (Formerly [facility]) 12/04/2015    Diarrhea, unspecified     Disorder of thyroid     has Thyroid nodules    Diverticulosis of large intestine     Dizziness     Ductal  carcinoma in situ (DCIS) of right breast     Esophageal reflux     Essential hypertension     Exudative macular degeneration (HCC) 04/01/2016    Right Eye     Fatigue     Flatulence/gas pain/belching     Frequent urination     Frequent UTI     Full dentures     Gastroesophageal reflux disease 12/04/2015    GERD (gastroesophageal reflux disease)     Hearing impairment     bilateral hearing aids    Hearing loss     Heart palpitations     Hemorrhoids     High blood pressure     High cholesterol     History of cardiac murmur     History of tobacco use     Hoarseness, chronic     Hyperlipidemia     not taking lipitor due to side effects    Incontinence     uses pads    Indigestion     Irregular bowel habits     Itch of skin     Leaking of urine     Leaky heart valve     slight per pt- had echo 12/2015    Loss of appetite     Macular degeneration     Macular degeneration     Menopause     Nausea     Neuropathy     Nodule of left lung 01/07/2016    Obesity (BMI 30-39.9)     Osteoarthritis     all joints    Osteoporosis     Other fatigue 10/10/2018    Pain in joints     Pap smear for cervical cancer screening 2006    Per patient had pap in 2006. No abn pap.     Pneumonia, organism unspecified(486)     04/2015    PONV (postoperative nausea and vomiting)     Problems with swallowing     due hiatal hernia; gaggs at times    Restless leg syndrome     Restless leg syndrome     RLS (restless legs syndrome) 12/04/2015    Screening breast examination 11/01/2021    Dr. Layla Pretty    Screening mammogram for breast cancer 02/06/2022    Benign     Shortness of breath     ON EXERTION    Sleep apnea     Sleep disturbance     Splenic artery aneurysm (HCC) 03/23/2021    Torn rotator cuff     both shoulders per pt    Uncomfortable fullness after meals     Vertigo     Visual impairment     glasses    Vitamin B12 deficiency     Vulvar itching 08/13/2019    Wears glasses     Weight loss      Past Surgical History:   Procedure Laterality  Date    ANGIOGRAM      no intervention    APPENDECTOMY      APPENDECTOMY      CHOLECYSTECTOMY      COLONOSCOPY      EGD  2022    Reactive gastropathy    NIYAH LOCALIZATION WIRE 1 SITE LEFT (CPT=19281)          NIYAH LOCALIZATION WIRE 1 SITE RIGHT (CPT=19281)          NIYAH LOCALIZATION WIRE 1 SITE RIGHT (CPT=19281) Right 2016    DCIS    MASTECTOMY PARTIAL Right 2016    wire localized partial mastectomy    NEEDLE BIOPSY RIGHT      diag DCIS- 16    OTHER SURGICAL HISTORY      L and R breast cyst removal     Family History   Problem Relation Age of Onset    Breast Cancer Paternal Aunt     Heart Disorder Mother     Hypertension Mother     Colon Polyps Mother     Heart Disorder Father     Hypertension Father     Lipids Father     Heart Disorder Paternal Grandfather     Diabetes Paternal Grandmother     Breast Cancer Self 86        DCIS     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   Tobacco Use    Smoking status: Former     Packs/day: 1.00     Years: 35.00     Additional pack years: 0.00     Total pack years: 35.00     Types: Cigarettes     Quit date: 1988     Years since quittin.4     Passive exposure: Never    Smokeless tobacco: Former   Vaping Use    Vaping Use: Never used   Substance and Sexual Activity    Alcohol use: No    Drug use: No    Sexual activity: Not Currently   Other Topics Concern    Caffeine Concern Yes     Comment: coffee 1- 2 cups daily    Exercise No     Comment: walking       Allergies   Allergen Reactions    Ciprofloxacin NAUSEA AND VOMITING and HIVES    Peanut-Containing Drug Products UNKNOWN and OTHER (SEE COMMENTS)     Bladder infection    Pollen ITCHING and HIVES    Sulfa Antibiotics NAUSEA AND VOMITING    Adhesive Tape ITCHING    Dander RASH     fur    Other ITCHING     feather         Current Outpatient Medications:     pramipexole (MIRAPEX) 0.125 MG Oral Tab, Take  0.125 mg ( 1 tablet) at ~ 7 PM and 0.125 mg (1 tablet) at bedtime, Disp: 60 tablet, Rfl: 2    estradiol 0.1 MG/GM Vaginal Cream, Apply 0.5 grams (pea-sized amount) of cream inside the vagina nightly. May use small amount at the vaginal opening as well in a thin layer., Disp: 42.5 g, Rfl: 3    clotrimazole-betamethasone 1-0.05 % External Cream, Apply 1 Application topically 2 (two) times daily as needed., Disp: 30 g, Rfl: 1    ESOMEPRAZOLE MAGNESIUM 40 MG Oral Capsule Delayed Release, TAKE ONE CAPSULE BY MOUTH TWICE DAILY BEFORE MEALS, Disp: 180 capsule, Rfl: 0    glipiZIDE ER 2.5 MG Oral Tablet 24 Hr, Take 1 tablet (2.5 mg total) by mouth daily with breakfast., Disp: 30 tablet, Rfl: 0    losartan 100 MG Oral Tab, Take 1 tablet (100 mg total) by mouth daily., Disp: 90 tablet, Rfl: 0    mometasone 0.1 % External Ointment, apply a thin layer daily to vulva for 2 weeks then twice weekly, Disp: 45 g, Rfl: 0    ondansetron (ZOFRAN ODT) 8 MG Oral Tablet Dispersible, Take 1 tablet (8 mg total) by mouth every 8 (eight) hours as needed for Nausea., Disp: 30 tablet, Rfl: 0    cyclobenzaprine 5 MG Oral Tab, Take 1 tablet (5 mg total) by mouth nightly as needed for Muscle spasms., Disp: 30 tablet, Rfl: 0    furosemide 20 MG Oral Tab, Take 1 tablet (20 mg total) by mouth daily., Disp: , Rfl:     albuterol (VENTOLIN HFA) 108 (90 Base) MCG/ACT Inhalation Aero Soln, Inhale 2 puffs into the lungs every 4 (four) hours as needed for Wheezing., Disp: 1 each, Rfl: 1    fluticasone propionate 50 MCG/ACT Nasal Suspension, 2 sprays by Nasal route daily., Disp: , Rfl:     acetaminophen 500 MG Oral Tab, Take 1 tablet (500 mg total) by mouth every 6 (six) hours as needed for Pain., Disp: , Rfl:     docusate sodium 100 MG Oral Cap, Take 1 capsule (100 mg total) by mouth 2 (two) times daily as needed for constipation., Disp: , Rfl:     CAPSAICIN APR EX, Apply topically as needed., Disp: , Rfl:     Loperamide HCl 2 MG Oral Cap, Take 1 capsule (2  mg total) by mouth as needed for Diarrhea., Disp: , Rfl:     Multiple Vitamins-Minerals (PRESERVISION AREDS) Oral Tab, Take 1 tablet by mouth 2 (two) times daily., Disp: , Rfl:     atorvastatin (LIPITOR) 10 MG Oral Tab, Take 1 tablet (10 mg total) by mouth daily., Disp: 90 tablet, Rfl: 3    Magnesium Lactate (MAG-TAB SR) 84 MG (7MEQ) Oral Tab CR, Take 1 tablet (84 mg total) by mouth daily., Disp: 30 tablet, Rfl: 0    Review of Systems:  No chest pain or palpitations; otherwise as noted in HPI.    Exam:  /63 (BP Location: Right arm, Patient Position: Sitting, Cuff Size: adult)   Pulse 56   Resp 16   Ht 61\"   Wt 150 lb (68 kg)   LMP  (LMP Unknown)   BMI 28.34 kg/m²   Estimated body mass index is 28.34 kg/m² as calculated from the following:    Height as of this encounter: 61\".    Weight as of this encounter: 150 lb (68 kg).    Gen: well developed, well nourished, no acute distress  HEENT: normocephalic  Heart; normal S1/S2, regular rate and rhythm  Lungs: clear to auscultation bilaterally  Extremities: no edema, peripheral pulses intact    Neck: supple, full range of motion; no carotid bruits    Fundoscopic Exam: optic discs sharp bilaterally    Neuro:  Mental status:  Orientation: Alert and oriented to person, place, time  Speech Fluent and conversational    CN: PERRL, EOMI with no nystagmus, VFF, smile symmetric, sensation intact, tongue and palate midline, SCM intact, otherwise, CN 2-12 intact  Motor: 5/5 strength throughout, tone normal  DTR: absent ankle jerks; otherwise, 2+ symmetric throughout, toes downgoing bilaterally, no clonus  Sensory: intact to light touch throughout; vibration absent great toes and malleoli and reduced left patellar and present R side   Coord: FNF intact with no tremor or dysmetria; rapid alternating movements intact bilaterally  Romberg: absent  Gait: normal casual gait with aid of walker    Labs:  None new    Imaging:  None new    Impression/ Plan:  Paulina Man is a 89  year old female with history of neuropathy and RLS, who presents for management.     Neurologic exam is abnormal with signs / symptoms of neuropathy. In addition, she has symptoms of restless legs syndrome and has been on pramipexole with some improvement but still has symptoms - this may be due to anticipation related to longstanding use of dopamine agonist and recommend break up dose of pramipexole - 0.125 mg ~ 7 PM and 0.125 mg prior to bedtime.     Given her somewhat worsening RLS, will check Magnesium level and iron studies / ferritin level as this may be associated with RLS; will check B12 level as well. .    1. RLS (restless legs syndrome)  As noted above   - pramipexole (MIRAPEX) 0.125 MG Oral Tab; Take 0.125 mg ( 1 tablet) at ~ 7 PM and 0.125 mg (1 tablet) at bedtime  Dispense: 60 tablet; Refill: 2  - Iron And Tibc; Future  - Ferritin; Future  - Magnesium; Future  - Vitamin B12; Future    2. Diabetic polyneuropathy associated with type 2 diabetes mellitus (HCC)  As noted above     3. Other specified diabetes mellitus with diabetic neuropathy, unspecified (HCC)  As noted above   - Iron And Tibc; Future    4. Type 2 diabetes mellitus with other diabetic neurological complication (HCC)  As noted above  - Ferritin; Future    Return in about 3 months (around 5/13/2024).      Aydin Burroughs MD, Neurology  [facility]  Pager [phone]  2/13/2024

## 2024-02-13 NOTE — PATIENT INSTRUCTIONS
Refill policies:    Allow 2-3 business days for refills; controlled substances may take longer.  Contact your pharmacy at least 5 days prior to running out of medication and have them send an electronic request or submit request through the “request refill” option in your [employer] account.  Refills are not addressed on weekends; covering physicians do not authorize routine medications on weekends.  No narcotics or controlled substances are refilled after noon on Fridays or by on call physicians.  By law, narcotics must be electronically prescribed.  A 30 day supply with no refills is the maximum allowed.  If your prescription is due for a refill, you may be due for a follow up appointment.  To best provide you care, patients receiving routine medications need to be seen at least once a year.  Patients receiving narcotic/controlled substance medications need to be seen at least once every 3 months.  In the event that your preferred pharmacy does not have the requested medication in stock (e.g. Backordered), it is your responsibility to find another pharmacy that has the requested medication available.  We will gladly send a new prescription to that pharmacy at your request.    Scheduling Tests:    If your physician has ordered radiology tests such as MRI or CT scans, please contact Central Scheduling at [phone] right away to schedule the test.  Once scheduled, the [facility] Centralized Referral Team will work with your insurance carrier to obtain pre-certification or prior authorization.  Depending on your insurance carrier, approval may take 3-10 days.  It is highly recommended patients assure they have received an authorization before having a test performed.  If test is done without insurance authorization, patient may be responsible for the entire amount billed.      Precertification and Prior Authorizations:  If your physician has recommended that you have a procedure or additional testing performed the [facility]  Centralized Referral Team will contact your insurance carrier to obtain pre-certification or prior authorization.    You are strongly encouraged to contact your insurance carrier to verify that your procedure/test has been approved and is a COVERED benefit.  Although the [facility] Centralized Referral Team does its due diligence, the insurance carrier gives the disclaimer that \"Although the procedure is authorized, this does not guarantee payment.\"    Ultimately the patient is responsible for payment.   Thank you for your understanding in this matter.  Paperwork Completion:  If you require FMLA or disability paperwork for your recovery, please make sure to either drop it off or have it faxed to our office at [phone]. Be sure the form has your name and date of birth on it.  The form will be faxed to our Forms Department and they will complete it for you.  There is a 25$ fee for all forms that need to be filled out.  Please be aware there is a 10-14 day turnaround time.  You will need to sign a release of information (PETER) form if your paperwork does not come with one.  You may call the Forms Department with any questions at [phone].  Their fax number is [phone].

## 2024-02-14 ENCOUNTER — HOSPITAL ENCOUNTER (EMERGENCY)
Facility: HOSPITAL | Age: 89
Discharge: HOME | End: 2024-02-15
Attending: EMERGENCY MEDICINE
Payer: MEDICARE

## 2024-02-14 ENCOUNTER — TELEPHONE (OUTPATIENT)
Dept: INTERNAL MEDICINE CLINIC | Facility: CLINIC | Age: 89
End: 2024-02-14

## 2024-02-14 ENCOUNTER — OFFICE VISIT (OUTPATIENT)
Dept: INTERNAL MEDICINE CLINIC | Facility: CLINIC | Age: 89
End: 2024-02-14
Payer: MEDICARE

## 2024-02-14 ENCOUNTER — LAB ENCOUNTER (OUTPATIENT)
Dept: LAB | Age: 89
End: 2024-02-14
Attending: Other
Payer: MEDICARE

## 2024-02-14 ENCOUNTER — TELEPHONE (OUTPATIENT)
Dept: NEUROLOGY | Facility: CLINIC | Age: 89
End: 2024-02-14

## 2024-02-14 VITALS
HEART RATE: 68 BPM | BODY MASS INDEX: 28.32 KG/M2 | HEIGHT: 61 IN | OXYGEN SATURATION: 97 % | RESPIRATION RATE: 18 BRPM | SYSTOLIC BLOOD PRESSURE: 126 MMHG | TEMPERATURE: 97 F | DIASTOLIC BLOOD PRESSURE: 64 MMHG | WEIGHT: 150 LBS

## 2024-02-14 DIAGNOSIS — E66.9 DIABETES MELLITUS TYPE 2 IN OBESE  (HCC): Primary | ICD-10-CM

## 2024-02-14 DIAGNOSIS — E78.00 PURE HYPERCHOLESTEROLEMIA: ICD-10-CM

## 2024-02-14 DIAGNOSIS — E11.49 TYPE 2 DIABETES MELLITUS WITH OTHER DIABETIC NEUROLOGICAL COMPLICATION (HCC): ICD-10-CM

## 2024-02-14 DIAGNOSIS — J44.1 COPD EXACERBATION (HCC): ICD-10-CM

## 2024-02-14 DIAGNOSIS — I10 ESSENTIAL HYPERTENSION: ICD-10-CM

## 2024-02-14 DIAGNOSIS — E11.69 DIABETES MELLITUS TYPE 2 IN OBESE  (HCC): Primary | ICD-10-CM

## 2024-02-14 DIAGNOSIS — E83.42 HYPOMAGNESEMIA: Primary | ICD-10-CM

## 2024-02-14 DIAGNOSIS — G25.81 RLS (RESTLESS LEGS SYNDROME): ICD-10-CM

## 2024-02-14 DIAGNOSIS — E13.40 OTHER SPECIFIED DIABETES MELLITUS WITH DIABETIC NEUROPATHY, UNSPECIFIED (HCC): ICD-10-CM

## 2024-02-14 LAB
ALBUMIN SERPL-MCNC: 3.2 G/DL (ref 3.4–5)
ALBUMIN/GLOB SERPL: 0.8 {RATIO} (ref 1–2)
ALP LIVER SERPL-CCNC: 76 U/L
ALT SERPL-CCNC: 19 U/L
ANION GAP SERPL CALC-SCNC: 6 MMOL/L (ref 0–18)
AST SERPL-CCNC: 22 U/L (ref 15–37)
BASOPHILS # BLD AUTO: 0.07 X10(3) UL (ref 0–0.2)
BASOPHILS NFR BLD AUTO: 0.8 %
BILIRUB SERPL-MCNC: 0.4 MG/DL (ref 0.1–2)
BUN BLD-MCNC: 11 MG/DL (ref 9–23)
CALCIUM BLD-MCNC: 7.9 MG/DL (ref 8.5–10.1)
CARTRIDGE LOT#: 655 NUMERIC
CHLORIDE SERPL-SCNC: 109 MMOL/L (ref 98–112)
CO2 SERPL-SCNC: 25 MMOL/L (ref 21–32)
CREAT BLD-MCNC: 0.93 MG/DL
DEPRECATED HBV CORE AB SER IA-ACNC: 55.4 NG/ML
EGFRCR SERPLBLD CKD-EPI 2021: 57 ML/MIN/1.73M2 (ref 60–?)
EOSINOPHIL # BLD AUTO: 0.15 X10(3) UL (ref 0–0.7)
EOSINOPHIL NFR BLD AUTO: 1.7 %
ERYTHROCYTE [DISTWIDTH] IN BLOOD BY AUTOMATED COUNT: 13 %
GLOBULIN PLAS-MCNC: 4 G/DL (ref 2.8–4.4)
GLUCOSE BLD-MCNC: 139 MG/DL (ref 70–99)
HCT VFR BLD AUTO: 38.3 %
HEMOGLOBIN A1C: 6.4 % (ref 4.3–5.6)
HGB BLD-MCNC: 12.8 G/DL
IMM GRANULOCYTES # BLD AUTO: 0.03 X10(3) UL (ref 0–1)
IMM GRANULOCYTES NFR BLD: 0.3 %
IRON SATN MFR SERPL: 22 %
IRON SERPL-MCNC: 65 UG/DL
LYMPHOCYTES # BLD AUTO: 1.6 X10(3) UL (ref 1–4)
LYMPHOCYTES NFR BLD AUTO: 18.6 %
MAGNESIUM SERPL-MCNC: 0.6 MG/DL (ref 1.6–2.6)
MAGNESIUM SERPL-MCNC: 0.7 MG/DL (ref 1.6–2.6)
MAGNESIUM SERPL-MCNC: 1.2 MG/DL (ref 1.6–2.6)
MCH RBC QN AUTO: 29.8 PG (ref 26–34)
MCHC RBC AUTO-ENTMCNC: 33.4 G/DL (ref 31–37)
MCV RBC AUTO: 89.1 FL
MONOCYTES # BLD AUTO: 0.54 X10(3) UL (ref 0.1–1)
MONOCYTES NFR BLD AUTO: 6.3 %
NEUTROPHILS # BLD AUTO: 6.21 X10 (3) UL (ref 1.5–7.7)
NEUTROPHILS # BLD AUTO: 6.21 X10(3) UL (ref 1.5–7.7)
NEUTROPHILS NFR BLD AUTO: 72.3 %
OSMOLALITY SERPL CALC.SUM OF ELEC: 292 MOSM/KG (ref 275–295)
PLATELET # BLD AUTO: 249 10(3)UL (ref 150–450)
POTASSIUM SERPL-SCNC: 3.5 MMOL/L (ref 3.5–5.1)
PROT SERPL-MCNC: 7.2 G/DL (ref 6.4–8.2)
RBC # BLD AUTO: 4.3 X10(6)UL
SODIUM SERPL-SCNC: 140 MMOL/L (ref 136–145)
TIBC SERPL-MCNC: 302 UG/DL (ref 240–450)
TRANSFERRIN SERPL-MCNC: 203 MG/DL (ref 200–360)
VIT B12 SERPL-MCNC: 571 PG/ML (ref 193–986)
WBC # BLD AUTO: 8.6 X10(3) UL (ref 4–11)

## 2024-02-14 PROCEDURE — 80053 COMPREHEN METABOLIC PANEL: CPT | Performed by: EMERGENCY MEDICINE

## 2024-02-14 PROCEDURE — 99284 EMERGENCY DEPT VISIT MOD MDM: CPT

## 2024-02-14 PROCEDURE — 36415 COLL VENOUS BLD VENIPUNCTURE: CPT

## 2024-02-14 PROCEDURE — 83550 IRON BINDING TEST: CPT

## 2024-02-14 PROCEDURE — 83735 ASSAY OF MAGNESIUM: CPT

## 2024-02-14 PROCEDURE — 82607 VITAMIN B-12: CPT

## 2024-02-14 PROCEDURE — 96366 THER/PROPH/DIAG IV INF ADDON: CPT

## 2024-02-14 PROCEDURE — 83735 ASSAY OF MAGNESIUM: CPT | Performed by: EMERGENCY MEDICINE

## 2024-02-14 PROCEDURE — 82728 ASSAY OF FERRITIN: CPT

## 2024-02-14 PROCEDURE — 96365 THER/PROPH/DIAG IV INF INIT: CPT

## 2024-02-14 PROCEDURE — 99285 EMERGENCY DEPT VISIT HI MDM: CPT

## 2024-02-14 PROCEDURE — 83540 ASSAY OF IRON: CPT

## 2024-02-14 PROCEDURE — 93005 ELECTROCARDIOGRAM TRACING: CPT

## 2024-02-14 PROCEDURE — 85025 COMPLETE CBC W/AUTO DIFF WBC: CPT | Performed by: EMERGENCY MEDICINE

## 2024-02-14 PROCEDURE — 93010 ELECTROCARDIOGRAM REPORT: CPT

## 2024-02-14 RX ORDER — ATORVASTATIN CALCIUM 10 MG/1
10 TABLET, FILM COATED ORAL DAILY
Qty: 90 TABLET | Refills: 3 | Status: SHIPPED | OUTPATIENT
Start: 2024-02-14 | End: 2025-02-08

## 2024-02-14 RX ORDER — MAGNESIUM SULFATE HEPTAHYDRATE 40 MG/ML
2 INJECTION, SOLUTION INTRAVENOUS ONCE
Status: COMPLETED | OUTPATIENT
Start: 2024-02-14 | End: 2024-02-14

## 2024-02-14 RX ORDER — MAGNESIUM L-LACTATE 84 MG
84 TABLET, EXTENDED RELEASE ORAL DAILY
Qty: 30 TABLET | Refills: 0 | Status: SHIPPED | OUTPATIENT
Start: 2024-02-14 | End: 2024-03-15

## 2024-02-14 NOTE — PROGRESS NOTES
Paulina Man is a 89 year old female.    Chief Complaint   Patient presents with    Follow - Up     TA RM4       HPI:     Patient with COPD, DM2, HTN, HL, recent admission for COPD exacerbation and palpitations here for follow up.  Has chronic MARX and this is back to baseline, CXR, echo, troponin negative during recent admission.  She just wore a 30 day heart monitor, result pending.  Diabetes is well controlled with hgba1c 6.4 today, she follows with optho regularly, no acute vision or foot complaints  LDL is at goal on atorvastatin, BP controlled- 126/64    Patient Active Problem List   Diagnosis    Diabetic polyneuropathy associated with type 2 diabetes mellitus (HCC)    Gastroesophageal reflux disease    Primary hypertension    RLS (restless legs syndrome)    Pure hypercholesterolemia    Exudative age-related macular degeneration, bilateral, with active choroidal neovascularization (HCC)    DCIS (ductal carcinoma in situ) of breast    DCIS (ductal carcinoma in situ) of breast    Atypical lobular hyperplasia of right breast    Arthritis of both hands    Bilateral carpal tunnel syndrome    Chronic obstructive pulmonary disease (HCC)    Splenic artery aneurysm (HCC)    Back pain with radiation    DDD (degenerative disc disease), lumbar    Chronic vulvitis    Atrophic vaginitis    Recurrent UTI    Unsteady gait    Diabetes mellitus type 2 in obese  (HCC)    BPPV (benign paroxysmal positional vertigo), unspecified laterality    B12 deficiency    Dextroscoliosis    Cervical spondylosis without myelopathy    Spondylosis of lumbar region without myelopathy or radiculopathy    Dyspnea    COPD exacerbation (HCC)    Dyspnea, unspecified type    Shortness of breath    Palpitations    Encounter for well woman exam with abnormal findings     Current Outpatient Medications   Medication Sig Dispense Refill    atorvastatin (LIPITOR) 10 MG Oral Tab Take 1 tablet (10 mg total) by mouth daily. 90 tablet 3    pramipexole (MIRAPEX)  0.125 MG Oral Tab Take 0.125 mg ( 1 tablet) at ~ 7 PM and 0.125 mg (1 tablet) at bedtime 60 tablet 2    estradiol 0.1 MG/GM Vaginal Cream Apply 0.5 grams (pea-sized amount) of cream inside the vagina nightly. May use small amount at the vaginal opening as well in a thin layer. 42.5 g 3    clotrimazole-betamethasone 1-0.05 % External Cream Apply 1 Application topically 2 (two) times daily as needed. 30 g 1    ESOMEPRAZOLE MAGNESIUM 40 MG Oral Capsule Delayed Release TAKE ONE CAPSULE BY MOUTH TWICE DAILY BEFORE MEALS 180 capsule 0    glipiZIDE ER 2.5 MG Oral Tablet 24 Hr Take 1 tablet (2.5 mg total) by mouth daily with breakfast. 30 tablet 0    losartan 100 MG Oral Tab Take 1 tablet (100 mg total) by mouth daily. 90 tablet 0    mometasone 0.1 % External Ointment apply a thin layer daily to vulva for 2 weeks then twice weekly 45 g 0    ondansetron (ZOFRAN ODT) 8 MG Oral Tablet Dispersible Take 1 tablet (8 mg total) by mouth every 8 (eight) hours as needed for Nausea. 30 tablet 0    cyclobenzaprine 5 MG Oral Tab Take 1 tablet (5 mg total) by mouth nightly as needed for Muscle spasms. 30 tablet 0    furosemide 20 MG Oral Tab Take 1 tablet (20 mg total) by mouth daily.      albuterol (VENTOLIN HFA) 108 (90 Base) MCG/ACT Inhalation Aero Soln Inhale 2 puffs into the lungs every 4 (four) hours as needed for Wheezing. 1 each 1    fluticasone propionate 50 MCG/ACT Nasal Suspension 2 sprays by Nasal route daily.      acetaminophen 500 MG Oral Tab Take 1 tablet (500 mg total) by mouth every 6 (six) hours as needed for Pain.      docusate sodium 100 MG Oral Cap Take 1 capsule (100 mg total) by mouth 2 (two) times daily as needed for constipation.      CAPSAICIN APR EX Apply topically as needed.      Loperamide HCl 2 MG Oral Cap Take 1 capsule (2 mg total) by mouth as needed for Diarrhea.      Multiple Vitamins-Minerals (PRESERVISION AREDS) Oral Tab Take 1 tablet by mouth 2 (two) times daily.      Magnesium Lactate (MAG-TAB SR) 84  MG (7MEQ) Oral Tab CR Take 1 tablet (84 mg total) by mouth daily. 30 tablet 0      Past Medical History:   Diagnosis Date    Abdominal distention     Abdominal hernia     Abdominal pain     Acute diverticulitis 10/10/2020    Noted on CT scan - acute diverticulitis of the sigmoid colon    Arrhythmia     Arthritis     Atypical lobular hyperplasia of right breast 06/30/2016    Back pain     Back problem     lower lumbar bad discs     Mckeon's esophagus without dysplasia 11/28/2018    CHF (congestive heart failure) ([facility])     Chronic cough     Chronic diarrhea 12/04/2015    Started before metformin- related to IBS      Constipation     COPD (chronic obstructive pulmonary disease) ([facility])     NO OXYGEN- no pulmonologist    Deviated septum     left side    Diabetic polyneuropathy associated with type 2 diabetes mellitus ([facility]) 12/04/2015    Diarrhea, unspecified     Disorder of thyroid     has Thyroid nodules    Diverticulosis of large intestine     Dizziness     Ductal carcinoma in situ (DCIS) of right breast     Esophageal reflux     Essential hypertension     Exudative macular degeneration ([facility]) 04/01/2016    Right Eye     Fatigue     Flatulence/gas pain/belching     Frequent urination     Frequent UTI     Full dentures     Gastroesophageal reflux disease 12/04/2015    GERD (gastroesophageal reflux disease)     Hearing impairment     bilateral hearing aids    Hearing loss     Heart palpitations     Hemorrhoids     High blood pressure     High cholesterol     History of cardiac murmur     History of tobacco use     Hoarseness, chronic     Hyperlipidemia     not taking lipitor due to side effects    Incontinence     uses pads    Indigestion     Irregular bowel habits     Itch of skin     Leaking of urine     Leaky heart valve     slight per pt- had echo 12/2015    Loss of appetite     Macular degeneration     Macular degeneration     Menopause     Nausea     Neuropathy     Nodule of left lung 01/07/2016    Obesity (BMI  30-39.9)     Osteoarthritis     all joints    Osteoporosis     Other fatigue 10/10/2018    Pain in joints     Pap smear for cervical cancer screening 2006    Per patient had pap in . No abn pap.     Pneumonia, organism unspecified(486)     2015    PONV (postoperative nausea and vomiting)     Problems with swallowing     due hiatal hernia; gaggs at times    Restless leg syndrome     Restless leg syndrome     RLS (restless legs syndrome) 2015    Screening breast examination 2021    Dr. 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   Tobacco Use    Smoking status: Former     Packs/day: 1.00     Years: 35.00     Additional pack years: 0.00     Total pack years: 35.00     Types: Cigarettes     Quit date: 1988     Years since quittin.5     Passive exposure: Never    Smokeless tobacco: Former   Vaping Use    Vaping Use: Never used   Substance and Sexual Activity    Alcohol use: No    Drug use: No    Sexual activity: Not Currently   Other Topics Concern    Caffeine Concern Yes     Comment: coffee 1- 2 cups daily    Exercise No     Comment: walking     Social Determinants of Health     Financial Resource Strain: Low Risk  (2023)    Financial Resource Strain     Difficulty of Paying Living Expenses: Not hard at all     Med Affordability: No   Food Insecurity: No Food Insecurity (2023)    Food Insecurity     Food Insecurity: Never true   Transportation Needs: No Transportation  Needs (12/27/2023)    Transportation Needs     Lack of Transportation: No   Physical Activity: Insufficiently Active (2/27/2023)    Exercise Vital Sign     Days of Exercise per Week: 1 day     Minutes of Exercise per Session: 20 min   Stress: No Stress Concern Present (2/27/2023)    Stress     Feeling of Stress : No   Social Connections: Socially Integrated (2/27/2023)    Social Connections     Frequency of Socialization with Friends and Family: 2   Housing Stability: Low Risk  (12/27/2023)    Housing Stability     Housing Instability: No     Housing Instability Emergency: No     Family History   Problem Relation Age of Onset    Breast Cancer Paternal Aunt     Heart Disorder Mother     Hypertension Mother     Colon Polyps Mother     Heart Disorder Father     Hypertension Father     Lipids Father     Heart Disorder Paternal Grandfather     Diabetes Paternal Grandmother     Breast Cancer Self 86        DCIS        Allergies  Allergies   Allergen Reactions    Ciprofloxacin NAUSEA AND VOMITING and HIVES    Peanut-Containing Drug Products UNKNOWN and OTHER (SEE COMMENTS)     Bladder infection    Pollen ITCHING and HIVES    Sulfa Antibiotics NAUSEA AND VOMITING    Adhesive Tape ITCHING    Dander RASH     fur    Other ITCHING     feather         REVIEW OF SYSTEMS:   GENERAL HEALTH:  no fevers   RESPIRATORY: stable MARX  CARDIOVASCULAR: denies chest pain  GI: denies abdominal pain  : no dysuria  NEURO: no focal weakness  HEME: No adenopathy      EXAM:   /64   Pulse 68   Temp 96.9 °F (36.1 °C) (Temporal)   Resp 18   Ht 5' 1\" (1.549 m)   Wt 150 lb (68 kg)   LMP  (LMP Unknown)   SpO2 97%   BMI 28.34 kg/m²   GENERAL: well developed, well nourished,in no apparent distress  HEENT: atraumatic, normocephalic  NECK: supple,no adenopathy  LUNGS: normal rate without respiratory distress, lungs clear to auscultation  CARDIO: RRR nl S1 S2  GI: normal bowel sounds, soft, NT/ND  EXTREMITIES: no cyanosis, clubbing or  edema  NEURO: Alert and oriented    ASSESSMENT AND PLAN:     Encounter Diagnoses   Name     Diabetes mellitus type 2 in obese  (HCC)- controlled, hgba1c 6.4 today, continue glipizide er 2.5mg daily, complete dm labs prior to next OV in june     Essential hypertension- controlled, CPM     Pure hypercholesterolemia- continue atorvastatin, due to check lipids in May     COPD exacerbation (HCC)- resolved, recent hospitalization records reviewed.           Orders Placed This Encounter   Procedures    POC Hgb A1C    Hemoglobin A1C [E]    Comp Metabolic Panel (14)    TSH W Reflex To Free T4 [E]    CBC W Differential W Platelet [E]    Lipid Panel [E]    Microalb/Creat Ratio, Random Urine       Meds & Refills for this Visit:  Requested Prescriptions     Signed Prescriptions Disp Refills    atorvastatin (LIPITOR) 10 MG Oral Tab 90 tablet 3     Sig: Take 1 tablet (10 mg total) by mouth daily.       Imaging & Consults:  None    Return in about 17 weeks (around 6/12/2024), or if symptoms worsen or fail to improve, for follow up chronic issues.  There are no Patient Instructions on file for this visit.      The patient indicates understanding of these issues and agrees to the plan.

## 2024-02-14 NOTE — TELEPHONE ENCOUNTER
Per Dr Burroughs, was checking Magnesium for high level possibly contributing to symptoms of RLS.  Would like patient to follow up with PCP regarding appropriate form and dose of Magnesium supplement.    Call routed to Dr Annie Newton and [facility] staff.  Phone call to office nurse Sheila to inform, will follow up. Patient is on Mg supplement, possibly non-compliant.

## 2024-02-14 NOTE — TELEPHONE ENCOUNTER
Spoke with CB and TB. Patient needing to be seen in ED.     Called and LMTCB for patient. Called son on file ok per verbal consent and he voiced understanding and will call his wife whom is with the patient to take to the ED for prompt eval.

## 2024-02-14 NOTE — TELEPHONE ENCOUNTER
Lab calling to inform:    Magnesium level 0.7 mg/dL @ 1141 today.    Provider currently in procedure with patient.    Notice sent with high priority, will follow up in person when provider emerges from procedure.

## 2024-02-14 NOTE — TELEPHONE ENCOUNTER
Called from neurology deferring critical low magnesium of 0.7 ordered by Dr. Burroughs and deferred result to PCP.  Patient seen by TB today.     Called patient LMTCB 2/14/2024    See other TE 2/14/2024

## 2024-02-14 NOTE — TELEPHONE ENCOUNTER
Called from neurology deferring critical low magnesium of 0.7 ordered by Dr. Burroughs and deferred result to PCP.  Patient seen by TB today.     Called patient LMTCB 2/14/2024

## 2024-02-15 ENCOUNTER — TELEPHONE (OUTPATIENT)
Dept: CASE MANAGEMENT | Facility: HOSPITAL | Age: 89
End: 2024-02-15

## 2024-02-15 VITALS
SYSTOLIC BLOOD PRESSURE: 131 MMHG | HEIGHT: 61 IN | HEART RATE: 63 BPM | WEIGHT: 150 LBS | OXYGEN SATURATION: 97 % | DIASTOLIC BLOOD PRESSURE: 70 MMHG | BODY MASS INDEX: 28.32 KG/M2 | TEMPERATURE: 97 F | RESPIRATION RATE: 20 BRPM

## 2024-02-15 NOTE — DISCHARGE INSTRUCTIONS
Take magnesium supplements as directed.  Contact your primary physician to have your magnesium rechecked within the next day or 2.

## 2024-02-15 NOTE — ED PROVIDER NOTES
Patient Seen in: [facility] Emergency Department      History     Chief Complaint   Patient presents with   • Abnormal Labs     Stated Complaint: low mag    Subjective:   HPI    Patient comes to the emergency department after her primary physician obtained lab tests on her and noted that her magnesium was quite low.  The patient herself has no specific complaints.  Patient has no symptoms of numbness, weakness, paresthesia, jitteriness or any other new neuromuscular symptoms.    Objective:   Past Medical History:   Diagnosis Date   • Abdominal distention    • Abdominal hernia    • Abdominal pain    • Acute diverticulitis 10/10/2020    Noted on CT scan - acute diverticulitis of the sigmoid colon   • Arrhythmia    • Arthritis    • Atypical lobular hyperplasia of right breast 06/30/2016   • Back pain    • Back problem     lower lumbar bad discs    • Mckeon's esophagus without dysplasia 11/28/2018   • CHF (congestive heart failure) ([facility])    • Chronic cough    • Chronic diarrhea 12/04/2015    Started before metformin- related to IBS     • Constipation    • COPD (chronic obstructive pulmonary disease) ([facility])     NO OXYGEN- no pulmonologist   • Deviated septum     left side   • Diabetic polyneuropathy associated with type 2 diabetes mellitus ([facility]) 12/04/2015   • Diarrhea, unspecified    • Disorder of thyroid     has Thyroid nodules   • Diverticulosis of large intestine    • Dizziness    • Ductal carcinoma in situ (DCIS) of right breast    • Esophageal reflux    • Essential hypertension    • Exudative macular degeneration ([facility]) 04/01/2016    Right Eye    • Fatigue    • Flatulence/gas pain/belching    • Frequent urination    • Frequent UTI    • Full dentures    • Gastroesophageal reflux disease 12/04/2015   • GERD (gastroesophageal reflux disease)    • Hearing impairment     bilateral hearing aids   • Hearing loss    • Heart palpitations    • Hemorrhoids    • High blood pressure    • High cholesterol    • History of  cardiac murmur    • History of tobacco use    • Hoarseness, chronic    • Hyperlipidemia     not taking lipitor due to side effects   • Incontinence     uses pads   • Indigestion    • Irregular bowel habits    • Itch of skin    • Leaking of urine    • Leaky heart valve     slight per pt- had echo 12/2015   • Loss of appetite    • Macular degeneration    • Macular degeneration    • Menopause    • Nausea    • Neuropathy    • Nodule of left lung 01/07/2016   • Obesity (BMI 30-39.9)    • Osteoarthritis     all joints   • Osteoporosis    • Other fatigue 10/10/2018   • Pain in joints    • Pap smear for cervical cancer screening 2006    Per patient had pap in 2006. No abn pap.    • Pneumonia, organism unspecified(486)     04/2015   • PONV (postoperative nausea and vomiting)    • Problems with swallowing     due hiatal hernia; gaggs at times   • Restless leg syndrome    • Restless leg syndrome    • RLS (restless legs syndrome) 12/04/2015   • Screening breast examination 11/01/2021    Dr. Layla Pretty   • Screening mammogram for breast cancer 02/06/2022    Benign    • Shortness of breath     ON EXERTION   • Sleep apnea    • Sleep disturbance    • Splenic artery aneurysm (HCC) 03/23/2021   • Torn rotator cuff     both shoulders per pt   • Uncomfortable fullness after meals    • Vertigo    • Visual impairment     glasses   • Vitamin B12 deficiency    • Vulvar itching 08/13/2019   • Wears glasses    • Weight loss               Past Surgical History:   Procedure Laterality Date   • ANGIOGRAM      no intervention   • APPENDECTOMY  1935   • APPENDECTOMY     • CHOLECYSTECTOMY  1980   • COLONOSCOPY     • EGD  03/09/2022    Reactive gastropathy   • NIYAH LOCALIZATION WIRE 1 SITE LEFT (CPT=19281)      1957   • NIYAH LOCALIZATION WIRE 1 SITE RIGHT (CPT=19281)      1980   • NIYAH LOCALIZATION WIRE 1 SITE RIGHT (CPT=19281) Right 07/2016    DCIS   • MASTECTOMY PARTIAL Right 07/01/2016    wire localized partial mastectomy   • NEEDLE BIOPSY  RIGHT      diag DCIS- 16   • OTHER SURGICAL HISTORY      L and R breast cyst removal                Social History     Socioeconomic History   • Marital status:    Occupational History   • 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   Tobacco Use   • Smoking status: Former     Packs/day: 1.00     Years: 35.00     Additional pack years: 0.00     Total pack years: 35.00     Types: Cigarettes     Quit date: 1988     Years since quittin.4     Passive exposure: Never   • Smokeless tobacco: Former   Vaping Use   • Vaping Use: Never used   Substance and Sexual Activity   • Alcohol use: No   • Drug use: No   • Sexual activity: Not Currently   Other Topics Concern   • Caffeine Concern Yes     Comment: coffee 1- 2 cups daily   • Exercise No     Comment: walking     Social Determinants of Health     Financial Resource Strain: Low Risk  (2023)    Financial Resource Strain    • Difficulty of Paying Living Expenses: Not hard at all    • Med Affordability: No   Food Insecurity: No Food Insecurity (2023)    Food Insecurity    • Food Insecurity: Never true   Transportation Needs: No Transportation Needs (2023)    Transportation Needs    • Lack of Transportation: No   Physical Activity: Insufficiently Active (2023)    Exercise Vital Sign    • Days of Exercise per Week: 1 day    • Minutes of Exercise per Session: 20 min   Stress: No Stress Concern Present (2023)    Stress    • Feeling of Stress : No   Social Connections: Socially Integrated (2023)    Social Connections    • Frequency of Socialization with Friends and Family: 2   Housing Stability: Low Risk  (2023)    Housing Stability    • Housing Instability: No    • Housing Instability Emergency: No              Review of Systems    Positive for stated complaint: low mag  Other systems are as noted in HPI.  Constitutional and vital signs reviewed.      All other systems reviewed and negative except as noted above.    Physical Exam      ED Triage Vitals [02/14/24 1805]   /69   Pulse 59   Resp 16   Temp 97.3 °F (36.3 °C)   Temp src Temporal   SpO2 97 %   O2 Device None (Room air)       Current:/70   Pulse 63   Temp 97.3 °F (36.3 °C) (Temporal)   Resp 20   Ht 154.9 cm (5' 1\")   Wt 68 kg   LMP  (LMP Unknown)   SpO2 97%   BMI 28.34 kg/m²         Physical Exam  Vitals and nursing note reviewed.   Constitutional:       Appearance: She is well-developed.   HENT:      Head: Normocephalic.   Cardiovascular:      Rate and Rhythm: Normal rate and regular rhythm.      Heart sounds: Normal heart sounds. No murmur heard.     Comments: Occasional premature beats.  Pulmonary:      Effort: Pulmonary effort is normal. No respiratory distress.      Breath sounds: Normal breath sounds.   Abdominal:      General: Bowel sounds are normal.      Palpations: Abdomen is soft.      Tenderness: There is no abdominal tenderness. There is no rebound.   Musculoskeletal:         General: No tenderness. Normal range of motion.      Cervical back: Normal range of motion and neck supple.      Comments: Bilateral lower extremity edema which patient states is chronic and unchanged from baseline.  Patient states that she takes Lasix for this.   Lymphadenopathy:      Cervical: No cervical adenopathy.   Skin:     General: Skin is warm and dry.      Findings: No rash.   Neurological:      Mental Status: She is alert and oriented to person, place, and time.      Sensory: No sensory deficit.              ED Course     Labs Reviewed   COMP METABOLIC PANEL (14) - Abnormal; Notable for the following components:       Result Value    Glucose 139 (*)     Calcium, Total 7.9 (*)     eGFR-Cr 57 (*)     Albumin 3.2 (*)     A/G Ratio 0.8 (*)     All other components within normal limits   MAGNESIUM - Abnormal; Notable for the following components:    Magnesium 0.6 (*)     All other components within normal limits   MAGNESIUM - Abnormal; Notable for the following components:     Magnesium 1.2 (*)     All other components within normal limits   CBC WITH DIFFERENTIAL WITH PLATELET    Narrative:     The following orders were created for panel order CBC With Differential With Platelet.  Procedure                               Abnormality         Status                     ---------                               -----------         ------                     CBC W/ DIFFERENTIAL[[id number]]                              Final result                 Please view results for these tests on the individual orders.   RAINBOW DRAW LAVENDER   RAINBOW DRAW LIGHT GREEN   CBC W/ DIFFERENTIAL     EKG    Rate, intervals and axes as noted on EKG Report.  Rate: 117  Rhythm: Sinus Rhythm  Reading: Nonspecific ST and T wave abnormalities               ED Course as of 02/15/24 0143  ------------------------------------------------------------  Time: 02/14 2046  Value: Magnesium, Serum(!!): 0.6  Comment: (Reviewed)  ------------------------------------------------------------  Time: 02/14 2046  Comment: CBC and CMP chemistries are unremarkable.     Patient's magnesium was rechecked after patient received IV magnesium and was noted to be 1.2.  Again, the patient had no symptoms while in the emergency department.         MDM      Patient presents to emergency department after she was instructed to come in after her magnesium level was noted to be quite low.  Patient had no acute symptoms.  There is no arrhythmia, neurologic symptoms, chest pain or shortness of breath.  At this point, it is unclear what is causing the patient's diminished magnesium, but supplemental magnesium was administered and patient's magnesium came up to a reasonable level of 1.2.  She was subsequently discharged home with prescription for oral magnesium and instructions to follow-up closely with her primary care physician to have her magnesium rechecked and possibly do other follow-up and workup to determine the cause of the patient's  hypomagnesemia.                                   MDM    Disposition and Plan     Clinical Impression:  1. Hypomagnesemia         Disposition:  Discharge  2/14/2024 11:57 pm    Follow-up:  Annie Newton MD  [facility] [place] [address]  [phone]    Call in 1 day(s)            Medications Prescribed:  Current Discharge Medication List      START taking these medications    Details   Magnesium Lactate (MAG-TAB SR) 84 MG (7MEQ) Oral Tab CR Take 1 tablet (84 mg total) by mouth daily.  Qty: 30 tablet, Refills: 0

## 2024-02-15 NOTE — CM/SW NOTE
Received a call from patients son stating someone called him last night and asked him to return the call. CM advised pt son that pt is not in the ER at this time and he can call his mom to follow up.

## 2024-02-15 NOTE — TELEPHONE ENCOUNTER
Pt calling stated she got magnesium from hospital, was in the ER but didn't get enough and was given the below script.    Pt stated she went the pharm to pick the script but pharm didn't have it. Pt stated pharm told you the below isn't common and didn't have it.  Pt stated she was told they will have it tomorrow.  High TE  FYI    Pt stated she has questions for the nurse and they are in reference to this medication.      [id number] - [place] [id number] [place] -[phone], [phone]     Magnesium Lactate (MAG-TAB SR) 84 MG (7MEQ) Oral Tab CR 30 tablet 0 2/14/2024 3/15/2024    Sig - Route: Take 1 tablet (84 mg total) by mouth daily. - Oral    Sent to pharmacy as: Magnesium Lactate 84 MG (7MEQ) Oral Tablet Extended Release (Mag-Tab SR)    E-Prescribing Status: Receipt confirmed by pharmacy (2/14/2024 11:57 PM CST)

## 2024-02-15 NOTE — ED QUICK NOTES
Rounding Completed    Plan of Care reviewed. Waiting for lab results.  Elimination needs assessed. Patient ambulatory to bathroom with steady gait and walker.  Provided warm blanket.    Bed is locked and in lowest position. Call light within reach.

## 2024-02-15 NOTE — TELEPHONE ENCOUNTER
LOV 2/14/24    Spoke to pt. She states magnesium is not available at her pharmacy until tomorrow. She will get it and start taking it then.     Pt states that while at the ER yesterday, the ER doctor mentioned to her that her low magnesium could possibly be due to her lasix. Pt wanting to know if she should see TB for low magnesium/possible Lasix adjustment or if this should be handled by cardiology? She has cardiology appt on 2/20/24     TB - do you want to see patient or have pt see cardiology for her low magnesium?

## 2024-02-15 NOTE — ED QUICK NOTES
Rounding Completed.     Plan of Care reviewed. Waiting for [facility] ETA 0200.  Elimination needs assessed.  Provided [place].    Bed is locked and in lowest position. Call light within reach.

## 2024-02-16 LAB
ATRIAL RATE: 66 BPM
P AXIS: 76 DEGREES
P-R INTERVAL: 130 MS
Q-T INTERVAL: 400 MS
QRS DURATION: 102 MS
QTC CALCULATION (BEZET): 419 MS
R AXIS: -54 DEGREES
T AXIS: 78 DEGREES
VENTRICULAR RATE: 66 BPM

## 2024-02-16 NOTE — TELEPHONE ENCOUNTER
Called and spoke w/ pt. Notified TB is okay with pt seeing cardiology as has upcoming apt. Pt stated she has apt Tuesday. Advised to keep that as scheduled and no further apts needed here at this time. Pt verbalizes understanding and is agreeable to plan.

## 2024-02-20 ENCOUNTER — LAB ENCOUNTER (OUTPATIENT)
Dept: LAB | Facility: HOSPITAL | Age: 89
End: 2024-02-20
Attending: INTERNAL MEDICINE
Payer: MEDICARE

## 2024-02-20 ENCOUNTER — PATIENT OUTREACH (OUTPATIENT)
Dept: CASE MANAGEMENT | Age: 89
End: 2024-02-20

## 2024-02-20 ENCOUNTER — TELEPHONE (OUTPATIENT)
Dept: INTERNAL MEDICINE CLINIC | Facility: CLINIC | Age: 89
End: 2024-02-20

## 2024-02-20 DIAGNOSIS — R30.9 PAIN WITH URINATION: ICD-10-CM

## 2024-02-20 DIAGNOSIS — N39.0 RECURRENT UTI: Primary | ICD-10-CM

## 2024-02-20 DIAGNOSIS — N39.0 RECURRENT UTI: ICD-10-CM

## 2024-02-20 LAB
BILIRUB UR QL STRIP.AUTO: NEGATIVE
CLARITY UR REFRACT.AUTO: CLEAR
GLUCOSE UR STRIP.AUTO-MCNC: NORMAL MG/DL
KETONES UR STRIP.AUTO-MCNC: NEGATIVE MG/DL
LEUKOCYTE ESTERASE UR QL STRIP.AUTO: 250
NITRITE UR QL STRIP.AUTO: NEGATIVE
PH UR STRIP.AUTO: 7 [PH] (ref 5–8)
PROT UR STRIP.AUTO-MCNC: NEGATIVE MG/DL
RBC UR QL AUTO: NEGATIVE
SP GR UR STRIP.AUTO: 1.02 (ref 1–1.03)
UROBILINOGEN UR STRIP.AUTO-MCNC: NORMAL MG/DL

## 2024-02-20 PROCEDURE — 87086 URINE CULTURE/COLONY COUNT: CPT

## 2024-02-20 PROCEDURE — 87147 CULTURE TYPE IMMUNOLOGIC: CPT

## 2024-02-20 PROCEDURE — 81001 URINALYSIS AUTO W/SCOPE: CPT

## 2024-02-20 NOTE — TELEPHONE ENCOUNTER
Pt is at the hospital outpatient and she would like to have us put in a urine analysis order she thinks she has a bladder infection

## 2024-02-20 NOTE — TELEPHONE ENCOUNTER
Spoke w/ pt. Pt stated she had an apt at the [facility] and is at the lab now. Pt believes she has bladder infection. C/o pain with urination, frequency, & urgency. Denies fever and abd pain. Looking for UA order. Offered apt tomorrow with TB. Pt stated it costs her money to come here for an apt and wants to complete since she is at the lab now.     Spoke w/ TB. Ok for UA with culture reflex. Order placed.     Notified pt order is in place. Pt appreciative of order.     CHELO SAUNDERS

## 2024-02-20 NOTE — PROGRESS NOTES
1st attempt ER f/up apt request  PCP -existing apt (3/5), pt doesn't want to move up apt  Closing encounter

## 2024-02-21 ENCOUNTER — TELEPHONE (OUTPATIENT)
Dept: INTERNAL MEDICINE CLINIC | Facility: CLINIC | Age: 89
End: 2024-02-21

## 2024-02-21 RX ORDER — CEPHALEXIN 500 MG/1
500 CAPSULE ORAL 2 TIMES DAILY
Qty: 14 CAPSULE | Refills: 0 | Status: SHIPPED
Start: 2024-02-21

## 2024-02-22 ENCOUNTER — TELEPHONE (OUTPATIENT)
Dept: INTERNAL MEDICINE CLINIC | Facility: CLINIC | Age: 89
End: 2024-02-22

## 2024-02-22 NOTE — TELEPHONE ENCOUNTER
Pt needs below medication called into her pharmacy as their electronic system is down.        [id number] - [place] [address] [place] [phone], [phone] [[id number]]           cephalexin 500 MG Oral Cap 14 capsule 0 2/21/2024 --    Sig - Route: Take 1 capsule (500 mg total) by mouth 2 (two) times daily. - Oral    Sent to pharmacy as: Cephalexin 500 MG Oral Capsule (Keflex)    Class: Fax    E-Prescribing Status: Transmission to pharmacy failed (2/22/2024 10:54 AM CST)

## 2024-02-22 NOTE — TELEPHONE ENCOUNTER
Called [place] pharmacy and left VM with prescription information, pt info, call back number.     Will hold encounter to ensure pt is able to  medication

## 2024-02-23 NOTE — TELEPHONE ENCOUNTER
Pt called back. Nothing further is needed-pt is going to  prescription. Pt stated system is now working.

## 2024-03-06 RX ORDER — GLIPIZIDE 2.5 MG/1
2.5 TABLET, EXTENDED RELEASE ORAL
Qty: 30 TABLET | Refills: 0 | Status: SHIPPED | OUTPATIENT
Start: 2024-03-06

## 2024-03-11 ENCOUNTER — PATIENT OUTREACH (OUTPATIENT)
Dept: CASE MANAGEMENT | Age: 89
End: 2024-03-11

## 2024-03-11 DIAGNOSIS — M19.041 ARTHRITIS OF BOTH HANDS: ICD-10-CM

## 2024-03-11 DIAGNOSIS — M51.36 DDD (DEGENERATIVE DISC DISEASE), LUMBAR: ICD-10-CM

## 2024-03-11 DIAGNOSIS — J44.9 CHRONIC OBSTRUCTIVE PULMONARY DISEASE, UNSPECIFIED COPD TYPE (HCC): ICD-10-CM

## 2024-03-11 DIAGNOSIS — E53.8 B12 DEFICIENCY: ICD-10-CM

## 2024-03-11 DIAGNOSIS — J44.1 COPD EXACERBATION (HCC): ICD-10-CM

## 2024-03-11 DIAGNOSIS — E78.00 PURE HYPERCHOLESTEROLEMIA: ICD-10-CM

## 2024-03-11 DIAGNOSIS — H81.10 BPPV (BENIGN PAROXYSMAL POSITIONAL VERTIGO), UNSPECIFIED LATERALITY: ICD-10-CM

## 2024-03-11 DIAGNOSIS — R26.81 UNSTEADY GAIT: ICD-10-CM

## 2024-03-11 DIAGNOSIS — I10 PRIMARY HYPERTENSION: Primary | ICD-10-CM

## 2024-03-11 DIAGNOSIS — G25.81 RLS (RESTLESS LEGS SYNDROME): ICD-10-CM

## 2024-03-11 DIAGNOSIS — M19.042 ARTHRITIS OF BOTH HANDS: ICD-10-CM

## 2024-03-11 DIAGNOSIS — K21.9 GASTROESOPHAGEAL REFLUX DISEASE WITHOUT ESOPHAGITIS: ICD-10-CM

## 2024-03-11 DIAGNOSIS — E11.42 DIABETIC POLYNEUROPATHY ASSOCIATED WITH TYPE 2 DIABETES MELLITUS (HCC): ICD-10-CM

## 2024-03-11 RX ORDER — ATORVASTATIN CALCIUM 40 MG/1
40 TABLET, FILM COATED ORAL DAILY
COMMUNITY
Start: 2024-02-20

## 2024-03-11 NOTE — PROGRESS NOTES
Spoke to Paulina for [facility].      Updates to patient care team/comments: UTD  Patient reported changes in medications: UTD  Med Adherence  Comment: pt taking medications as prescribed     Health Maintenance:   Health Maintenance   Topic Date Due    Diabetes Care: Microalb/Creat Ratio  05/04/2024    Diabetes Care Dilated Eye Exam  07/20/2024    Annual Physical  07/25/2024    Diabetes Care A1C  08/14/2024    Diabetes Care Foot Exam  10/23/2024    Diabetes Care: GFR  02/14/2025    Influenza Vaccine  Completed    Annual Depression Screening  Completed    Fall Risk Screening (Annual)  Completed    Pneumococcal Vaccine: 65+ Years  Completed    Zoster Vaccines  Completed    COVID-19 Vaccine  Completed       Patient updates/concerns:    Spoke to pt for monthly outreach   Pt has started taking rx for magnesium since visit ot ER. Pt magnesium labs were abnormal and pt was encouraged to report to ER to determine if she was having/ or had a heart attack. Pt was given EKG and informed that pt did not have heart attack. Pt was then given iv magnesium before discharge and instructed to start rx of magnesium. Pt has started it but reports that she may not be always getting exactly the full dose because she has ot crush the pills to take them. Pt states that she feels like she has been a little more sluggish since she started taking it.    Pt has been told to change the way she is taking her pramipexole. She now takes it twice at night once at 7 and once before bed. She states that for the first time in years she is getting to sleep at consistently the same time. She feels her sleep has been more restful. Pt hopes this will allow her to start travelling by plane instead of exclusively by train.   Pt has finished abx and her urinary infection has improved. Discussed with pt strategies to drink more water to prevent frequency of urinary infections. Pt verbalized understanding and will try infusing with fruit.    Pt is still able to  manage all of her own finances. Pt feels this is good for her sense of independence. Pt manages all of her own medications and usually does a two week pillbox to make sure any refills are filled in an appropriate time frame.   Reviewed meds, pt is taking 40mg atorvastatin and has not had any leg pain discomfort.   Pt continues to see eye doc for mac degeneration injections in right eye. Pt says new medication  works harder and longer than previous Pt feels her vision is much improved after injections and then she returns every 10 weeks.    Pt has started taking colace when constipated. Pt estimates she takes it a few times every week.   Pt will periodically take bp and o2 at home. Pt states results are always stable and within range.    Pt has pulmonologist visit upcoming and will have to procedures performed in office.    Pt plans to start walking more with improved weather . Pt states her balance and gait are not as confident and she wants to improve her leg strength.     Goals/Action Plan:    Active goal from previous outreach: exercise    Patient reported progress towards goals: pt is starting to walk more to work on leg strength               - What: drinking water           - Where/When/How: pt will try infusion and gauge if this is helpful.   Patient Reported Barriers and Concerns: n/a                   - Plan for overcoming barriers: none    Care Managers Interventions: continue to provide encouragement and education for healthy coping and dx    Future Appointments:   Future Appointments   Date Time Provider Department Center   4/29/2024  2:30 PM Bebeto Blackwell MD [facility] [place]   5/14/2024  1:20 PM Aydin Burroughs MD [facility] Care Manager Follow Up Date: one karolyn    Reason For Follow Up: review progress and or barriers towards patient's goals.     Time Spent This Encounter Total: 43min medical record review, telephone communication, care plan updates where needed,  education, goals, and action plan recreation/update. Provided acknowledgment and validation to patient's concerns.   Monthly Minute Total including today: 43  Physical assessment, complete health history, and need for CCM established by Annie Newton MD.

## 2024-03-14 NOTE — ED INITIAL ASSESSMENT (HPI)
Pt c/o of feeling itchy all over since Saturday. Pt also c/o of flank pain, lower abdominal pain, and discomfort with urination.
No

## 2024-03-15 ENCOUNTER — TELEPHONE (OUTPATIENT)
Facility: CLINIC | Age: 89
End: 2024-03-15

## 2024-03-15 NOTE — TELEPHONE ENCOUNTER
Annie from [facility] called to transfer pt to find UL order placed by Dr. Pretty, pt is unsure what kind of order was placed by the doctor. Pt was given a physical order but lost the ppw. Please advise. Pt was seen 2/12/24

## 2024-03-19 ENCOUNTER — TELEPHONE (OUTPATIENT)
Facility: CLINIC | Age: 89
End: 2024-03-19

## 2024-03-19 DIAGNOSIS — Z12.31 BREAST CANCER SCREENING BY MAMMOGRAM: Primary | ICD-10-CM

## 2024-03-20 ENCOUNTER — OFFICE VISIT (OUTPATIENT)
Dept: INTERNAL MEDICINE CLINIC | Facility: CLINIC | Age: 89
End: 2024-03-20
Payer: MEDICARE

## 2024-03-20 ENCOUNTER — TELEPHONE (OUTPATIENT)
Dept: INTERNAL MEDICINE CLINIC | Facility: CLINIC | Age: 89
End: 2024-03-20

## 2024-03-20 ENCOUNTER — LAB ENCOUNTER (OUTPATIENT)
Dept: LAB | Age: 89
End: 2024-03-20
Attending: INTERNAL MEDICINE
Payer: MEDICARE

## 2024-03-20 VITALS
SYSTOLIC BLOOD PRESSURE: 124 MMHG | OXYGEN SATURATION: 95 % | BODY MASS INDEX: 28.2 KG/M2 | DIASTOLIC BLOOD PRESSURE: 70 MMHG | HEART RATE: 75 BPM | TEMPERATURE: 97 F | HEIGHT: 61 IN | WEIGHT: 149.38 LBS | RESPIRATION RATE: 16 BRPM

## 2024-03-20 DIAGNOSIS — E11.69 TYPE 2 DIABETES MELLITUS WITH OBESITY (HCC): ICD-10-CM

## 2024-03-20 DIAGNOSIS — R35.0 URINARY FREQUENCY: Primary | ICD-10-CM

## 2024-03-20 DIAGNOSIS — R79.0 LOW MAGNESIUM LEVEL: ICD-10-CM

## 2024-03-20 DIAGNOSIS — R19.7 DIARRHEA, UNSPECIFIED TYPE: ICD-10-CM

## 2024-03-20 DIAGNOSIS — E87.6 HYPOKALEMIA: Primary | ICD-10-CM

## 2024-03-20 DIAGNOSIS — E11.40 TYPE 2 DIABETES MELLITUS WITH DIABETIC NEUROPATHY, WITHOUT LONG-TERM CURRENT USE OF INSULIN (HCC): ICD-10-CM

## 2024-03-20 DIAGNOSIS — E66.9 TYPE 2 DIABETES MELLITUS WITH OBESITY (HCC): ICD-10-CM

## 2024-03-20 LAB
ANION GAP SERPL CALC-SCNC: 1 MMOL/L (ref 0–18)
APPEARANCE: CLEAR
BILIRUBIN: NEGATIVE
BUN BLD-MCNC: 12 MG/DL (ref 9–23)
CALCIUM BLD-MCNC: 9.2 MG/DL (ref 8.5–10.1)
CHLORIDE SERPL-SCNC: 111 MMOL/L (ref 98–112)
CO2 SERPL-SCNC: 28 MMOL/L (ref 21–32)
CREAT BLD-MCNC: 0.8 MG/DL
EGFRCR SERPLBLD CKD-EPI 2021: 69 ML/MIN/1.73M2 (ref 60–?)
FASTING STATUS PATIENT QL REPORTED: YES
GLUCOSE (URINE DIPSTICK): NEGATIVE MG/DL
GLUCOSE BLD-MCNC: 125 MG/DL (ref 70–99)
KETONES (URINE DIPSTICK): NEGATIVE MG/DL
MAGNESIUM SERPL-MCNC: 1.1 MG/DL (ref 1.6–2.6)
MULTISTIX EXPIRATION DATE: ABNORMAL DATE
MULTISTIX LOT#: ABNORMAL NUMERIC
NITRITE, URINE: NEGATIVE
OCCULT BLOOD: NEGATIVE
OSMOLALITY SERPL CALC.SUM OF ELEC: 291 MOSM/KG (ref 275–295)
PH, URINE: 6 (ref 4.5–8)
POTASSIUM SERPL-SCNC: 3.3 MMOL/L (ref 3.5–5.1)
PROTEIN (URINE DIPSTICK): NEGATIVE MG/DL
SODIUM SERPL-SCNC: 140 MMOL/L (ref 136–145)
SPECIFIC GRAVITY: 1.02 (ref 1–1.03)
URINE-COLOR: YELLOW
UROBILINOGEN,SEMI-QN: 0.2 MG/DL (ref 0–1.9)

## 2024-03-20 PROCEDURE — 87086 URINE CULTURE/COLONY COUNT: CPT | Performed by: INTERNAL MEDICINE

## 2024-03-20 PROCEDURE — 87186 SC STD MICRODIL/AGAR DIL: CPT | Performed by: INTERNAL MEDICINE

## 2024-03-20 PROCEDURE — 87088 URINE BACTERIA CULTURE: CPT | Performed by: INTERNAL MEDICINE

## 2024-03-20 PROCEDURE — 83735 ASSAY OF MAGNESIUM: CPT

## 2024-03-20 PROCEDURE — G2211 COMPLEX E/M VISIT ADD ON: HCPCS | Performed by: INTERNAL MEDICINE

## 2024-03-20 PROCEDURE — 99214 OFFICE O/P EST MOD 30 MIN: CPT | Performed by: INTERNAL MEDICINE

## 2024-03-20 PROCEDURE — 81003 URINALYSIS AUTO W/O SCOPE: CPT | Performed by: INTERNAL MEDICINE

## 2024-03-20 PROCEDURE — 36415 COLL VENOUS BLD VENIPUNCTURE: CPT

## 2024-03-20 PROCEDURE — 80048 BASIC METABOLIC PNL TOTAL CA: CPT

## 2024-03-20 RX ORDER — MAGNESIUM L-LACTATE 84 MG
84 TABLET, EXTENDED RELEASE ORAL DAILY
COMMUNITY
Start: 2024-02-20

## 2024-03-20 NOTE — TELEPHONE ENCOUNTER
Component  Ref Range & Units 3/20/24 10:50 AM   Magnesium  1.6 - 2.6 mg/dL 1.1 Low Panic      See result note. TB already aware of results.

## 2024-03-20 NOTE — PROGRESS NOTES
Paulina Man is a 89 year old female.    Chief Complaint   Patient presents with    Follow - Up     EJ Rm 4- Pt is here for 4 mos f/u       HPI:     Pleasant patient with DM2, HTN, HL, COPD here for follow up.  Feels she may have UTI, urgency and frequency last few days. She had UTI in Feb, wants to make sure it is not back. No f/c/flank pain  Dm2 is well controlled, denies any symptoms of hypoglycemia.  On low dose glipizider er 2.5mg daily, last hgba1c 6.4, +neuropathy in feet  C/o intermittent diarrhea for several weeks, depends on what she eats- will start making food diary because not sure what foods lead to diarrhea. No n/v/abd pain. She did have ABX last month for UTI, discussed we can check stool for c dif if diarrhea persists  H/o low Magnesium- pt taking Mag supplement but has hard time swallowing it, tried to crush it but directions said not to crush. She would like to check mag level today     Patient Active Problem List   Diagnosis    Diabetic polyneuropathy associated with type 2 diabetes mellitus (HCC)    Gastroesophageal reflux disease    Primary hypertension    RLS (restless legs syndrome)    Pure hypercholesterolemia    Exudative age-related macular degeneration, bilateral, with active choroidal neovascularization (HCC)    DCIS (ductal carcinoma in situ) of breast    DCIS (ductal carcinoma in situ) of breast    Atypical lobular hyperplasia of right breast    Arthritis of both hands    Bilateral carpal tunnel syndrome    Chronic obstructive pulmonary disease (HCC)    Splenic artery aneurysm (HCC)    Back pain with radiation    DDD (degenerative disc disease), lumbar    Chronic vulvitis    Atrophic vaginitis    Recurrent UTI    Unsteady gait    BPPV (benign paroxysmal positional vertigo), unspecified laterality    B12 deficiency    Dextroscoliosis    Cervical spondylosis without myelopathy    Spondylosis of lumbar region without myelopathy or radiculopathy    Dyspnea    COPD exacerbation (HCC)     Dyspnea, unspecified type    Shortness of breath    Palpitations    Encounter for well woman exam with abnormal findings     Current Outpatient Medications   Medication Sig Dispense Refill    Magnesium Lactate (MAG-TAB SR) 84 MG (7MEQ) Oral Tab CR Take 1 tablet (84 mg total) by mouth daily.      atorvastatin 40 MG Oral Tab Take 1 tablet (40 mg total) by mouth daily.      GLIPIZIDE ER 2.5 MG Oral Tablet 24 Hr TAKE ONE TABLET BY MOUTH ONE TIME DAILY WITH BREAKFAST 30 tablet 0    pramipexole (MIRAPEX) 0.125 MG Oral Tab Take 0.125 mg ( 1 tablet) at ~ 7 PM and 0.125 mg (1 tablet) at bedtime 60 tablet 2    estradiol 0.1 MG/GM Vaginal Cream Apply 0.5 grams (pea-sized amount) of cream inside the vagina nightly. May use small amount at the vaginal opening as well in a thin layer. 42.5 g 3    clotrimazole-betamethasone 1-0.05 % External Cream Apply 1 Application topically 2 (two) times daily as needed. 30 g 1    ESOMEPRAZOLE MAGNESIUM 40 MG Oral Capsule Delayed Release TAKE ONE CAPSULE BY MOUTH TWICE DAILY BEFORE MEALS 180 capsule 0    mometasone 0.1 % External Ointment apply a thin layer daily to vulva for 2 weeks then twice weekly 45 g 0    ondansetron (ZOFRAN ODT) 8 MG Oral Tablet Dispersible Take 1 tablet (8 mg total) by mouth every 8 (eight) hours as needed for Nausea. 30 tablet 0    cyclobenzaprine 5 MG Oral Tab Take 1 tablet (5 mg total) by mouth nightly as needed for Muscle spasms. 30 tablet 0    furosemide 20 MG Oral Tab Take 1 tablet (20 mg total) by mouth daily.      albuterol (VENTOLIN HFA) 108 (90 Base) MCG/ACT Inhalation Aero Soln Inhale 2 puffs into the lungs every 4 (four) hours as needed for Wheezing. 1 each 1    fluticasone propionate 50 MCG/ACT Nasal Suspension 2 sprays by Nasal route daily.      acetaminophen 500 MG Oral Tab Take 1 tablet (500 mg total) by mouth every 6 (six) hours as needed for Pain.      docusate sodium 100 MG Oral Cap Take 1 capsule (100 mg total) by mouth 2 (two) times daily as needed  for constipation.      CAPSAICIN APR EX Apply topically as needed.      Loperamide HCl 2 MG Oral Cap Take 1 capsule (2 mg total) by mouth as needed for Diarrhea.      Multiple Vitamins-Minerals (PRESERVISION AREDS) Oral Tab Take 1 tablet by mouth 2 (two) times daily.        Past Medical History:   Diagnosis Date    Abdominal distention     Abdominal hernia     Abdominal pain     Acute diverticulitis 10/10/2020    Noted on CT scan - acute diverticulitis of the sigmoid colon    Arrhythmia     Arthritis     Atypical lobular hyperplasia of right breast 06/30/2016    Back pain     Back problem     lower lumbar bad discs     Mckeon's esophagus without dysplasia 11/28/2018    CHF (congestive heart failure) ([facility])     Chronic cough     Chronic diarrhea 12/04/2015    Started before metformin- related to IBS      Constipation     COPD (chronic obstructive pulmonary disease) ([facility])     NO OXYGEN- no pulmonologist    Deviated septum     left side    Diabetic polyneuropathy associated with type 2 diabetes mellitus ([facility]) 12/04/2015    Diarrhea, unspecified     Disorder of thyroid     has Thyroid nodules    Diverticulosis of large intestine     Dizziness     Ductal carcinoma in situ (DCIS) of right breast     Esophageal reflux     Essential hypertension     Exudative macular degeneration ([facility]) 04/01/2016    Right Eye     Fatigue     Flatulence/gas pain/belching     Frequent urination     Frequent UTI     Full dentures     Gastroesophageal reflux disease 12/04/2015    GERD (gastroesophageal reflux disease)     Hearing impairment     bilateral hearing aids    Hearing loss     Heart palpitations     Hemorrhoids     High blood pressure     High cholesterol     History of cardiac murmur     History of tobacco use     Hoarseness, chronic     Hyperlipidemia     not taking lipitor due to side effects    Incontinence     uses pads    Indigestion     Irregular bowel habits     Itch of skin     Leaking of urine     Leaky heart valve      slight per pt- had echo 2015    Loss of appetite     Macular degeneration     Macular degeneration     Menopause     Nausea     Neuropathy     Nodule of left lung 2016    Obesity (BMI 30-39.9)     Osteoarthritis     all joints    Osteoporosis     Other fatigue 10/10/2018    Pain in joints     Pap smear for cervical cancer screening 2006    Per patient had pap in . No abn pap.     Pneumonia, organism unspecified(486)     2015    PONV (postoperative nausea and vomiting)     Problems with swallowing     due hiatal hernia; gaggs at times    Restless leg syndrome     Restless leg syndrome     RLS (restless legs syndrome) 2015    Screening breast examination 2021    Dr. 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   Tobacco Use    Smoking status: Former     Packs/day: 1.00     Years: 35.00     Additional pack years: 0.00     Total pack years: 35.00     Types: Cigarettes     Quit date: 1988     Years since quittin.5     Passive exposure: Never    Smokeless tobacco: Former   Vaping Use    Vaping Use: Never used   Substance and Sexual Activity    Alcohol use: No    Drug use: No    Sexual activity: Not Currently   Other Topics Concern    Caffeine Concern Yes     Comment: coffee 1- 2 cups daily    Exercise No     Comment: walking     Social Determinants of Health     Financial Resource Strain: Low Risk  (2023)    Financial Resource Strain     Difficulty of Paying Living  Expenses: Not hard at all     Med Affordability: No   Food Insecurity: No Food Insecurity (12/27/2023)    Food Insecurity     Food Insecurity: Never true   Transportation Needs: No Transportation Needs (12/27/2023)    Transportation Needs     Lack of Transportation: No   Physical Activity: Insufficiently Active (2/27/2023)    Exercise Vital Sign     Days of Exercise per Week: 1 day     Minutes of Exercise per Session: 20 min   Stress: No Stress Concern Present (2/27/2023)    Stress     Feeling of Stress : No   Social Connections: Socially Integrated (2/27/2023)    Social Connections     Frequency of Socialization with Friends and Family: 2   Housing Stability: Low Risk  (12/27/2023)    Housing Stability     Housing Instability: No     Housing Instability Emergency: No     Family History   Problem Relation Age of Onset    Breast Cancer Paternal Aunt     Heart Disorder Mother     Hypertension Mother     Colon Polyps Mother     Heart Disorder Father     Hypertension Father     Lipids Father     Heart Disorder Paternal Grandfather     Diabetes Paternal Grandmother     Breast Cancer Self 86        DCIS        Allergies  Allergies   Allergen Reactions    Ciprofloxacin NAUSEA AND VOMITING and HIVES    Peanut-Containing Drug Products UNKNOWN and OTHER (SEE COMMENTS)     Bladder infection    Pollen ITCHING and HIVES    Sulfa Antibiotics NAUSEA AND VOMITING    Adhesive Tape ITCHING    Dander RASH     fur    Other ITCHING     feather         REVIEW OF SYSTEMS:   GENERAL HEALTH:  no fevers   RESPIRATORY: no cough  CARDIOVASCULAR: denies chest pain  GI: denies abdominal pain  : no dysuria  NEURO: denies headaches  PSYCH: No reported depression   HEME: No adenopathy      EXAM:   /70   Pulse 75   Temp 97 °F (36.1 °C) (Temporal)   Resp 16   Ht 5' 1\" (1.549 m)   Wt 149 lb 6.4 oz (67.8 kg)   LMP  (LMP Unknown)   SpO2 95%   BMI 28.23 kg/m²   GENERAL: well developed, well nourished,in no apparent distress  HEENT:  atraumatic, normocephalic  NECK: supple,no adenopathy  LUNGS: normal rate without respiratory distress, lungs clear to auscultation  CARDIO: RRR nl S1 S2  GI: normal bowel sounds, soft, NT/ND  EXTREMITIES: no cyanosis, clubbing or edema  NEURO: Alert and oriented    ASSESSMENT AND PLAN:     Encounter Diagnoses   Name     Urinary frequency- urine dip with trace OSMAN, rest normal. Will check urine culture before prescribing antibiotic     Diarrhea, unspecified type- agree with food diary, would avoid dairy. Patient to call if symptoms persist.     Low magnesium level- consider switching to gummy or chewable supplement since she has trouble with large pills, check mag level today     Type 2 diabetes mellitus with diabetic neuropathy, without long-term current use of insulin ([facility])- BG well controlled, no signs or symptoms of hypoglycemia . If hgba1c consistently under 7, consider stopping glipizide ER 2.5mg due to her advanced age        Orders Placed This Encounter   Procedures    Urine Dip, auto without Micro    Magnesium [E]    Basic Metabolic Panel (8) [E]    Urine Culture, Routine [E]       Meds & Refills for this Visit:  Requested Prescriptions      No prescriptions requested or ordered in this encounter       Imaging & Consults:  None    Return in about 4 months (around 7/20/2024) for wellness.  There are no Patient Instructions on file for this visit.      The patient indicates understanding of these issues and agrees to the plan.

## 2024-03-21 PROBLEM — E11.69 TYPE 2 DIABETES MELLITUS WITH OBESITY (HCC): Status: RESOLVED | Noted: 2023-04-07 | Resolved: 2024-03-21

## 2024-03-21 PROBLEM — E66.9 TYPE 2 DIABETES MELLITUS WITH OBESITY (HCC): Status: RESOLVED | Noted: 2023-04-07 | Resolved: 2024-03-21

## 2024-03-22 ENCOUNTER — TELEPHONE (OUTPATIENT)
Dept: INTERNAL MEDICINE CLINIC | Facility: CLINIC | Age: 89
End: 2024-03-22

## 2024-03-22 RX ORDER — CEPHALEXIN 500 MG/1
500 CAPSULE ORAL 2 TIMES DAILY
Qty: 14 CAPSULE | Refills: 0 | Status: SHIPPED | OUTPATIENT
Start: 2024-03-22

## 2024-03-26 ENCOUNTER — HOSPITAL ENCOUNTER (OUTPATIENT)
Dept: BONE DENSITY | Age: 89
Discharge: HOME | End: 2024-03-26
Attending: INTERNAL MEDICINE
Payer: MEDICARE

## 2024-03-26 DIAGNOSIS — Z78.0 POST-MENOPAUSAL: ICD-10-CM

## 2024-03-26 PROCEDURE — 77080 DXA BONE DENSITY AXIAL: CPT | Performed by: INTERNAL MEDICINE

## 2024-04-02 ENCOUNTER — OFFICE VISIT (OUTPATIENT)
Dept: INTERNAL MEDICINE CLINIC | Facility: CLINIC | Age: 89
End: 2024-04-02
Payer: MEDICARE

## 2024-04-02 ENCOUNTER — TELEPHONE (OUTPATIENT)
Dept: URGENT CARE | Age: 89
End: 2024-04-02

## 2024-04-02 VITALS
OXYGEN SATURATION: 92 % | SYSTOLIC BLOOD PRESSURE: 114 MMHG | DIASTOLIC BLOOD PRESSURE: 60 MMHG | HEART RATE: 61 BPM | BODY MASS INDEX: 27.94 KG/M2 | HEIGHT: 61 IN | WEIGHT: 148 LBS

## 2024-04-02 DIAGNOSIS — M81.0 SENILE OSTEOPOROSIS: Primary | ICD-10-CM

## 2024-04-02 DIAGNOSIS — H91.8X3 OTHER SPECIFIED HEARING LOSS OF BOTH EARS: ICD-10-CM

## 2024-04-02 DIAGNOSIS — H61.22 CERUMINOSIS, LEFT: ICD-10-CM

## 2024-04-02 PROCEDURE — 69210 REMOVE IMPACTED EAR WAX UNI: CPT | Performed by: INTERNAL MEDICINE

## 2024-04-02 PROCEDURE — 99213 OFFICE O/P EST LOW 20 MIN: CPT | Performed by: INTERNAL MEDICINE

## 2024-04-02 RX ORDER — GLIPIZIDE 2.5 MG/1
2.5 TABLET, EXTENDED RELEASE ORAL
Qty: 90 TABLET | Refills: 0 | Status: SHIPPED | OUTPATIENT
Start: 2024-04-02

## 2024-04-02 RX ORDER — ALENDRONATE SODIUM 70 MG/1
70 TABLET ORAL
Qty: 4 TABLET | Refills: 2 | Status: SHIPPED | OUTPATIENT
Start: 2024-04-02

## 2024-04-02 NOTE — PROGRESS NOTES
Paulina Man is a 89 year old female.    Chief Complaint   Patient presents with    Medication Follow-Up     Room 4 ac  Discuss dexa scan results and medications    Ear Wax     Wax causing hearing issues       HPI:     Pleasant patient with DM2 and HTN here for dexa review/medication for osteoporosis and with c/o trouble hearing due to wax build up. Patient states she has had osteoporosis for many years, believes she took fosamax for 2-3 years prior to moving here. No SE that she recalls from the Fosamax. She denies any fracture history. She is not good about takng ca and vit d supplements.   Femoral neck T score was -2.9,  lumbar spine T score -0.3, hip T score -2.4  She is [place] and wears hearing aids. Recently wax has built up and she cannot hear even with the hearing aids.       Patient Active Problem List   Diagnosis    Diabetic polyneuropathy associated with type 2 diabetes mellitus (HCC)    Gastroesophageal reflux disease    Primary hypertension    RLS (restless legs syndrome)    Pure hypercholesterolemia    Exudative age-related macular degeneration, bilateral, with active choroidal neovascularization (HCC)    DCIS (ductal carcinoma in situ) of breast    DCIS (ductal carcinoma in situ) of breast    Atypical lobular hyperplasia of right breast    Arthritis of both hands    Bilateral carpal tunnel syndrome    Chronic obstructive pulmonary disease (HCC)    Splenic artery aneurysm (HCC)    Back pain with radiation    DDD (degenerative disc disease), lumbar    Chronic vulvitis    Atrophic vaginitis    Recurrent UTI    Unsteady gait    BPPV (benign paroxysmal positional vertigo), unspecified laterality    B12 deficiency    Dextroscoliosis    Cervical spondylosis without myelopathy    Spondylosis of lumbar region without myelopathy or radiculopathy    Dyspnea    COPD exacerbation (HCC)    Dyspnea, unspecified type    Shortness of breath    Palpitations    Encounter for well woman exam with abnormal findings      Current Outpatient Medications   Medication Sig Dispense Refill    glipiZIDE ER 2.5 MG Oral Tablet 24 Hr TAKE ONE TABLET BY MOUTH DAILY WITH BREAKFAST 90 tablet 0    alendronate (FOSAMAX) 70 MG Oral Tab Take 1 tablet (70 mg total) by mouth every 7 days. 4 tablet 2    cephalexin 500 MG Oral Cap Take 1 capsule (500 mg total) by mouth 2 (two) times daily. 14 capsule 0    Magnesium Lactate (MAG-TAB SR) 84 MG (7MEQ) Oral Tab CR Take 1 tablet (84 mg total) by mouth daily.      atorvastatin 40 MG Oral Tab Take 1 tablet (40 mg total) by mouth daily.      pramipexole (MIRAPEX) 0.125 MG Oral Tab Take 0.125 mg ( 1 tablet) at ~ 7 PM and 0.125 mg (1 tablet) at bedtime 60 tablet 2    clotrimazole-betamethasone 1-0.05 % External Cream Apply 1 Application topically 2 (two) times daily as needed. 30 g 1    ESOMEPRAZOLE MAGNESIUM 40 MG Oral Capsule Delayed Release TAKE ONE CAPSULE BY MOUTH TWICE DAILY BEFORE MEALS 180 capsule 0    mometasone 0.1 % External Ointment apply a thin layer daily to vulva for 2 weeks then twice weekly 45 g 0    ondansetron (ZOFRAN ODT) 8 MG Oral Tablet Dispersible Take 1 tablet (8 mg total) by mouth every 8 (eight) hours as needed for Nausea. 30 tablet 0    cyclobenzaprine 5 MG Oral Tab Take 1 tablet (5 mg total) by mouth nightly as needed for Muscle spasms. 30 tablet 0    furosemide 20 MG Oral Tab Take 1 tablet (20 mg total) by mouth daily.      albuterol (VENTOLIN HFA) 108 (90 Base) MCG/ACT Inhalation Aero Soln Inhale 2 puffs into the lungs every 4 (four) hours as needed for Wheezing. 1 each 1    fluticasone propionate 50 MCG/ACT Nasal Suspension 2 sprays by Nasal route daily.      acetaminophen 500 MG Oral Tab Take 1 tablet (500 mg total) by mouth every 6 (six) hours as needed for Pain.      docusate sodium 100 MG Oral Cap Take 1 capsule (100 mg total) by mouth 2 (two) times daily as needed for constipation.      CAPSAICIN APR EX Apply topically as needed.      Loperamide HCl 2 MG Oral Cap Take 1  capsule (2 mg total) by mouth as needed for Diarrhea.      Multiple Vitamins-Minerals (PRESERVISION AREDS) Oral Tab Take 1 tablet by mouth 2 (two) times daily.      estradiol 0.1 MG/GM Vaginal Cream Apply 0.5 grams (pea-sized amount) of cream inside the vagina nightly. May use small amount at the vaginal opening as well in a thin layer. (Patient not taking: Reported on 4/2/2024) 42.5 g 3      Past Medical History:   Diagnosis Date    Abdominal distention     Abdominal hernia     Abdominal pain     Acute diverticulitis 10/10/2020    Noted on CT scan - acute diverticulitis of the sigmoid colon    Arrhythmia     Arthritis     Atypical lobular hyperplasia of right breast 06/30/2016    Back pain     Back problem     lower lumbar bad discs     Mckeon's esophagus without dysplasia 11/28/2018    CHF (congestive heart failure) ([facility])     Chronic cough     Chronic diarrhea 12/04/2015    Started before metformin- related to IBS      Constipation     COPD (chronic obstructive pulmonary disease) ([facility])     NO OXYGEN- no pulmonologist    Deviated septum     left side    Diabetic polyneuropathy associated with type 2 diabetes mellitus ([facility]) 12/04/2015    Diarrhea, unspecified     Disorder of thyroid     has Thyroid nodules    Diverticulosis of large intestine     Dizziness     Ductal carcinoma in situ (DCIS) of right breast     Esophageal reflux     Essential hypertension     Exudative macular degeneration ([facility]) 04/01/2016    Right Eye     Fatigue     Flatulence/gas pain/belching     Frequent urination     Frequent UTI     Full dentures     Gastroesophageal reflux disease 12/04/2015    GERD (gastroesophageal reflux disease)     Hearing impairment     bilateral hearing aids    Hearing loss     Heart palpitations     Hemorrhoids     High blood pressure     High cholesterol     History of cardiac murmur     History of tobacco use     Hoarseness, chronic     Hyperlipidemia     not taking lipitor due to side effects    Incontinence      uses pads    Indigestion     Irregular bowel habits     Itch of skin     Leaking of urine     Leaky heart valve     slight per pt- had echo 2015    Loss of appetite     Macular degeneration     Macular degeneration     Menopause     Nausea     Neuropathy     Nodule of left lung 2016    Obesity (BMI 30-39.9)     Osteoarthritis     all joints    Osteoporosis     Other fatigue 10/10/2018    Pain in joints     Pap smear for cervical cancer screening 2006    Per patient had pap in . No abn pap.     Pneumonia, organism unspecified(486)     2015    PONV (postoperative nausea and vomiting)     Problems with swallowing     due hiatal hernia; gaggs at times    Restless leg syndrome     Restless leg syndrome     RLS (restless legs syndrome) 2015    Screening breast examination 2021    Dr. 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   Tobacco Use    Smoking status: Former     Packs/day: 1.00     Years: 35.00     Additional pack years: 0.00     Total pack years: 35.00     Types: Cigarettes     Quit date: 1988     Years since quittin.6     Passive exposure: Never    Smokeless tobacco: Former   Vaping Use    Vaping Use: Never used   Substance and Sexual Activity    Alcohol use: No    Drug use: No    Sexual activity: Not Currently   Other Topics Concern    Caffeine Concern Yes     Comment: coffee 1- 2 cups daily    Exercise No     Comment: walking     Social Determinants of Health      Financial Resource Strain: Low Risk  (2/27/2023)    Financial Resource Strain     Difficulty of Paying Living Expenses: Not hard at all     Med Affordability: No   Food Insecurity: No Food Insecurity (12/27/2023)    Food Insecurity     Food Insecurity: Never true   Transportation Needs: No Transportation Needs (12/27/2023)    Transportation Needs     Lack of Transportation: No   Physical Activity: Insufficiently Active (2/27/2023)    Exercise Vital Sign     Days of Exercise per Week: 1 day     Minutes of Exercise per Session: 20 min   Stress: No Stress Concern Present (2/27/2023)    Stress     Feeling of Stress : No   Social Connections: Socially Integrated (2/27/2023)    Social Connections     Frequency of Socialization with Friends and Family: 2   Housing Stability: Low Risk  (12/27/2023)    Housing Stability     Housing Instability: No     Housing Instability Emergency: No     Family History   Problem Relation Age of Onset    Breast Cancer Paternal Aunt     Heart Disorder Mother     Hypertension Mother     Colon Polyps Mother     Heart Disorder Father     Hypertension Father     Lipids Father     Heart Disorder Paternal Grandfather     Diabetes Paternal Grandmother     Breast Cancer Self 86        DCIS        Allergies  Allergies   Allergen Reactions    Ciprofloxacin NAUSEA AND VOMITING and HIVES    Peanut-Containing Drug Products UNKNOWN and OTHER (SEE COMMENTS)     Bladder infection    Pollen ITCHING and HIVES    Sulfa Antibiotics NAUSEA AND VOMITING    Adhesive Tape ITCHING    Dander RASH     fur    Other ITCHING     feather         REVIEW OF SYSTEMS:   GENERAL HEALTH:  no fevers   RESPIRATORY: no cough  CARDIOVASCULAR: denies chest pain  GI: denies abdominal pain  : no dysuria      EXAM:   /60   Pulse 61   Ht 5' 1\" (1.549 m)   Wt 148 lb (67.1 kg)   LMP  (LMP Unknown)   SpO2 92%   BMI 27.96 kg/m²   GENERAL: well developed, well nourished,in no apparent distress  HEENT: atraumatic,  normocephalic, L>>R cerumen, impacted on the left. Impacted wax removed with irrigation as well as curette on the left, pt able to hear again with hearing aids in. No complications with wax removal  LUNGS: normal rate without respiratory distress  NEURO: Alert and oriented    ASSESSMENT AND PLAN:     Encounter Diagnoses   Name     Senile osteoporosis- start fosamax 70mg weekly, potential side effects reviewed. Stressed adequate intake of calcium and vitamin D.      Ceruminosis, left- removed today with irrigation and curette     Other specified hearing loss of both ears- improved with wax removal, pt wears hearing aids         No orders of the defined types were placed in this encounter.      Meds & Refills for this Visit:  Requested Prescriptions     Signed Prescriptions Disp Refills    alendronate (FOSAMAX) 70 MG Oral Tab 4 tablet 2     Sig: Take 1 tablet (70 mg total) by mouth every 7 days.       Imaging & Consults:  None    Return if symptoms worsen or fail to improve.  There are no Patient Instructions on file for this visit.      The patient indicates understanding of these issues and agrees to the plan.

## 2024-04-02 NOTE — TELEPHONE ENCOUNTER
Last VISIT 03-20-24 TB uti    Last CPE 07-25-23    Last REFILL 03-06-24     Last LABS 02-14-24 poc a1c    Future Appointments   Date Time Provider Department Center   4/2/2024  3:00 PM Annie Newton MD [facility]   4/16/2024  4:40 PM [facility]   4/29/2024  2:30 PM Bebeto Blackwell MD [facility]   5/14/2024  1:20 PM Aydin Burroughs MD [facility] [place]   7/29/2024  4:00 PM Annie Newton MD [facility]         Please Approve or Deny.

## 2024-04-03 ENCOUNTER — LAB ENCOUNTER (OUTPATIENT)
Dept: LAB | Facility: HOSPITAL | Age: 89
End: 2024-04-03
Attending: INTERNAL MEDICINE
Payer: MEDICARE

## 2024-04-03 DIAGNOSIS — E87.6 HYPOKALEMIA: ICD-10-CM

## 2024-04-03 LAB
ANION GAP SERPL CALC-SCNC: 2 MMOL/L (ref 0–18)
BUN BLD-MCNC: 13 MG/DL (ref 9–23)
CALCIUM BLD-MCNC: 9.5 MG/DL (ref 8.5–10.1)
CHLORIDE SERPL-SCNC: 109 MMOL/L (ref 98–112)
CO2 SERPL-SCNC: 30 MMOL/L (ref 21–32)
CREAT BLD-MCNC: 0.95 MG/DL
EGFRCR SERPLBLD CKD-EPI 2021: 56 ML/MIN/1.73M2 (ref 60–?)
FASTING STATUS PATIENT QL REPORTED: YES
GLUCOSE BLD-MCNC: 116 MG/DL (ref 70–99)
MAGNESIUM SERPL-MCNC: 1.4 MG/DL (ref 1.6–2.6)
OSMOLALITY SERPL CALC.SUM OF ELEC: 293 MOSM/KG (ref 275–295)
POTASSIUM SERPL-SCNC: 4 MMOL/L (ref 3.5–5.1)
SODIUM SERPL-SCNC: 141 MMOL/L (ref 136–145)

## 2024-04-03 PROCEDURE — 36415 COLL VENOUS BLD VENIPUNCTURE: CPT

## 2024-04-03 PROCEDURE — 83735 ASSAY OF MAGNESIUM: CPT

## 2024-04-03 PROCEDURE — 80048 BASIC METABOLIC PNL TOTAL CA: CPT

## 2024-04-09 ENCOUNTER — PATIENT OUTREACH (OUTPATIENT)
Dept: CASE MANAGEMENT | Age: 89
End: 2024-04-09

## 2024-04-09 DIAGNOSIS — H35.3231 EXUDATIVE AGE-RELATED MACULAR DEGENERATION, BILATERAL, WITH ACTIVE CHOROIDAL NEOVASCULARIZATION (HCC): ICD-10-CM

## 2024-04-09 DIAGNOSIS — E11.42 DIABETIC POLYNEUROPATHY ASSOCIATED WITH TYPE 2 DIABETES MELLITUS (HCC): ICD-10-CM

## 2024-04-09 DIAGNOSIS — I10 PRIMARY HYPERTENSION: ICD-10-CM

## 2024-04-09 DIAGNOSIS — M51.36 DDD (DEGENERATIVE DISC DISEASE), LUMBAR: ICD-10-CM

## 2024-04-09 DIAGNOSIS — E53.8 B12 DEFICIENCY: Primary | ICD-10-CM

## 2024-04-09 DIAGNOSIS — M19.042 ARTHRITIS OF BOTH HANDS: ICD-10-CM

## 2024-04-09 DIAGNOSIS — M19.041 ARTHRITIS OF BOTH HANDS: ICD-10-CM

## 2024-04-09 DIAGNOSIS — R26.81 UNSTEADY GAIT: ICD-10-CM

## 2024-04-09 DIAGNOSIS — G25.81 RLS (RESTLESS LEGS SYNDROME): ICD-10-CM

## 2024-04-09 DIAGNOSIS — J44.9 CHRONIC OBSTRUCTIVE PULMONARY DISEASE, UNSPECIFIED COPD TYPE (HCC): ICD-10-CM

## 2024-04-09 DIAGNOSIS — J44.1 COPD EXACERBATION (HCC): ICD-10-CM

## 2024-04-09 DIAGNOSIS — K21.9 GASTROESOPHAGEAL REFLUX DISEASE WITHOUT ESOPHAGITIS: ICD-10-CM

## 2024-04-09 DIAGNOSIS — E78.00 PURE HYPERCHOLESTEROLEMIA: ICD-10-CM

## 2024-04-09 NOTE — PROGRESS NOTES
Spoke to Paulina for CCM.      Updates to patient care team/comments: UTD  Patient reported changes in medications: UTD  Med Adherence  Comment: pt taking medications as prescribed     Health Maintenance:   Health Maintenance   Topic Date Due    Diabetes Care: Microalb/Creat Ratio  05/04/2024    Diabetes Care Dilated Eye Exam  07/20/2024    Annual Physical  07/25/2024    Diabetes Care A1C  08/14/2024    Diabetes Care Foot Exam  10/23/2024    Diabetes Care: GFR  04/03/2025    Influenza Vaccine  Completed    Annual Depression Screening  Completed    Fall Risk Screening (Annual)  Completed    Pneumococcal Vaccine: 65+ Years  Completed    Zoster Vaccines  Completed    COVID-19 Vaccine  Completed       Patient updates/concerns:    Spoke to pt for monthly outreach   Pt is feeling much better after having her ears cleaned. Pt states that she can hear very weell. She will be bringing them into [facility] to be serviced.    Pt has not yet started fosomax. She had been unable to find the very specific instructions abou how to take it until the time of outreach. [facility] reviewed with pt the instruction to take first thing in the morning and to not lay down for at least thirty minutes following the pill. Pt verbalized understanding. Pt states she will start Wednesday morning 4/10/22. She will write the pill in her calendar to not forget.    [facility] reviewed with pt the potential side effects of fosomax and advised pt to pay attention if any appear or get worse. Pt verbalized understanding. [facility] sent pt list of all the various side effects presented in the manufacuterers guide.    Pt wanted to review magnesium rx. According to pcp office pt was advised to take 400. Pt has started and she confirmed with nurses and was waiting for response from pcp if she felt it should be increased even more. According to telephone encounter pcp has been notified that pt is in fact taking the recommended dose.   Pt states that she has  a visit with GI  upcoming to discuss her ongoing abdominal pain and recurring constipation.  Pt has started to eat a small salad every day and has noticed that her condition has somewhat improved. [facility] discussed with pt the importance of salad on optimal digestion and the benefits it has for her hydration considering she has difficulty drinking water regularly. Pt states that she is trying to make sure to drink at least two large glasses daily. She will gradually try to introduce more. Pt is aware that pcp has recommended 6 large glasses daily.     Pt has mammogram scheduled and has no questions or concerns about the procedure.   Pt is very pleased with her restless leg condition improvement since altering dose of pramiprexole. Pt is cutting pill in half and having 1/2 at 7pm and the other half at bed time. Pt states that this has improved her ability to fall asleep at a regular time because the medication makes her drowsy. Pt states that she is getting much better sleep but still seems to sleep lightly and goes to the restroom a few times over night.    Pt feels her memory has not been as sharp in recent months. [facility] discusses d with pt strategies to keep cognition strong. 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 Pt states that she has not tried to buy a puzzle book but agrees to incorporate puzzles and crosswords into her routine. Pt welcomed [facility] resources for foods that help keep memory strong, like fatty fish salmon etc.    Pt still uses walker to ambulate and has an increased confidence in her steps now that restless leg has improved.   Goals/Action Plan:    Active goal from previous outreach: exercise    Patient reported progress towards goals: pt is walking more now that weather has improved.                - What: drinking water           - Where/When/How: pt is up to consistently taking 2 glasses every day. Pt will gradually add 1 glass when  tolerable but will maintain at least two every day at this time  Patient Reported Barriers and Concerns: n/a                   - Plan for overcoming barriers: none    Care Managers Interventions: continue to provide encouragement and education for healthy coping and dx    Future Appointments:   Future Appointments   Date Time Provider Department Center   4/12/2024  3:30 PM Tana Cates APRN [facility]   4/16/2024  4:40 PM [facility]   4/29/2024  2:30 PM Bebeto Blackwell MD [facility] Pulm [facility] [place]   5/14/2024  1:20 PM Aydin Burroughs MD [facility] [place]   7/29/2024  4:00 PM Annie Newton MD [facility]         Next Care Manager Follow Up Date: one month    Reason For Follow Up: review progress and or barriers towards patient's goals.     Time Spent This Encounter Total: 50 min medical record review, telephone communication, care plan updates where needed, education, goals, and action plan recreation/update. Provided acknowledgment and validation to patient's concerns.   Monthly Minute Total including today: 50  Physical assessment, complete health history, and need for CCM established by Annie Newton MD.

## 2024-04-12 PROBLEM — Z87.891 PERSONAL HISTORY OF NICOTINE DEPENDENCE: Status: ACTIVE | Noted: 2023-01-01

## 2024-04-12 PROBLEM — Z86.000 PERSONAL HISTORY OF IN-SITU NEOPLASM OF BREAST: Status: ACTIVE | Noted: 2023-01-01

## 2024-04-12 PROBLEM — K46.9 UNSPECIFIED ABDOMINAL HERNIA WITHOUT OBSTRUCTION OR GANGRENE: Status: ACTIVE | Noted: 2023-01-01

## 2024-04-12 PROBLEM — E66.9 OBESITY, UNSPECIFIED: Status: ACTIVE | Noted: 2023-01-01

## 2024-04-12 PROBLEM — M19.90 UNSPECIFIED OSTEOARTHRITIS, UNSPECIFIED SITE: Status: ACTIVE | Noted: 2023-03-17

## 2024-04-12 PROBLEM — M81.0 AGE-RELATED OSTEOPOROSIS WITHOUT CURRENT PATHOLOGICAL FRACTURE: Status: ACTIVE | Noted: 2023-03-17

## 2024-04-12 PROBLEM — Z60.2 PROBLEMS RELATED TO LIVING ALONE: Status: ACTIVE | Noted: 2023-01-01

## 2024-04-12 PROBLEM — E11.42 TYPE 2 DIABETES MELLITUS WITH DIABETIC POLYNEUROPATHY (HCC): Status: ACTIVE | Noted: 2023-01-01

## 2024-04-12 PROBLEM — G47.30 SLEEP APNEA, UNSPECIFIED: Status: ACTIVE | Noted: 2023-01-01

## 2024-04-12 PROBLEM — Z87.01 PERSONAL HISTORY OF PNEUMONIA (RECURRENT): Status: ACTIVE | Noted: 2023-01-01

## 2024-04-12 PROBLEM — H91.90 UNSPECIFIED HEARING LOSS, UNSPECIFIED EAR: Status: ACTIVE | Noted: 2023-03-17

## 2024-04-12 PROBLEM — Z87.440 PERSONAL HISTORY OF URINARY (TRACT) INFECTIONS: Status: ACTIVE | Noted: 2023-01-01

## 2024-04-12 PROBLEM — I87.2 VENOUS INSUFFICIENCY OF BOTH LOWER EXTREMITIES: Status: ACTIVE | Noted: 2022-11-28

## 2024-04-12 PROBLEM — I51.89 DIASTOLIC DYSFUNCTION: Status: ACTIVE | Noted: 2022-10-21

## 2024-04-12 PROBLEM — I50.32 CHRONIC DIASTOLIC HEART FAILURE (HCC): Status: ACTIVE | Noted: 2022-10-31

## 2024-04-12 PROBLEM — R60.0 EDEMA LEG: Status: ACTIVE | Noted: 2022-10-24

## 2024-04-16 ENCOUNTER — HOSPITAL ENCOUNTER (OUTPATIENT)
Dept: MAMMOGRAPHY | Facility: HOSPITAL | Age: 89
Discharge: HOME | End: 2024-04-16
Attending: OBSTETRICS & GYNECOLOGY
Payer: MEDICARE

## 2024-04-16 ENCOUNTER — HOSPITAL ENCOUNTER (OUTPATIENT)
Dept: CT IMAGING | Facility: HOSPITAL | Age: 89
Discharge: HOME | End: 2024-04-16
Payer: MEDICARE

## 2024-04-16 DIAGNOSIS — Z12.31 BREAST CANCER SCREENING BY MAMMOGRAM: ICD-10-CM

## 2024-04-16 DIAGNOSIS — R10.30 LOWER ABDOMINAL PAIN: ICD-10-CM

## 2024-04-16 DIAGNOSIS — Z87.19 HISTORY OF DIVERTICULITIS: ICD-10-CM

## 2024-04-16 PROCEDURE — 74177 CT ABD & PELVIS W/CONTRAST: CPT

## 2024-04-16 PROCEDURE — 77067 SCR MAMMO BI INCL CAD: CPT | Performed by: OBSTETRICS & GYNECOLOGY

## 2024-04-16 PROCEDURE — 77063 BREAST TOMOSYNTHESIS BI: CPT | Performed by: OBSTETRICS & GYNECOLOGY

## 2024-04-29 ENCOUNTER — TELEPHONE (OUTPATIENT)
Dept: INTERNAL MEDICINE CLINIC | Facility: CLINIC | Age: 89
End: 2024-04-29

## 2024-04-29 ENCOUNTER — OFFICE VISIT (OUTPATIENT)
Facility: CLINIC | Age: 89
End: 2024-04-29
Payer: MEDICARE

## 2024-04-29 ENCOUNTER — LAB ENCOUNTER (OUTPATIENT)
Dept: LAB | Facility: HOSPITAL | Age: 89
End: 2024-04-29
Attending: INTERNAL MEDICINE
Payer: MEDICARE

## 2024-04-29 VITALS
DIASTOLIC BLOOD PRESSURE: 60 MMHG | BODY MASS INDEX: 28.32 KG/M2 | OXYGEN SATURATION: 97 % | WEIGHT: 150 LBS | HEIGHT: 61 IN | RESPIRATION RATE: 14 BRPM | SYSTOLIC BLOOD PRESSURE: 106 MMHG | HEART RATE: 69 BPM

## 2024-04-29 DIAGNOSIS — R06.02 SHORTNESS OF BREATH: Primary | ICD-10-CM

## 2024-04-29 DIAGNOSIS — K21.9 GASTROESOPHAGEAL REFLUX DISEASE WITHOUT ESOPHAGITIS: ICD-10-CM

## 2024-04-29 DIAGNOSIS — R30.0 DYSURIA: Primary | ICD-10-CM

## 2024-04-29 DIAGNOSIS — R30.0 DYSURIA: ICD-10-CM

## 2024-04-29 LAB
BILIRUB UR QL STRIP.AUTO: NEGATIVE
COLOR UR AUTO: YELLOW
GLUCOSE UR STRIP.AUTO-MCNC: NORMAL MG/DL
KETONES UR STRIP.AUTO-MCNC: NEGATIVE MG/DL
LEUKOCYTE ESTERASE UR QL STRIP.AUTO: 500
PH UR STRIP.AUTO: 7 [PH] (ref 5–8)
PROT UR STRIP.AUTO-MCNC: 30 MG/DL
SP GR UR STRIP.AUTO: 1.02 (ref 1–1.03)
UROBILINOGEN UR STRIP.AUTO-MCNC: NORMAL MG/DL
WBC #/AREA URNS AUTO: >50 /HPF

## 2024-04-29 PROCEDURE — 87086 URINE CULTURE/COLONY COUNT: CPT

## 2024-04-29 PROCEDURE — 99214 OFFICE O/P EST MOD 30 MIN: CPT | Performed by: INTERNAL MEDICINE

## 2024-04-29 PROCEDURE — 87186 SC STD MICRODIL/AGAR DIL: CPT

## 2024-04-29 PROCEDURE — 87077 CULTURE AEROBIC IDENTIFY: CPT

## 2024-04-29 PROCEDURE — 81001 URINALYSIS AUTO W/SCOPE: CPT

## 2024-04-29 RX ORDER — CEPHALEXIN 500 MG/1
500 CAPSULE ORAL 2 TIMES DAILY
Qty: 14 CAPSULE | Refills: 0 | Status: SHIPPED | OUTPATIENT
Start: 2024-04-29

## 2024-04-29 NOTE — TELEPHONE ENCOUNTER
Pt states that when she is urinating she is in pain and she thinks she has a bladder infection and is asking if TB can put in a urine test for her today. She is trying to go to the lab at 4 pm today.

## 2024-04-29 NOTE — TELEPHONE ENCOUNTER
Patient indicates her s/s started yesterday, pain with urination, frequency, urgency.  Pt has an appt with Dr Blackwell today at 2:30pm and wanted to go to the lab after the appt for urine testing? LOV 4/2/24 with TB.

## 2024-04-29 NOTE — PATIENT INSTRUCTIONS
Plan:      Continue  Albuterol HFA MDI 2 puffs 4 times daily as needed.    Continue Fluticasone 2 puffs each nostril daily   Continue diuretics per cardiology   Cardiology follow up   Check Spirometry before next visit     Follow up: 6  months     Bebeto Blackwell MD

## 2024-04-29 NOTE — PROGRESS NOTES
Pulmonary/Critical Care/Sleep Medicine   Progress  Note     PCP: Annie Newton MD   Phone: [phone]   Fax: [phone]        Chief Complaint   Patient presents with    COPD     6mo follow up       HPI  I had the pleasure of seeing Paulina Man who is a pleasant 89 year old female who presents for follow up of COPD after visit on 10/30/2023     Since last visi the patient states that she is still short of breath on exertion nut denies cough          The patient states that she sleeps OK at night. She lives alone at home.         She has  no pets .         Hx of tobacco use: She  reports that she quit smoking about 35 years ago. Her smoking use included cigarettes. She started smoking about 70 years ago. She has a 35 pack-year smoking history. She has never been exposed to tobacco smoke. She has quit using smokeless tobacco.    Past Medical History:    Abdominal distention    Abdominal hernia    Abdominal pain    Acute diverticulitis    Noted on CT scan - acute diverticulitis of the sigmoid colon    Arrhythmia    Arthritis    Atypical lobular hyperplasia of right breast    Back pain    Back problem    lower lumbar bad discs     Mckeon's esophagus without dysplasia    CHF (congestive heart failure) (HCC)    Chronic cough    Chronic diarrhea    Started before metformin- related to IBS      Constipation    COPD (chronic obstructive pulmonary disease) (HCC)    NO OXYGEN- no pulmonologist    Deviated septum    left side    Diabetes mellitus (HCC)    Diabetic polyneuropathy associated with type 2 diabetes mellitus (HCC)    Diarrhea, unspecified    Disorder of thyroid    has Thyroid nodules    Diverticulosis of large intestine    Dizziness    Ductal carcinoma in situ (DCIS) of right breast    Easy bruising    Esophageal reflux    Essential hypertension    Exudative macular degeneration (HCC)    Right Eye     Eye disease    Fatigue    Flatulence/gas pain/belching    Frequent urination    Frequent UTI    Full  dentures    Gastroesophageal reflux disease    GERD (gastroesophageal reflux disease)    Hearing impairment    bilateral hearing aids    Hearing loss    Heart palpitations    Hemorrhoids    High blood pressure    High cholesterol    History of cardiac murmur    History of tobacco use    Hoarseness, chronic    Hyperlipidemia    not taking lipitor due to side effects    Incontinence    uses pads    Indigestion    Irregular bowel habits    Itch of skin    Leaking of urine    Leaky heart valve    slight per pt- had echo 12/2015    Leg swelling    Loss of appetite    Macular degeneration    Macular degeneration    Menopause    Nausea    Neuropathy    Nodule of left lung    Obesity (BMI 30-39.9)    Osteoarthritis    all joints    Osteoporosis    Other fatigue    Pain in joints    Pap smear for cervical cancer screening    Per patient had pap in 2006. No abn pap.     Pneumonia, organism unspecified(486)    04/2015    PONV (postoperative nausea and vomiting)    Problems with swallowing    due hiatal hernia; gaggs at times    Restless leg syndrome    Restless leg syndrome    RLS (restless legs syndrome)    Screening breast examination    Dr. Layla Pretty    Screening mammogram for breast cancer    Benign     Shortness of breath    ON EXERTION    Sleep apnea    Sleep disturbance    Splenic artery aneurysm (HCC)    Torn rotator cuff    both shoulders per pt    Uncomfortable fullness after meals    Vertigo    Visual impairment    glasses    Vitamin B12 deficiency    Vulvar itching    Wears glasses    Weight loss    Wheezing      Past Surgical History:   Procedure Laterality Date    Angiogram      no intervention    Appendectomy  1935    Appendectomy      Cholecystectomy  1980    Colonoscopy      Egd  03/09/2022    Reactive gastropathy    Kevin localization wire 1 site left (cpt=19281)      1957    Kevin localization wire 1 site right (cpt=19281)      1980    Keivn localization wire 1 site right (cpt=19281) Right 07/2016    DCIS     Mastectomy partial Right 07/01/2016    wire localized partial mastectomy    Needle biopsy right      diag DCIS- 06/07/16    Other surgical history  1980    L and R breast cyst removal    Sigmoidoscopy,diagnostic       Allergies   Allergen Reactions    Ciprofloxacin NAUSEA AND VOMITING and HIVES    Peanut-Containing Drug Products UNKNOWN and OTHER (SEE COMMENTS)     Bladder infection    Pollen ITCHING and HIVES    Sulfa Antibiotics NAUSEA AND VOMITING    Adhesive Tape ITCHING    Dander RASH     fur    Other ITCHING     feather     Current Outpatient Medications   Medication Sig Dispense Refill    glipiZIDE ER 2.5 MG Oral Tablet 24 Hr TAKE ONE TABLET BY MOUTH DAILY WITH BREAKFAST 90 tablet 0    alendronate (FOSAMAX) 70 MG Oral Tab Take 1 tablet (70 mg total) by mouth every 7 days. 4 tablet 2    cephalexin 500 MG Oral Cap Take 1 capsule (500 mg total) by mouth 2 (two) times daily. 14 capsule 0    Magnesium Lactate (MAG-TAB SR) 84 MG (7MEQ) Oral Tab CR Take 1 tablet (84 mg total) by mouth daily.      atorvastatin 40 MG Oral Tab Take 1 tablet (40 mg total) by mouth daily.      pramipexole (MIRAPEX) 0.125 MG Oral Tab Take 0.125 mg ( 1 tablet) at ~ 7 PM and 0.125 mg (1 tablet) at bedtime 60 tablet 2    estradiol 0.1 MG/GM Vaginal Cream Apply 0.5 grams (pea-sized amount) of cream inside the vagina nightly. May use small amount at the vaginal opening as well in a thin layer. 42.5 g 3    clotrimazole-betamethasone 1-0.05 % External Cream Apply 1 Application topically 2 (two) times daily as needed. 30 g 1    ESOMEPRAZOLE MAGNESIUM 40 MG Oral Capsule Delayed Release TAKE ONE CAPSULE BY MOUTH TWICE DAILY BEFORE MEALS 180 capsule 0    mometasone 0.1 % External Ointment apply a thin layer daily to vulva for 2 weeks then twice weekly 45 g 0    ondansetron (ZOFRAN ODT) 8 MG Oral Tablet Dispersible Take 1 tablet (8 mg total) by mouth every 8 (eight) hours as needed for Nausea. 30 tablet 0    cyclobenzaprine 5 MG Oral Tab Take 1  tablet (5 mg total) by mouth nightly as needed for Muscle spasms. 30 tablet 0    furosemide 20 MG Oral Tab Take 1 tablet (20 mg total) by mouth daily.      albuterol (VENTOLIN HFA) 108 (90 Base) MCG/ACT Inhalation Aero Soln Inhale 2 puffs into the lungs every 4 (four) hours as needed for Wheezing. 1 each 1    fluticasone propionate 50 MCG/ACT Nasal Suspension 2 sprays by Nasal route daily.      acetaminophen 500 MG Oral Tab Take 1 tablet (500 mg total) by mouth every 6 (six) hours as needed for Pain.      docusate sodium 100 MG Oral Cap Take 1 capsule (100 mg total) by mouth 2 (two) times daily as needed for constipation.      CAPSAICIN APR EX Apply topically as needed.      Loperamide HCl 2 MG Oral Cap Take 1 capsule (2 mg total) by mouth as needed for Diarrhea.      Multiple Vitamins-Minerals (PRESERVISION AREDS) Oral Tab Take 1 tablet by mouth 2 (two) times daily.        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   Tobacco Use    Smoking status: Former     Current packs/day: 0.00     Average packs/day: 1 pack/day for 35.0 years (35.0 ttl pk-yrs)     Types: Cigarettes     Start date: 1953     Quit date: 1988     Years since quittin.6     Passive exposure: Never    Smokeless tobacco: Former   Vaping Use    Vaping status: Never Used   Substance and Sexual Activity    Alcohol use: No    Drug use: No    Sexual activity: Not Currently   Other Topics Concern    Caffeine Concern Yes     Comment: coffee 1- 2 cups daily    Exercise No     Comment: walking     Social Determinants of Health     Financial Resource Strain: Low Risk  (2023)    Financial Resource Strain     Difficulty of Paying Living Expenses: Not hard at all     Med Affordability: No   Food Insecurity: No Food Insecurity (2023)    Food Insecurity     Food Insecurity: Never true   Transportation Needs: No Transportation Needs (2023)    Transportation  Needs     Lack of Transportation: No   Physical Activity: Insufficiently Active (2/27/2023)    Exercise Vital Sign     Days of Exercise per Week: 1 day     Minutes of Exercise per Session: 20 min   Stress: No Stress Concern Present (2/27/2023)    Stress     Feeling of Stress : No   Social Connections: Socially Integrated (2/27/2023)    Social Connections     Frequency of Socialization with Friends and Family: 2   Housing Stability: Low Risk  (12/27/2023)    Housing Stability     Housing Instability: No     Housing Instability Emergency: No      Immunization History   Administered Date(s) Administered    Covid-19 Vaccine Pfizer 30 mcg/0.3 ml 02/07/2021, 02/28/2021, 11/02/2021, 05/16/2022, 10/25/2022    Covid-19 Vaccine Pfizer Dani-Sucrose 30 mcg/0.3 ml 05/16/2022    FLU VAC High Dose 65 YRS & Older PRSV Free (04576) 09/13/2016, 09/30/2019, 09/08/2021, 09/26/2022, 08/25/2023    FLUAD High Dose 65 yr and older (15563) 09/20/2018    Fluzone Vaccine Medicare () 09/16/2015, 09/12/2016, 08/31/2017    HIGH DOSE FLU 65 YRS AND OLDER PRSV FREE SINGLE D (46355) FLU CLINIC 09/08/2021, 08/25/2023    Influenza 09/07/2015, 08/12/2020    Pfizer Covid-19 Vaccine 30mcg/0.3ml 12yrs+ (9413-6792) 11/09/2023    Pneumococcal (Prevnar 13) 09/16/2015    Pneumococcal Conjugate PCV20 11/26/2023    Pneumovax 23 05/23/2017    RSV, recombinant, RSVpreF, adjuvanted (Arexvy) 09/13/2023    TDAP 01/07/2016    Zoster Vaccine Recombinant Adjuvanted (Shingrix) 07/01/2019, 01/09/2020      Family History   Problem Relation Age of Onset    Breast Cancer Paternal Aunt     Heart Disorder Mother     Hypertension Mother     Colon Polyps Mother     Heart Disorder Father     Hypertension Father     Lipids Father     Heart Disorder Paternal Grandfather     Diabetes Paternal Grandmother     Breast Cancer Self 86        DCIS        Review of Systems   Constitutional:  Negative for fatigue, fever and unexpected weight change.   HENT:  Positive for congestion  and rhinorrhea. Negative for mouth sores, nosebleeds, postnasal drip, sore throat and trouble swallowing.    Eyes:  Negative for visual disturbance.   Respiratory:  Positive for shortness of breath. Negative for apnea, cough, choking, chest tightness and wheezing.    Cardiovascular:  Negative for chest pain, palpitations and leg swelling.   Gastrointestinal:  Negative for abdominal pain, constipation, diarrhea, nausea and vomiting.        GERD symptoms    Genitourinary:  Negative for difficulty urinating.   Musculoskeletal:  Positive for arthralgias. Negative for back pain, gait problem and myalgias.   Neurological:  Negative for dizziness, weakness and headaches.   Psychiatric/Behavioral:  Positive for sleep disturbance.        Vitals:    04/29/24 1508   BP: 106/60   Pulse: 69   Resp: 14      Body mass index is 28.34 kg/m².     Physical Exam  Constitutional:       General: She is not in acute distress.     Appearance: Normal appearance. She is not ill-appearing or diaphoretic.   HENT:      Head: Normocephalic and atraumatic.      Nose: Nose normal. No congestion or rhinorrhea.      Comments: Narrow edematous nares L>R      Mouth/Throat:      Mouth: Mucous membranes are moist.      Pharynx: Oropharynx is clear. No oropharyngeal exudate or posterior oropharyngeal erythema.      Comments: Mallampati class II palate   Eyes:      Extraocular Movements: Extraocular movements intact.      Pupils: Pupils are equal, round, and reactive to light.   Cardiovascular:      Rate and Rhythm: Normal rate.      Pulses: Normal pulses.      Heart sounds: Normal heart sounds. No murmur heard.  Pulmonary:      Effort: Pulmonary effort is normal. No respiratory distress.      Breath sounds: Normal breath sounds. No wheezing or rhonchi.      Comments: Diminished breath sounds in bilateral lower lung zones  Chest:      Chest wall: No tenderness.   Abdominal:      General: Abdomen is flat. Bowel sounds are normal.      Palpations: Abdomen  is soft.   Musculoskeletal:         General: Normal range of motion.      Right lower leg: Edema (=1 ankle) present.      Left lower leg: Edema (+1 ankle) present.   Skin:     General: Skin is warm.   Neurological:      General: No focal deficit present.      Mental Status: She is alert and oriented to person, place, and time.   Psychiatric:         Mood and Affect: Mood normal.         Behavior: Behavior normal.         Thought Content: Thought content normal.         Judgment: Judgment normal.             Labs:  Last BMP  Lab Results   Component Value Date     (H) 04/03/2024    BUN 13 04/03/2024    CREATSERUM 0.95 04/03/2024    BUNCREA 13.8 10/14/2022    ANIONGAP 2 04/03/2024    GFRAA 68 07/02/2022    GFRNAA 59 (L) 07/02/2022    CA 9.5 04/03/2024     04/03/2024    K 4.0 04/03/2024     04/03/2024    CO2 30.0 04/03/2024    OSMOCALC 293 04/03/2024      Last CBC  Lab Results   Component Value Date    WBC 8.6 02/14/2024    RBC 4.30 02/14/2024    HGB 12.8 02/14/2024    HCT 38.3 02/14/2024    MCV 89.1 02/14/2024    MCH 29.8 02/14/2024    MCHC 33.4 02/14/2024    RDW 13.0 02/14/2024    .0 02/14/2024      Last CMP  Lab Results   Component Value Date     (H) 04/03/2024    BUN 13 04/03/2024    BUNCREA 13.8 10/14/2022    CREATSERUM 0.95 04/03/2024    ANIONGAP 2 04/03/2024    GFR 64 12/01/2017    GFRNAA 59 (L) 07/02/2022    GFRAA 68 07/02/2022    CA 9.5 04/03/2024    OSMOCALC 293 04/03/2024    ALKPHO 76 02/14/2024    AST 22 02/14/2024    ALT 19 02/14/2024    BILT 0.4 02/14/2024    TP 7.2 02/14/2024    ALB 3.2 (L) 02/14/2024    GLOBULIN 4.0 02/14/2024     04/03/2024    K 4.0 04/03/2024     04/03/2024    CO2 30.0 04/03/2024      Last Thyroid Function  Lab Results   Component Value Date    T4F 1.1 07/08/2020    TSH 1.040 12/26/2023        Imaging personally reviewed :    CXR PA anf LAT personally reviewed   Impression   CONCLUSION:  Stable COPD changes.  Small bilateral pleural  effusions versus pleural scarring.  Minimal scarring/atelectasis in the lower lungs.           LOCATION:  [id number]                 Dictated by (CST): Everette Reid MD on 10/20/2023 at 4:10 PM          Impression:    Dyspnea on exertion: With history of environmental allergies and benefit from prednisone.  Suspect related to mild intermittent asthma versus COPD along with deconditioning.  The patient reports history of mild CHF in the past and has mild edema of the lower extremities, volume overload would also be in differential diagnosis.: controlled symptoms   Allergic rhinitis : controlled   Dextroscoliosis   Balance issues   B12 deficiency per patient, currently on daily vitamin B12 tablets  GERD with Hx Barents esophagus   Hiatal hernia on CT abdomen   History of at least 35 pack years of tobacco smoking, suspect with underlying COPD  Type 2 diabetes mellitus  Hypertension  Hyperlipidemia macular degeneration osteoporosis history of nodule in left lung noted on 1/7/2016                          Plan:      Continue  Albuterol HFA MDI 2 puffs 4 times daily as needed.    Continue Fluticasone 2 puffs each nostril daily   Continue diuretics per cardiology   Cardiology follow up   Check Spirometry before next visit     Follow up: 6  months     Thank you for allowing me to participate in your patient care.    MADYSON Blackwell MD, FACP, FCCP, FAASM - Pulmonary/Critical care/Sleep Medicine  Please contact our office if you have any questions or concerns at [phone]

## 2024-05-01 ENCOUNTER — TELEPHONE (OUTPATIENT)
Dept: NEUROLOGY | Facility: CLINIC | Age: 89
End: 2024-05-01

## 2024-05-01 NOTE — TELEPHONE ENCOUNTER
Patient taking PRAMAPIXOLE 0.125, one tablet at 7pm and one tablet @ HS since 2/13/2024.    States RLS symptoms mostly resolved with brief episodes daily.  Having side effects:    Weird \"awful\" dreams since taking.  Insomnia since taking  Weakness with difficulty walking since taking.    Also:   \"Freezing' since today  \"Urinary frequency\" since 2 weeks  Currently has UTI, is being treated.    Would like to stop medication.  Is open to alternate medication.    Routed to provider.

## 2024-05-01 NOTE — TELEPHONE ENCOUNTER
Ok to stop pramipexole; let things calm down in terms of UTI treatment; then, we can discuss new medication    Ferritin level was also low - recommend try ferrous sulfate 325 mg daily pill for supplementation and see if this helps with restless legs symptoms as well.

## 2024-05-02 NOTE — TELEPHONE ENCOUNTER
Ok to stop pramipexole; let things calm down in terms of UTI treatment; then, we can discuss new medication     Ferritin level was also low - recommend try ferrous sulfate 325 mg daily pill for supplementation and see if this helps with restless legs symptoms as well.         Left voicemail with both patient and her son relaying instructions by Dr. Burroughs.

## 2024-05-02 NOTE — TELEPHONE ENCOUNTER
Patient did receive voicemail but not audible. Dr. Burroughs's instructions told to patient. Patient expressed understanding.

## 2024-05-09 ENCOUNTER — TELEPHONE (OUTPATIENT)
Dept: INTERNAL MEDICINE CLINIC | Facility: CLINIC | Age: 89
End: 2024-05-09

## 2024-05-09 ENCOUNTER — PATIENT OUTREACH (OUTPATIENT)
Dept: CASE MANAGEMENT | Age: 89
End: 2024-05-09

## 2024-05-09 DIAGNOSIS — E78.00 PURE HYPERCHOLESTEROLEMIA: ICD-10-CM

## 2024-05-09 DIAGNOSIS — E53.8 B12 DEFICIENCY: ICD-10-CM

## 2024-05-09 DIAGNOSIS — G25.81 RLS (RESTLESS LEGS SYNDROME): ICD-10-CM

## 2024-05-09 DIAGNOSIS — E11.42 DIABETIC POLYNEUROPATHY ASSOCIATED WITH TYPE 2 DIABETES MELLITUS (HCC): ICD-10-CM

## 2024-05-09 DIAGNOSIS — M19.042 ARTHRITIS OF BOTH HANDS: ICD-10-CM

## 2024-05-09 DIAGNOSIS — K21.9 GASTROESOPHAGEAL REFLUX DISEASE WITHOUT ESOPHAGITIS: ICD-10-CM

## 2024-05-09 DIAGNOSIS — J44.1 COPD EXACERBATION (HCC): ICD-10-CM

## 2024-05-09 DIAGNOSIS — J44.9 CHRONIC OBSTRUCTIVE PULMONARY DISEASE, UNSPECIFIED COPD TYPE (HCC): ICD-10-CM

## 2024-05-09 DIAGNOSIS — R30.0 DYSURIA: Primary | ICD-10-CM

## 2024-05-09 DIAGNOSIS — M51.36 DDD (DEGENERATIVE DISC DISEASE), LUMBAR: Primary | ICD-10-CM

## 2024-05-09 DIAGNOSIS — I10 PRIMARY HYPERTENSION: ICD-10-CM

## 2024-05-09 DIAGNOSIS — M19.041 ARTHRITIS OF BOTH HANDS: ICD-10-CM

## 2024-05-09 DIAGNOSIS — R00.2 PALPITATIONS: ICD-10-CM

## 2024-05-09 RX ORDER — ZINC OXIDE
OINTMENT (GRAM) TOPICAL AS NEEDED
COMMUNITY

## 2024-05-09 RX ORDER — ASPIRIN 81 MG/1
TABLET, CHEWABLE ORAL DAILY
COMMUNITY

## 2024-05-09 RX ORDER — MAGNESIUM 200 MG
TABLET ORAL
COMMUNITY

## 2024-05-09 RX ORDER — LOSARTAN POTASSIUM 100 MG/1
TABLET ORAL DAILY
COMMUNITY

## 2024-05-09 RX ORDER — POLYETHYLENE GLYCOL 3350 17 G/17G
17 POWDER, FOR SOLUTION ORAL DAILY
COMMUNITY

## 2024-05-09 RX ORDER — MELATONIN
1000 DAILY
COMMUNITY

## 2024-05-09 RX ORDER — BACILLUS COAGULANS/INULIN 500MM-1.5G
TABLET,CHEWABLE ORAL
COMMUNITY

## 2024-05-09 NOTE — TELEPHONE ENCOUNTER
Pt finished abx yesterday and her symptoms still persist. Pt is having burning while urinating, no blood in urine, no flank pain.    Pt can be reached at     Thank you    Routed to [facility]

## 2024-05-09 NOTE — TELEPHONE ENCOUNTER
Attempted to call pt. Left VM to call back.     Per result note:      Annie Newton MD  5/3/2024 12:39 AM CDT       Patient to finish keflex course, notify us if not better after the course         TB - Waiting for pt to call back to get more info, do you order anything in the mean time? Repeat UA? OV?

## 2024-05-09 NOTE — PROGRESS NOTES
Spoke to Paulina for CCM.      Updates to patient care team/comments: UTD  Patient reported changes in medications: UTD  Med Adherence  Comment: pt taking medications as prescribed     Health Maintenance:   Health Maintenance   Topic Date Due    COVID-19 Vaccine (8 - 2023-24 season) 03/09/2024    Diabetes Care: Microalb/Creat Ratio  05/04/2024    Diabetes Care Dilated Eye Exam  07/20/2024    Annual Physical  07/25/2024    Diabetes Care A1C  08/14/2024    Diabetes Care Foot Exam  10/23/2024    Diabetes Care: GFR  04/03/2025    Influenza Vaccine  Completed    Annual Depression Screening  Completed    Fall Risk Screening (Annual)  Completed    Pneumococcal Vaccine: 65+ Years  Completed    Zoster Vaccines  Completed       Patient updates/concerns:    Spoke to pt for monthly outreach   Pt completed abx and is still experiencing symptoms of UTI. Pt is having pain when urinating. Pt has not seen any blood or had any flank pain. Pt wanted to discuss if more abx will be prescribed or if any further testing would be necessary.    Pt requested ccm assistance in going over every line of her prescriptions to ensure every thing is current and accurate. Pt has complied with GI and started taking benefiber and pepcid. Pt will add miralax per specialist request. Pt confirmed her understanding about every medication she takes, why she takes it and when it is recommended. Pt has routine taking pills three times a day and states she has no difficulty affording them or taking them routinely at the same time every day.   Pt is very frustrated with the severity of her restless legs syndrome. She states it comes on late in the day making it difficult to fall asleep and she rarely sleeps more than 5 hours a day.  She was taken off the mirapex because of the nightmares along with leg weakness/numbness/balance issues. Pt states that even though the rls was prevalent on the mirapex it was  much better managed. Pt has appt with dr pruitt next  week to discuss further. Pt was advised not to start mirapex again unless specialist approves and encourages. Discussed with pt the significance of taking a fall while on medication. Pt verbalized understanding and will continue with current regiment until discussing with specialist on the 14th. Pt has started the ferrous sulfate, but has not felt it effective at all.   Reviewed with pt some other natural or OTC approaches to restless legs. Pt was encouraged to try any one of these prior to going to bed. Heating pad, ice packs, lavender oil or rubbing in some of her capsazin cream.  Pt was also encouraged to try chamomile tea to help her relax a few hours before bed. Pt confirmed she understood not to use heating pad when drowsy or in bed and that these methods don't guarantee relief, they are suggestions and some work better for some than others. Pt verbalized understanding.    Pt states her abdominal pain is improved and her bowel movements and habits are returning to normal.    Pt continues to follow pulmonology and understands guidance from recent visit.  Goals/Action Plan:    Active goal from previous outreach: exercise    Patient reported progress towards goals: pt continues to get as much walking in as the weather will allow. She likes to take daily walks to the [facility]               - What: hydration           - Where/When/How: pt is still only drinking about 2 glasses of water per day  Patient Reported Barriers and Concerns: n/a                   - Plan for overcoming barriers: none    Care Managers Interventions: TE [facility] with pt question about ongoing UTI symptoms, continue to provide encouragement and education for healthy coping and dx    Future Appointments:   Future Appointments   Date Time Provider Department Center   5/14/2024  1:20 PM Aydin Burroughs MD [facility]   8/5/2024  3:40 PM Annie Newton MD [facility]         Next Care Manager Follow Up Date: one  month    Reason For Follow Up: review progress and or barriers towards patient's goals.     Time Spent This Encounter Total: 69 min medical record review, telephone communication, care plan updates where needed, education, goals, and action plan recreation/update. Provided acknowledgment and validation to patient's concerns.   Monthly Minute Total including today: 69  Physical assessment, complete health history, and need for CCM established by Annie Newton MD.

## 2024-05-09 NOTE — TELEPHONE ENCOUNTER
Called and spoke to pt. Advised to complete rpt UA, pt asking about abx. Explained importance of getting UA prior so we can run sensitivities. Pt stated understanding and agreed to plan, will get to lab tomorrow.

## 2024-05-10 ENCOUNTER — LAB ENCOUNTER (OUTPATIENT)
Dept: LAB | Facility: HOSPITAL | Age: 89
End: 2024-05-10
Attending: INTERNAL MEDICINE

## 2024-05-10 DIAGNOSIS — R30.0 DYSURIA: ICD-10-CM

## 2024-05-10 LAB
BILIRUB UR QL STRIP.AUTO: NEGATIVE
COLOR UR AUTO: YELLOW
GLUCOSE UR STRIP.AUTO-MCNC: NORMAL MG/DL
LEUKOCYTE ESTERASE UR QL STRIP.AUTO: 500
PH UR STRIP.AUTO: 7.5 [PH] (ref 5–8)
PROT UR STRIP.AUTO-MCNC: 30 MG/DL
SP GR UR STRIP.AUTO: 1.02 (ref 1–1.03)
UROBILINOGEN UR STRIP.AUTO-MCNC: NORMAL MG/DL
WBC #/AREA URNS AUTO: >50 /HPF
WBC CLUMPS UR QL AUTO: PRESENT /HPF

## 2024-05-10 PROCEDURE — 87077 CULTURE AEROBIC IDENTIFY: CPT

## 2024-05-10 PROCEDURE — 87186 SC STD MICRODIL/AGAR DIL: CPT

## 2024-05-10 PROCEDURE — 81001 URINALYSIS AUTO W/SCOPE: CPT

## 2024-05-10 PROCEDURE — 87086 URINE CULTURE/COLONY COUNT: CPT

## 2024-05-11 ENCOUNTER — TELEPHONE (OUTPATIENT)
Dept: INTERNAL MEDICINE CLINIC | Facility: CLINIC | Age: 89
End: 2024-05-11

## 2024-05-11 RX ORDER — CEPHALEXIN 500 MG/1
500 CAPSULE ORAL 2 TIMES DAILY
Qty: 14 CAPSULE | Refills: 0 | Status: SHIPPED | OUTPATIENT
Start: 2024-05-11

## 2024-05-13 RX ORDER — CEPHALEXIN 500 MG/1
500 CAPSULE ORAL 2 TIMES DAILY
Qty: 14 CAPSULE | Refills: 0 | OUTPATIENT
Start: 2024-05-13

## 2024-05-14 ENCOUNTER — OFFICE VISIT (OUTPATIENT)
Dept: NEUROLOGY | Facility: CLINIC | Age: 89
End: 2024-05-14

## 2024-05-14 VITALS
DIASTOLIC BLOOD PRESSURE: 62 MMHG | HEIGHT: 61 IN | WEIGHT: 150 LBS | RESPIRATION RATE: 16 BRPM | SYSTOLIC BLOOD PRESSURE: 110 MMHG | HEART RATE: 67 BPM | BODY MASS INDEX: 28.32 KG/M2

## 2024-05-14 DIAGNOSIS — G25.81 RLS (RESTLESS LEGS SYNDROME): Primary | ICD-10-CM

## 2024-05-14 PROCEDURE — 99214 OFFICE O/P EST MOD 30 MIN: CPT | Performed by: OTHER

## 2024-05-14 RX ORDER — ROPINIROLE 0.25 MG/1
0.25 TABLET, FILM COATED ORAL NIGHTLY
Qty: 30 TABLET | Refills: 0 | Status: SHIPPED | OUTPATIENT
Start: 2024-05-14

## 2024-05-14 NOTE — PROGRESS NOTES
[facility] Progress Note    HPI  Chief Complaint   Patient presents with    Neurologic Problem     RLS, reports stability in condition, worse Sx's at night          \"Patient has history of RLS and neuropathy and has previously been following with my neurology associate, Dr SHIRA Ahmadi. She has reportedly not been able to be on gabapentin; she has history of COPD with shortness of breath when she is up and active and this has been more recent.      In terms of leg symptoms, she has been having this for \"years\" and has some restless sensation and movement in both legs mainly at night but also during the day; she has numbness in feet as well.      She has some numbness/tingling in hands as well; symptoms typically occur when she is sitting, lying down starting around 7 PM, but then improve until the evening time when she takes her dose of Mirapex 0.25 mg nightly and lies down; she may sleep through the night       She has not been on iron supplement but thinks she may have been on this in the past. \"         Patient last seen 2/13/2024. She was recommended to change her dose of Mirapex to 0.125 mg ~ 7 PM and 0.125 mg nightly - she is not sure if she took this dose or took twice the amount of her usual 0.250 mg nightly dose.       She is a poor historian and is contradictory in her statements saying her \"restless legs symptoms\" are not improving but then saying they are \"not as violent\" since being off pramipexole. Currently, she is sleeping through the night but has been taking benadryl nightly.     She is on iron supplement as well as magnesium supplement as her magnesium level was low (0.7) and has been following with PCP. She also was recently treated for UTI.     Past Medical History:    Abdominal distention    Abdominal hernia    Abdominal pain    Acute diverticulitis    Noted on CT scan - acute diverticulitis of the sigmoid colon    Arrhythmia    Arthritis    Atypical lobular hyperplasia of right  breast    Back pain    Back problem    lower lumbar bad discs     Mckeon's esophagus without dysplasia    CHF (congestive heart failure) (HCC)    Chronic cough    Chronic diarrhea    Started before metformin- related to IBS      Constipation    COPD (chronic obstructive pulmonary disease) ([facility])    NO OXYGEN- no pulmonologist    Deviated septum    left side    Diabetes mellitus (HCC)    Diabetic polyneuropathy associated with type 2 diabetes mellitus (HCC)    Diarrhea, unspecified    Disorder of thyroid    has Thyroid nodules    Diverticulosis of large intestine    Dizziness    Ductal carcinoma in situ (DCIS) of right breast    Easy bruising    Esophageal reflux    Essential hypertension    Exudative macular degeneration (HCC)    Right Eye     Eye disease    Fatigue    Flatulence/gas pain/belching    Frequent urination    Frequent UTI    Full dentures    Gastroesophageal reflux disease    GERD (gastroesophageal reflux disease)    Hearing impairment    bilateral hearing aids    Hearing loss    Heart palpitations    Hemorrhoids    High blood pressure    High cholesterol    History of cardiac murmur    History of tobacco use    Hoarseness, chronic    Hyperlipidemia    not taking lipitor due to side effects    Incontinence    uses pads    Indigestion    Irregular bowel habits    Itch of skin    Leaking of urine    Leaky heart valve    slight per pt- had echo 12/2015    Leg swelling    Loss of appetite    Macular degeneration    Macular degeneration    Menopause    Nausea    Neuropathy    Nodule of left lung    Obesity (BMI 30-39.9)    Osteoarthritis    all joints    Osteoporosis    Other fatigue    Pain in joints    Pap smear for cervical cancer screening    Per patient had pap in 2006. No abn pap.     Pneumonia, organism unspecified(486)    04/2015    PONV (postoperative nausea and vomiting)    Problems with swallowing    due hiatal hernia; gaggs at times    Restless leg syndrome    Restless leg syndrome    RLS  (restless legs syndrome)    Screening breast examination    Dr. Layla Pretty    Screening mammogram for breast cancer    Benign     Shortness of breath    ON EXERTION    Sleep apnea    Sleep disturbance    Splenic artery aneurysm (HCC)    Torn rotator cuff    both shoulders per pt    Uncomfortable fullness after meals    Vertigo    Visual impairment    glasses    Vitamin B12 deficiency    Vulvar itching    Wears glasses    Weight loss    Wheezing     Past Surgical History:   Procedure Laterality Date    Angiogram      no intervention    Appendectomy      Appendectomy      Cholecystectomy      Colonoscopy      Egd  2022    Reactive gastropathy    Kevin localization wire 1 site left (cpt=19281)          Kevin localization wire 1 site right (cpt=19281)          Kevin localization wire 1 site right (cpt=19281) Right 2016    DCIS    Mastectomy partial Right 2016    wire localized partial mastectomy    Needle biopsy right      diag DCIS- 16    Other surgical history      L and R breast cyst removal    Sigmoidoscopy,diagnostic       Family History   Problem Relation Age of Onset    Breast Cancer Paternal Aunt     Heart Disorder Mother     Hypertension Mother     Colon Polyps Mother     Heart Disorder Father     Hypertension Father     Lipids Father     Heart Disorder Paternal Grandfather     Diabetes Paternal Grandmother     Breast Cancer Self 86        DCIS     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   Tobacco Use    Smoking status: Former     Current packs/day: 0.00     Average packs/day: 1 pack/day for 35.0 years (35.0 ttl pk-yrs)     Types: Cigarettes     Start date: 1953     Quit date: 1988     Years since quittin.7     Passive exposure: Never    Smokeless tobacco: Former   Vaping Use    Vaping status: Never Used   Substance and Sexual Activity    Alcohol use: No    Drug use: No     Sexual activity: Not Currently   Other Topics Concern    Caffeine Concern Yes     Comment: coffee 1- 2 cups daily    Exercise No     Comment: walking       Allergies   Allergen Reactions    Ciprofloxacin NAUSEA AND VOMITING and HIVES    Peanut-Containing Drug Products UNKNOWN and OTHER (SEE COMMENTS)     Bladder infection    Pollen ITCHING and HIVES    Sulfa Antibiotics NAUSEA AND VOMITING    Adhesive Tape ITCHING    Dander RASH     fur    Other ITCHING     feather         Current Outpatient Medications:     rOPINIRole 0.25 MG Oral Tab, Take 1 tablet (0.25 mg total) by mouth at bedtime., Disp: 30 tablet, Rfl: 0    losartan 100 MG Oral Tab, Take by mouth daily., Disp: , Rfl:     Magnesium 200 MG Oral Tab, Take by mouth. 2 x 200 mg gummies daily, Disp: , Rfl:     Cholecalciferol (VITAMIN D3) 25 MCG (1000 UT) Oral Cap, Take 1 tablet by mouth daily., Disp: , Rfl:     cyanocobalamin 1000 MCG Oral Tab, Take 1 tablet (1,000 mcg total) by mouth daily., Disp: , Rfl:     aspirin 81 MG Oral Chew Tab, Chew by mouth daily., Disp: , Rfl:     liver oil-zinc oxide 40 % External Ointment, Apply topically as needed for Dry Skin., Disp: , Rfl:     polyethylene glycol, PEG 3350, 17 g Oral Powd Pack, Take 17 g by mouth daily., Disp: , Rfl:     Bacillus Coagulans-Inulin (BENEFIBER PREBIOTIC+PROBIOTIC) Oral Chew Tab, Chew by mouth., Disp: , Rfl:     cephalexin 500 MG Oral Cap, Take 1 capsule (500 mg total) by mouth 2 (two) times daily., Disp: 14 capsule, Rfl: 0    glipiZIDE ER 2.5 MG Oral Tablet 24 Hr, TAKE ONE TABLET BY MOUTH DAILY WITH BREAKFAST, Disp: 90 tablet, Rfl: 0    atorvastatin 40 MG Oral Tab, Take 1 tablet (40 mg total) by mouth daily., Disp: , Rfl:     estradiol 0.1 MG/GM Vaginal Cream, Apply 0.5 grams (pea-sized amount) of cream inside the vagina nightly. May use small amount at the vaginal opening as well in a thin layer., Disp: 42.5 g, Rfl: 3    clotrimazole-betamethasone 1-0.05 % External Cream, Apply 1 Application  topically 2 (two) times daily as needed., Disp: 30 g, Rfl: 1    ESOMEPRAZOLE MAGNESIUM 40 MG Oral Capsule Delayed Release, TAKE ONE CAPSULE BY MOUTH TWICE DAILY BEFORE MEALS, Disp: 180 capsule, Rfl: 0    mometasone 0.1 % External Ointment, apply a thin layer daily to vulva for 2 weeks then twice weekly, Disp: 45 g, Rfl: 0    ondansetron (ZOFRAN ODT) 8 MG Oral Tablet Dispersible, Take 1 tablet (8 mg total) by mouth every 8 (eight) hours as needed for Nausea., Disp: 30 tablet, Rfl: 0    albuterol (VENTOLIN HFA) 108 (90 Base) MCG/ACT Inhalation Aero Soln, Inhale 2 puffs into the lungs every 4 (four) hours as needed for Wheezing., Disp: 1 each, Rfl: 1    fluticasone propionate 50 MCG/ACT Nasal Suspension, 2 sprays by Nasal route daily., Disp: , Rfl:     acetaminophen 500 MG Oral Tab, Take 1 tablet (500 mg total) by mouth every 6 (six) hours as needed for Pain., Disp: , Rfl:     docusate sodium 100 MG Oral Cap, Take 1 capsule (100 mg total) by mouth 2 (two) times daily as needed for constipation., Disp: , Rfl:     CAPSAICIN APR EX, Apply topically as needed., Disp: , Rfl:     Loperamide HCl 2 MG Oral Cap, Take 1 capsule (2 mg total) by mouth as needed for Diarrhea., Disp: , Rfl:     Multiple Vitamins-Minerals (PRESERVISION AREDS) Oral Tab, Take 1 tablet by mouth. 250 mg once daily, Disp: , Rfl:     Review of Systems:  No chest pain or palpitations; otherwise as noted in HPI.    Exam:  /62 (BP Location: Left arm, Patient Position: Sitting, Cuff Size: adult)   Pulse 67   Resp 16   Ht 61\"   Wt 150 lb (68 kg)   LMP  (LMP Unknown)   BMI 28.34 kg/m²   Estimated body mass index is 28.34 kg/m² as calculated from the following:    Height as of this encounter: 61\".    Weight as of this encounter: 150 lb (68 kg).    Gen: well developed, well nourished, no acute distress  HEENT: normocephalic  Heart; normal S1/S2, regular rate and rhythm  Lungs: clear to auscultation bilaterally  Extremities: no edema, peripheral pulses  intact    Neck: supple, full range of motion; no carotid bruits    Fundoscopic Exam: optic discs sharp bilaterally    Neuro:  Mental status:  Orientation: Alert and oriented to person, place, time  Speech Fluent and conversational    CN: PERRL, EOMI with no nystagmus, VFF, smile symmetric, sensation intact, tongue and palate midline, SCM intact, otherwise, CN 2-12 intact  Motor: 5/5 strength throughout, tone normal  DTR: absent ankle jerks; otherwise, 2+ symmetric throughout, toes downgoing bilaterally, no clonus  Sensory: intact to light touch throughout; vibration absent great toes and malleoli and reduced left patellar and present R side   Coord: FNF intact with no tremor or dysmetria; rapid alternating movements intact bilaterally  Romberg: absent  Gait: normal casual gait with aid of walker    Labs:  New    Component      Latest Ref Rng 2/14/2024   Iron, Serum      50 - 170 ug/dL 65    Transferrin      200 - 360 mg/dL 203    Iron Bind.Cap.(TIBC)      240 - 450 ug/dL 302    Iron Saturation      15 - 50 % 22    Vitamin B12      193 - 986 pg/mL 571    FERRITIN      18.0 - 340.0 ng/mL 55.4    Magnesium, Serum      1.6 - 2.6 mg/dL 0.7 (LL)         Imaging:  None new    Impression/ Plan:  Paulina Man is a 89 year old female with history of neuropathy and RLS, who presents for management.     Neurologic exam is abnormal with signs / symptoms of neuropathy. In addition, she has symptoms of restless legs syndrome and has been on pramipexole with some improvement but still has symptoms - she was recommended to break up dose of pramipexole to 0.125 mg ~ 7 PM and 0.125 mg prior to bedtime and magnesium study level was low; ferritin also low and now following with PCP for repletion of magnesium and on oral iron supplement.     She was having side effects on pramipexole but it is unclear if she was taking prescribed dose; she has noted improvement since being off pramipexole and will try ropinirole starting low dose 0.250  mg nightly and monitor for improvement; may need to titrate up, however.    1. RLS (restless legs syndrome)  As noted above  - rOPINIRole 0.25 MG Oral Tab; Take 1 tablet (0.25 mg total) by mouth at bedtime.  Dispense: 30 tablet; Refill: 0    Return in about 3 months (around 8/14/2024).    Aydin Burroughs MD, Neurology  [facility]  Pager [phone]  5/14/2024

## 2024-05-14 NOTE — PATIENT INSTRUCTIONS
Refill policies:    Allow 2-3 business days for refills; controlled substances may take longer.  Contact your pharmacy at least 5 days prior to running out of medication and have them send an electronic request or submit request through the “request refill” option in your [employer] account.  Refills are not addressed on weekends; covering physicians do not authorize routine medications on weekends.  No narcotics or controlled substances are refilled after noon on Fridays or by on call physicians.  By law, narcotics must be electronically prescribed.  A 30 day supply with no refills is the maximum allowed.  If your prescription is due for a refill, you may be due for a follow up appointment.  To best provide you care, patients receiving routine medications need to be seen at least once a year.  Patients receiving narcotic/controlled substance medications need to be seen at least once every 3 months.  In the event that your preferred pharmacy does not have the requested medication in stock (e.g. Backordered), it is your responsibility to find another pharmacy that has the requested medication available.  We will gladly send a new prescription to that pharmacy at your request.    Scheduling Tests:    If your physician has ordered radiology tests such as MRI or CT scans, please contact Central Scheduling at [phone] right away to schedule the test.  Once scheduled, the [facility] Centralized Referral Team will work with your insurance carrier to obtain pre-certification or prior authorization.  Depending on your insurance carrier, approval may take 3-10 days.  It is highly recommended patients assure they have received an authorization before having a test performed.  If test is done without insurance authorization, patient may be responsible for the entire amount billed.      Precertification and Prior Authorizations:  If your physician has recommended that you have a procedure or additional testing performed the [facility]  Centralized Referral Team will contact your insurance carrier to obtain pre-certification or prior authorization.    You are strongly encouraged to contact your insurance carrier to verify that your procedure/test has been approved and is a COVERED benefit.  Although the [facility] Centralized Referral Team does its due diligence, the insurance carrier gives the disclaimer that \"Although the procedure is authorized, this does not guarantee payment.\"    Ultimately the patient is responsible for payment.   Thank you for your understanding in this matter.  Paperwork Completion:  If you require FMLA or disability paperwork for your recovery, please make sure to either drop it off or have it faxed to our office at [phone]. Be sure the form has your name and date of birth on it.  The form will be faxed to our Forms Department and they will complete it for you.  There is a 25$ fee for all forms that need to be filled out.  Please be aware there is a 10-14 day turnaround time.  You will need to sign a release of information (PETER) form if your paperwork does not come with one.  You may call the Forms Department with any questions at [phone].  Their fax number is [phone].

## 2024-05-18 ENCOUNTER — HOSPITAL ENCOUNTER (EMERGENCY)
Facility: HOSPITAL | Age: 89
Discharge: HOME | End: 2024-05-18
Attending: EMERGENCY MEDICINE

## 2024-05-18 VITALS
RESPIRATION RATE: 18 BRPM | HEIGHT: 61 IN | SYSTOLIC BLOOD PRESSURE: 129 MMHG | WEIGHT: 150 LBS | DIASTOLIC BLOOD PRESSURE: 56 MMHG | TEMPERATURE: 97 F | HEART RATE: 65 BPM | BODY MASS INDEX: 28.32 KG/M2 | OXYGEN SATURATION: 97 %

## 2024-05-18 DIAGNOSIS — L29.9 PRURITUS: Primary | ICD-10-CM

## 2024-05-18 PROCEDURE — 99283 EMERGENCY DEPT VISIT LOW MDM: CPT

## 2024-05-18 RX ORDER — HYDROXYZINE HYDROCHLORIDE 25 MG/1
25 TABLET, FILM COATED ORAL EVERY 4 HOURS PRN
Qty: 20 TABLET | Refills: 0 | Status: SHIPPED | OUTPATIENT
Start: 2024-05-18 | End: 2024-06-17

## 2024-05-18 NOTE — DISCHARGE INSTRUCTIONS
At this time your itching does not appear to be related to a rash.  There is no evidence of an obvious allergic reaction.  He did start a new medication just prior to this itching beginning.  It could be a reaction to the ropinirole.  You should talk with Dr. Burroughs about this itching and you guys can decide whether or not you wish to continue the medication to help with your restless leg syndrome.  I will prescribe you some medicine for itching which may help your symptoms.  If you develop a rash, tightness in your throat, swelling to your face, you need to return to the emergency room.

## 2024-05-18 NOTE — ED PROVIDER NOTES
Patient Seen in: [facility] Emergency Department      History     Chief Complaint   Patient presents with    Medication Reaction     Started ropinrole Wednesday, taking cephelxin x5 days ago.      Stated Complaint: itching, cold    Subjective:   HPI    89-year-old female presents reporting she is itching ever since she started taking ropinirole 3 days ago.  She denies any rash.  No swelling to the face.  No difficulty swallowing.  Denies any shortness of breath, chest pain or palpitations.  No nausea, vomiting, diarrhea.  She is wondering if this new medication is interacting with her other medicines and causing her to have itching.  Neurology is treating her with ropinirole for restless leg syndrome.    Objective:   Past Medical History:    Abdominal distention    Abdominal hernia    Abdominal pain    Acute diverticulitis    Noted on CT scan - acute diverticulitis of the sigmoid colon    Arrhythmia    Arthritis    Atypical lobular hyperplasia of right breast    Back pain    Back problem    lower lumbar bad discs     Mckeon's esophagus without dysplasia    CHF (congestive heart failure) ([facility])    Chronic cough    Chronic diarrhea    Started before metformin- related to IBS      Constipation    COPD (chronic obstructive pulmonary disease) ([facility])    NO OXYGEN- no pulmonologist    Deviated septum    left side    Diabetes mellitus (HCC)    Diabetic polyneuropathy associated with type 2 diabetes mellitus (HCC)    Diarrhea, unspecified    Disorder of thyroid    has Thyroid nodules    Diverticulosis of large intestine    Dizziness    Ductal carcinoma in situ (DCIS) of right breast    Easy bruising    Esophageal reflux    Essential hypertension    Exudative macular degeneration (HCC)    Right Eye     Eye disease    Fatigue    Flatulence/gas pain/belching    Frequent urination    Frequent UTI    Full dentures    Gastroesophageal reflux disease    GERD (gastroesophageal reflux disease)    Hearing impairment    bilateral  hearing aids    Hearing loss    Heart palpitations    Hemorrhoids    High blood pressure    High cholesterol    History of cardiac murmur    History of tobacco use    Hoarseness, chronic    Hyperlipidemia    not taking lipitor due to side effects    Incontinence    uses pads    Indigestion    Irregular bowel habits    Itch of skin    Leaking of urine    Leaky heart valve    slight per pt- had echo 12/2015    Leg swelling    Loss of appetite    Macular degeneration    Macular degeneration    Menopause    Nausea    Neuropathy    Nodule of left lung    Obesity (BMI 30-39.9)    Osteoarthritis    all joints    Osteoporosis    Other fatigue    Pain in joints    Pap smear for cervical cancer screening    Per patient had pap in 2006. No abn pap.     Pneumonia, organism unspecified(486)    04/2015    PONV (postoperative nausea and vomiting)    Problems with swallowing    due hiatal hernia; gaggs at times    Restless leg syndrome    Restless leg syndrome    RLS (restless legs syndrome)    Screening breast examination    Dr. Layla Pretty    Screening mammogram for breast cancer    Benign     Shortness of breath    ON EXERTION    Sleep apnea    Sleep disturbance    Splenic artery aneurysm (HCC)    Torn rotator cuff    both shoulders per pt    Uncomfortable fullness after meals    Vertigo    Visual impairment    glasses    Vitamin B12 deficiency    Vulvar itching    Wears glasses    Weight loss    Wheezing              Past Surgical History:   Procedure Laterality Date    Angiogram      no intervention    Appendectomy  1935    Appendectomy      Cholecystectomy  1980    Colonoscopy      Egd  03/09/2022    Reactive gastropathy    Kevin localization wire 1 site left (cpt=19281)      1957    Kevin localization wire 1 site right (cpt=19281)      1980    Kevin localization wire 1 site right (cpt=19281) Right 07/2016    DCIS    Mastectomy partial Right 07/01/2016    wire localized partial mastectomy    Needle biopsy right      diag DCIS-  16    Other surgical history      L and R breast cyst removal    Sigmoidoscopy,diagnostic                  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   Tobacco Use    Smoking status: Former     Current packs/day: 0.00     Average packs/day: 1 pack/day for 35.0 years (35.0 ttl pk-yrs)     Types: Cigarettes     Start date: 1953     Quit date: 1988     Years since quittin.7     Passive exposure: Never    Smokeless tobacco: Former   Vaping Use    Vaping status: Never Used   Substance and Sexual Activity    Alcohol use: No    Drug use: No    Sexual activity: Not Currently   Other Topics Concern    Caffeine Concern Yes     Comment: coffee 1- 2 cups daily    Exercise No     Comment: walking     Social Determinants of Health     Financial Resource Strain: Low Risk  (2023)    Financial Resource Strain     Difficulty of Paying Living Expenses: Not hard at all     Med Affordability: No   Food Insecurity: No Food Insecurity (2023)    Food Insecurity     Food Insecurity: Never true   Transportation Needs: No Transportation Needs (2023)    Transportation Needs     Lack of Transportation: No   Physical Activity: Insufficiently Active (2023)    Exercise Vital Sign     Days of Exercise per Week: 1 day     Minutes of Exercise per Session: 20 min   Stress: No Stress Concern Present (2023)    Stress     Feeling of Stress : No   Social Connections: Socially Integrated (2023)    Social Connections     Frequency of Socialization with Friends and Family: 2   Housing Stability: Low Risk  (2023)    Housing Stability     Housing Instability: No     Housing Instability Emergency: No              Review of Systems    Positive for stated complaint: itching, cold  Other systems are as noted in HPI.  Constitutional and vital signs reviewed.      All other systems reviewed and negative except as noted  above.    Physical Exam     ED Triage Vitals   BP 05/18/24 1110 129/56   Pulse 05/18/24 1110 65   Resp 05/18/24 1110 18   Temp 05/18/24 1110 97.4 °F (36.3 °C)   Temp src --    SpO2 05/18/24 1111 97 %   O2 Device 05/18/24 1110 None (Room air)       Current Vitals:   Vital Signs  BP: 129/56  Pulse: 65  Resp: 18  Temp: 97.4 °F (36.3 °C)    Oxygen Therapy  SpO2: 97 %  O2 Device: None (Room air)            Physical Exam    General:  Vitals as listed.  No acute distress   HEENT: Sclerae anicteric.  Conjunctivae show no pallor.  Oropharynx clear, mucous membranes moist   Neck: supple, no rigidity   Lungs: good air exchange and clear   Heart: regular rate rhythm and no murmur   Abdomen: Soft and nontender.  No abdominal masses.  No peritoneal signs   Extremities: no edema, normal peripheral pulses   Neuro: Alert oriented and nonfocal   Skin: no rashes or nodules    ED Course   Labs Reviewed - No data to display                   MDM      89-year-old female presents reporting itching after starting ropinirole 3 days ago    Differential includes but is not limited to medication reaction, pruritus, a life threat.    On examination she is well-appearing.  She has no rash or lesions to her skin.  She has no swelling to the face, posterior pharynx.  Lungs are clear to auscultation.  She has no symptoms to suggest significant allergic reaction.  Possible medication side effect?  Recommend she speak with her neurology team about the symptoms.  In the meantime I have prescribed her Atarax to help with itching.  We did discuss that if she develops any swelling or rash she should return to the emergency room for evaluation.                                       Medical Decision Making      Disposition and Plan     Clinical Impression:  1. Pruritus         Disposition:  Discharge  5/18/2024 11:58 am    Follow-up:  Annie Newton MD  [address]  [address] [place] [address]  [phone]    Follow up      Aydin Burroughs,  MD  [address] [place] [id number]  [phone]    Follow up            Medications Prescribed:  Discharge Medication List as of 5/18/2024 12:04 PM        START taking these medications    Details   hydrOXYzine 25 MG Oral Tab Take 1 tablet (25 mg total) by mouth every 4 (four) hours as needed for Itching., Normal, Disp-20 tablet, R-0

## 2024-05-18 NOTE — ED INITIAL ASSESSMENT (HPI)
Pt ambulatory to ED with walker from home, living independently,  accompanied by son and reports possible medication reaction to ropinrole starting on Wednesday. Pt reports \"being cold, freezing and itching\" all the time. Reports worse vision in R eye that started on Wednesday,  hx macular degeneration. PT is also taking cephalexin for bladder infection, concerned for reaction of the 2 medications. Pt is a/ox4.

## 2024-05-22 ENCOUNTER — PATIENT OUTREACH (OUTPATIENT)
Dept: CASE MANAGEMENT | Age: 89
End: 2024-05-22

## 2024-05-22 NOTE — PROGRESS NOTES
1st attempt ER f/up apt request  PCP -existing apt (8/5) for medicare annual well visit, pt doesn't want sooner apt  NEURO -existing apt (8/14), pt doesn't want sooner apt  Closing encounter

## 2024-05-28 ENCOUNTER — WALK IN (OUTPATIENT)
Dept: URGENT CARE | Age: 89
End: 2024-05-28

## 2024-05-28 VITALS
OXYGEN SATURATION: 98 % | HEART RATE: 64 BPM | SYSTOLIC BLOOD PRESSURE: 130 MMHG | RESPIRATION RATE: 18 BRPM | DIASTOLIC BLOOD PRESSURE: 51 MMHG | TEMPERATURE: 96.3 F

## 2024-05-28 DIAGNOSIS — N39.0 ACUTE UTI: ICD-10-CM

## 2024-05-28 DIAGNOSIS — R30.0 DYSURIA: Primary | ICD-10-CM

## 2024-05-28 LAB
APPEARANCE, POC: ABNORMAL
BILIRUBIN, POC: NEGATIVE
COLOR, POC: YELLOW
GLUCOSE UR-MCNC: NEGATIVE MG/DL
KETONES, POC: NEGATIVE MG/DL
NITRITE, POC: POSITIVE
OCCULT BLOOD, POC: NEGATIVE
PH UR: 5.5 [PH] (ref 5–7)
PROT UR-MCNC: NEGATIVE MG/DL
SP GR UR: >= 1.03 (ref 1–1.03)
UROBILINOGEN UR-MCNC: 0.2 MG/DL (ref 0–1)
WBC (LEUKOCYTE) ESTERASE, POC: ABNORMAL

## 2024-05-28 PROCEDURE — 81002 URINALYSIS NONAUTO W/O SCOPE: CPT | Performed by: FAMILY MEDICINE

## 2024-05-28 PROCEDURE — 99214 OFFICE O/P EST MOD 30 MIN: CPT | Performed by: FAMILY MEDICINE

## 2024-05-28 RX ORDER — HYDROXYZINE HYDROCHLORIDE 25 MG/1
25 TABLET, FILM COATED ORAL
COMMUNITY
Start: 2024-05-18 | End: 2024-06-17

## 2024-05-28 RX ORDER — NITROFURANTOIN 25; 75 MG/1; MG/1
100 CAPSULE ORAL 2 TIMES DAILY
Qty: 14 CAPSULE | Refills: 0 | Status: SHIPPED | OUTPATIENT
Start: 2024-05-28 | End: 2024-06-04

## 2024-05-28 RX ORDER — ALBUTEROL SULFATE 90 UG/1
AEROSOL, METERED RESPIRATORY (INHALATION)
COMMUNITY
Start: 2024-02-20

## 2024-05-28 ASSESSMENT — PAIN SCALES - GENERAL: PAINLEVEL: 0

## 2024-05-29 LAB
APPEARANCE UR: ABNORMAL
BACTERIA #/AREA URNS HPF: ABNORMAL /HPF
BILIRUB UR QL STRIP: NEGATIVE
COLOR UR: YELLOW
GLUCOSE UR STRIP-MCNC: NEGATIVE MG/DL
HGB UR QL STRIP: NEGATIVE
HYALINE CASTS #/AREA URNS LPF: ABNORMAL /LPF
KETONES UR STRIP-MCNC: NEGATIVE MG/DL
LEUKOCYTE ESTERASE UR QL STRIP: ABNORMAL
MUCOUS THREADS URNS QL MICRO: PRESENT
NITRITE UR QL STRIP: POSITIVE
PH UR STRIP: 6 [PH] (ref 5–7)
PROT UR STRIP-MCNC: ABNORMAL MG/DL
RBC #/AREA URNS HPF: ABNORMAL /HPF
SP GR UR STRIP: 1.02 (ref 1–1.03)
SQUAMOUS #/AREA URNS HPF: ABNORMAL /HPF
UROBILINOGEN UR STRIP-MCNC: 0.2 MG/DL
WBC #/AREA URNS HPF: ABNORMAL /HPF

## 2024-05-31 LAB — BACTERIA UR CULT: ABNORMAL

## 2024-06-05 ENCOUNTER — TELEPHONE (OUTPATIENT)
Dept: NEUROLOGY | Facility: CLINIC | Age: 89
End: 2024-06-05

## 2024-06-05 DIAGNOSIS — G25.81 RLS (RESTLESS LEGS SYNDROME): Primary | ICD-10-CM

## 2024-06-05 RX ORDER — PRAMIPEXOLE DIHYDROCHLORIDE 0.12 MG/1
TABLET ORAL
Qty: 60 TABLET | Refills: 3 | Status: SHIPPED | OUTPATIENT
Start: 2024-06-05 | End: 2024-08-14

## 2024-06-05 NOTE — TELEPHONE ENCOUNTER
Spoke to patient who is currently taking PRAMIPEXOLE 0.125 @ 7pm and HS.  She is down to a few pills left and asking for refill.  She does not want to take ROPINROLE,     Pended order, routed to provider.

## 2024-06-05 NOTE — TELEPHONE ENCOUNTER
Last visit, she was off all medications and advised to try ropinerole due to her reports of feeling \"bad\" on pramipexole.    From chart review, she went to ER 5/18/2024 ~3 days after starting ropinerole for restless legs symptoms and was having \"itching\" sensation    She may have had reaction to ropinerole - will resume her prior medication with pramipexole 0.125 mg divided dose as per nursing notes    Rx signed

## 2024-06-05 NOTE — TELEPHONE ENCOUNTER
Patient calling, wants to make medication adjustments and would like to talk to nurse.    States that she has discontinued taking Ropinirole.    Increased magnesium per primary care doctor and legs are feeling better. PT restarted taking call Pramipexole Dihydrochloride 0.125mg and would like Dr. Burroughs to write new prescription.    Please advise and send to:    [id number] - [place], [place] - [address] [phone], [phone]

## 2024-06-10 ENCOUNTER — PATIENT OUTREACH (OUTPATIENT)
Dept: CASE MANAGEMENT | Age: 89
End: 2024-06-10

## 2024-06-10 DIAGNOSIS — G25.81 RLS (RESTLESS LEGS SYNDROME): ICD-10-CM

## 2024-06-10 DIAGNOSIS — E53.8 B12 DEFICIENCY: ICD-10-CM

## 2024-06-10 DIAGNOSIS — M81.0 AGE-RELATED OSTEOPOROSIS WITHOUT CURRENT PATHOLOGICAL FRACTURE: ICD-10-CM

## 2024-06-10 DIAGNOSIS — R00.2 PALPITATIONS: ICD-10-CM

## 2024-06-10 DIAGNOSIS — K21.9 GASTROESOPHAGEAL REFLUX DISEASE WITHOUT ESOPHAGITIS: ICD-10-CM

## 2024-06-10 DIAGNOSIS — I10 PRIMARY HYPERTENSION: ICD-10-CM

## 2024-06-10 DIAGNOSIS — M51.36 DDD (DEGENERATIVE DISC DISEASE), LUMBAR: Primary | ICD-10-CM

## 2024-06-10 DIAGNOSIS — E11.42 DIABETIC POLYNEUROPATHY ASSOCIATED WITH TYPE 2 DIABETES MELLITUS (HCC): ICD-10-CM

## 2024-06-10 DIAGNOSIS — E78.00 PURE HYPERCHOLESTEROLEMIA: ICD-10-CM

## 2024-06-10 DIAGNOSIS — J44.9 CHRONIC OBSTRUCTIVE PULMONARY DISEASE, UNSPECIFIED COPD TYPE (HCC): ICD-10-CM

## 2024-06-10 DIAGNOSIS — M19.041 ARTHRITIS OF BOTH HANDS: ICD-10-CM

## 2024-06-10 DIAGNOSIS — M19.042 ARTHRITIS OF BOTH HANDS: ICD-10-CM

## 2024-06-10 DIAGNOSIS — H35.3231 EXUDATIVE AGE-RELATED MACULAR DEGENERATION, BILATERAL, WITH ACTIVE CHOROIDAL NEOVASCULARIZATION (HCC): ICD-10-CM

## 2024-06-10 NOTE — PROGRESS NOTES
Spoke to Paulina for CCM.      Updates to patient care team/comments: UTD  Patient reported changes in medications: UTD  Med Adherence  Comment: pt taking medications as prescribed     Health Maintenance:   Health Maintenance   Topic Date Due    COVID-19 Vaccine (8 - 2023-24 season) 03/09/2024    Diabetes Care: Microalb/Creat Ratio  05/04/2024    Diabetes Care Dilated Eye Exam  07/20/2024    Annual Physical  07/25/2024    Diabetes Care A1C  08/14/2024    Diabetes Care Foot Exam  10/23/2024    Diabetes Care: GFR  04/03/2025    Influenza Vaccine  Completed    Annual Depression Screening  Completed    Fall Risk Screening (Annual)  Completed    Pneumococcal Vaccine: 65+ Years  Completed    Zoster Vaccines  Completed       Patient updates/concerns:    Spoke to pt for monthly outreach   Pt is in the process of finalizing some end of life instructions and has begun reviewing them with her son.    Pt was able to tolerate an extended car ride to [place]. Pt discussed the improvement she has seen in her restless legs. Pt has been advised to double her magnesium intake and she now takes 4 magnesium gummies daily. She has started to take pramipexole two times daily. She has also decreased her caffeine intake. She is very pleased with the decrease in RLS symptoms.   Pt continues to get daily exercise with walking. She will walk to the store or take a trip around the neighborhood. She  has completed physical therapy and continues to perform some foot/leg exercises throughout the day a few times.   Pt has not had any issues with visual field disturbances. Pt has seen eye doctor and advised that the symptoms she was exhibiting were not cause for concern and testing indicates that her vision has not declined and that if she is to experience similar symptoms in the future taht she should not be alarmed as they are natural as we age. Pt will still take eye drops if eyes are dry but was not instructed to add any other  medication.   Pt states she had diabetic eye exam. Ccm called and left message for [facility] to fax results to pcp office.    Pt is confident in her gait and balance and feels it has improved since increasing magnesium. Pt uses a rolator when out of the house but tries to use a cane when she is in her home.    Pt has been consciously trying to drink more water and has been able to tolerate 2 and will continue trying to add. Pt does not like the taste. Ccm encouraged use of diffuser to add fruit. Pt verbalized understanding.    Pt did not have any new questions or concerns.  Goals/Action Plan:    Active goal from previous outreach: exercise    Patient reported progress towards goals: pt walks every day and does foot leg exercises from pt               - What: hydration           - Where/When/How: pt is till drinking 2 glasses a day and will soon try adding a third  Patient Reported Barriers and Concerns: n/a                   - Plan for overcoming barriers: none    Care Managers Interventions: continue to provide encouragement and education for healthy coping and dx    Future Appointments:   Future Appointments   Date Time Provider Department Center   8/5/2024  3:40 PM Annie Newton MD [facility]   8/14/2024  1:00 PM [facility] [place]   10/28/2024  3:30 PM Bebeto Blackwell MD [facility] Care Manager Follow Up Date: one  month    Reason For Follow Up: review progress and or barriers towards patient's goals.     Time Spent This Encounter Total: 34 min medical record review, telephone communication, care plan updates where needed, education, goals, and action plan recreation/update. Provided acknowledgment and validation to patient's concerns.   Monthly Minute Total including today: 34  Physical assessment, complete health history, and need for CCM established by Annie Newton MD.

## 2024-06-30 ENCOUNTER — WALK IN (OUTPATIENT)
Dept: URGENT CARE | Age: 89
End: 2024-06-30

## 2024-06-30 VITALS
DIASTOLIC BLOOD PRESSURE: 62 MMHG | TEMPERATURE: 97.6 F | HEART RATE: 78 BPM | SYSTOLIC BLOOD PRESSURE: 144 MMHG | RESPIRATION RATE: 16 BRPM | OXYGEN SATURATION: 97 %

## 2024-06-30 DIAGNOSIS — L03.90 CELLULITIS, UNSPECIFIED CELLULITIS SITE: Primary | ICD-10-CM

## 2024-06-30 DIAGNOSIS — R30.0 DYSURIA: ICD-10-CM

## 2024-06-30 LAB
APPEARANCE, POC: CLEAR
BILIRUBIN, POC: ABNORMAL
COLOR, POC: YELLOW
GLUCOSE UR-MCNC: NEGATIVE MG/DL
INTERNAL PROCEDURAL CONTROLS ACCEPTABLE: YES
KETONES, POC: NEGATIVE MG/DL
NITRITE, POC: NEGATIVE
OCCULT BLOOD, POC: NEGATIVE
PH UR: 6 [PH] (ref 5–7)
PROT UR-MCNC: NEGATIVE MG/DL
SP GR UR: 1.02 (ref 1–1.03)
TEST LOT EXPIRATION DATE: ABNORMAL
TEST LOT NUMBER: ABNORMAL
UROBILINOGEN UR-MCNC: 0.2 MG/DL (ref 0–1)
WBC (LEUKOCYTE) ESTERASE, POC: ABNORMAL

## 2024-06-30 RX ORDER — CEFUROXIME AXETIL 500 MG/1
500 TABLET ORAL 2 TIMES DAILY
Qty: 20 TABLET | Refills: 0 | Status: SHIPPED | OUTPATIENT
Start: 2024-06-30 | End: 2024-07-10

## 2024-07-01 LAB
APPEARANCE UR: ABNORMAL
BACTERIA #/AREA URNS HPF: ABNORMAL /HPF
BILIRUB UR QL STRIP: NEGATIVE
COLOR UR: YELLOW
GLUCOSE UR STRIP-MCNC: NEGATIVE MG/DL
HGB UR QL STRIP: NEGATIVE
HYALINE CASTS #/AREA URNS LPF: ABNORMAL /LPF
KETONES UR STRIP-MCNC: NEGATIVE MG/DL
LEUKOCYTE ESTERASE UR QL STRIP: ABNORMAL
MUCOUS THREADS URNS QL MICRO: PRESENT
NITRITE UR QL STRIP: NEGATIVE
PH UR STRIP: 6 [PH] (ref 5–7)
PROT UR STRIP-MCNC: NEGATIVE MG/DL
RBC #/AREA URNS HPF: ABNORMAL /HPF
SP GR UR STRIP: 1.02 (ref 1–1.03)
SQUAMOUS #/AREA URNS HPF: ABNORMAL /HPF
UROBILINOGEN UR STRIP-MCNC: 0.2 MG/DL
WBC #/AREA URNS HPF: ABNORMAL /HPF

## 2024-07-02 LAB
BACTERIA UR CULT: ABNORMAL
BACTERIA UR CULT: ABNORMAL

## 2024-07-09 ENCOUNTER — NURSE TRIAGE (OUTPATIENT)
Dept: INTERNAL MEDICINE CLINIC | Facility: CLINIC | Age: 89
End: 2024-07-09

## 2024-07-09 NOTE — TELEPHONE ENCOUNTER
Reason for Disposition   Fever > 100.0 F (37.8 C) and diabetes mellitus or a weak immune system (e.g., HIV positive, chemotherapy, splenectomy)     Temp Sunday 100.4, today 99.2    Protocols used: Fever-A-[place]    Action Requested: Summary for Provider     []  Critical Lab, Recommendations Needed  [] Need Additional Advice  [x]   FYI    []   Need Orders  [] Need Medications Sent to Pharmacy  []  Other     SUMMARY: Pt called and stated Sunday had low grade temp, 100.4. Temp today 99.2. Seen in [facility] 6/30/24 (dx UTI and cellulitis). Notes available. Pt currently on Ceftin 500mg. Denies any urinary symptoms currently. Had bug bite at [facility], stated it's much better but still itchy. Denies rash. Denies chest pain and SOB. C/o mild headache and cough. Coughing every once in a while, phlegm is clear. Also c/o \"burning to hands and legs\". Stated it' constant since Sunday. Unsure if from meds. Scheduled f/u visit tomorrow with SD. Pt verbalizes understanding and is agreeable to plan. Advised to call back with new or worsening symptoms.     Reason for call: Fever  Onset: Data Unavailable    Future Appointments   Date Time Provider Department Center   7/10/2024  8:00 AM Joanie Thorne APRN [facility]   8/5/2024  3:40 PM Annie Newton MD [facility]   8/14/2024  1:00 PM Aydin Burroughs MD [facility] [place]   10/28/2024  3:30 PM Bebeto Blackwell MD [facility] [place] [facility]

## 2024-07-10 ENCOUNTER — OFFICE VISIT (OUTPATIENT)
Dept: INTERNAL MEDICINE CLINIC | Facility: CLINIC | Age: 89
End: 2024-07-10
Payer: MEDICARE

## 2024-07-10 VITALS
TEMPERATURE: 98 F | HEART RATE: 72 BPM | SYSTOLIC BLOOD PRESSURE: 108 MMHG | WEIGHT: 147 LBS | OXYGEN SATURATION: 98 % | DIASTOLIC BLOOD PRESSURE: 54 MMHG | HEIGHT: 61 IN | RESPIRATION RATE: 20 BRPM | BODY MASS INDEX: 27.75 KG/M2

## 2024-07-10 DIAGNOSIS — E78.00 PURE HYPERCHOLESTEROLEMIA: ICD-10-CM

## 2024-07-10 DIAGNOSIS — I10 PRIMARY HYPERTENSION: ICD-10-CM

## 2024-07-10 DIAGNOSIS — E11.42 TYPE 2 DIABETES MELLITUS WITH DIABETIC POLYNEUROPATHY, WITHOUT LONG-TERM CURRENT USE OF INSULIN (HCC): ICD-10-CM

## 2024-07-10 DIAGNOSIS — R50.9 FEVER, UNSPECIFIED FEVER CAUSE: Primary | ICD-10-CM

## 2024-07-10 DIAGNOSIS — R68.83 CHILLS: ICD-10-CM

## 2024-07-10 PROBLEM — R06.00 DYSPNEA: Status: RESOLVED | Noted: 2023-10-30 | Resolved: 2024-07-10

## 2024-07-10 LAB
BILIRUBIN: NEGATIVE
GLUCOSE (URINE DIPSTICK): NEGATIVE MG/DL
KETONES (URINE DIPSTICK): NEGATIVE MG/DL
MULTISTIX LOT#: ABNORMAL NUMERIC
NITRITE, URINE: NEGATIVE
OCCULT BLOOD: NEGATIVE
PH, URINE: 7 (ref 4.5–8)
PROTEIN (URINE DIPSTICK): NEGATIVE MG/DL
SPECIFIC GRAVITY: 1.01 (ref 1–1.03)
UROBILINOGEN,SEMI-QN: 0.2 MG/DL (ref 0–1.9)

## 2024-07-10 PROCEDURE — 99214 OFFICE O/P EST MOD 30 MIN: CPT | Performed by: NURSE PRACTITIONER

## 2024-07-10 PROCEDURE — G2211 COMPLEX E/M VISIT ADD ON: HCPCS | Performed by: NURSE PRACTITIONER

## 2024-07-10 PROCEDURE — 87635 SARS-COV-2 COVID-19 AMP PRB: CPT | Performed by: NURSE PRACTITIONER

## 2024-07-10 PROCEDURE — 87086 URINE CULTURE/COLONY COUNT: CPT | Performed by: NURSE PRACTITIONER

## 2024-07-10 PROCEDURE — 81003 URINALYSIS AUTO W/O SCOPE: CPT | Performed by: NURSE PRACTITIONER

## 2024-07-10 NOTE — PROGRESS NOTES
Paulina Man is a 89 year old female.    Chief Complaint   Patient presents with    UTI     ES rm - 10 - f/u for UTI       HPI:   here for evaluation of persistent low grade fever.  See triage note from yesterday.  Seen at [facility] 6/30 treated for UTI and cellulitis right knee.  Completed ceftin.  She had a low grade fever starting Sunday evening and continued to yesterday.  At 3am today felt much better,   Cellulitis has improved significantly per her report.   No urinary symptoms.  Urine dip with trace leuk send for culture given fever although improved symptoms.    6/30 Urine culture sensitivities not in care everywhere.    Folllows with Dr Cano for recurrent UTI.  Had appt on Monday but was feeling ill so cancelled.  Has called for follow up appt.  Last ov with her 2022.    She did not test for covid and is requesting test today due to fever.  No SOB.        Mild cough but not new  known COPD.   Her diabetes has been stable   glipizide ER  A1c 6.4  Patient Active Problem List   Diagnosis    Diabetic polyneuropathy associated with type 2 diabetes mellitus (HCC)    Gastroesophageal reflux disease    Primary hypertension    RLS (restless legs syndrome)    Pure hypercholesterolemia    Exudative age-related macular degeneration, bilateral, with active choroidal neovascularization (HCC)    DCIS (ductal carcinoma in situ) of breast    DCIS (ductal carcinoma in situ) of breast    Atypical lobular hyperplasia of right breast    Arthritis of both hands    Bilateral carpal tunnel syndrome    Chronic obstructive pulmonary disease (HCC)    Splenic artery aneurysm (HCC)    Back pain with radiation    DDD (degenerative disc disease), lumbar    Chronic vulvitis    Atrophic vaginitis    Recurrent UTI    Unsteady gait    BPPV (benign paroxysmal positional vertigo), unspecified laterality    B12 deficiency    Dextroscoliosis    Cervical spondylosis without myelopathy    Spondylosis of lumbar region without myelopathy or radiculopathy     COPD exacerbation (HCC)    Dyspnea, unspecified type    Shortness of breath    Palpitations    Encounter for well woman exam with abnormal findings    Age-related osteoporosis without current pathological fracture    Sleep apnea, unspecified    Venous insufficiency of both lower extremities    Edema leg    Diastolic dysfunction    Chronic diastolic heart failure (HCC)    Unspecified osteoarthritis, unspecified site    Unspecified hearing loss, unspecified ear    Unspecified abdominal hernia without obstruction or gangrene    Type 2 diabetes mellitus with diabetic polyneuropathy (HCC)    Problems related to living alone    Personal history of urinary (tract) infections    Personal history of pneumonia (recurrent)    Personal history of nicotine dependence    Personal history of in-situ neoplasm of breast    Obesity, unspecified     Current Outpatient Medications   Medication Sig Dispense Refill    ESOMEPRAZOLE MAGNESIUM 40 MG Oral Capsule Delayed Release TAKE ONE CAPSULE BY MOUTH TWICE DAILY before meals 180 capsule 0    pramipexole 0.125 MG Oral Tab Take one tablet at 7 pm and take one tablet at bed time. 60 tablet 3    losartan 100 MG Oral Tab Take by mouth daily.      Magnesium 200 MG Oral Tab Take by mouth. 2 x 200 mg gummies daily      Cholecalciferol (VITAMIN D3) 25 MCG (1000 UT) Oral Cap Take 1 tablet by mouth daily.      cyanocobalamin 1000 MCG Oral Tab Take 1 tablet (1,000 mcg total) by mouth daily.      aspirin 81 MG Oral Chew Tab Chew by mouth daily.      liver oil-zinc oxide 40 % External Ointment Apply topically as needed for Dry Skin.      Bacillus Coagulans-Inulin (BENEFIBER PREBIOTIC+PROBIOTIC) Oral Chew Tab Chew by mouth.      glipiZIDE ER 2.5 MG Oral Tablet 24 Hr TAKE ONE TABLET BY MOUTH DAILY WITH BREAKFAST 90 tablet 0    atorvastatin 40 MG Oral Tab Take 1 tablet (40 mg total) by mouth daily.      estradiol 0.1 MG/GM Vaginal Cream Apply 0.5 grams (pea-sized amount) of cream inside the vagina  nightly. May use small amount at the vaginal opening as well in a thin layer. 42.5 g 3    clotrimazole-betamethasone 1-0.05 % External Cream Apply 1 Application topically 2 (two) times daily as needed. 30 g 1    mometasone 0.1 % External Ointment apply a thin layer daily to vulva for 2 weeks then twice weekly 45 g 0    ondansetron (ZOFRAN ODT) 8 MG Oral Tablet Dispersible Take 1 tablet (8 mg total) by mouth every 8 (eight) hours as needed for Nausea. 30 tablet 0    albuterol (VENTOLIN HFA) 108 (90 Base) MCG/ACT Inhalation Aero Soln Inhale 2 puffs into the lungs every 4 (four) hours as needed for Wheezing. 1 each 1    fluticasone propionate 50 MCG/ACT Nasal Suspension 2 sprays by Nasal route daily.      acetaminophen 500 MG Oral Tab Take 1 tablet (500 mg total) by mouth every 6 (six) hours as needed for Pain.      CAPSAICIN APR EX Apply topically as needed.      Loperamide HCl 2 MG Oral Cap Take 1 capsule (2 mg total) by mouth as needed for Diarrhea.      Multiple Vitamins-Minerals (PRESERVISION AREDS) Oral Tab Take 1 tablet by mouth. 250 mg once daily      polyethylene glycol, PEG 3350, 17 g Oral Powd Pack Take 17 g by mouth daily. (Patient not taking: Reported on 7/10/2024)      cephalexin 500 MG Oral Cap Take 1 capsule (500 mg total) by mouth 2 (two) times daily. (Patient not taking: Reported on 7/10/2024) 14 capsule 0    docusate sodium 100 MG Oral Cap Take 1 capsule (100 mg total) by mouth 2 (two) times daily as needed for constipation. (Patient not taking: Reported on 7/10/2024)        Past Medical History:    Abdominal distention    Abdominal hernia    Abdominal pain    Acute diverticulitis    Noted on CT scan - acute diverticulitis of the sigmoid colon    Arrhythmia    Arthritis    Atypical lobular hyperplasia of right breast    Back pain    Back problem    lower lumbar bad discs     Mckeon's esophagus without dysplasia    CHF (congestive heart failure) (HCC)    Chronic cough    Chronic diarrhea    Started  before metformin- related to IBS      Constipation    COPD (chronic obstructive pulmonary disease) (HCC)    NO OXYGEN- no pulmonologist    Deviated septum    left side    Diabetes mellitus (HCC)    Diabetic polyneuropathy associated with type 2 diabetes mellitus (HCC)    Diarrhea, unspecified    Disorder of thyroid    has Thyroid nodules    Diverticulosis of large intestine    Dizziness    Ductal carcinoma in situ (DCIS) of right breast    Easy bruising    Esophageal reflux    Essential hypertension    Exudative macular degeneration (HCC)    Right Eye     Eye disease    Fatigue    Flatulence/gas pain/belching    Frequent urination    Frequent UTI    Full dentures    Gastroesophageal reflux disease    GERD (gastroesophageal reflux disease)    Hearing impairment    bilateral hearing aids    Hearing loss    Heart palpitations    Hemorrhoids    High blood pressure    High cholesterol    History of cardiac murmur    History of tobacco use    Hoarseness, chronic    Hyperlipidemia    not taking lipitor due to side effects    Incontinence    uses pads    Indigestion    Irregular bowel habits    Itch of skin    Leaking of urine    Leaky heart valve    slight per pt- had echo 12/2015    Leg swelling    Loss of appetite    Macular degeneration    Macular degeneration    Menopause    Nausea    Neuropathy    Nodule of left lung    Obesity (BMI 30-39.9)    Osteoarthritis    all joints    Osteoporosis    Other fatigue    Pain in joints    Pap smear for cervical cancer screening    Per patient had pap in 2006. No abn pap.     Pneumonia, organism unspecified(486)    04/2015    PONV (postoperative nausea and vomiting)    Problems with swallowing    due hiatal hernia; gaggs at times    Restless leg syndrome    Restless leg syndrome    RLS (restless legs syndrome)    Screening breast examination    Dr. 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   Tobacco Use    Smoking status: Former     Current packs/day: 0.00     Average packs/day: 1 pack/day for 35.0 years (35.0 ttl pk-yrs)     Types: Cigarettes     Start date: 1953     Quit date: 1988     Years since quittin.8     Passive exposure: Never    Smokeless tobacco: Former   Vaping Use    Vaping status: Never Used   Substance and Sexual Activity    Alcohol use: No    Drug use: No    Sexual activity: Not Currently   Other Topics Concern    Caffeine Concern Yes     Comment: coffee 1- 2 cups daily    Exercise No     Comment: walking     Social Determinants of Health     Financial Resource Strain: Low Risk  (2023)    Financial Resource Strain     Difficulty of Paying Living Expenses: Not hard at all     Med Affordability: No   Food Insecurity: No Food Insecurity (2023)    Food Insecurity     Food Insecurity: Never true   Transportation Needs: No Transportation Needs (2023)    Transportation Needs     Lack of Transportation: No   Physical Activity: Insufficiently Active (2023)    Exercise Vital Sign     Days of Exercise per Week: 1 day     Minutes of Exercise per Session: 20 min   Stress: No Stress Concern Present (2023)    Stress     Feeling of Stress : No   Social Connections: Socially Integrated (2023)    Social Connections     Frequency of Socialization with Friends and Family: 2   Housing Stability: Low Risk  (2023)    Housing Stability     Housing Instability: No     Housing Instability Emergency: No     Family History   Problem Relation Age of Onset     Breast Cancer Paternal Aunt     Heart Disorder Mother     Hypertension Mother     Colon Polyps Mother     Heart Disorder Father     Hypertension Father     Lipids Father     Heart Disorder Paternal Grandfather     Diabetes Paternal Grandmother     Breast Cancer Self 86        DCIS        Allergies  Allergies   Allergen Reactions    Ciprofloxacin NAUSEA AND VOMITING and HIVES    Peanut-Containing Drug Products UNKNOWN and OTHER (SEE COMMENTS)     Bladder infection    Pollen ITCHING and HIVES    Sulfa Antibiotics NAUSEA AND VOMITING    Adhesive Tape ITCHING    Dander RASH     fur    Other ITCHING     feather       REVIEW OF SYSTEMS:   GENERAL HEALTH: feels better  RESPIRATORY: denies shortness of breath with exertion, no cough  CARDIOVASCULAR: denies chest pain on exertion, no palpatations  GI: denies abdominal pain and denies heartburn, no diarrhea or constipation  as above   MUSCULOSKELETAL:  No arthralgias or myalgias  NEURO: denies headaches,     EXAM:   /54 (BP Location: Left arm, Patient Position: Sitting, Cuff Size: large)   Pulse 72   Temp 97.6 °F (36.4 °C) (Oral)   Resp 20   Ht 5' 1\" (1.549 m)   Wt 147 lb (66.7 kg)   LMP  (LMP Unknown)   SpO2 98%   BMI 27.78 kg/m²   GENERAL: well developed, well nourished,in no apparent distress  HEENT: atraumatic, normocephalic,ears and throat are clear   NECK: supple,no adenopathy,  LUNGS: normal rate without respiratory distress, lungs clear to auscultation  no hweezing.  No cough with inspiration.   CARDIO: RRR   GI: normal bowel sounds, no masses, HSM or tenderness  bladder soft.  No CVA tenderness,   EXTREMITIES: no edema, normal strength and tone  right knee reported area of cellulitis WNL.   PSYCH: alert and oriented x 3    ASSESSMENT AND PLAN:     Encounter Diagnoses   Name Primary?    Fever, unspecified fever cause  uncertain etiology but feeling better,  urine for culture.  Covid test performed. Results pending. Cellulitis has improved.  Yes    Chills      Type 2 diabetes mellitus with diabetic polyneuropathy, without long-term current use of insulin (HCC)  stable not checking at home.  Cont low dose glipizide ER.      Pure hypercholesterolemia  stable      Primary hypertension  stable       Code selection for this visit was based on time spent (>30min) on date of service in preparing to see the patient, obtaining and/or reviewing separately obtained history, performing a medically appropriate examination, counseling and educating the patient/family/caregiver, ordering medications or testing, referring and communicating with other healthcare providers, documenting clinical information in the EHR, independently interpreting results and communicating results to the patient/family/caregiver and care coordination with the patient's other providers.    Orders Placed This Encounter   Procedures    Urine Dip, auto without Micro    Urine Culture, Routine [E]    COVID-19 Testing by PCR (Jose) [E]       Meds & Refills for this Visit:  Requested Prescriptions      No prescriptions requested or ordered in this encounter       Imaging & Consults:  None    No follow-ups on file.  There are no Patient Instructions on file for this visit.

## 2024-07-11 LAB — SARS-COV-2 RNA RESP QL NAA+PROBE: NOT DETECTED

## 2024-07-15 ENCOUNTER — TELEPHONE (OUTPATIENT)
Dept: INTERNAL MEDICINE CLINIC | Facility: CLINIC | Age: 89
End: 2024-07-15

## 2024-07-15 ENCOUNTER — PATIENT OUTREACH (OUTPATIENT)
Dept: CASE MANAGEMENT | Age: 89
End: 2024-07-15

## 2024-07-15 DIAGNOSIS — M19.042 ARTHRITIS OF BOTH HANDS: ICD-10-CM

## 2024-07-15 DIAGNOSIS — G25.81 RLS (RESTLESS LEGS SYNDROME): ICD-10-CM

## 2024-07-15 DIAGNOSIS — E11.42 TYPE 2 DIABETES MELLITUS WITH DIABETIC POLYNEUROPATHY, WITHOUT LONG-TERM CURRENT USE OF INSULIN (HCC): ICD-10-CM

## 2024-07-15 DIAGNOSIS — H35.3231 EXUDATIVE AGE-RELATED MACULAR DEGENERATION, BILATERAL, WITH ACTIVE CHOROIDAL NEOVASCULARIZATION (HCC): ICD-10-CM

## 2024-07-15 DIAGNOSIS — I50.32 CHRONIC DIASTOLIC HEART FAILURE (HCC): ICD-10-CM

## 2024-07-15 DIAGNOSIS — J44.9 CHRONIC OBSTRUCTIVE PULMONARY DISEASE, UNSPECIFIED COPD TYPE (HCC): ICD-10-CM

## 2024-07-15 DIAGNOSIS — M19.041 ARTHRITIS OF BOTH HANDS: ICD-10-CM

## 2024-07-15 DIAGNOSIS — E53.8 B12 DEFICIENCY: ICD-10-CM

## 2024-07-15 DIAGNOSIS — I10 PRIMARY HYPERTENSION: ICD-10-CM

## 2024-07-15 DIAGNOSIS — M51.36 DDD (DEGENERATIVE DISC DISEASE), LUMBAR: Primary | ICD-10-CM

## 2024-07-15 DIAGNOSIS — K21.9 GASTROESOPHAGEAL REFLUX DISEASE WITHOUT ESOPHAGITIS: ICD-10-CM

## 2024-07-15 DIAGNOSIS — E78.00 PURE HYPERCHOLESTEROLEMIA: ICD-10-CM

## 2024-07-15 DIAGNOSIS — J44.1 COPD EXACERBATION (HCC): ICD-10-CM

## 2024-07-15 NOTE — PROGRESS NOTES
Spoke to Paulina for Chronic Care Management.      Updates to patient care team/comments: UTD  Patient reported changes in medications: UTD  Med Adherence  Comment: pt taking medications as prescribed     Health Maintenance:   Health Maintenance   Topic Date Due    COVID-19 Vaccine (8 - 2023-24 season) 03/09/2024    Diabetes Care: Microalb/Creat Ratio  05/04/2024    Annual Physical  07/25/2024    Diabetes Care A1C  08/14/2024    Influenza Vaccine (1) 10/01/2024    Diabetes Care Foot Exam  10/23/2024    Diabetes Care: GFR  04/03/2025    Diabetes Care Dilated Eye Exam  05/16/2025    Annual Depression Screening  Completed    Fall Risk Screening (Annual)  Completed    Pneumococcal Vaccine: 65+ Years  Completed    Zoster Vaccines  Completed       Patient updates/concerns:    Spoke to pt for monthly outreach   Pt has finished her abx and is not exhibiting any symptoms of bladder infection and does not have any fevers.  Discussed with pt the connection of frequent bladder infections to lack of hydration. Pt verbalized understanding and states that she is consistently able to drink two or three glasses of water a day, but has an aversion to the taste making it difficult to remain consistent in the habit.    Pt discussed with ccm the how she feels her bladder infections are often triggered by bouts of diarrhea.  She states she typically can blame the consumption of chocolate but she has noticed in recent weeks that she will have an episode of loose watery stool without having consumed chocolate. She speculates that seeds or nuts may have been to blame. Discussed with pt that often times those that have experienced diverticulitis or diverticulosis are often encouraged to avoid seeds, nuts or popcorn to prevent irritating the colon and leaving her susceptible to flare up. Pt verbalized understanding.    Reviewed with pt the importance of avoiding constipation when trying to manage diverticulitis.  Pt continues to incorporate  salads regularly and plans to purchase chewable bene fiber.  Pt has not had to use docusate sodium.    Pt is very happy with the response she has received using the magnesium gummies and pramipexole. She states that her restless leg syndrome is vastly improved and no longer interfering with her sleep. She continues to take pramipexole as directed at 7pm and bed time.    Pt tries take frequent trips to her grocery store for exercise. She estimates that the trip is cloise to 6 blocks round trip and she makes it frequently during the week. She has a Rolator and feels confident in her leg strength and stability. She states that by the end of the trip she is ready for a break but that she is not struggling for breath. Pt tries to wait later in the day to do her walking outside.   Discussed proper skin care for exposure to the sun. Pt states she is in the habit of using sunscreen, but admits to not always being consistent.   Goals/Action Plan:    Active goal from previous outreach: exercise    Patient reported progress towards goals: pt has foot and leg exercises from PT and she tries to walk to the store several times weekly               - What: hydration           - Where/When/How: pt consistently drinks two glasses of water and is trying to increase intake  Patient Reported Barriers and Concerns: n/a                   - Plan for overcoming barriers: none    Care Managers Interventions: continue to provide encouragement and education for healthy coping and dx    Future Appointments:   Future Appointments   Date Time Provider Department Center   8/5/2024  3:40 PM Annie Newton MD [facility]   8/14/2024  1:00 PM [facility] [place]   10/28/2024  3:30 PM Bebeto Blackwell MD [facility] [place]         Next Care Manager Follow Up Date: one month    Reason For Follow Up: review progress and or barriers towards patient's goals.     Time Spent This Encounter Total: 24 min medical record  review, telephone communication, care plan updates where needed, education, goals, and action plan recreation/update. Provided acknowledgment and validation to patient's concerns.   Monthly Minute Total including today: 24  Physical assessment, complete health history, and need for CCM established by Annie Newton MD.

## 2024-07-15 NOTE — TELEPHONE ENCOUNTER
Future Appointments   Date Time Provider Department Center   8/5/2024  3:40 PM Annie Newton MD [facility]       If pt requires labs for upcoming [facility], please review chart and place appropriate orders.    Thank you    Routed to [facility]

## 2024-07-17 NOTE — TELEPHONE ENCOUNTER
Patient returned call, advised that she has lab orders for annual appointment.  Can go 2 weeks prior to appointment, fast for 10-12 hours, water ok, ok to take medications. Assured patient we can see results from [facility] providers. She confirms understanding.

## 2024-07-26 ENCOUNTER — LAB ENCOUNTER (OUTPATIENT)
Dept: LAB | Facility: HOSPITAL | Age: 89
End: 2024-07-26
Attending: INTERNAL MEDICINE
Payer: MEDICARE

## 2024-07-26 DIAGNOSIS — E11.69 TYPE 2 DIABETES MELLITUS WITH OBESITY (HCC): ICD-10-CM

## 2024-07-26 DIAGNOSIS — I10 ESSENTIAL HYPERTENSION: ICD-10-CM

## 2024-07-26 DIAGNOSIS — E78.00 PURE HYPERCHOLESTEROLEMIA: ICD-10-CM

## 2024-07-26 DIAGNOSIS — E66.9 TYPE 2 DIABETES MELLITUS WITH OBESITY (HCC): ICD-10-CM

## 2024-07-26 LAB
ALBUMIN SERPL-MCNC: 4.4 G/DL (ref 3.2–4.8)
ALBUMIN/GLOB SERPL: 1.7 {RATIO} (ref 1–2)
ALP LIVER SERPL-CCNC: 68 U/L
ALT SERPL-CCNC: 13 U/L
ANION GAP SERPL CALC-SCNC: 4 MMOL/L (ref 0–18)
AST SERPL-CCNC: 16 U/L (ref ?–34)
BASOPHILS # BLD AUTO: 0.07 X10(3) UL (ref 0–0.2)
BASOPHILS NFR BLD AUTO: 1.1 %
BILIRUB SERPL-MCNC: 0.7 MG/DL (ref 0.2–0.9)
BUN BLD-MCNC: 18 MG/DL (ref 9–23)
CALCIUM BLD-MCNC: 10.4 MG/DL (ref 8.7–10.4)
CHLORIDE SERPL-SCNC: 106 MMOL/L (ref 98–112)
CHOLEST SERPL-MCNC: 117 MG/DL (ref ?–200)
CO2 SERPL-SCNC: 29 MMOL/L (ref 21–32)
CREAT BLD-MCNC: 0.91 MG/DL
CREAT UR-SCNC: 70.8 MG/DL
EGFRCR SERPLBLD CKD-EPI 2021: 58 ML/MIN/1.73M2 (ref 60–?)
EOSINOPHIL # BLD AUTO: 0.21 X10(3) UL (ref 0–0.7)
EOSINOPHIL NFR BLD AUTO: 3.2 %
ERYTHROCYTE [DISTWIDTH] IN BLOOD BY AUTOMATED COUNT: 12.7 %
EST. AVERAGE GLUCOSE BLD GHB EST-MCNC: 126 MG/DL (ref 68–126)
FASTING PATIENT LIPID ANSWER: YES
FASTING STATUS PATIENT QL REPORTED: YES
GLOBULIN PLAS-MCNC: 2.6 G/DL (ref 2–3.5)
GLUCOSE BLD-MCNC: 105 MG/DL (ref 70–99)
HBA1C MFR BLD: 6 % (ref ?–5.7)
HCT VFR BLD AUTO: 38.5 %
HDLC SERPL-MCNC: 47 MG/DL (ref 40–59)
HGB BLD-MCNC: 13 G/DL
IMM GRANULOCYTES # BLD AUTO: 0.02 X10(3) UL (ref 0–1)
IMM GRANULOCYTES NFR BLD: 0.3 %
LDLC SERPL CALC-MCNC: 51 MG/DL (ref ?–100)
LYMPHOCYTES # BLD AUTO: 1.45 X10(3) UL (ref 1–4)
LYMPHOCYTES NFR BLD AUTO: 22.1 %
MCH RBC QN AUTO: 30.2 PG (ref 26–34)
MCHC RBC AUTO-ENTMCNC: 33.8 G/DL (ref 31–37)
MCV RBC AUTO: 89.5 FL
MICROALBUMIN UR-MCNC: <0.5 MG/DL
MONOCYTES # BLD AUTO: 0.6 X10(3) UL (ref 0.1–1)
MONOCYTES NFR BLD AUTO: 9.1 %
NEUTROPHILS # BLD AUTO: 4.21 X10 (3) UL (ref 1.5–7.7)
NEUTROPHILS # BLD AUTO: 4.21 X10(3) UL (ref 1.5–7.7)
NEUTROPHILS NFR BLD AUTO: 64.2 %
NONHDLC SERPL-MCNC: 70 MG/DL (ref ?–130)
OSMOLALITY SERPL CALC.SUM OF ELEC: 290 MOSM/KG (ref 275–295)
PLATELET # BLD AUTO: 258 10(3)UL (ref 150–450)
POTASSIUM SERPL-SCNC: 4 MMOL/L (ref 3.5–5.1)
PROT SERPL-MCNC: 7 G/DL (ref 5.7–8.2)
RBC # BLD AUTO: 4.3 X10(6)UL
SODIUM SERPL-SCNC: 139 MMOL/L (ref 136–145)
TRIGL SERPL-MCNC: 101 MG/DL (ref 30–149)
TSI SER-ACNC: 1.66 MIU/ML (ref 0.55–4.78)
VLDLC SERPL CALC-MCNC: 14 MG/DL (ref 0–30)
WBC # BLD AUTO: 6.6 X10(3) UL (ref 4–11)

## 2024-07-26 PROCEDURE — 80061 LIPID PANEL: CPT

## 2024-07-26 PROCEDURE — 36415 COLL VENOUS BLD VENIPUNCTURE: CPT

## 2024-07-26 PROCEDURE — 84443 ASSAY THYROID STIM HORMONE: CPT

## 2024-07-26 PROCEDURE — 82043 UR ALBUMIN QUANTITATIVE: CPT

## 2024-07-26 PROCEDURE — 83036 HEMOGLOBIN GLYCOSYLATED A1C: CPT

## 2024-07-26 PROCEDURE — 85025 COMPLETE CBC W/AUTO DIFF WBC: CPT

## 2024-07-26 PROCEDURE — 80053 COMPREHEN METABOLIC PANEL: CPT

## 2024-07-26 PROCEDURE — 82570 ASSAY OF URINE CREATININE: CPT

## 2024-07-31 ENCOUNTER — TELEPHONE (OUTPATIENT)
Dept: INTERNAL MEDICINE CLINIC | Facility: CLINIC | Age: 89
End: 2024-07-31

## 2024-07-31 NOTE — TELEPHONE ENCOUNTER
Pt called asking if her urine test she had the other day showed she had a UTI. Pt had urine microabl/creat ratio done. Informed pt this urine test was not to check for UTI. Asked pt if she is having symptoms of UTI and she denies any. She states she has had so many UTIs she thought that is why she had urine testing done. Informed pt TB will discuss results with her at her appt on 8/5/24. She verbalized understanding and is agreeable to plan.

## 2024-08-05 ENCOUNTER — OFFICE VISIT (OUTPATIENT)
Dept: INTERNAL MEDICINE CLINIC | Facility: CLINIC | Age: 89
End: 2024-08-05
Payer: MEDICARE

## 2024-08-05 VITALS
OXYGEN SATURATION: 99 % | HEIGHT: 60 IN | TEMPERATURE: 98 F | WEIGHT: 145.19 LBS | SYSTOLIC BLOOD PRESSURE: 128 MMHG | BODY MASS INDEX: 28.51 KG/M2 | RESPIRATION RATE: 18 BRPM | DIASTOLIC BLOOD PRESSURE: 66 MMHG | HEART RATE: 54 BPM

## 2024-08-05 DIAGNOSIS — M81.0 AGE-RELATED OSTEOPOROSIS WITHOUT CURRENT PATHOLOGICAL FRACTURE: ICD-10-CM

## 2024-08-05 DIAGNOSIS — I50.32 CHRONIC DIASTOLIC HEART FAILURE (HCC): ICD-10-CM

## 2024-08-05 DIAGNOSIS — H81.10 BPPV (BENIGN PAROXYSMAL POSITIONAL VERTIGO), UNSPECIFIED LATERALITY: ICD-10-CM

## 2024-08-05 DIAGNOSIS — E53.8 B12 DEFICIENCY: ICD-10-CM

## 2024-08-05 DIAGNOSIS — M51.36 DDD (DEGENERATIVE DISC DISEASE), LUMBAR: ICD-10-CM

## 2024-08-05 DIAGNOSIS — E78.00 PURE HYPERCHOLESTEROLEMIA: ICD-10-CM

## 2024-08-05 DIAGNOSIS — E83.42 HYPOMAGNESEMIA: ICD-10-CM

## 2024-08-05 DIAGNOSIS — K21.9 GASTROESOPHAGEAL REFLUX DISEASE, UNSPECIFIED WHETHER ESOPHAGITIS PRESENT: ICD-10-CM

## 2024-08-05 DIAGNOSIS — H35.3231 EXUDATIVE AGE-RELATED MACULAR DEGENERATION, BILATERAL, WITH ACTIVE CHOROIDAL NEOVASCULARIZATION (HCC): ICD-10-CM

## 2024-08-05 DIAGNOSIS — I72.8 SPLENIC ARTERY ANEURYSM (HCC): ICD-10-CM

## 2024-08-05 DIAGNOSIS — N76.3 CHRONIC VULVITIS: ICD-10-CM

## 2024-08-05 DIAGNOSIS — J43.8 OTHER EMPHYSEMA (HCC): ICD-10-CM

## 2024-08-05 DIAGNOSIS — E11.42 TYPE 2 DIABETES MELLITUS WITH DIABETIC POLYNEUROPATHY, WITHOUT LONG-TERM CURRENT USE OF INSULIN (HCC): ICD-10-CM

## 2024-08-05 DIAGNOSIS — G25.81 RLS (RESTLESS LEGS SYNDROME): ICD-10-CM

## 2024-08-05 DIAGNOSIS — I87.2 VENOUS INSUFFICIENCY OF BOTH LOWER EXTREMITIES: ICD-10-CM

## 2024-08-05 DIAGNOSIS — Z00.00 ENCOUNTER FOR MEDICARE ANNUAL WELLNESS EXAM: Primary | ICD-10-CM

## 2024-08-05 PROCEDURE — G0439 PPPS, SUBSEQ VISIT: HCPCS | Performed by: INTERNAL MEDICINE

## 2024-08-05 PROCEDURE — 99214 OFFICE O/P EST MOD 30 MIN: CPT | Performed by: INTERNAL MEDICINE

## 2024-08-05 NOTE — PROGRESS NOTES
Subjective:   Paulina Man is a 89 year old female who presents for a Medicare Subsequent Annual Wellness visit (Pt already had Initial Annual Wellness) and scheduled follow up of multiple significant but stable problems.     1. Encounter for Medicare annual wellness exam (Primary)- she is up to date on dexa and mammogram, screening colonoscopy not indicated at age 89,  Encouraged covid 19 booster, flu vaccine and rsv vaccine in the fall  2. Pure hypercholesterolemia- controlled on atorvastatin, no cardiac complaints  3. Type 2 diabetes mellitus with diabetic polyneuropathy, without long-term current use of insulin (HCC)- BG controlled, hgba1c 6, has numbness in her feet (chronic), no sores  -     Hemoglobin A1C; Future; Expected date: 11/01/2024  -     Basic Metabolic Panel (8); Future; Expected date: 11/01/2024  4. Chronic diastolic heart failure (HCC)- stable, she follows with Dr. Bowers  5. Chronic vulvitis- stable, follows with Dr. Pretty  6. RLS (restless legs syndrome)- symptoms controlled on pramipexole  7. Exudative age-related macular degeneration, bilateral, with active choroidal neovascularization (HCC)- stable, she follows with optho  Overview:  Right Eye  8. Other emphysema (HCC)- stable, no acute pulmonary complaints  9. Age-related osteoporosis without current pathological fracture- she self stopped fosamax (does not like potential side effects), discussed adequate ca and vit d, will repeat dexa in 2 years.  10. Splenic artery aneurysm (HCC)- stable on CT abd pelvis done April 2024  11. Hypomagnesemia- she is taking mag supplement, no leg cramps, sleeping better  12. Gastroesophageal reflux disease, unspecified whether esophagitis present- controlled on PPI  13. B12 deficiency- on b12 supplement and level this year was WNL  14. DDD (degenerative disc disease), lumbar- stable, no acute complaints. Can use otc tylenol prn  15. BPPV (benign paroxysmal positional vertigo), unspecified  laterality-stable, uses meclizine prn  16. Venous insufficiency of both lower extremities- compression stocking advised by her cardiologist   History/Other:   Fall Risk Assessment:   She has been screened for Falls and is low risk.      Cognitive Assessment:   She had a completely normal cognitive assessment - see flowsheet entries     Functional Ability/Status:   Paulina Man has some abnormal functions as listed below:  She has Driving difficulties based on screening of functional status. She has Hearing problems based on screening of functional status.She has Vision problems based on screening of functional status. She has Walking problems based on screening of functional status.       Depression Screening (PHQ):  PHQ-2 SCORE: 0  , done 8/5/2024            Advanced Directives:   She does have a Living Will but we do NOT have it on file in Epic.    She does have a POA but we do NOT have it on file in Epic.    Not discussed      Patient Active Problem List   Diagnosis    Diabetic polyneuropathy associated with type 2 diabetes mellitus (HCC)    Gastroesophageal reflux disease    Primary hypertension    RLS (restless legs syndrome)    Pure hypercholesterolemia    Exudative age-related macular degeneration, bilateral, with active choroidal neovascularization (HCC)    DCIS (ductal carcinoma in situ) of breast    DCIS (ductal carcinoma in situ) of breast    Atypical lobular hyperplasia of right breast    Arthritis of both hands    Bilateral carpal tunnel syndrome    Chronic obstructive pulmonary disease (HCC)    Splenic artery aneurysm (HCC)    Back pain with radiation    DDD (degenerative disc disease), lumbar    Chronic vulvitis    Atrophic vaginitis    Recurrent UTI    Unsteady gait    BPPV (benign paroxysmal positional vertigo), unspecified laterality    B12 deficiency    Dextroscoliosis    Cervical spondylosis without myelopathy    Spondylosis of lumbar region without myelopathy or radiculopathy    COPD  exacerbation (HCC)    Dyspnea, unspecified type    Shortness of breath    Palpitations    Encounter for well woman exam with abnormal findings    Age-related osteoporosis without current pathological fracture    Sleep apnea, unspecified    Venous insufficiency of both lower extremities    Edema leg    Diastolic dysfunction    Chronic diastolic heart failure (HCC)    Unspecified osteoarthritis, unspecified site    Unspecified hearing loss, unspecified ear    Unspecified abdominal hernia without obstruction or gangrene    Type 2 diabetes mellitus with diabetic polyneuropathy (HCC)    Problems related to living alone    Personal history of urinary (tract) infections    Personal history of pneumonia (recurrent)    Personal history of nicotine dependence    Personal history of in-situ neoplasm of breast    Obesity, unspecified     Allergies:  She is allergic to ciprofloxacin, peanut-containing drug products, pollen, sulfa antibiotics, adhesive tape, dander, and other.    Current Medications:  Outpatient Medications Marked as Taking for the 8/5/24 encounter (Office Visit) with Annie Newton MD   Medication Sig    ESOMEPRAZOLE MAGNESIUM 40 MG Oral Capsule Delayed Release TAKE ONE CAPSULE BY MOUTH TWICE DAILY before meals    pramipexole 0.125 MG Oral Tab Take one tablet at 7 pm and take one tablet at bed time.    losartan 100 MG Oral Tab Take by mouth daily.    Magnesium 200 MG Oral Tab Take by mouth. 2 x 200 mg gummies daily    Cholecalciferol (VITAMIN D3) 25 MCG (1000 UT) Oral Cap Take 1 tablet by mouth daily.    cyanocobalamin 1000 MCG Oral Tab Take 1 tablet (1,000 mcg total) by mouth daily.    aspirin 81 MG Oral Chew Tab Chew by mouth daily.    liver oil-zinc oxide 40 % External Ointment Apply topically as needed for Dry Skin.    Bacillus Coagulans-Inulin (BENEFIBER PREBIOTIC+PROBIOTIC) Oral Chew Tab Chew by mouth.    glipiZIDE ER 2.5 MG Oral Tablet 24 Hr TAKE ONE TABLET BY MOUTH DAILY WITH BREAKFAST    atorvastatin  40 MG Oral Tab Take 1 tablet (40 mg total) by mouth daily.    estradiol 0.1 MG/GM Vaginal Cream Apply 0.5 grams (pea-sized amount) of cream inside the vagina nightly. May use small amount at the vaginal opening as well in a thin layer.    mometasone 0.1 % External Ointment apply a thin layer daily to vulva for 2 weeks then twice weekly    ondansetron (ZOFRAN ODT) 8 MG Oral Tablet Dispersible Take 1 tablet (8 mg total) by mouth every 8 (eight) hours as needed for Nausea.    albuterol (VENTOLIN HFA) 108 (90 Base) MCG/ACT Inhalation Aero Soln Inhale 2 puffs into the lungs every 4 (four) hours as needed for Wheezing.    fluticasone propionate 50 MCG/ACT Nasal Suspension 2 sprays by Nasal route daily.    acetaminophen 500 MG Oral Tab Take 1 tablet (500 mg total) by mouth every 6 (six) hours as needed for Pain.    docusate sodium 100 MG Oral Cap Take 1 capsule (100 mg total) by mouth 2 (two) times daily as needed for constipation.    CAPSAICIN APR EX Apply topically as needed.    Loperamide HCl 2 MG Oral Cap Take 1 capsule (2 mg total) by mouth as needed for Diarrhea.    Multiple Vitamins-Minerals (PRESERVISION AREDS) Oral Tab Take 1 tablet by mouth. 250 mg once daily       Medical History:  She  has a past medical history of Abdominal distention, Abdominal hernia, Abdominal pain, Acute diverticulitis (10/10/2020), Arrhythmia, Arthritis, Atypical lobular hyperplasia of right breast (06/30/2016), Back pain, Back problem, Mckeon's esophagus without dysplasia (11/28/2018), CHF (congestive heart failure) ([facility]), Chronic cough, Chronic diarrhea (12/04/2015), Constipation, COPD (chronic obstructive pulmonary disease) ([facility]), Deviated septum, Diabetes mellitus ([facility]), Diabetic polyneuropathy associated with type 2 diabetes mellitus ([facility]) (12/04/2015), Diarrhea, unspecified, Disorder of thyroid, Diverticulosis of large intestine, Dizziness, Ductal carcinoma in situ (DCIS) of right breast, Easy bruising, Esophageal reflux, Essential  hypertension, Exudative macular degeneration (HCC) (04/01/2016), Eye disease, Fatigue, Flatulence/gas pain/belching, Frequent urination, Frequent UTI, Full dentures, Gastroesophageal reflux disease (12/04/2015), GERD (gastroesophageal reflux disease), Hearing impairment, Hearing loss, Heart palpitations, Hemorrhoids, High blood pressure, High cholesterol, History of cardiac murmur, History of tobacco use, Hoarseness, chronic, Hyperlipidemia, Incontinence, Indigestion, Irregular bowel habits, Itch of skin, Leaking of urine, Leaky heart valve, Leg swelling, Loss of appetite, Macular degeneration, Macular degeneration, Menopause, Nausea, Neuropathy, Nodule of left lung (01/07/2016), Obesity (BMI 30-39.9), Osteoarthritis, Osteoporosis, Other fatigue (10/10/2018), Pain in joints, Pap smear for cervical cancer screening (2006), Pneumonia, organism unspecified(486), PONV (postoperative nausea and vomiting), Problems with swallowing, Restless leg syndrome, Restless leg syndrome, RLS (restless legs syndrome) (12/04/2015), Screening breast examination (11/01/2021), Screening mammogram for breast cancer (02/06/2022), Shortness of breath, Sleep apnea, Sleep disturbance, Splenic artery aneurysm (HCC) (03/23/2021), Torn rotator cuff, Uncomfortable fullness after meals, Vertigo, Visual impairment, Vitamin B12 deficiency, Vulvar itching (08/13/2019), Wears glasses, Weight loss, and Wheezing.  Surgical History:  She  has a past surgical history that includes appendectomy (1935); other surgical history (1980); lyric localization wire 1 site left (cpt=19281); angiogram; needle biopsy right; mastectomy partial (Right, 07/01/2016); lyric localization wire 1 site right (cpt=19281); lyric localization wire 1 site right (cpt=19281) (Right, 07/2016); cholecystectomy (1980); appendectomy; colonoscopy; egd (03/09/2022); and sigmoidoscopy,diagnostic.   Family History:  Her family history includes Breast Cancer in her paternal aunt; Breast Cancer  (age of onset: 86) in her self; Colon Polyps in her mother; Diabetes in her paternal grandmother; Heart Disorder in her father, mother, and paternal grandfather; Hypertension in her father and mother; Lipids in her father.  Social History:  She  reports that she quit smoking about 35 years ago. Her smoking use included cigarettes. She started smoking about 70 years ago. She has a 35 pack-year smoking history. She has never been exposed to tobacco smoke. She has quit using smokeless tobacco. She reports that she does not drink alcohol and does not use drugs.    Tobacco:  She smoked tobacco in the past but quit greater than 12 months ago.  Social History     Tobacco Use   Smoking Status Former    Current packs/day: 0.00    Average packs/day: 1 pack/day for 35.0 years (35.0 ttl pk-yrs)    Types: Cigarettes    Start date: 1953    Quit date: 1988    Years since quittin.9    Passive exposure: Never   Smokeless Tobacco Former        CAGE Alcohol Screen:   CAGE screening score of 0 on 2024, showing low risk of alcohol abuse.      Patient Care Team:  Annie Newton MD as PCP - General (Internal Medicine)  Med Ny MD (PULMONARY DISEASES)  Ledy Milian MD (OBSTETRICS & GYNECOLOGY)  Pema Webster (OPHTHALMOLOGY)  Myra Burt CMA as [facility] Care Manager  Leonardo Nguyen MD (SURGERY, VASCULAR)  Cherelle Mcnamara MD (SURGERY, ORTHOPEDIC)  Loraine Lux DO (GASTROENTEROLOGY)  Layla Pretty MD as Obstetrician (OBSTETRICS & GYNECOLOGY)  Ayesha Jovel PA-BRAYDEN (Physician Assistant Medical)  Lashawn Jurado PT as Physical Therapist  Hany Ahmadi MD as Consulting Physician (NEUROLOGY)  Aydin Burroughs MD (NEUROLOGY)    Review of Systems     Negative for f/c/CP/palp    Objective:   Physical Exam  General Appearance:  Alert, cooperative, no distress, appears stated age   Head:  Normocephalic, without obvious abnormality, atraumatic   Eyes:  PERRL, conjunctiva/corneas clear,  EOM's intact both eyes   Ears:  Normal TM's and external ear canals, both ears   Nose: Nares normal, septum midline,mucosa normal   Throat: Lips, mucosa, and tongue normal; teeth and gums normal   Neck: Supple, symmetrical, trachea midline, no JVD   Back:   Symmetric, no curvature, ROM normal, no CVA tenderness   Lungs:   Clear to auscultation bilaterally, respirations unlabored   Heart:  Regular rate and rhythm, S1 and S2 normal   Abdomen:   Soft, non-tender, bowel sounds active all four quadrants,  no masses, no organomegaly   Pelvic: Deferred   Extremities: Trace edema   Pulses: 2+ and symmetric       Lymph nodes: Cervical, supraclavicular, and axillary nodes normal   Neurologic: Alert and oriented   Bilateral barefoot skin diabetic exam is normal, visualized feet and the appearance is normal.  Bilateral monofilament/sensation of both feet is abnormal, decreased sensation bottom of feet  Pulsation pedal pulse exam of both lower legs/feet is normal as well.       /66 (BP Location: Left arm, Patient Position: Sitting, Cuff Size: large)   Pulse 54   Temp 97.9 °F (36.6 °C) (Temporal)   Resp 18   Ht 5' (1.524 m)   Wt 145 lb 3.2 oz (65.9 kg)   LMP  (LMP Unknown)   SpO2 99%   BMI 28.36 kg/m²  Estimated body mass index is 28.36 kg/m² as calculated from the following:    Height as of this encounter: 5' (1.524 m).    Weight as of this encounter: 145 lb 3.2 oz (65.9 kg).    Medicare Hearing Assessment:   Hearing Screening    Time taken: 8/5/2024  3:48 PM  Entry User: Jonas Fraser MA  Screening Method: Finger Rub  Finger Rub Result: Fail (Comment: Bilateral hearing aids)         Visual Acuity:   Right Eye Visual Acuity: Corrected Right Eye Chart Acuity: 20/50   Left Eye Visual Acuity: Corrected Left Eye Chart Acuity:  (pt unable to see chart)   Both Eyes Visual Acuity: Corrected Both Eyes Chart Acuity: 20/40   Able To Tolerate Visual Acuity: Yes        Assessment & Plan:   Paulina Man is a 89 year old  female who presents for a Medicare Assessment.     1. Encounter for Medicare annual wellness exam (Primary)- she is up to date on dexa and mammogram, screening colonoscopy not indicated at age 89,  Encouraged covid 19 booster, flu vaccine and rsv vaccine in the fall  2. Pure hypercholesterolemia- controlled on atorvastatin, CPM  3. Type 2 diabetes mellitus with diabetic polyneuropathy, without long-term current use of insulin (HCC)- BG controlled, hgba1c 6, foot exam done today  -     Hemoglobin A1C; Future; Expected date: 11/01/2024  -     Basic Metabolic Panel (8); Future; Expected date: 11/01/2024  4. Chronic diastolic heart failure (HCC)- stable, she follows with Dr. Bowers  5. Chronic vulvitis- stable, follows with Dr. Pretty  6. RLS (restless legs syndrome)- symptoms controlled on pramipexole  7. Exudative age-related macular degeneration, bilateral, with active choroidal neovascularization (HCC)- stable, she follows with optho  Overview:  Right Eye  8. Other emphysema (HCC)- stable, no acute pulmonary complaints  9. Age-related osteoporosis without current pathological fracture- she self stopped fosamax (does not like potential side effects), discussed adequate ca and vit d, will repeat dexa in 2 years  10. Splenic artery aneurysm (HCC)- stable on CT abd pelvis done April 2024  11. Hypomagnesemia- she is taking mag supplement, will check level   -     Magnesium; Future; Expected date: 11/01/2024  12. Gastroesophageal reflux disease, unspecified whether esophagitis present- controlled on PPI  13. B12 deficiency- on b12 supplement and level this year was WNL  14. DDD (degenerative disc disease), lumbar- stable, no acute complaints. Can use otc tylenol prn  15. BPPV (benign paroxysmal positional vertigo), unspecified laterality-stable, uses meclizine prn  16. Venous insufficiency of both lower extremities- compression stocking advised by her cardiologist for LE edema    The patient indicates understanding  of these issues and agrees to the plan.  Continue with current treatment plan.  Reinforced healthy diet, lifestyle, and exercise.      Return in about 3 months (around 11/5/2024), or if symptoms worsen or fail to improve, for chronic issues.     Annie Newton MD, 8/5/2024     Supplementary Documentation:   General Health:  In the past six months, have you lost more than 10 pounds without trying?: 2 - No  Has your appetite been poor?: No  Type of Diet: Balanced;Low Salt  How does the patient maintain a good energy level?: Daily Walks  How would you describe your daily physical activity?: Moderate  How would you describe your current health state?: Good  How do you maintain positive mental well-being?: Visiting Family;Visiting Friends  On a scale of 0 to 10, with 0 being no pain and 10 being severe pain, what is your pain level?: 0 - (None)  In the past six months, have you experienced urine leakage?: 1-Yes  At any time do you feel concerned for the safety/well-being of yourself and/or your children, in your home or elsewhere?: No  Have you had any immunizations at another office such as Influenza, Hepatitis B, Tetanus, or Pneumococcal?: No    Health Maintenance   Topic Date Due    COVID-19 Vaccine (8 - 2023-24 season) 03/09/2024    Annual Physical  07/25/2024    Influenza Vaccine (1) 10/01/2024    Diabetes Care Foot Exam  10/23/2024    Diabetes Care A1C  01/26/2025    Diabetes Care Dilated Eye Exam  05/16/2025    Diabetes Care: GFR  07/26/2025    Diabetes Care: Microalb/Creat Ratio  07/26/2025    Annual Depression Screening  Completed    Fall Risk Screening (Annual)  Completed    Pneumococcal Vaccine: 65+ Years  Completed    Zoster Vaccines  Completed

## 2024-08-07 ENCOUNTER — HOSPITAL ENCOUNTER (OUTPATIENT)
Dept: CT IMAGING | Facility: HOSPITAL | Age: 89
Discharge: HOME | End: 2024-08-07
Attending: REGISTERED NURSE
Payer: MEDICARE

## 2024-08-07 DIAGNOSIS — I72.8 SPLENIC ARTERY ANEURYSM (HCC): ICD-10-CM

## 2024-08-07 PROCEDURE — 74176 CT ABD & PELVIS W/O CONTRAST: CPT | Performed by: REGISTERED NURSE

## 2024-08-14 ENCOUNTER — OFFICE VISIT (OUTPATIENT)
Dept: NEUROLOGY | Facility: CLINIC | Age: 89
End: 2024-08-14
Payer: MEDICARE

## 2024-08-14 VITALS
DIASTOLIC BLOOD PRESSURE: 53 MMHG | BODY MASS INDEX: 28.47 KG/M2 | WEIGHT: 145 LBS | RESPIRATION RATE: 16 BRPM | HEIGHT: 60 IN | SYSTOLIC BLOOD PRESSURE: 139 MMHG | HEART RATE: 64 BPM

## 2024-08-14 DIAGNOSIS — G25.81 RLS (RESTLESS LEGS SYNDROME): Primary | ICD-10-CM

## 2024-08-14 PROCEDURE — 99213 OFFICE O/P EST LOW 20 MIN: CPT | Performed by: OTHER

## 2024-08-14 RX ORDER — PRAMIPEXOLE DIHYDROCHLORIDE 0.12 MG/1
TABLET ORAL
Qty: 60 TABLET | Refills: 3 | Status: SHIPPED | OUTPATIENT
Start: 2024-08-14

## 2024-08-14 NOTE — PROGRESS NOTES
[facility] Progress Note    HPI  Chief Complaint   Patient presents with    Neurologic Problem     3 month follow up restless leg syndrome and doing so much better with the pramipexole 0.125 mg twice a day          \"Patient has history of RLS and neuropathy and has previously been following with my neurology associate, Dr SHIRA Ahmadi. She has reportedly not been able to be on gabapentin; she has history of COPD with shortness of breath when she is up and active and this has been more recent.      In terms of leg symptoms, she has been having this for \"years\" and has some restless sensation and movement in both legs mainly at night but also during the day; she has numbness in feet as well.      She has some numbness/tingling in hands as well; symptoms typically occur when she is sitting, lying down starting around 7 PM, but then improve until the evening time when she takes her dose of Mirapex 0.25 mg nightly and lies down; she may sleep through the night       She has not been on iron supplement but thinks she may have been on this in the past. \"         Prior notes as per 5/14/2024.  Patient last seen 2/13/2024. She was recommended to change her dose of Mirapex to 0.125 mg ~ 7 PM and 0.125 mg nightly - she is not sure if she took this dose or took twice the amount of her usual 0.250 mg nightly dose.       She is a poor historian and is contradictory in her statements saying her \"restless legs symptoms\" are not improving but then saying they are \"not as violent\" since being off pramipexole. Currently, she is sleeping through the night but has been taking benadryl nightly.     She is on iron supplement as well as magnesium supplement as her magnesium level was low (0.7) and has been following with PCP. She also was recently treated for UTI.     Patient last seen 5/14/2024.  At time of last visit, it was unclear if she was taking pramipexole on a regular basis, and she was advised to try ropinirole.   However, she presented to the emergency room 5/18/2024 due to itching and cold sensation, which started shortly after starting ropinirole.  She is now back on pramipexole 0.125 mg twice daily ~ 7 PM and nightly and is doing well on this dose. She is sleeping through the night. She feels she has some \"hot sensation\" in both legs more at night as well but is happy with her restless legs symptoms. She continues to follow with PCP for low magnesium as well and is on magnesium supplement and doing well.     Past Medical History:    Abdominal distention    Abdominal hernia    Abdominal pain    Acute diverticulitis    Noted on CT scan - acute diverticulitis of the sigmoid colon    Arrhythmia    Arthritis    Atypical lobular hyperplasia of right breast    Back pain    Back problem    lower lumbar bad discs     Mckeon's esophagus without dysplasia    CHF (congestive heart failure) ([facility])    Chronic cough    Chronic diarrhea    Started before metformin- related to IBS      Constipation    COPD (chronic obstructive pulmonary disease) ([facility])    NO OXYGEN- no pulmonologist    Deviated septum    left side    Diabetes mellitus ([facility])    Diabetic polyneuropathy associated with type 2 diabetes mellitus ([facility])    Diarrhea, unspecified    Disorder of thyroid    has Thyroid nodules    Diverticulosis of large intestine    Dizziness    Ductal carcinoma in situ (DCIS) of right breast    Easy bruising    Esophageal reflux    Essential hypertension    Exudative macular degeneration ([facility])    Right Eye     Eye disease    Fatigue    Flatulence/gas pain/belching    Frequent urination    Frequent UTI    Full dentures    Gastroesophageal reflux disease    GERD (gastroesophageal reflux disease)    Hearing impairment    bilateral hearing aids    Hearing loss    Heart palpitations    Hemorrhoids    High blood pressure    High cholesterol    History of cardiac murmur    History of tobacco use    Hoarseness, chronic    Hyperlipidemia    not taking  lipitor due to side effects    Incontinence    uses pads    Indigestion    Irregular bowel habits    Itch of skin    Leaking of urine    Leaky heart valve    slight per pt- had echo 12/2015    Leg swelling    Loss of appetite    Macular degeneration    Macular degeneration    Menopause    Nausea    Neuropathy    Nodule of left lung    Obesity (BMI 30-39.9)    Osteoarthritis    all joints    Osteoporosis    Other fatigue    Pain in joints    Pap smear for cervical cancer screening    Per patient had pap in 2006. No abn pap.     Pneumonia, organism unspecified(486)    04/2015    PONV (postoperative nausea and vomiting)    Problems with swallowing    due hiatal hernia; gaggs at times    Restless leg syndrome    Restless leg syndrome    RLS (restless legs syndrome)    Screening breast examination    Dr. Layla Pretty    Screening mammogram for breast cancer    Benign     Shortness of breath    ON EXERTION    Sleep apnea    Sleep disturbance    Splenic artery aneurysm (HCC)    Torn rotator cuff    both shoulders per pt    Uncomfortable fullness after meals    Vertigo    Visual impairment    glasses    Vitamin B12 deficiency    Vulvar itching    Wears glasses    Weight loss    Wheezing     Past Surgical History:   Procedure Laterality Date    Angiogram      no intervention    Appendectomy  1935    Appendectomy      Cholecystectomy  1980    Colonoscopy      Egd  03/09/2022    Reactive gastropathy    Kevin localization wire 1 site left (cpt=19281)      1957    Kevin localization wire 1 site right (cpt=19281)      1980    Kevin localization wire 1 site right (cpt=19281) Right 07/2016    DCIS    Mastectomy partial Right 07/01/2016    wire localized partial mastectomy    Needle biopsy right      diag DCIS- 06/07/16    Other surgical history  1980    L and R breast cyst removal    Sigmoidoscopy,diagnostic       Family History   Problem Relation Age of Onset    Breast Cancer Paternal Aunt     Heart Disorder Mother     Hypertension  Mother     Colon Polyps Mother     Heart Disorder Father     Hypertension Father     Lipids Father     Heart Disorder Paternal Grandfather     Diabetes Paternal Grandmother     Breast Cancer Self 86        DCIS     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   Tobacco Use    Smoking status: Former     Current packs/day: 0.00     Average packs/day: 1 pack/day for 35.0 years (35.0 ttl pk-yrs)     Types: Cigarettes     Start date: 1953     Quit date: 1988     Years since quittin.9     Passive exposure: Never    Smokeless tobacco: Former   Vaping Use    Vaping status: Never Used   Substance and Sexual Activity    Alcohol use: No    Drug use: No    Sexual activity: Not Currently   Other Topics Concern    Caffeine Concern Yes     Comment: coffee 1- 2 cups daily    Exercise No     Comment: walking       Allergies   Allergen Reactions    Ciprofloxacin NAUSEA AND VOMITING and HIVES    Peanut-Containing Drug Products UNKNOWN and OTHER (SEE COMMENTS)     Bladder infection    Pollen ITCHING and HIVES    Sulfa Antibiotics NAUSEA AND VOMITING    Adhesive Tape ITCHING    Dander RASH     fur    Other ITCHING     feather         Current Outpatient Medications:     pramipexole 0.125 MG Oral Tab, Take one tablet at 7 pm and take one tablet at bed time., Disp: 60 tablet, Rfl: 3    ESOMEPRAZOLE MAGNESIUM 40 MG Oral Capsule Delayed Release, TAKE ONE CAPSULE BY MOUTH TWICE DAILY before meals, Disp: 180 capsule, Rfl: 0    losartan 100 MG Oral Tab, Take by mouth daily., Disp: , Rfl:     Magnesium 200 MG Oral Tab, Take by mouth. 2 x 200 mg gummies daily, Disp: , Rfl:     Cholecalciferol (VITAMIN D3) 25 MCG (1000 UT) Oral Cap, Take 1 tablet by mouth daily., Disp: , Rfl:     cyanocobalamin 1000 MCG Oral Tab, Take 1 tablet (1,000 mcg total) by mouth daily., Disp: , Rfl:     aspirin 81 MG Oral Chew Tab, Chew by mouth daily., Disp: , Rfl:     liver  oil-zinc oxide 40 % External Ointment, Apply topically as needed for Dry Skin., Disp: , Rfl:     Bacillus Coagulans-Inulin (BENEFIBER PREBIOTIC+PROBIOTIC) Oral Chew Tab, Chew by mouth., Disp: , Rfl:     glipiZIDE ER 2.5 MG Oral Tablet 24 Hr, TAKE ONE TABLET BY MOUTH DAILY WITH BREAKFAST, Disp: 90 tablet, Rfl: 0    atorvastatin 40 MG Oral Tab, Take 1 tablet (40 mg total) by mouth daily., Disp: , Rfl:     estradiol 0.1 MG/GM Vaginal Cream, Apply 0.5 grams (pea-sized amount) of cream inside the vagina nightly. May use small amount at the vaginal opening as well in a thin layer., Disp: 42.5 g, Rfl: 3    mometasone 0.1 % External Ointment, apply a thin layer daily to vulva for 2 weeks then twice weekly, Disp: 45 g, Rfl: 0    ondansetron (ZOFRAN ODT) 8 MG Oral Tablet Dispersible, Take 1 tablet (8 mg total) by mouth every 8 (eight) hours as needed for Nausea., Disp: 30 tablet, Rfl: 0    albuterol (VENTOLIN HFA) 108 (90 Base) MCG/ACT Inhalation Aero Soln, Inhale 2 puffs into the lungs every 4 (four) hours as needed for Wheezing., Disp: 1 each, Rfl: 1    fluticasone propionate 50 MCG/ACT Nasal Suspension, 2 sprays by Nasal route daily., Disp: , Rfl:     acetaminophen 500 MG Oral Tab, Take 1 tablet (500 mg total) by mouth every 6 (six) hours as needed for Pain., Disp: , Rfl:     docusate sodium 100 MG Oral Cap, Take 1 capsule (100 mg total) by mouth 2 (two) times daily as needed for constipation., Disp: , Rfl:     CAPSAICIN APR EX, Apply topically as needed., Disp: , Rfl:     Loperamide HCl 2 MG Oral Cap, Take 1 capsule (2 mg total) by mouth as needed for Diarrhea., Disp: , Rfl:     Multiple Vitamins-Minerals (PRESERVISION AREDS) Oral Tab, Take 1 tablet by mouth. 250 mg once daily, Disp: , Rfl:     Review of Systems:  No chest pain or palpitations; otherwise as noted in HPI.    Exam:  /53 (BP Location: Right arm, Patient Position: Sitting, Cuff Size: adult)   Pulse 64   Resp 16   Ht 60\"   Wt 145 lb (65.8 kg)   LMP   (LMP Unknown)   BMI 28.32 kg/m²   Estimated body mass index is 28.32 kg/m² as calculated from the following:    Height as of this encounter: 60\".    Weight as of this encounter: 145 lb (65.8 kg).    Gen: well developed, well nourished, no acute distress  HEENT: normocephalic  Heart; normal S1/S2, regular rate and rhythm  Lungs: clear to auscultation bilaterally  Extremities: no edema, peripheral pulses intact    Neck: supple, full range of motion; no carotid bruits    Fundoscopic Exam: optic discs sharp bilaterally    Neuro:  Mental status:  Orientation: Alert and oriented to person, place, time  Speech Fluent and conversational    CN: PERRL, EOMI with no nystagmus, VFF, smile symmetric, sensation intact, tongue and palate midline, SCM intact, otherwise, CN 2-12 intact  Motor: 5/5 strength throughout, tone normal  DTR: absent ankle jerks; otherwise, 2+ symmetric throughout, toes downgoing bilaterally, no clonus  Sensory: intact to light touch throughout; vibration inconsistent answers   Coord: FNF intact with no tremor or dysmetria; rapid alternating movements intact bilaterally  Romberg: absent  Gait: normal casual gait with aid of walker    Labs:  New    Component      Latest Ref Rng 3/20/2024 4/3/2024 7/26/2024   Glucose      70 - 99 mg/dL   105 (H)    Sodium      136 - 145 mmol/L   139    Potassium      3.5 - 5.1 mmol/L   4.0    Chloride      98 - 112 mmol/L   106    Carbon Dioxide, Total      21.0 - 32.0 mmol/L   29.0    ANION GAP      0 - 18 mmol/L   4    BUN      9 - 23 mg/dL   18    CREATININE      0.55 - 1.02 mg/dL   0.91    CALCIUM      8.7 - 10.4 mg/dL   10.4    CALCULATED OSMOLALITY      275 - 295 mOsm/kg   290    EGFR      >=60 mL/min/1.73m2   58 (L)    AST (SGOT)      <34 U/L   16    ALT (SGPT)      10 - 49 U/L   13    ALKALINE PHOSPHATASE      55 - 142 U/L   68    Total Bilirubin      0.2 - 0.9 mg/dL   0.7    PROTEIN, TOTAL      5.7 - 8.2 g/dL   7.0    Albumin      3.2 - 4.8 g/dL   4.4    Globulin       2.0 - 3.5 g/dL   2.6    A/G Ratio      1.0 - 2.0    1.7    Patient Fasting for CMP?   Yes    Magnesium, Serum      1.6 - 2.6 mg/dL 1.1 (LL)  1.4 (L)        Prior as noted below    Component      Latest Ref Rng 2/14/2024   Iron, Serum      50 - 170 ug/dL 65    Transferrin      200 - 360 mg/dL 203    Iron Bind.Cap.(TIBC)      240 - 450 ug/dL 302    Iron Saturation      15 - 50 % 22    Vitamin B12      193 - 986 pg/mL 571    FERRITIN      18.0 - 340.0 ng/mL 55.4    Magnesium, Serum      1.6 - 2.6 mg/dL 0.7 (LL)         Imaging:  None new             Impression/ Plan:  Paulina Man is a 89 year old female with history of neuropathy and RLS, who presents for management.     Neurologic exam is abnormal with signs / symptoms of neuropathy. In addition, she has symptoms of restless legs syndrome and has been on pramipexole with some improvement but still has symptoms - she was recommended to break up dose of pramipexole to 0.125 mg ~ 7 PM and 0.125 mg prior to bedtime and magnesium study level was low; ferritin also low and now following with PCP for repletion of magnesium and on oral iron supplement.     She was having side effects on pramipexole but it is unclear if she was taking prescribed dose; She tried ropinirole and had poor reaction and is now doing well on regimen of pramipexole 0.125 mg twice nightly ~ 7 PM and nightly prior to bedtime - will continue.    1. RLS (restless legs syndrome)  As noted above   - pramipexole 0.125 MG Oral Tab; Take one tablet at 7 pm and take one tablet at bed time.  Dispense: 60 tablet; Refill: 3    Return in about 4 months (around 12/14/2024).    Aydin Burroughs MD, Neurology  [facility]  Pager [phone]  8/14/2024

## 2024-08-15 ENCOUNTER — PATIENT OUTREACH (OUTPATIENT)
Dept: CASE MANAGEMENT | Age: 89
End: 2024-08-15

## 2024-08-15 DIAGNOSIS — M51.36 DDD (DEGENERATIVE DISC DISEASE), LUMBAR: ICD-10-CM

## 2024-08-15 DIAGNOSIS — E11.42 DIABETIC POLYNEUROPATHY ASSOCIATED WITH TYPE 2 DIABETES MELLITUS (HCC): ICD-10-CM

## 2024-08-15 DIAGNOSIS — M19.042 ARTHRITIS OF BOTH HANDS: Primary | ICD-10-CM

## 2024-08-15 DIAGNOSIS — E78.00 PURE HYPERCHOLESTEROLEMIA: ICD-10-CM

## 2024-08-15 DIAGNOSIS — G25.81 RLS (RESTLESS LEGS SYNDROME): ICD-10-CM

## 2024-08-15 DIAGNOSIS — M19.041 ARTHRITIS OF BOTH HANDS: Primary | ICD-10-CM

## 2024-08-15 DIAGNOSIS — I50.32 CHRONIC DIASTOLIC HEART FAILURE (HCC): ICD-10-CM

## 2024-08-15 DIAGNOSIS — I10 PRIMARY HYPERTENSION: ICD-10-CM

## 2024-08-15 DIAGNOSIS — H35.3231 EXUDATIVE AGE-RELATED MACULAR DEGENERATION, BILATERAL, WITH ACTIVE CHOROIDAL NEOVASCULARIZATION (HCC): ICD-10-CM

## 2024-08-15 DIAGNOSIS — J44.1 COPD EXACERBATION (HCC): ICD-10-CM

## 2024-08-16 NOTE — PROGRESS NOTES
Spoke to Paulina for Chronic Care Management.      Updates to patient care team/comments: UTD  Patient reported changes in medications: UTD  Med Adherence  Comment: pt taking medications as prescribed     Health Maintenance:   Health Maintenance   Topic Date Due    COVID-19 Vaccine (8 - 2023-24 season) 03/09/2024    Influenza Vaccine (1) 10/01/2024    Diabetes Care A1C  01/26/2025    Diabetes Care Dilated Eye Exam  05/16/2025    Diabetes Care: GFR  07/26/2025    Diabetes Care: Microalb/Creat Ratio  07/26/2025    Diabetes Care Foot Exam  08/05/2025    Annual Physical  08/05/2025    Annual Depression Screening  Completed    Fall Risk Screening (Annual)  Completed    Pneumococcal Vaccine: 65+ Years  Completed    Zoster Vaccines  Completed       Patient updates/concerns:    Spoke to pt for monthly outreach   Pt has visited dr pruitt and no changes were made to her treatment. Pt is very happy with medication. She states that restless leg syndrome has improved tremendously.  Pt states she is no longer having any difficulty with numbness and tingling in her hands. She states she has no difficulty with gripping things or staying stable using her rollator.   Pt continues to get routine shots in her eye. She states that the new medication she is using is stronger than previous one. Pt used to have to follow up every month and now it is every 3 months.   Pt has no difficulty with her walks to the store. Pt tries to walk to and from store every day when weather cooperates. Pt estimates the walk is 6 blocks back and forth and pt likes to spend time walking through the store for exercise. Pt uses her rollator always, in and out of the house.    Pt cannot tolerate large meals and gets full very quickly. She will often eat several small meals a day, but the portions are very limited. Pt makes sure to include protein throughout the day and is consistent every day with eating yogurt.    Pt does not sleep very well despite  improvement in restless leg syndrome. Pt is a light sleeper. She will get a minimum of 4 hours of sleep every day, sometimes more and after that she wakes uses the bathroom and may or may not get any more sleep after that. Pt tries to take a nap before lunch because she is usually up very early and she takes a nap before dinner. Pt feels that she has enough energy to tolerate the days activities and does not find herself nodding off at any point.    Pt received her flu shot from [employer] yesterday and did not have any side effects or adverse reactions. Pt has discussed with pharmacist on when to return for Covid shot.    Pt had ct scan and has not yet discussed results with specialist or clinical staff. Pt declined ccm assistance in coordinating call from office and agreed to f/u and discuss results today with clinical staff at specialist office.    Pt has been free of any symptoms of bladder infections.   Pt did not have any new questions or concerns for pcp or clinical staff.     Goals/Action Plan:    Active goal from previous outreach: exercise    Patient reported progress towards goals: pt tries to take daily walks to the store               - What: hydration           - Where/When/How: pt is still not exceeding 2-3 glasses of water every day but she is consistent with this amount  Patient Reported Barriers and Concerns: n/a                   - Plan for overcoming barriers: none    Care Managers Interventions: continue to provide encouragement and education for healthy coping and dx    Future Appointments:   Future Appointments   Date Time Provider Department Center   10/28/2024  3:30 PM Bebeto Blackwell MD [facility]   12/10/2024  1:40 PM Aydin Burroughs MD [facility] Care Manager Follow Up Date: one month    Reason For Follow Up: review progress and or barriers towards patient's goals.     Time Spent This Encounter Total: 45 min medical record review, telephone communication,  care plan updates where needed, education, goals, and action plan recreation/update. Provided acknowledgment and validation to patient's concerns.   Monthly Minute Total including today: 45  Physical assessment, complete health history, and need for CCM established by Annie Newton MD.

## 2024-08-19 NOTE — TELEPHONE ENCOUNTER
Refill passed per [facility] protocol.    Please review pended refill request as unable to refill due to high/very high drug interaction warning copied here:     High  Allergy/Contraindication: glipiZIDE ERReactions: NAUSEA AND VOMITING. No reaction type specified. User documented allergy severity: Medium.  Level 3 with [employer]IOT[employer].    Requested Prescriptions   Pending Prescriptions Disp Refills    GLIPIZIDE ER 2.5 MG Oral Tablet 24 Hr [Pharmacy Med Name: Glipizide Er 24hr 2.5 Mg Tab Nort] 90 tablet 0     Sig: TAKE ONE TABLET BY MOUTH ONE TIME DAILY WITH BREAKFAST       Diabetes Medication Protocol Passed - 8/14/2024 10:50 AM        Passed - Last A1C < 7.5 and within past 6 months     Lab Results   Component Value Date    A1C 6.0 (H) 07/26/2024             Passed - In person appointment or virtual visit in the past 6 mos or appointment in next 3 mos     Recent Outpatient Visits              5 days ago RLS (restless legs syndrome)    [facility] Aydin Burroughs MD    Office Visit    2 weeks ago Encounter for Medicare annual wellness exam    [address] Annie Newton MD    Office Visit    1 month ago Fever, unspecified fever cause    [address] Joanie Thorne APRN    Office Visit    3 months ago RLS (restless legs syndrome)    [facility] Aydin Burroughs MD    Office Visit    3 months ago Shortness of breath    [facility] Bebeto Blackwell MD    Office Visit          Future Appointments         Provider Department Appt Notes    In 2 months Bebeto Blackwell MD [facility] 6 months f/up    In 3 months Aydin Burroughs MD [facility] f/u 4 months                    Passed - Microalbumin  procedure in past 12 months or taking ACE/ARB        Passed - EGFRCR or GFRNAA > 50     GFR Evaluation  EGFRCR: 58 , resulted on 7/26/2024          Passed - GFR in the past 12 months           Future Appointments         Provider Department Appt Notes    In 2 months Bebeto Blackwell MD [facility] 6 months f/up    In 3 months Aydin Burroughs MD [facility] f/u 4 months          Recent Outpatient Visits              5 days ago RLS (restless legs syndrome)    [facility] Aydin Burroughs MD    Office Visit    2 weeks ago Encounter for Medicare annual wellness exam    [facility], [address] [place] [address] Annie Newton MD    Office Visit    1 month ago Fever, unspecified fever cause    [facility], [address] [place] [address] Joanie Thorne APRN    Office Visit    3 months ago RLS (restless legs syndrome)    [facility] Aydin Burroughs MD    Office Visit    3 months ago Shortness of breath    [facility] Bebeto Blackwell MD    Office Visit

## 2024-08-20 RX ORDER — GLIPIZIDE 2.5 MG/1
2.5 TABLET, EXTENDED RELEASE ORAL
Qty: 90 TABLET | Refills: 3 | Status: SHIPPED | OUTPATIENT
Start: 2024-08-20

## 2024-08-28 ENCOUNTER — WALK IN (OUTPATIENT)
Dept: URGENT CARE | Age: 89
End: 2024-08-28

## 2024-08-28 VITALS
RESPIRATION RATE: 18 BRPM | HEART RATE: 66 BPM | TEMPERATURE: 97.6 F | OXYGEN SATURATION: 98 % | SYSTOLIC BLOOD PRESSURE: 124 MMHG | DIASTOLIC BLOOD PRESSURE: 72 MMHG

## 2024-08-28 DIAGNOSIS — N30.00 ACUTE CYSTITIS WITHOUT HEMATURIA: ICD-10-CM

## 2024-08-28 DIAGNOSIS — R30.0 DYSURIA: Primary | ICD-10-CM

## 2024-08-28 LAB
APPEARANCE, POC: ABNORMAL
BILIRUBIN, POC: NEGATIVE
COLOR, POC: YELLOW
GLUCOSE UR-MCNC: NEGATIVE MG/DL
INTERNAL PROCEDURAL CONTROLS ACCEPTABLE: YES
KETONES, POC: ABNORMAL MG/DL
NITRITE, POC: NEGATIVE
OCCULT BLOOD, POC: NEGATIVE
PH UR: 5.5 [PH] (ref 5–7)
PROT UR-MCNC: NEGATIVE MG/DL
SP GR UR: 1.02 (ref 1–1.03)
TEST LOT EXPIRATION DATE: ABNORMAL
TEST LOT NUMBER: ABNORMAL
UROBILINOGEN UR-MCNC: 0.2 MG/DL (ref 0–1)
WBC (LEUKOCYTE) ESTERASE, POC: ABNORMAL

## 2024-08-28 RX ORDER — NITROFURANTOIN 25; 75 MG/1; MG/1
100 CAPSULE ORAL 2 TIMES DAILY
Qty: 14 CAPSULE | Refills: 0 | Status: SHIPPED | OUTPATIENT
Start: 2024-08-28 | End: 2024-09-04

## 2024-08-30 LAB — BACTERIA UR CULT: NORMAL

## 2024-09-12 ENCOUNTER — PATIENT OUTREACH (OUTPATIENT)
Dept: CASE MANAGEMENT | Age: 89
End: 2024-09-12

## 2024-09-12 DIAGNOSIS — E78.00 PURE HYPERCHOLESTEROLEMIA: ICD-10-CM

## 2024-09-12 DIAGNOSIS — J44.1 COPD EXACERBATION (HCC): ICD-10-CM

## 2024-09-12 DIAGNOSIS — E11.42 DIABETIC POLYNEUROPATHY ASSOCIATED WITH TYPE 2 DIABETES MELLITUS (HCC): ICD-10-CM

## 2024-09-12 DIAGNOSIS — E11.42 TYPE 2 DIABETES MELLITUS WITH DIABETIC POLYNEUROPATHY, WITHOUT LONG-TERM CURRENT USE OF INSULIN (HCC): Primary | ICD-10-CM

## 2024-09-12 DIAGNOSIS — M19.041 ARTHRITIS OF BOTH HANDS: ICD-10-CM

## 2024-09-12 DIAGNOSIS — I10 PRIMARY HYPERTENSION: ICD-10-CM

## 2024-09-12 DIAGNOSIS — J44.9 CHRONIC OBSTRUCTIVE PULMONARY DISEASE, UNSPECIFIED COPD TYPE (HCC): ICD-10-CM

## 2024-09-12 DIAGNOSIS — K21.9 GASTROESOPHAGEAL REFLUX DISEASE WITHOUT ESOPHAGITIS: ICD-10-CM

## 2024-09-12 DIAGNOSIS — H35.3231 EXUDATIVE AGE-RELATED MACULAR DEGENERATION, BILATERAL, WITH ACTIVE CHOROIDAL NEOVASCULARIZATION (HCC): ICD-10-CM

## 2024-09-12 DIAGNOSIS — M19.042 ARTHRITIS OF BOTH HANDS: ICD-10-CM

## 2024-09-12 DIAGNOSIS — E53.8 B12 DEFICIENCY: ICD-10-CM

## 2024-09-12 NOTE — PROGRESS NOTES
Spoke to Paulina for Chronic Care Management.      Updates to patient care team/comments: UTD  Patient reported changes in medications: UTD  Med Adherence  Comment: pt taking medications as prescribed     Health Maintenance:   Health Maintenance   Topic Date Due    COVID-19 Vaccine (8 - 2023-24 season) 09/01/2024    Influenza Vaccine (1) 10/01/2024    Diabetes Care A1C  01/26/2025    Diabetes Care Dilated Eye Exam  05/16/2025    Diabetes Care: GFR  07/26/2025    Diabetes Care: Microalb/Creat Ratio  07/26/2025    Diabetes Care Foot Exam  08/05/2025    Annual Physical  08/05/2025    Annual Depression Screening  Completed    Fall Risk Screening (Annual)  Completed    Pneumococcal Vaccine: 65+ Years  Completed    Zoster Vaccines  Completed       Patient updates/concerns:    Spoke to pt for monthly outreach   Pt requested ccm assistance in scheduling 3 month follow up with pcp. Pt was scheduled on 11/4/24 with dr hill. Pt was reminded that lab work will be do, and ccm will send hard copy lab work to pt with reminder to schedule within 2 weeks of visit. Pt verbalized understanding.    Discussed with pt her strategies to limit salt, sugar abdoul fat foods. Pt states that she does not count calories or avoid any foods of any kind. She states that her appetite is slight and this inherently limits her portions. With the small portions she never gets overly concerned that she is consuming too much of the wrong type of food.  Pt will still prepare a lot of fresh food instead of frozen processed food. Pt gets a good variety and has a little protein at every meal.   Pt does not walk every day but tries to walk frequently. She makes several trips to the store during the week and walks there to and from her home.    Pt had flu shot on aug 14 at [facility] and will discuss with pharmacist when to get the new Covid shot.    Reviewed medications with pt. She states that she tries to keep herself accountable to taking the medications at  the same time every day by logging it a notebook. Pt will sometimes forget and then take them a little later. Pt states she rarely misses a day but it does sometimes happen.    Pt feels she is getting a restful nights sleep. She has only been bothered by restless legs one time in recent weeks. She will still require a nap before and after lunch.   Pt does not take bp at home but she does have a cuff at home if need be to start tracking.    Pt has routine derm visit tomorrow for skin check.   Goals/Action Plan:    Active goal from previous outreach: exercise    Patient reported progress towards goals: pt takes frequent trips to the grocery store as her exercise               - What: hydration           - Where/When/How: pt is consistently getting 2-3 glasses a day  Patient Reported Barriers and Concerns: n/a                   - Plan for overcoming barriers: none    Care Managers Interventions: continue to provide encouragement and education for healthy coping and dx    Future Appointments:   Future Appointments   Date Time Provider Department Center   10/28/2024  3:30 PM Bebeto Blackwell MD [facility]   12/10/2024  1:40 PM [facility] Follow Up Date: one month    Reason For Follow Up: review progress and or barriers towards patient's goals.     Time Spent This Encounter Total: 35 min medical record review, telephone communication, care plan updates where needed, education, goals, and action plan recreation/update. Provided acknowledgment and validation to patient's concerns.   Monthly Minute Total including today: 35  Physical assessment, complete health history, and need for CCM established by Annie Newton MD.

## 2024-10-14 ENCOUNTER — PATIENT OUTREACH (OUTPATIENT)
Dept: CASE MANAGEMENT | Age: 89
End: 2024-10-14

## 2024-10-14 DIAGNOSIS — H35.3231 EXUDATIVE AGE-RELATED MACULAR DEGENERATION, BILATERAL, WITH ACTIVE CHOROIDAL NEOVASCULARIZATION (HCC): ICD-10-CM

## 2024-10-14 DIAGNOSIS — E11.42 DIABETIC POLYNEUROPATHY ASSOCIATED WITH TYPE 2 DIABETES MELLITUS (HCC): ICD-10-CM

## 2024-10-14 DIAGNOSIS — E78.00 PURE HYPERCHOLESTEROLEMIA: ICD-10-CM

## 2024-10-14 DIAGNOSIS — M19.041 ARTHRITIS OF BOTH HANDS: ICD-10-CM

## 2024-10-14 DIAGNOSIS — G25.81 RLS (RESTLESS LEGS SYNDROME): ICD-10-CM

## 2024-10-14 DIAGNOSIS — E53.8 B12 DEFICIENCY: ICD-10-CM

## 2024-10-14 DIAGNOSIS — M19.042 ARTHRITIS OF BOTH HANDS: ICD-10-CM

## 2024-10-14 DIAGNOSIS — J44.9 CHRONIC OBSTRUCTIVE PULMONARY DISEASE, UNSPECIFIED COPD TYPE (HCC): ICD-10-CM

## 2024-10-14 DIAGNOSIS — I50.32 CHRONIC DIASTOLIC HEART FAILURE (HCC): ICD-10-CM

## 2024-10-14 DIAGNOSIS — E11.42 TYPE 2 DIABETES MELLITUS WITH DIABETIC POLYNEUROPATHY, WITHOUT LONG-TERM CURRENT USE OF INSULIN (HCC): Primary | ICD-10-CM

## 2024-10-14 DIAGNOSIS — K21.9 GASTROESOPHAGEAL REFLUX DISEASE WITHOUT ESOPHAGITIS: ICD-10-CM

## 2024-10-14 DIAGNOSIS — H81.10 BPPV (BENIGN PAROXYSMAL POSITIONAL VERTIGO), UNSPECIFIED LATERALITY: ICD-10-CM

## 2024-10-14 DIAGNOSIS — J44.1 COPD EXACERBATION (HCC): ICD-10-CM

## 2024-10-14 NOTE — PROGRESS NOTES
Spoke to Paulina for Chronic Care Management.      Updates to patient care team/comments: UTD  Patient reported changes in medications: UTD  Med Adherence  Comment: pt taking medications as prescribed     Health Maintenance:   Health Maintenance   Topic Date Due    COVID-19 Vaccine (8 - 2023-24 season) 09/01/2024    Influenza Vaccine (1) 10/01/2024    Diabetes Care A1C  01/26/2025    Diabetes Care Dilated Eye Exam  05/16/2025    Diabetes Care: GFR  07/26/2025    Diabetes Care: Microalb/Creat Ratio  07/26/2025    Diabetes Care Foot Exam  08/05/2025    Annual Physical  08/05/2025    Annual Depression Screening  Completed    Fall Risk Screening (Annual)  Completed    Pneumococcal Vaccine: 65+ Years  Completed    Zoster Vaccines  Completed       Patient updates/concerns:    Spoke to pt for monthly outreach   Pt wanted to discuss potential solutions for the problems she has been having with sleep.  Pt had been dealing with restless legs affecting quality of her sleep and was put on pramipexole. Pt states that her sleep improved and restless leg syndrome was not interfering with quality of sleep. Pt states that since starting the medication she has been having increasingly vivid dreams. Pt is not acting out any of her dreams nor is she moving or walking while asleep. Pt is safely in bed the whole night. Pt thinks the quality of her sleep has improved since pramiprexole but she is having difficulty falling asleep.  CCM discussed with pt potential solutions. Pt states that she reserves much of her screen time for before bed time. Ccm discussed the effects that tablets/laptops/phone screens on keeping people up. Pt states that she will change her computer time a few hours earlier. Discussed with pt over the counter suggestions that had been presented to her in the past like benadryl or melatonin. Pt tries to reference an on line data base when determining how safe a supplement would be for her in combination of her other  meds and she is reluctant to add either melatonin or benadryl to her routine because of potential interactions.  Pt confirms she is still taking magnesium.  Reviewed with pt the benefits of chamomile tea/lavender tea as sleep aide. Pt agreed to try. Pt will f/u with ccm or pcp office if she needs to discuss this further with clinical staff at pcp office.    Pt reviewed medications with ccm.    Pt had Covid shot on oct 2 and did not have any side effects. Pt had flu shot on aug 14 and no reaction.   Pt is still very active throughout the day. Most of her exercise is comprised of daily activity. [facility] discussed with pt the benefits of exercise and how It can help regulate sleep.  Ccm encouraged pt to adopt a daily routine of stretches using walker or marching in place. Pt verbalized understanding.    Pt has seen eye doctor and was not given any new rx or f/u     Goals/Action Plan:    Active goal from previous outreach: exercise    Patient reported progress towards goals: pt takes frequent almost daily trips to local supermarket and plans to adopt a daily stretching routine using walker               - What: n/a           - Where/When/How: none  Patient Reported Barriers and Concerns: n/a                   - Plan for overcoming barriers: none    Care Managers Interventions: continue to provide encouragement and education for healthy coping and dx    Future Appointments:   Future Appointments   Date Time Provider Department Center   10/28/2024  3:30 PM Bebeto Blackwell MD [facility] [place]   11/4/2024  9:40 AM Annie Newton MD [facility] [address] [facility]   12/10/2024  1:40 PM Aydin Burroughs MD [facility] Care Manager Follow Up Date: one month    Reason For Follow Up: review progress and or barriers towards patient's goals.     Time Spent This Encounter Total: 36 min medical record review, telephone communication, care plan updates where needed, education, goals, and action plan recreation/update.  Provided acknowledgment and validation to patient's concerns.   Monthly Minute Total including today: 36  Physical assessment, complete health history, and need for CCM established by Annie Newton MD.

## 2024-10-21 DIAGNOSIS — G25.81 RLS (RESTLESS LEGS SYNDROME): ICD-10-CM

## 2024-10-21 RX ORDER — PRAMIPEXOLE DIHYDROCHLORIDE 0.12 MG/1
TABLET ORAL
Qty: 60 TABLET | Refills: 3 | Status: SHIPPED | OUTPATIENT
Start: 2024-10-21

## 2024-10-21 NOTE — TELEPHONE ENCOUNTER
Medication: pramipexole 0.125 MG Oral Tab      Date of last refill: 8/14/24 (#60/3)  [place] 9[place]19/24     Last office visit: 8/14/24  Due back to clinic per last office note:  Return in about 4 months   Date next office visit scheduled:    Future Appointments   Date Time Provider Department Center   10/28/2024  3:30 PM Bebeto Blackwell MD [facility] [place]   11/8/2024 12:40 PM Annie Newton MD [facility] [address] [facility]   12/10/2024  1:40 PM [facility] [place]           Last OV note recommendation:    Impression/ Plan:  Paulina Man is a 89 year old female with history of neuropathy and RLS, who presents for management.      Neurologic exam is abnormal with signs / symptoms of neuropathy. In addition, she has symptoms of restless legs syndrome and has been on pramipexole with some improvement but still has symptoms - she was recommended to break up dose of pramipexole to 0.125 mg ~ 7 PM and 0.125 mg prior to bedtime and magnesium study level was low; ferritin also low and now following with PCP for repletion of magnesium and on oral iron supplement.     She was having side effects on pramipexole but it is unclear if she was taking prescribed dose; She tried ropinirole and had poor reaction and is now doing well on regimen of pramipexole 0.125 mg twice nightly ~ 7 PM and nightly prior to bedtime - will continue.     1. RLS (restless legs syndrome)  As noted above   - pramipexole 0.125 MG Oral Tab; Take one tablet at 7 pm and take one tablet at bed time.  Dispense: 60 tablet; Refill: 3     Return in about 4 months (around 12/14/2024).     Aydin Burroughs MD, Neurology

## 2024-10-21 NOTE — TELEPHONE ENCOUNTER
Patient stated she is experiencing increased restless leg syndrome symptoms.       Patient also stated she needs a refill for pramipexole and wondering if will need to increase the dosage.    Patient stated needs paramipexole rx tonight since out of the medication.    Please call patient to discuss and advise.

## 2024-10-26 ENCOUNTER — WALK IN (OUTPATIENT)
Dept: URGENT CARE | Age: 89
End: 2024-10-26

## 2024-10-26 VITALS
SYSTOLIC BLOOD PRESSURE: 127 MMHG | RESPIRATION RATE: 20 BRPM | DIASTOLIC BLOOD PRESSURE: 53 MMHG | HEART RATE: 73 BPM | TEMPERATURE: 98.3 F | WEIGHT: 145 LBS | OXYGEN SATURATION: 97 %

## 2024-10-26 DIAGNOSIS — H61.23 BILATERAL IMPACTED CERUMEN: Primary | ICD-10-CM

## 2024-10-26 ASSESSMENT — PAIN SCALES - GENERAL: PAINLEVEL: 0

## 2024-10-29 ENCOUNTER — HOSPITAL ENCOUNTER (OUTPATIENT)
Dept: CT IMAGING | Facility: HOSPITAL | Age: 89
Discharge: HOME | End: 2024-10-29
Attending: REGISTERED NURSE
Payer: MEDICARE

## 2024-10-29 ENCOUNTER — LAB ENCOUNTER (OUTPATIENT)
Dept: LAB | Facility: HOSPITAL | Age: 89
End: 2024-10-29
Attending: REGISTERED NURSE
Payer: MEDICARE

## 2024-10-29 DIAGNOSIS — E11.42 TYPE 2 DIABETES MELLITUS WITH DIABETIC POLYNEUROPATHY, WITHOUT LONG-TERM CURRENT USE OF INSULIN (HCC): ICD-10-CM

## 2024-10-29 DIAGNOSIS — E83.42 HYPOMAGNESEMIA: ICD-10-CM

## 2024-10-29 DIAGNOSIS — I72.8 SPLENIC ARTERY ANEURYSM (HCC): ICD-10-CM

## 2024-10-29 LAB
ANION GAP SERPL CALC-SCNC: 3 MMOL/L (ref 0–18)
BUN BLD-MCNC: 15 MG/DL (ref 9–23)
CALCIUM BLD-MCNC: 10.3 MG/DL (ref 8.7–10.4)
CHLORIDE SERPL-SCNC: 106 MMOL/L (ref 98–112)
CO2 SERPL-SCNC: 30 MMOL/L (ref 21–32)
CREAT BLD-MCNC: 0.85 MG/DL
EGFRCR SERPLBLD CKD-EPI 2021: 63 ML/MIN/1.73M2 (ref 60–?)
EST. AVERAGE GLUCOSE BLD GHB EST-MCNC: 140 MG/DL (ref 68–126)
FASTING STATUS PATIENT QL REPORTED: YES
GLUCOSE BLD-MCNC: 73 MG/DL (ref 70–99)
HBA1C MFR BLD: 6.5 % (ref ?–5.7)
MAGNESIUM SERPL-MCNC: 1.3 MG/DL (ref 1.6–2.6)
OSMOLALITY SERPL CALC.SUM OF ELEC: 287 MOSM/KG (ref 275–295)
POTASSIUM SERPL-SCNC: 3.6 MMOL/L (ref 3.5–5.1)
SODIUM SERPL-SCNC: 139 MMOL/L (ref 136–145)

## 2024-10-29 PROCEDURE — 36415 COLL VENOUS BLD VENIPUNCTURE: CPT

## 2024-10-29 PROCEDURE — 80048 BASIC METABOLIC PNL TOTAL CA: CPT

## 2024-10-29 PROCEDURE — 83036 HEMOGLOBIN GLYCOSYLATED A1C: CPT

## 2024-10-29 PROCEDURE — 74176 CT ABD & PELVIS W/O CONTRAST: CPT | Performed by: REGISTERED NURSE

## 2024-10-29 PROCEDURE — 83735 ASSAY OF MAGNESIUM: CPT

## 2024-11-06 ENCOUNTER — PATIENT OUTREACH (OUTPATIENT)
Dept: CASE MANAGEMENT | Age: 89
End: 2024-11-06

## 2024-11-06 DIAGNOSIS — E11.42 TYPE 2 DIABETES MELLITUS WITH DIABETIC POLYNEUROPATHY, WITHOUT LONG-TERM CURRENT USE OF INSULIN (HCC): Primary | ICD-10-CM

## 2024-11-06 DIAGNOSIS — E78.00 PURE HYPERCHOLESTEROLEMIA: ICD-10-CM

## 2024-11-06 DIAGNOSIS — M19.042 ARTHRITIS OF BOTH HANDS: ICD-10-CM

## 2024-11-06 DIAGNOSIS — I50.32 CHRONIC DIASTOLIC HEART FAILURE (HCC): ICD-10-CM

## 2024-11-06 DIAGNOSIS — E53.8 B12 DEFICIENCY: ICD-10-CM

## 2024-11-06 DIAGNOSIS — I10 PRIMARY HYPERTENSION: ICD-10-CM

## 2024-11-06 DIAGNOSIS — J44.9 CHRONIC OBSTRUCTIVE PULMONARY DISEASE, UNSPECIFIED COPD TYPE (HCC): ICD-10-CM

## 2024-11-06 DIAGNOSIS — J44.1 COPD EXACERBATION (HCC): ICD-10-CM

## 2024-11-06 DIAGNOSIS — M19.041 ARTHRITIS OF BOTH HANDS: ICD-10-CM

## 2024-11-06 DIAGNOSIS — G25.81 RLS (RESTLESS LEGS SYNDROME): ICD-10-CM

## 2024-11-06 DIAGNOSIS — K21.9 GASTROESOPHAGEAL REFLUX DISEASE WITHOUT ESOPHAGITIS: ICD-10-CM

## 2024-11-06 NOTE — PROGRESS NOTES
Spoke to Paulina for Chronic Care Management.      Updates to patient care team/comments: UTD  Patient reported changes in medications: UTD  Med Adherence  Comment: pt taking medications as prescribed     Health Maintenance:   Health Maintenance   Topic Date Due    COVID-19 Vaccine (8 - 2023-24 season) 09/01/2024    Influenza Vaccine (1) 10/01/2024    Diabetes Care A1C  04/29/2025    Diabetes Care Dilated Eye Exam  05/16/2025    Diabetes Care: Microalb/Creat Ratio  07/26/2025    Diabetes Care Foot Exam  08/05/2025    Annual Physical  08/05/2025    Diabetes Care: GFR  10/29/2025    Annual Depression Screening  Completed    Fall Risk Screening (Annual)  Completed    Pneumococcal Vaccine: 65+ Years  Completed    Zoster Vaccines  Completed       Patient updates/concerns:    Spoke to pt for monthly outreach   Pt wanted to discuss an emerging symptom that she has been experiencing. Pt states that she feel that she has been producing an excessive amount of saliva. Reviewed with pt potential causes like parkinson's medication or gerd. Pt confirms that she feels improvement in her symptoms using both gerd and parkinson's meds and feels it would be counterproductive to seek an alternative in response to the saliva. Pt does not feel that she is in danger of choking on saliva or food she feels it is just a little unsightly sometimes if it drips out. Pt declined ccm assistance in coordinating a call with nurses. She feels that this is something that she will be able to tolerate.    Pt has been having an increasing amount of lower back/tailbone pain. Pt is apprehensive about using heating pads or capsazin on the area. Pt will shopping for new office chair that is more ergonomic along with a pad for her to sit on. Pt will search for an alternative topical.    Pt was having some irritation in her eye and she tried to flush it to some degree of success. Pt saw eye doc who examined her and indicated it might have been an eyelash but  pt has not had any further irritation since. While at appointment pt was given the routine injection in her eye. Pt states that it causes some pain for a few hours but is effective at managing the decline in her eyesight.    Pt completed labs ahead of pcp visit. She has not reviewed them yet and has visit with pcp upcoming. Pt acknowledges understanding that A1c level went up. Pt admits to less exercise as seasons change she cannot walk outside as much. She also admits to eating more sweets recently pt understands what caused the increase in her numbers and has no questions for pcp ahead of visit. [facility] sent pt examples of exercises she can perform safely using a walker.    Pt requested assistance in activating her mychart. Pt takes a notebook with her to visits and [facility] encouraged pt to remind herself to discuss this with psr at pcp office. Pt confirmed that she did that and will f/u with ccm or family member to help walk her through the set up process for mychart.    Pt is still having difficulty getting a restful nights sleep. She has decreased her screen time ahead of going to bed. She will sometimes wake in the middle of the night and walk around before she gets back to sleep. Pt makes sure to keep walker at bedside and has night lights on to increase safety from falls at night.   Goals/Action Plan:    Active goal from previous outreach: staying healthy     Patient reported progress towards goals: pt continues to see providers as needed and takes her medications as prescribed               - What: exercise           - Where/When/How: pt tries to get outside to walk when weather is good and goes to grocery store. In colder months pt tries to stay active and moving frequently but her activity level decreases in the winter months.  [facility] is sending examples of exercises that can safely be done using a walker  Patient Reported Barriers and Concerns: n/a                   - Plan for overcoming barriers: none    Care  Managers Interventions: continue to provide encouragement and education for healthy coping and dx    Future Appointments:   Future Appointments   Date Time Provider Department Center   11/8/2024 12:40 PM Annie Newton MD [facility] [facility]   11/11/2024  1:15 PM Bebeto Blackwell MD [facility] [place]   12/10/2024  1:40 PM Aydin Burroughs MD [facility] Care Manager Follow Up Date: one month    Reason For Follow Up: review progress and or barriers towards patient's goals.     Time Spent This Encounter Total: 29 min medical record review, telephone communication, care plan updates where needed, education, goals, and action plan recreation/update. Provided acknowledgment and validation to patient's concerns.   Monthly Minute Total including today: 29  Physical assessment, complete health history, and need for CCM established by Annie Newton MD.    Friendly reminder - 2025 Benefits Open Enrollment is here!   We encourage you to review your current Medicare coverage and explore your options during this period. We have developed a Medicare Information Page on our website to serve as a resource for guidance and answers to any questions you might have.   You can access it by visiting, [email or web address]

## 2024-11-07 ENCOUNTER — RT VISIT (OUTPATIENT)
Dept: RESPIRATORY THERAPY | Facility: HOSPITAL | Age: 89
End: 2024-11-07
Attending: NURSE PRACTITIONER
Payer: MEDICARE

## 2024-11-07 DIAGNOSIS — R06.09 DYSPNEA ON EXERTION: ICD-10-CM

## 2024-11-07 PROCEDURE — 94060 EVALUATION OF WHEEZING: CPT | Performed by: INTERNAL MEDICINE

## 2024-11-07 PROCEDURE — 94729 DIFFUSING CAPACITY: CPT | Performed by: INTERNAL MEDICINE

## 2024-11-07 PROCEDURE — 94726 PLETHYSMOGRAPHY LUNG VOLUMES: CPT | Performed by: INTERNAL MEDICINE

## 2024-11-08 ENCOUNTER — OFFICE VISIT (OUTPATIENT)
Dept: INTERNAL MEDICINE CLINIC | Facility: CLINIC | Age: 89
End: 2024-11-08
Payer: MEDICARE

## 2024-11-08 VITALS
HEART RATE: 60 BPM | BODY MASS INDEX: 28.01 KG/M2 | DIASTOLIC BLOOD PRESSURE: 60 MMHG | WEIGHT: 148.38 LBS | SYSTOLIC BLOOD PRESSURE: 134 MMHG | OXYGEN SATURATION: 98 % | HEIGHT: 61 IN

## 2024-11-08 DIAGNOSIS — K59.09 OTHER CONSTIPATION: ICD-10-CM

## 2024-11-08 DIAGNOSIS — I10 PRIMARY HYPERTENSION: ICD-10-CM

## 2024-11-08 DIAGNOSIS — E78.00 PURE HYPERCHOLESTEROLEMIA: ICD-10-CM

## 2024-11-08 DIAGNOSIS — E11.42 TYPE 2 DIABETES MELLITUS WITH DIABETIC POLYNEUROPATHY, WITHOUT LONG-TERM CURRENT USE OF INSULIN (HCC): Primary | ICD-10-CM

## 2024-11-08 PROCEDURE — 99214 OFFICE O/P EST MOD 30 MIN: CPT | Performed by: INTERNAL MEDICINE

## 2024-11-08 PROCEDURE — G2211 COMPLEX E/M VISIT ADD ON: HCPCS | Performed by: INTERNAL MEDICINE

## 2024-11-08 NOTE — PROGRESS NOTES
Paulina Man is a 89 year old female.    Chief Complaint   Patient presents with    Follow - Up     Room 3, [facility], pt is here for 3 months follow up.       HPI:     Pleasant patient with DM2, GERD, HTN, HL, [place] here for follow up. Doing very well. Planning a trip to [place] with family for thanksgiving.  Diabetes is well controlled on low dose glipizide 2.5mg daily, hgba1c 6.5 (previously 6), no acute foot or vision complaints  She occasionally gets constipation despite eating green vegetables daily, otc colac prn helps. No n/v/abd pain/weight loss  Her BP and cholesterol are well controlled with medications, no cardiac complaints. Denies any falls, ambulates with rolling walker.    Patient Active Problem List   Diagnosis    Diabetic polyneuropathy associated with type 2 diabetes mellitus (HCC)    Gastroesophageal reflux disease    Primary hypertension    RLS (restless legs syndrome)    Pure hypercholesterolemia    Exudative age-related macular degeneration, bilateral, with active choroidal neovascularization (HCC)    DCIS (ductal carcinoma in situ) of breast    DCIS (ductal carcinoma in situ) of breast    Atypical lobular hyperplasia of right breast    Arthritis of both hands    Bilateral carpal tunnel syndrome    Chronic obstructive pulmonary disease (HCC)    Splenic artery aneurysm (HCC)    Back pain with radiation    DDD (degenerative disc disease), lumbar    Chronic vulvitis    Atrophic vaginitis    Recurrent UTI    Unsteady gait    BPPV (benign paroxysmal positional vertigo), unspecified laterality    B12 deficiency    Dextroscoliosis    Cervical spondylosis without myelopathy    Spondylosis of lumbar region without myelopathy or radiculopathy    COPD exacerbation (HCC)    Dyspnea, unspecified type    Shortness of breath    Palpitations    Encounter for well woman exam with abnormal findings    Age-related osteoporosis without current pathological fracture    Sleep apnea, unspecified    Venous insufficiency of both  lower extremities    Edema leg    Diastolic dysfunction    Chronic diastolic heart failure (HCC)    Unspecified osteoarthritis, unspecified site    Unspecified hearing loss, unspecified ear    Unspecified abdominal hernia without obstruction or gangrene    Type 2 diabetes mellitus with diabetic polyneuropathy (HCC)    Problems related to living alone    Personal history of urinary (tract) infections    Personal history of pneumonia (recurrent)    Personal history of nicotine dependence    Personal history of in-situ neoplasm of breast    Obesity, unspecified    Dyspnea on exertion    Other constipation     Current Outpatient Medications   Medication Sig Dispense Refill    Esomeprazole Magnesium 40 MG Oral Capsule Delayed Release TAKE ONE CAPSULE BY MOUTH TWICE DAILY BEFORE MEALS 180 capsule 1    pramipexole 0.125 MG Oral Tab Take one tablet at 7 pm and take one tablet at bed time. 60 tablet 3    glipiZIDE ER 2.5 MG Oral Tablet 24 Hr Take 1 tablet (2.5 mg total) by mouth daily with breakfast. 90 tablet 3    losartan 100 MG Oral Tab Take by mouth daily.      Magnesium 200 MG Oral Tab Take by mouth. 2 x 200 mg gummies daily      Cholecalciferol (VITAMIN D3) 25 MCG (1000 UT) Oral Cap Take 1 tablet by mouth daily.      cyanocobalamin 1000 MCG Oral Tab Take 1 tablet (1,000 mcg total) by mouth daily.      aspirin 81 MG Oral Chew Tab Chew by mouth daily.      liver oil-zinc oxide 40 % External Ointment Apply topically as needed for Dry Skin.      Bacillus Coagulans-Inulin (BENEFIBER PREBIOTIC+PROBIOTIC) Oral Chew Tab Chew by mouth.      atorvastatin 40 MG Oral Tab Take 1 tablet (40 mg total) by mouth daily.      estradiol 0.1 MG/GM Vaginal Cream Apply 0.5 grams (pea-sized amount) of cream inside the vagina nightly. May use small amount at the vaginal opening as well in a thin layer. 42.5 g 3    mometasone 0.1 % External Ointment apply a thin layer daily to vulva for 2 weeks then twice weekly 45 g 0    ondansetron (ZOFRAN  ODT) 8 MG Oral Tablet Dispersible Take 1 tablet (8 mg total) by mouth every 8 (eight) hours as needed for Nausea. 30 tablet 0    albuterol (VENTOLIN HFA) 108 (90 Base) MCG/ACT Inhalation Aero Soln Inhale 2 puffs into the lungs every 4 (four) hours as needed for Wheezing. 1 each 1    fluticasone propionate 50 MCG/ACT Nasal Suspension 2 sprays by Nasal route daily.      acetaminophen 500 MG Oral Tab Take 1 tablet (500 mg total) by mouth every 6 (six) hours as needed for Pain.      docusate sodium 100 MG Oral Cap Take 1 capsule (100 mg total) by mouth 2 (two) times daily as needed for constipation.      CAPSAICIN APR EX Apply topically as needed.      Loperamide HCl 2 MG Oral Cap Take 1 capsule (2 mg total) by mouth as needed for Diarrhea.      Multiple Vitamins-Minerals (PRESERVISION AREDS) Oral Tab Take 1 tablet by mouth. 250 mg once daily        Past Medical History:    Abdominal distention    Abdominal hernia    Abdominal pain    Acute diverticulitis    Noted on CT scan - acute diverticulitis of the sigmoid colon    Arrhythmia    Arthritis    Atypical lobular hyperplasia of right breast    Back pain    Back problem    lower lumbar bad discs     Mckeon's esophagus without dysplasia    CHF (congestive heart failure) ([facility])    Chronic cough    Chronic diarrhea    Started before metformin- related to IBS      Constipation    COPD (chronic obstructive pulmonary disease) ([facility])    NO OXYGEN- no pulmonologist    Deviated septum    left side    Diabetes mellitus ([facility])    Diabetic polyneuropathy associated with type 2 diabetes mellitus ([facility])    Diarrhea, unspecified    Disorder of thyroid    has Thyroid nodules    Diverticulosis of large intestine    Dizziness    Ductal carcinoma in situ (DCIS) of right breast    Easy bruising    Esophageal reflux    Essential hypertension    Exudative macular degeneration (HCC)    Right Eye     Eye disease    Fatigue    Flatulence/gas pain/belching    Frequent urination    Frequent UTI     Full dentures    Gastroesophageal reflux disease    GERD (gastroesophageal reflux disease)    Hearing impairment    bilateral hearing aids    Hearing loss    Heart palpitations    Hemorrhoids    High blood pressure    High cholesterol    History of cardiac murmur    History of tobacco use    Hoarseness, chronic    Hyperlipidemia    not taking lipitor due to side effects    Incontinence    uses pads    Indigestion    Irregular bowel habits    Itch of skin    Leaking of urine    Leaky heart valve    slight per pt- had echo 2015    Leg swelling    Loss of appetite    Macular degeneration    Macular degeneration    Menopause    Nausea    Neuropathy    Nodule of left lung    Obesity (BMI 30-39.9)    Osteoarthritis    all joints    Osteoporosis    Other fatigue    Pain in joints    Pap smear for cervical cancer screening    Per patient had pap in . No abn pap.     Pneumonia, organism unspecified(486)    2015    PONV (postoperative nausea and vomiting)    Problems with swallowing    due hiatal hernia; gaggs at times    Restless leg syndrome    Restless leg syndrome    RLS (restless legs syndrome)    Screening breast examination    Dr. 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   Tobacco Use    Smoking status: Former     Current packs/day: 0.00     Average packs/day: 1 pack/day for 35.0 years (35.0 ttl pk-yrs)     Types: Cigarettes     Start date: 1953     Quit date: 1988     Years since quittin.2     Passive  exposure: Never    Smokeless tobacco: Former   Vaping Use    Vaping status: Never Used   Substance and Sexual Activity    Alcohol use: No    Drug use: No    Sexual activity: Not Currently   Other Topics Concern    Caffeine Concern Yes     Comment: coffee 1- 2 cups daily    Exercise No     Comment: walking     Social Drivers of Health     Financial Resource Strain: Low Risk  (2/27/2023)    Financial Resource Strain     Difficulty of Paying Living Expenses: Not hard at all     Med Affordability: No   Food Insecurity: No Food Insecurity (12/27/2023)    Food Insecurity     Food Insecurity: Never true   Transportation Needs: No Transportation Needs (12/27/2023)    Transportation Needs     Lack of Transportation: No   Physical Activity: Insufficiently Active (2/27/2023)    Exercise Vital Sign     Days of Exercise per Week: 1 day     Minutes of Exercise per Session: 20 min   Stress: No Stress Concern Present (2/27/2023)    Stress     Feeling of Stress : No   Social Connections: Socially Integrated (2/27/2023)    Social Connections     Frequency of Socialization with Friends and Family: 2   Housing Stability: Low Risk  (12/27/2023)    Housing Stability     Housing Instability: No     Housing Instability Emergency: No     Family History   Problem Relation Age of Onset    Breast Cancer Paternal Aunt     Heart Disorder Mother     Hypertension Mother     Colon Polyps Mother     Heart Disorder Father     Hypertension Father     Lipids Father     Heart Disorder Paternal Grandfather     Diabetes Paternal Grandmother     Breast Cancer Self 86        DCIS        Allergies  Allergies[1]      REVIEW OF SYSTEMS:   GENERAL HEALTH:  no fevers   RESPIRATORY: no cough  CARDIOVASCULAR: denies chest pain  GI: occ constipation  : no dysuria  NEURO: denies headaches  PSYCH: No reported depression   HEME: No adenopathy      EXAM:   /60 (BP Location: Right arm, Patient Position: Sitting, Cuff Size: adult)   Pulse 60   Ht 5' 1\" (1.549  m)   Wt 148 lb 6.4 oz (67.3 kg)   LMP  (LMP Unknown)   SpO2 98%   BMI 28.04 kg/m²   GENERAL: well developed, well nourished,in no apparent distress  HEENT: atraumatic, normocephalic  NECK: supple,no adenopathy  LUNGS: normal rate without respiratory distress, lungs clear to auscultation  CARDIO: RRR nl S1 S2  GI: normal bowel sounds, soft, NT/ND  EXTREMITIES: no cyanosis, clubbing or edema  NEURO: Alert and oriented, ambulating with rolling walker    ASSESSMENT AND PLAN:     Encounter Diagnoses   Name     Type 2 diabetes mellitus with diabetic polyneuropathy, without long-term current use of insulin (HCC)- well controlled on low dose glipizide, hgba1c is 6.5, she is up to date on diabetic eye exam     Other constipation- push fluids and fiber, otc miralax prn     Pure hypercholesterolemia- continue atorvastatin, check lipids prior to next f/u     Primary hypertension- well controlled, CPM    Other- she already had flu vaccine and covid 19 booster at the pharmacy    Orders Placed This Encounter   Procedures    Hemoglobin A1C [E]    Lipid Panel [E]    Comp Metabolic Panel (14)    Microalb/Creat Ratio, Random Urine    TSH W Reflex To Free T4 [E]       Meds & Refills for this Visit:  Requested Prescriptions      No prescriptions requested or ordered in this encounter       Imaging & Consults:  None    No follow-ups on file.  There are no Patient Instructions on file for this visit.      The patient indicates understanding of these issues and agrees to the plan.           [1]   Allergies  Allergen Reactions    Ciprofloxacin NAUSEA AND VOMITING and HIVES    Peanut-Containing Drug Products UNKNOWN and OTHER (SEE COMMENTS)     Bladder infection    Pollen ITCHING and HIVES    Sulfa Antibiotics NAUSEA AND VOMITING    Adhesive Tape ITCHING    Dander RASH     fur    Other ITCHING     feather

## 2024-11-11 ENCOUNTER — OFFICE VISIT (OUTPATIENT)
Facility: CLINIC | Age: 89
End: 2024-11-11
Payer: MEDICARE

## 2024-11-11 VITALS
TEMPERATURE: 98 F | HEART RATE: 66 BPM | RESPIRATION RATE: 16 BRPM | DIASTOLIC BLOOD PRESSURE: 50 MMHG | OXYGEN SATURATION: 97 % | HEIGHT: 61 IN | BODY MASS INDEX: 27.94 KG/M2 | SYSTOLIC BLOOD PRESSURE: 118 MMHG | WEIGHT: 148 LBS

## 2024-11-11 DIAGNOSIS — N95.2 ATROPHIC VAGINITIS: ICD-10-CM

## 2024-11-11 DIAGNOSIS — R06.02 SHORTNESS OF BREATH: ICD-10-CM

## 2024-11-11 DIAGNOSIS — K21.9 GASTROESOPHAGEAL REFLUX DISEASE, UNSPECIFIED WHETHER ESOPHAGITIS PRESENT: Primary | ICD-10-CM

## 2024-11-11 DIAGNOSIS — J44.9 CHRONIC OBSTRUCTIVE PULMONARY DISEASE, UNSPECIFIED COPD TYPE (HCC): ICD-10-CM

## 2024-11-11 PROCEDURE — 99214 OFFICE O/P EST MOD 30 MIN: CPT | Performed by: INTERNAL MEDICINE

## 2024-11-11 NOTE — PATIENT INSTRUCTIONS
Plan:      Continue  Albuterol HFA MDI 2 puffs 4 times daily as needed.    Continue Fluticasone 2 puffs each nostril daily   Continue diuretics per cardiology   Cardiology follow up        Follow up:8  months     Bebeto Blackwell MD

## 2024-11-11 NOTE — PROGRESS NOTES
Pulmonary/Critical Care/Sleep Medicine   Progress  Note     PCP: Annie Newton MD   Phone: [phone]   Fax: [phone]        Chief Complaint   Patient presents with    Follow - Up     Pt here for six month pulmonary check up. PFT .  Pt states no new issues . PFT performed 11-7-24       HPI  I had the pleasure of seeing Paulina Man who is a pleasant 89 year old female who presents for follow up of COPD after visit on 4/29/2024     Since last visi the patient states that she has stable  short of breath on exertion nut denies cough          The patient states that she sleeps OK at night. She lives alone at home.         She has  no pets .         Hx of tobacco use: She  reports that she quit smoking about 36 years ago. Her smoking use included cigarettes. She started smoking about 71 years ago. She has a 35 pack-year smoking history. She has never been exposed to tobacco smoke. She has quit using smokeless tobacco.    Past Medical History:    Abdominal distention    Abdominal hernia    Abdominal pain    Acute diverticulitis    Noted on CT scan - acute diverticulitis of the sigmoid colon    Arrhythmia    Arthritis    Atypical lobular hyperplasia of right breast    Back pain    Back problem    lower lumbar bad discs     Mckeon's esophagus without dysplasia    CHF (congestive heart failure) (HCC)    Chronic cough    Chronic diarrhea    Started before metformin- related to IBS      Constipation    COPD (chronic obstructive pulmonary disease) (HCC)    NO OXYGEN- no pulmonologist    Deviated septum    left side    Diabetes mellitus (HCC)    Diabetic polyneuropathy associated with type 2 diabetes mellitus (HCC)    Diarrhea, unspecified    Disorder of thyroid    has Thyroid nodules    Diverticulosis of large intestine    Dizziness    Ductal carcinoma in situ (DCIS) of right breast    Easy bruising    Esophageal reflux    Essential hypertension    Exudative macular degeneration (HCC)    Right Eye     Eye disease     Fatigue    Flatulence/gas pain/belching    Frequent urination    Frequent UTI    Full dentures    Gastroesophageal reflux disease    GERD (gastroesophageal reflux disease)    Hearing impairment    bilateral hearing aids    Hearing loss    Heart palpitations    Hemorrhoids    High blood pressure    High cholesterol    History of cardiac murmur    History of tobacco use    Hoarseness, chronic    Hyperlipidemia    not taking lipitor due to side effects    Incontinence    uses pads    Indigestion    Irregular bowel habits    Itch of skin    Leaking of urine    Leaky heart valve    slight per pt- had echo 12/2015    Leg swelling    Loss of appetite    Macular degeneration    Macular degeneration    Menopause    Nausea    Neuropathy    Nodule of left lung    Obesity (BMI 30-39.9)    Osteoarthritis    all joints    Osteoporosis    Other fatigue    Pain in joints    Pap smear for cervical cancer screening    Per patient had pap in 2006. No abn pap.     Pneumonia, organism unspecified(486)    04/2015    PONV (postoperative nausea and vomiting)    Problems with swallowing    due hiatal hernia; gaggs at times    Restless leg syndrome    Restless leg syndrome    RLS (restless legs syndrome)    Screening breast examination    Dr. Layla Pretty    Screening mammogram for breast cancer    Benign     Shortness of breath    ON EXERTION    Sleep apnea    Sleep disturbance    Splenic artery aneurysm (HCC)    Torn rotator cuff    both shoulders per pt    Uncomfortable fullness after meals    Vertigo    Visual impairment    glasses    Vitamin B12 deficiency    Vulvar itching    Wears glasses    Weight loss    Wheezing      Past Surgical History:   Procedure Laterality Date    Angiogram      no intervention    Appendectomy  1935    Appendectomy      Cholecystectomy  1980    Colonoscopy      Egd  03/09/2022    Reactive gastropathy    Kevin localization wire 1 site left (cpt=19281)      1957    Kevin localization wire 1 site right  (cpt=19281)      1980    Kevin localization wire 1 site right (cpt=19281) Right 07/2016    DCIS    Mastectomy partial Right 07/01/2016    wire localized partial mastectomy    Needle biopsy right      diag DCIS- 06/07/16    Other surgical history  1980    L and R breast cyst removal    Sigmoidoscopy,diagnostic       Allergies   Allergen Reactions    Ciprofloxacin NAUSEA AND VOMITING and HIVES    Peanut-Containing Drug Products UNKNOWN and OTHER (SEE COMMENTS)     Bladder infection    Pollen ITCHING and HIVES    Sulfa Antibiotics NAUSEA AND VOMITING    Adhesive Tape ITCHING    Dander RASH     fur    Other ITCHING     feather     Current Outpatient Medications   Medication Sig Dispense Refill    Esomeprazole Magnesium 40 MG Oral Capsule Delayed Release TAKE ONE CAPSULE BY MOUTH TWICE DAILY BEFORE MEALS 180 capsule 1    pramipexole 0.125 MG Oral Tab Take one tablet at 7 pm and take one tablet at bed time. 60 tablet 3    glipiZIDE ER 2.5 MG Oral Tablet 24 Hr Take 1 tablet (2.5 mg total) by mouth daily with breakfast. 90 tablet 3    losartan 100 MG Oral Tab Take by mouth daily.      Magnesium 200 MG Oral Tab Take by mouth. 2 x 200 mg gummies daily      Cholecalciferol (VITAMIN D3) 25 MCG (1000 UT) Oral Cap Take 1 tablet by mouth daily.      cyanocobalamin 1000 MCG Oral Tab Take 1 tablet (1,000 mcg total) by mouth daily.      aspirin 81 MG Oral Chew Tab Chew by mouth daily.      Bacillus Coagulans-Inulin (BENEFIBER PREBIOTIC+PROBIOTIC) Oral Chew Tab Chew by mouth.      atorvastatin 40 MG Oral Tab Take 1 tablet (40 mg total) by mouth daily.      estradiol 0.1 MG/GM Vaginal Cream Apply 0.5 grams (pea-sized amount) of cream inside the vagina nightly. May use small amount at the vaginal opening as well in a thin layer. 42.5 g 3    albuterol (VENTOLIN HFA) 108 (90 Base) MCG/ACT Inhalation Aero Soln Inhale 2 puffs into the lungs every 4 (four) hours as needed for Wheezing. 1 each 1    fluticasone propionate 50 MCG/ACT Nasal  Suspension 2 sprays by Nasal route daily.      acetaminophen 500 MG Oral Tab Take 1 tablet (500 mg total) by mouth every 6 (six) hours as needed for Pain.      docusate sodium 100 MG Oral Cap Take 1 capsule (100 mg total) by mouth 2 (two) times daily as needed for constipation.      CAPSAICIN APR EX Apply topically as needed.      Loperamide HCl 2 MG Oral Cap Take 1 capsule (2 mg total) by mouth as needed for Diarrhea.      liver oil-zinc oxide 40 % External Ointment Apply topically as needed for Dry Skin.      mometasone 0.1 % External Ointment apply a thin layer daily to vulva for 2 weeks then twice weekly 45 g 0    ondansetron (ZOFRAN ODT) 8 MG Oral Tablet Dispersible Take 1 tablet (8 mg total) by mouth every 8 (eight) hours as needed for Nausea. (Patient not taking: Reported on 2024) 30 tablet 0    Multiple Vitamins-Minerals (PRESERVISION AREDS) Oral Tab Take 1 tablet by mouth. 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   Tobacco Use    Smoking status: Former     Current packs/day: 0.00     Average packs/day: 1 pack/day for 35.0 years (35.0 ttl pk-yrs)     Types: Cigarettes     Start date: 1953     Quit date: 1988     Years since quittin.2     Passive exposure: Never    Smokeless tobacco: Former   Vaping Use    Vaping status: Never Used   Substance and Sexual Activity    Alcohol use: No    Drug use: No    Sexual activity: Not Currently   Other Topics Concern    Caffeine Concern Yes     Comment: coffee 1- 2 cups daily    Exercise No     Comment: walking     Social Drivers of Health     Financial Resource Strain: Low Risk  (2023)    Financial Resource Strain     Difficulty of Paying Living Expenses: Not hard at all     Med Affordability: No   Food Insecurity: No Food Insecurity (2023)    Food Insecurity     Food Insecurity: Never true    Transportation Needs: No Transportation Needs (12/27/2023)    Transportation Needs     Lack of Transportation: No   Physical Activity: Insufficiently Active (2/27/2023)    Exercise Vital Sign     Days of Exercise per Week: 1 day     Minutes of Exercise per Session: 20 min   Stress: No Stress Concern Present (2/27/2023)    Stress     Feeling of Stress : No   Social Connections: Socially Integrated (2/27/2023)    Social Connections     Frequency of Socialization with Friends and Family: 2   Housing Stability: Low Risk  (12/27/2023)    Housing Stability     Housing Instability: No     Housing Instability Emergency: No      Immunization History   Administered Date(s) Administered    Covid-19 Vaccine Pfizer 30 mcg/0.3 ml 02/07/2021, 02/28/2021, 11/02/2021, 05/16/2022, 10/25/2022    Covid-19 Vaccine Pfizer Dani-Sucrose 30 mcg/0.3 ml 05/16/2022    FLU VAC High Dose 65 YRS & Older PRSV Free (96169) 09/13/2016, 09/30/2019, 09/08/2021, 09/26/2022, 08/25/2023    FLUAD High Dose 65 yr and older (14097) 09/20/2018    Fluzone Vaccine Medicare () 09/16/2015, 09/12/2016, 08/31/2017    High Dose Fluzone Influenza Vaccine, 65yr+ PF 0.5mL (71034) 09/08/2021, 08/25/2023    Influenza 09/07/2015, 08/12/2020    Pfizer Covid-19 Vaccine 30mcg/0.3ml 12yrs+ 11/09/2023    Pneumococcal (Prevnar 13) 09/16/2015    Pneumococcal Conjugate PCV20 11/26/2023    Pneumovax 23 05/23/2017    RSV, recombinant, RSVpreF, adjuvanted (Arexvy) 09/13/2023    TDAP 01/07/2016    Zoster Vaccine Recombinant Adjuvanted (Shingrix) 07/01/2019, 01/09/2020      Family History   Problem Relation Age of Onset    Breast Cancer Paternal Aunt     Heart Disorder Mother     Hypertension Mother     Colon Polyps Mother     Heart Disorder Father     Hypertension Father     Lipids Father     Heart Disorder Paternal Grandfather     Diabetes Paternal Grandmother     Breast Cancer Self 86        DCIS        Review of Systems   Constitutional:  Negative for fatigue, fever and  unexpected weight change.   HENT:  Negative for congestion, mouth sores, nosebleeds, postnasal drip, rhinorrhea, sore throat and trouble swallowing.    Eyes:  Negative for visual disturbance.   Respiratory:  Positive for shortness of breath. Negative for apnea, cough, choking, chest tightness and wheezing.    Cardiovascular:  Negative for chest pain, palpitations and leg swelling.   Gastrointestinal:  Negative for abdominal pain, constipation, diarrhea, nausea and vomiting.        GERD symptoms    Genitourinary:  Negative for difficulty urinating.   Musculoskeletal:  Positive for arthralgias. Negative for back pain, gait problem and myalgias.   Neurological:  Negative for dizziness, weakness and headaches.   Psychiatric/Behavioral:  Negative for sleep disturbance.        Vitals:    11/11/24 1247   BP: 118/50   Pulse: 66   Resp: 16   Temp: 98.3 °F (36.8 °C)      Body mass index is 27.96 kg/m².     Physical Exam  Constitutional:       General: She is not in acute distress.     Appearance: Normal appearance. She is not ill-appearing or diaphoretic.   HENT:      Head: Normocephalic and atraumatic.      Nose: Nose normal. No congestion or rhinorrhea.      Comments: Narrow edematous nares L>R      Mouth/Throat:      Mouth: Mucous membranes are moist.      Pharynx: Oropharynx is clear. No oropharyngeal exudate or posterior oropharyngeal erythema.      Comments: Mallampati class II palate   Eyes:      Extraocular Movements: Extraocular movements intact.      Pupils: Pupils are equal, round, and reactive to light.   Cardiovascular:      Rate and Rhythm: Normal rate.      Pulses: Normal pulses.      Heart sounds: Normal heart sounds. No murmur heard.  Pulmonary:      Effort: Pulmonary effort is normal. No respiratory distress.      Breath sounds: Normal breath sounds. No wheezing or rhonchi.      Comments: Diminished breath sounds in bilateral lower lung zones  Chest:      Chest wall: No tenderness.   Abdominal:      General:  Abdomen is flat. Bowel sounds are normal.      Palpations: Abdomen is soft.   Musculoskeletal:         General: Normal range of motion.      Right lower leg: No edema.      Left lower leg: No edema.   Skin:     General: Skin is warm.   Neurological:      General: No focal deficit present.      Mental Status: She is alert and oriented to person, place, and time.   Psychiatric:         Mood and Affect: Mood normal.         Behavior: Behavior normal.         Thought Content: Thought content normal.         Judgment: Judgment normal.             Labs:  Last BMP  Lab Results   Component Value Date    GLU 73 10/29/2024    BUN 15 10/29/2024    CREATSERUM 0.85 10/29/2024    BUNCREA 13.8 10/14/2022    ANIONGAP 3 10/29/2024    GFRAA 68 07/02/2022    GFRNAA 59 (L) 07/02/2022    CA 10.3 10/29/2024     10/29/2024    K 3.6 10/29/2024     10/29/2024    CO2 30.0 10/29/2024    OSMOCALC 287 10/29/2024      Last CBC  Lab Results   Component Value Date    WBC 6.6 07/26/2024    RBC 4.30 07/26/2024    HGB 13.0 07/26/2024    HCT 38.5 07/26/2024    MCV 89.5 07/26/2024    MCH 30.2 07/26/2024    MCHC 33.8 07/26/2024    RDW 12.7 07/26/2024    .0 07/26/2024      Last CMP  Lab Results   Component Value Date    GLU 73 10/29/2024    BUN 15 10/29/2024    BUNCREA 13.8 10/14/2022    CREATSERUM 0.85 10/29/2024    ANIONGAP 3 10/29/2024    GFR 64 12/01/2017    GFRNAA 59 (L) 07/02/2022    GFRAA 68 07/02/2022    CA 10.3 10/29/2024    OSMOCALC 287 10/29/2024    ALKPHO 68 07/26/2024    AST 16 07/26/2024    ALT 13 07/26/2024    BILT 0.7 07/26/2024    TP 7.0 07/26/2024    ALB 4.4 07/26/2024    GLOBULIN 2.6 07/26/2024     10/29/2024    K 3.6 10/29/2024     10/29/2024    CO2 30.0 10/29/2024      Last Thyroid Function  Lab Results   Component Value Date    T4F 1.1 07/08/2020    TSH 1.663 07/26/2024        Imaging personally reviewed :    CXR PA anf LAT personally reviewed   Impression   CONCLUSION:  Stable COPD changes.  Small  bilateral pleural effusions versus pleural scarring.  Minimal scarring/atelectasis in the lower lungs.           LOCATION:  [facility]                 Dictated by (CST): Everette Reid MD on 10/20/2023 at 4:10 PM        PFT:    Paulina Man is a 89 year old female who presents for evaluation of dyspnea exertion     Findings:  Spirometry: FEV1 is 1.07 L, 71 predicted.  With a Z-score of -1.37  FVC is 1.87 L, 93% predicted and FEV1/ FVC ratio is 0.57.  With a Z-score of -2.18  There is no significant bronchodilator response after administration of albuterol.   The flow-volume loop demonstrates an obstructive pattern.   Lung Volumes:                                                                                                              The TLC is 4.37 L, 102% predicted.  With a Z-score of 0.13  The residual volume 2.50 L, 129% predicted.  With a Z-score of 0.91  Diffusion Capacity:  The diffusion capacity is 8.87 or 54% predicted with a Z-score of -3.15 and 74% predicted when corrected for alveolar volume.     Impression:  There is mild airway obstruction  on spirometry as FEV1/FVC ratio is reduced and visualized on flow-volume loop.  Lung volumes appear within normal limits..     Diffusion capacity  is moderately reduced . This may represent a normal finding but can be seen in emphysema, interstitial lung disease, pulmonary vascular disease (such as pulmonary hypertension), atelectasis and anemia. If not already performed, would suggest further evaluation as determined clinically.     There are no previous pulmonary function tests available for comparison.      Disclaimer: This PFT has been interpreted in accordance to ATS/ERS interpretation guidelines 2022, with the use of upper and lower limits of normal as well as z-score references. Previous testing (before March 2024) was not performed at [facility] using z-scores and so comparison to previous testing should be taken with caution.     Bebeto Blackwell MD      Impression:    Dyspnea on exertion: With history of environmental allergies and benefit from prednisone.  Suspect related to mild intermittent asthma versus COPD along with deconditioning. PFT ON 11/7/2024 showed mild COPD with moderate diffusion limitation   Allergic rhinitis : controlled   Dextroscoliosis   Balance issues   B12 deficiency per patient, currently on daily vitamin B12 tablets  GERD with Hx Barents esophagus   Hiatal hernia on CT abdomen   History of at least 35 pack years of tobacco smoking, suspect with underlying COPD  Type 2 diabetes mellitus  Hypertension  Hyperlipidemia macular degeneration osteoporosis history of nodule in left lung noted on 1/7/2016                          Plan:      Continue  Albuterol HFA MDI 2 puffs 4 times daily as needed.    Continue Fluticasone 2 puffs each nostril daily   Continue diuretics per cardiology   Cardiology follow up        Follow up:8  months     Thank you for allowing me to participate in your patient care.    MADYSON Blackwell MD, FACP, FCCP, FAASM - Pulmonary/Critical care/Sleep Medicine  Please contact our office if you have any questions or concerns at [phone]

## 2024-12-10 ENCOUNTER — PATIENT OUTREACH (OUTPATIENT)
Dept: CASE MANAGEMENT | Age: 89
End: 2024-12-10

## 2024-12-10 DIAGNOSIS — E78.00 PURE HYPERCHOLESTEROLEMIA: ICD-10-CM

## 2024-12-10 DIAGNOSIS — J44.9 CHRONIC OBSTRUCTIVE PULMONARY DISEASE, UNSPECIFIED COPD TYPE (HCC): ICD-10-CM

## 2024-12-10 DIAGNOSIS — E53.8 B12 DEFICIENCY: ICD-10-CM

## 2024-12-10 DIAGNOSIS — I50.32 CHRONIC DIASTOLIC HEART FAILURE (HCC): ICD-10-CM

## 2024-12-10 DIAGNOSIS — E11.42 DIABETIC POLYNEUROPATHY ASSOCIATED WITH TYPE 2 DIABETES MELLITUS (HCC): ICD-10-CM

## 2024-12-10 DIAGNOSIS — M81.0 AGE-RELATED OSTEOPOROSIS WITHOUT CURRENT PATHOLOGICAL FRACTURE: ICD-10-CM

## 2024-12-10 DIAGNOSIS — H35.3231 EXUDATIVE AGE-RELATED MACULAR DEGENERATION, BILATERAL, WITH ACTIVE CHOROIDAL NEOVASCULARIZATION (HCC): Primary | ICD-10-CM

## 2024-12-10 DIAGNOSIS — K21.9 GASTROESOPHAGEAL REFLUX DISEASE, UNSPECIFIED WHETHER ESOPHAGITIS PRESENT: ICD-10-CM

## 2024-12-10 DIAGNOSIS — J44.1 COPD EXACERBATION (HCC): ICD-10-CM

## 2024-12-10 DIAGNOSIS — E11.42 TYPE 2 DIABETES MELLITUS WITH DIABETIC POLYNEUROPATHY, WITHOUT LONG-TERM CURRENT USE OF INSULIN (HCC): ICD-10-CM

## 2025-01-09 ENCOUNTER — PATIENT OUTREACH (OUTPATIENT)
Dept: CASE MANAGEMENT | Age: OVER 89
End: 2025-01-09

## 2025-01-09 DIAGNOSIS — I10 PRIMARY HYPERTENSION: ICD-10-CM

## 2025-01-09 DIAGNOSIS — J44.1 COPD EXACERBATION (HCC): ICD-10-CM

## 2025-01-09 DIAGNOSIS — M19.042 ARTHRITIS OF BOTH HANDS: ICD-10-CM

## 2025-01-09 DIAGNOSIS — E78.00 PURE HYPERCHOLESTEROLEMIA: ICD-10-CM

## 2025-01-09 DIAGNOSIS — J44.9 CHRONIC OBSTRUCTIVE PULMONARY DISEASE, UNSPECIFIED COPD TYPE (HCC): ICD-10-CM

## 2025-01-09 DIAGNOSIS — G25.81 RLS (RESTLESS LEGS SYNDROME): ICD-10-CM

## 2025-01-09 DIAGNOSIS — M19.041 ARTHRITIS OF BOTH HANDS: ICD-10-CM

## 2025-01-09 DIAGNOSIS — E11.42 TYPE 2 DIABETES MELLITUS WITH DIABETIC POLYNEUROPATHY, WITHOUT LONG-TERM CURRENT USE OF INSULIN (HCC): Primary | ICD-10-CM

## 2025-01-09 DIAGNOSIS — K21.9 GASTROESOPHAGEAL REFLUX DISEASE, UNSPECIFIED WHETHER ESOPHAGITIS PRESENT: ICD-10-CM

## 2025-01-09 NOTE — PROGRESS NOTES
Spoke to Paulina for Chronic Care Management.      Updates to patient care team/comments: UTD  Patient reported changes in medications: UTD  Med Adherence  Comment: pt taking medications as prescribed     Health Maintenance:   Health Maintenance   Topic Date Due    COVID-19 Vaccine (8 - 2024-25 season) 09/01/2024    Influenza Vaccine (1) 10/01/2024    Annual Depression Screening  01/01/2025    Fall Risk Screening (Annual)  01/01/2025    Diabetes Care: Foot Exam (Annual)  01/01/2025    Diabetes Care: Microalb/Creat Ratio (Annual)  01/01/2025    Diabetes Care A1C  04/29/2025    Diabetes Care Dilated Eye Exam  05/16/2025    Annual Physical  08/05/2025    Diabetes Care: GFR  10/29/2025    Pneumococcal Vaccine: 50+ Years  Completed    Zoster Vaccines  Completed    Meningococcal B Vaccine  Aged Out       Patient updates/concerns:    Spoke to pt for monthly outreach   Pt had her eye injections today. She was advised to try and schedule visits every 12 weeks.  She states that this time she waited until the 13 week and could feel a noticeable increase in pressure. She was not in any pain and her eyesight was not affected. Pt has scheduled accordingly.    Pt new chair has been a great benefit. She states it is more ergonomic and she fits much better in it. Pt spends a lot of time on here computer and has not been experiencing any issues with her tailbone since getting new chair.    Pt restless has not kept her from sleeping, but the pramipexole continues to giver her very vivid and disturbing dreams. Pt states that restless leg has been bothering her more during the day but she has no other option but to tolerate it. She states that it does not prevent her from completing any of her daily activities. Reviewed with pt dr pruitt expectations and pt was asked to return in mid December. Pt verbalized understanding and agreed to call and schedule appt to f/u and discuss effects of medication.    Pt received exercises sent from  ccm. Pt has identified a number of them as effective and has designed a daily routine. Pt uses the stability of the walker to perform the exercises and has been able to perform these daily . Pt feels this is a good substitute for the walks that she is not able to take until the weather warms up.    Pt now has a blood pressure cuff and plans to routinely take her bp. Discussed with pt what the desired range is and to be careful not to take blood pressure tests too closely together and to wait ramya 10 min if she feels she needs to retest. Pt verbalized understanding.    Pt has been spending a lot of her spare time organizing her spaces and decluttering. Pt states that this is something she enjoys doing and it helps her relieve stress.      Goals/Action Plan:    Active goal from previous outreach: managing healthcare    Patient reported progress towards goals: pt continues to see providers as needed and takes medications as prescribed               - What: constipation           - Where/When/How: did not discuss at this outreach will discuss management at next out reach  Patient Reported Barriers and Concerns: n/a                   - Plan for overcoming barriers: none    Care Managers Interventions: continue to provide encouragement and education for healthy coping and dx     Future Appointments:   Future Appointments   Date Time Provider Department Center   7/7/2025  2:00 PM Bebeto Blackwell MD [facility]         Next Care Manager Follow Up Date: one month    Reason For Follow Up: review progress and or barriers towards patient's goals.     Time Spent This Encounter Total: 20 min medical record review, telephone communication, care plan updates where needed, education, goals, and action plan recreation/update. Provided acknowledgment and validation to patient's concerns.   Monthly Minute Total including today: 20  Physical assessment, complete health history, and need for CCM established by Annie Newton  MD.

## 2025-01-16 RX ORDER — BLOOD SUGAR DIAGNOSTIC
1 STRIP MISCELLANEOUS
Qty: 100 EACH | Refills: 3 | Status: SHIPPED | OUTPATIENT
Start: 2025-01-16

## 2025-01-16 RX ORDER — BLOOD-GLUCOSE METER
1 EACH MISCELLANEOUS
Qty: 1 KIT | Refills: 0 | Status: SHIPPED | OUTPATIENT
Start: 2025-01-16

## 2025-01-16 RX ORDER — LANCETS 30 GAUGE
1 EACH MISCELLANEOUS DAILY
Qty: 100 EACH | Refills: 3 | Status: SHIPPED | OUTPATIENT
Start: 2025-01-16

## 2025-01-16 NOTE — TELEPHONE ENCOUNTER
Pt called. She states she had \"low blood sugar\" this am. She felt shaky so she drank some apple juice. She tried checking her blood sugar but her glucometer is not working despite putting new batteries in it. Her glucometer strips are . She feels better now, no longer shaky.     Dr. Newton - ok for prescription for new glucometer and supplies? Pended if agreeable

## 2025-01-21 ENCOUNTER — MED REC SCAN ONLY (OUTPATIENT)
Dept: INTERNAL MEDICINE CLINIC | Facility: CLINIC | Age: OVER 89
End: 2025-01-21

## 2025-01-21 NOTE — PROGRESS NOTES
Received form from [id number] on 01/17/2025 for refill on One Touch Ultra test strips. Form put in TB bin for review and signature.

## 2025-02-11 ENCOUNTER — PATIENT OUTREACH (OUTPATIENT)
Dept: CASE MANAGEMENT | Age: OVER 89
End: 2025-02-11

## 2025-02-11 ENCOUNTER — OFFICE VISIT (OUTPATIENT)
Dept: NEUROLOGY | Facility: CLINIC | Age: OVER 89
End: 2025-02-11
Payer: MEDICARE

## 2025-02-11 VITALS
SYSTOLIC BLOOD PRESSURE: 121 MMHG | WEIGHT: 148 LBS | HEIGHT: 61 IN | RESPIRATION RATE: 16 BRPM | HEART RATE: 76 BPM | BODY MASS INDEX: 27.94 KG/M2 | DIASTOLIC BLOOD PRESSURE: 50 MMHG

## 2025-02-11 DIAGNOSIS — E11.42 TYPE 2 DIABETES MELLITUS WITH DIABETIC POLYNEUROPATHY, WITHOUT LONG-TERM CURRENT USE OF INSULIN (HCC): ICD-10-CM

## 2025-02-11 DIAGNOSIS — K21.9 GASTROESOPHAGEAL REFLUX DISEASE, UNSPECIFIED WHETHER ESOPHAGITIS PRESENT: ICD-10-CM

## 2025-02-11 DIAGNOSIS — G25.81 RLS (RESTLESS LEGS SYNDROME): ICD-10-CM

## 2025-02-11 DIAGNOSIS — E53.8 B12 DEFICIENCY: ICD-10-CM

## 2025-02-11 DIAGNOSIS — M19.041 ARTHRITIS OF BOTH HANDS: ICD-10-CM

## 2025-02-11 DIAGNOSIS — M19.042 ARTHRITIS OF BOTH HANDS: ICD-10-CM

## 2025-02-11 DIAGNOSIS — I50.32 CHRONIC DIASTOLIC HEART FAILURE (HCC): ICD-10-CM

## 2025-02-11 DIAGNOSIS — G56.03 BILATERAL CARPAL TUNNEL SYNDROME: ICD-10-CM

## 2025-02-11 DIAGNOSIS — J44.1 COPD EXACERBATION (HCC): Primary | ICD-10-CM

## 2025-02-11 DIAGNOSIS — E78.00 PURE HYPERCHOLESTEROLEMIA: ICD-10-CM

## 2025-02-11 DIAGNOSIS — I10 PRIMARY HYPERTENSION: ICD-10-CM

## 2025-02-11 PROCEDURE — 99213 OFFICE O/P EST LOW 20 MIN: CPT | Performed by: OTHER

## 2025-02-11 RX ORDER — PRAMIPEXOLE DIHYDROCHLORIDE 0.12 MG/1
TABLET ORAL
Qty: 180 TABLET | Refills: 1 | Status: SHIPPED | OUTPATIENT
Start: 2025-02-11

## 2025-02-11 NOTE — PROGRESS NOTES
[facility] Progress Note    HPI  Chief Complaint   Patient presents with    Neurologic Problem     Follow up on Restless leg syndrome and doing good          \"Patient has history of RLS and neuropathy and has previously been following with my neurology associate, Dr SHIRA Ahmadi. She has reportedly not been able to be on gabapentin; she has history of COPD with shortness of breath when she is up and active and this has been more recent.      In terms of leg symptoms, she has been having this for \"years\" and has some restless sensation and movement in both legs mainly at night but also during the day; she has numbness in feet as well.      She has some numbness/tingling in hands as well; symptoms typically occur when she is sitting, lying down starting around 7 PM, but then improve until the evening time when she takes her dose of Mirapex 0.25 mg nightly and lies down; she may sleep through the night       She has not been on iron supplement but thinks she may have been on this in the past. \"         Prior notes as per 5/14/2024.  Patient last seen 2/13/2024. She was recommended to change her dose of Mirapex to 0.125 mg ~ 7 PM and 0.125 mg nightly - she is not sure if she took this dose or took twice the amount of her usual 0.250 mg nightly dose.       She is a poor historian and is contradictory in her statements saying her \"restless legs symptoms\" are not improving but then saying they are \"not as violent\" since being off pramipexole. Currently, she is sleeping through the night but has been taking benadryl nightly.     She is on iron supplement as well as magnesium supplement as her magnesium level was low (0.7) and has been following with PCP. She also was recently treated for UTI.     Prior notes as per 8/14/2024.  Patient last seen 5/14/2024.  At time of last visit, it was unclear if she was taking pramipexole on a regular basis, and she was advised to try ropinirole.  However, she presented to  the emergency room 5/18/2024 due to itching and cold sensation, which started shortly after starting ropinirole.  She is now back on pramipexole 0.125 mg twice daily ~ 7 PM and nightly and is doing well on this dose. She is sleeping through the night. She feels she has some \"hot sensation\" in both legs more at night as well but is happy with her restless legs symptoms. She continues to follow with PCP for low magnesium as well and is on magnesium supplement and doing well.        Patient last seen 8/14/2024.  She is on pramipexole 0.125 mg twice daily ~ 7 PM and nightly. She continues to do well on this dose and denies side effects; she is happy with level of control and usually sleeps through the night aside from waking up to go to the restroom in the middle of the night one time.     Past Medical History:    Abdominal distention    Abdominal hernia    Abdominal pain    Acute diverticulitis    Noted on CT scan - acute diverticulitis of the sigmoid colon    Arrhythmia    Arthritis    Atypical lobular hyperplasia of right breast    Back pain    Back problem    lower lumbar bad discs     Mckeon's esophagus without dysplasia    CHF (congestive heart failure) ([facility])    Chronic cough    Chronic diarrhea    Started before metformin- related to IBS      Constipation    COPD (chronic obstructive pulmonary disease) ([facility])    NO OXYGEN- no pulmonologist    Deviated septum    left side    Diabetes mellitus ([facility])    Diabetic polyneuropathy associated with type 2 diabetes mellitus ([facility])    Diarrhea, unspecified    Disorder of thyroid    has Thyroid nodules    Diverticulosis of large intestine    Dizziness    Ductal carcinoma in situ (DCIS) of right breast    Easy bruising    Esophageal reflux    Essential hypertension    Exudative macular degeneration (HCC)    Right Eye     Eye disease    Fatigue    Flatulence/gas pain/belching    Frequent urination    Frequent UTI    Full dentures    Gastroesophageal reflux disease    GERD  (gastroesophageal reflux disease)    Hearing impairment    bilateral hearing aids    Hearing loss    Heart palpitations    Hemorrhoids    High blood pressure    High cholesterol    History of cardiac murmur    History of tobacco use    Hoarseness, chronic    Hyperlipidemia    not taking lipitor due to side effects    Incontinence    uses pads    Indigestion    Irregular bowel habits    Itch of skin    Leaking of urine    Leaky heart valve    slight per pt- had echo 12/2015    Leg swelling    Loss of appetite    Macular degeneration    Macular degeneration    Menopause    Nausea    Neuropathy    Nodule of left lung    Obesity (BMI 30-39.9)    Osteoarthritis    all joints    Osteoporosis    Other fatigue    Pain in joints    Pap smear for cervical cancer screening    Per patient had pap in 2006. No abn pap.     Pneumonia, organism unspecified(486)    04/2015    PONV (postoperative nausea and vomiting)    Problems with swallowing    due hiatal hernia; gaggs at times    Restless leg syndrome    Restless leg syndrome    RLS (restless legs syndrome)    Screening breast examination    Dr. Layla Pretty    Screening mammogram for breast cancer    Benign     Shortness of breath    ON EXERTION    Sleep apnea    Sleep disturbance    Splenic artery aneurysm    Torn rotator cuff    both shoulders per pt    Uncomfortable fullness after meals    Vertigo    Visual impairment    glasses    Vitamin B12 deficiency    Vulvar itching    Wears glasses    Weight loss    Wheezing     Past Surgical History:   Procedure Laterality Date    Angiogram      no intervention    Appendectomy  1935    Appendectomy      Cholecystectomy  1980    Colonoscopy      Egd  03/09/2022    Reactive gastropathy    Kevin localization wire 1 site left (cpt=19281)      1957    Kevin localization wire 1 site right (cpt=19281)      1980    Kevin localization wire 1 site right (cpt=19281) Right 07/2016    DCIS    Mastectomy partial Right 07/01/2016    wire localized  partial mastectomy    Needle biopsy right      diag DCIS- 16    Other surgical history      L and R breast cyst removal    Sigmoidoscopy,diagnostic       Family History   Problem Relation Age of Onset    Breast Cancer Paternal Aunt     Heart Disorder Mother     Hypertension Mother     Colon Polyps Mother     Heart Disorder Father     Hypertension Father     Lipids Father     Heart Disorder Paternal Grandfather     Diabetes Paternal Grandmother     Breast Cancer Self 86        DCIS     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   Tobacco Use    Smoking status: Former     Current packs/day: 0.00     Average packs/day: 1 pack/day for 35.0 years (35.0 ttl pk-yrs)     Types: Cigarettes     Start date: 1953     Quit date: 1988     Years since quittin.4     Passive exposure: Never    Smokeless tobacco: Former   Vaping Use    Vaping status: Never Used   Substance and Sexual Activity    Alcohol use: No    Drug use: No    Sexual activity: Not Currently   Other Topics Concern    Caffeine Concern Yes     Comment: coffee 1- 2 cups daily    Exercise Yes     Comment: walking       Allergies   Allergen Reactions    Ciprofloxacin NAUSEA AND VOMITING and HIVES    Peanut-Containing Drug Products UNKNOWN and OTHER (SEE COMMENTS)     Bladder infection    Pollen ITCHING and HIVES    Sulfa Antibiotics NAUSEA AND VOMITING    Adhesive Tape ITCHING    Dander RASH     fur    Other ITCHING     feather         Current Outpatient Medications:     pramipexole 0.125 MG Oral Tab, Take one tablet at 7 pm and take one tablet at bed time., Disp: 180 tablet, Rfl: 1    Blood Glucose Monitoring Suppl (ONETOUCH ULTRA 2) w/Device Does not apply Kit, 1 each daily as needed. Please dispense whatever is covered by pt insurance, Disp: 1 kit, Rfl: 0    Glucose Blood (ONETOUCH ULTRA) In Vitro Strip, 1 each by Other route daily as needed for Other. Please  dispense whatever is covered by pt insurance, Disp: 100 each, Rfl: 3    OneTouch Delica Lancets 30G Does not apply Misc, 1 each daily. Please dispense whatever is covered by pt insurance, Disp: 100 each, Rfl: 3    Esomeprazole Magnesium 40 MG Oral Capsule Delayed Release, TAKE ONE CAPSULE BY MOUTH TWICE DAILY BEFORE MEALS, Disp: 180 capsule, Rfl: 1    glipiZIDE ER 2.5 MG Oral Tablet 24 Hr, Take 1 tablet (2.5 mg total) by mouth daily with breakfast., Disp: 90 tablet, Rfl: 3    losartan 100 MG Oral Tab, Take by mouth daily., Disp: , Rfl:     Magnesium 200 MG Oral Tab, Take by mouth. 2 x 200 mg gummies daily, Disp: , Rfl:     Cholecalciferol (VITAMIN D3) 25 MCG (1000 UT) Oral Cap, Take 1 tablet by mouth daily., Disp: , Rfl:     cyanocobalamin 1000 MCG Oral Tab, Take 1 tablet (1,000 mcg total) by mouth daily., Disp: , Rfl:     aspirin 81 MG Oral Chew Tab, Chew by mouth daily., Disp: , Rfl:     Bacillus Coagulans-Inulin (BENEFIBER PREBIOTIC+PROBIOTIC) Oral Chew Tab, Chew by mouth., Disp: , Rfl:     atorvastatin 40 MG Oral Tab, Take 1 tablet (40 mg total) by mouth daily., Disp: , Rfl:     estradiol 0.1 MG/GM Vaginal Cream, Apply 0.5 grams (pea-sized amount) of cream inside the vagina nightly. May use small amount at the vaginal opening as well in a thin layer., Disp: 42.5 g, Rfl: 3    mometasone 0.1 % External Ointment, apply a thin layer daily to vulva for 2 weeks then twice weekly, Disp: 45 g, Rfl: 0    ondansetron (ZOFRAN ODT) 8 MG Oral Tablet Dispersible, Take 1 tablet (8 mg total) by mouth every 8 (eight) hours as needed for Nausea., Disp: 30 tablet, Rfl: 0    albuterol (VENTOLIN HFA) 108 (90 Base) MCG/ACT Inhalation Aero Soln, Inhale 2 puffs into the lungs every 4 (four) hours as needed for Wheezing., Disp: 1 each, Rfl: 1    fluticasone propionate 50 MCG/ACT Nasal Suspension, 2 sprays by Nasal route daily., Disp: , Rfl:     acetaminophen 500 MG Oral Tab, Take 1 tablet (500 mg total) by mouth every 6 (six) hours as  needed for Pain., Disp: , Rfl:     docusate sodium 100 MG Oral Cap, Take 1 capsule (100 mg total) by mouth 2 (two) times daily as needed for constipation., Disp: , Rfl:     CAPSAICIN APR EX, Apply topically as needed., Disp: , Rfl:     Loperamide HCl 2 MG Oral Cap, Take 1 capsule (2 mg total) by mouth as needed for Diarrhea., Disp: , Rfl:     Multiple Vitamins-Minerals (PRESERVISION AREDS) Oral Tab, Take 1 tablet by mouth. 250 mg once daily, Disp: , Rfl:     Review of Systems:  No chest pain or palpitations; otherwise as noted in HPI.    Exam:  /50 (BP Location: Left arm, Patient Position: Sitting, Cuff Size: adult)   Pulse 76   Resp 16   Ht 61\"   Wt 148 lb (67.1 kg)   LMP  (LMP Unknown)   BMI 27.96 kg/m²   Estimated body mass index is 27.96 kg/m² as calculated from the following:    Height as of this encounter: 61\".    Weight as of this encounter: 148 lb (67.1 kg).    Gen: well developed, well nourished, no acute distress  HEENT: normocephalic  Heart; normal S1/S2, regular rate and rhythm  Lungs: clear to auscultation bilaterally  Extremities: no edema, peripheral pulses intact    Neck: supple, full range of motion; no carotid bruits    Fundoscopic Exam: optic discs sharp bilaterally    Neuro:  Mental status:  Orientation: Alert and oriented to person, place, time  Speech Fluent and conversational    CN: PERRL, EOMI with no nystagmus, VFF, smile symmetric, sensation intact, tongue and palate midline, SCM intact, otherwise, CN 2-12 intact  Motor: 5/5 strength throughout, tone normal  DTR: absent ankle jerks; otherwise, 2+ symmetric throughout, toes downgoing bilaterally, no clonus  Sensory: intact to light touch throughout; vibration inconsistent answers   Coord: FNF intact with no tremor or dysmetria; rapid alternating movements intact bilaterally  Romberg: absent  Gait: normal casual gait with aid of walker    Labs:  New    Component      Latest Ref Rng 10/29/2024   Glucose      70 - 99 mg/dL 73     Sodium      136 - 145 mmol/L 139    Potassium      3.5 - 5.1 mmol/L 3.6    Chloride      98 - 112 mmol/L 106    Carbon Dioxide, Total      21.0 - 32.0 mmol/L 30.0    ANION GAP      0 - 18 mmol/L 3    BUN      9 - 23 mg/dL 15    CREATININE      0.55 - 1.02 mg/dL 0.85    CALCIUM      8.7 - 10.4 mg/dL 10.3    CALCULATED OSMOLALITY      275 - 295 mOsm/kg 287    EGFR      >=60 mL/min/1.73m2 63    Patient Fasting for BMP? Yes    HEMOGLOBIN A1c      <5.7 % 6.5 (H)    ESTIMATED AVERAGE GLUCOSE      68 - 126 mg/dL 140 (H)    Magnesium, Serum      1.6 - 2.6 mg/dL 1.3 (L)       Prior as noted below    Component      Latest Ref Rng 3/20/2024 4/3/2024 7/26/2024   Glucose      70 - 99 mg/dL   105 (H)    Sodium      136 - 145 mmol/L   139    Potassium      3.5 - 5.1 mmol/L   4.0    Chloride      98 - 112 mmol/L   106    Carbon Dioxide, Total      21.0 - 32.0 mmol/L   29.0    ANION GAP      0 - 18 mmol/L   4    BUN      9 - 23 mg/dL   18    CREATININE      0.55 - 1.02 mg/dL   0.91    CALCIUM      8.7 - 10.4 mg/dL   10.4    CALCULATED OSMOLALITY      275 - 295 mOsm/kg   290    EGFR      >=60 mL/min/1.73m2   58 (L)    AST (SGOT)      <34 U/L   16    ALT (SGPT)      10 - 49 U/L   13    ALKALINE PHOSPHATASE      55 - 142 U/L   68    Total Bilirubin      0.2 - 0.9 mg/dL   0.7    PROTEIN, TOTAL      5.7 - 8.2 g/dL   7.0    Albumin      3.2 - 4.8 g/dL   4.4    Globulin      2.0 - 3.5 g/dL   2.6    A/G Ratio      1.0 - 2.0    1.7    Patient Fasting for CMP?   Yes    Magnesium, Serum      1.6 - 2.6 mg/dL 1.1 (LL)  1.4 (L)        Prior as noted below    Component      Latest Ref Rng 2/14/2024   Iron, Serum      50 - 170 ug/dL 65    Transferrin      200 - 360 mg/dL 203    Iron Bind.Cap.(TIBC)      240 - 450 ug/dL 302    Iron Saturation      15 - 50 % 22    Vitamin B12      193 - 986 pg/mL 571    FERRITIN      18.0 - 340.0 ng/mL 55.4    Magnesium, Serum      1.6 - 2.6 mg/dL 0.7 (LL)         Imaging:  None new             Impression/  Plan:  Paulina Man is a 89 year old female with history of neuropathy and RLS, who presents for management.     Neurologic exam is abnormal with signs / symptoms of neuropathy. In addition, she has symptoms of restless legs syndrome and has been on pramipexole with some improvement but still has symptoms - she was recommended to break up dose of pramipexole to 0.125 mg ~ 7 PM and 0.125 mg prior to bedtime and magnesium study level was low; ferritin also low and now following with PCP for repletion of magnesium and on oral iron supplement.     She was having side effects on pramipexole but it is unclear if she was taking prescribed dose; She tried ropinirole and had poor reaction and is now doing well on regimen of pramipexole 0.125 mg twice nightly ~ 7 PM and nightly prior to bedtime - will continue.    1. RLS (restless legs syndrome)  As noted above   - pramipexole 0.125 MG Oral Tab; Take one tablet at 7 pm and take one tablet at bed time.  Dispense: 180 tablet; Refill: 1    Return in about 6 months (around 8/11/2025).    Aydin Burroughs MD, Neurology  [facility]  Pager [phone]  2/11/2025

## 2025-02-11 NOTE — PATIENT INSTRUCTIONS
Refill policies:    Allow 2-3 business days for refills; controlled substances may take longer.  Contact your pharmacy at least 5 days prior to running out of medication and have them send an electronic request or submit request through the “request refill” option in your [employer] account.  Refills are not addressed on weekends; covering physicians do not authorize routine medications on weekends.  No narcotics or controlled substances are refilled after noon on Fridays or by on call physicians.  By law, narcotics must be electronically prescribed.  A 30 day supply with no refills is the maximum allowed.  If your prescription is due for a refill, you may be due for a follow up appointment.  To best provide you care, patients receiving routine medications need to be seen at least once a year.  Patients receiving narcotic/controlled substance medications need to be seen at least once every 3 months.  In the event that your preferred pharmacy does not have the requested medication in stock (e.g. Backordered), it is your responsibility to find another pharmacy that has the requested medication available.  We will gladly send a new prescription to that pharmacy at your request.    Scheduling Tests:    If your physician has ordered radiology tests such as MRI or CT scans, please contact Central Scheduling at [phone] right away to schedule the test.  Once scheduled, the [facility] Centralized Referral Team will work with your insurance carrier to obtain pre-certification or prior authorization.  Depending on your insurance carrier, approval may take 3-10 days.  It is highly recommended patients assure they have received an authorization before having a test performed.  If test is done without insurance authorization, patient may be responsible for the entire amount billed.      Precertification and Prior Authorizations:  If your physician has recommended that you have a procedure or additional testing performed the [facility]  Centralized Referral Team will contact your insurance carrier to obtain pre-certification or prior authorization.    You are strongly encouraged to contact your insurance carrier to verify that your procedure/test has been approved and is a COVERED benefit.  Although the [facility] Centralized Referral Team does its due diligence, the insurance carrier gives the disclaimer that \"Although the procedure is authorized, this does not guarantee payment.\"    Ultimately the patient is responsible for payment.   Thank you for your understanding in this matter.  Paperwork Completion:  If you require FMLA or disability paperwork for your recovery, please make sure to either drop it off or have it faxed to our office at [phone]. Be sure the form has your name and date of birth on it.  The form will be faxed to our Forms Department and they will complete it for you.  There is a 25$ fee for all forms that need to be filled out.  Please be aware there is a 10-14 day turnaround time.  You will need to sign a release of information (PETER) form if your paperwork does not come with one.  You may call the Forms Department with any questions at [phone].  Their fax number is [phone].

## 2025-02-13 NOTE — PROGRESS NOTES
Spoke to Paulina for Chronic Care Management.      Updates to patient care team/comments: UTD  Patient reported changes in medications: UTD  Med Adherence  Comment: pt taking medications as prescribed     Health Maintenance:   Health Maintenance   Topic Date Due    COVID-19 Vaccine (8 - 2024-25 season) 09/01/2024    Influenza Vaccine (1) 10/01/2024    Annual Depression Screening  01/01/2025    Fall Risk Screening (Annual)  01/01/2025    Diabetes Care: Foot Exam (Annual)  01/01/2025    Diabetes Care: Microalb/Creat Ratio (Annual)  01/01/2025    Diabetes Care A1C  04/29/2025    Diabetes Care Dilated Eye Exam  05/16/2025    Annual Physical  08/05/2025    Diabetes Care: GFR  10/29/2025    Pneumococcal Vaccine: 50+ Years  Completed    Zoster Vaccines  Completed    Meningococcal B Vaccine  Aged Out       Patient updates/concerns:    Spoke to pt for monthly outreach   Pt has seen specialist for routine visit to discuss ongoing management of her Restless leg syndrome. Pt states that she is routinely bothered by it but feels confident in her ability to manage it. She confirms she received the exercises ccm sent her for stretches she can do in bed. Pt states that these exercises have helped her calm the symptoms of RLS when they occur. She also gets up and walks around for relief. She states that it does not take long for her to improve her symptoms.   Pt continues to take magnesium. Dr pruitt did not advise her to get any imaging and her follow up will be in 6 months.     Pt has experienced two episodes of shakiness brought on by low blood sugar levels. Pt states that she makes sure to have some juice and symptoms improve quickly. Pt has also started to carry a glucose tablet with her. [facility] discussed with pt what the causes could be and she indicated that her appetite is not very strong and she typically only eats when she is hungry. This will often lead to extended periods between meals. Pt is becoming more conscious of  how long it has been and discussed carrying a protein bar or shake with her.    Pt has picked up her glucometer. She has not been able to makes sense of the directions and has not yet had a successful reading. Pt states that she will review the instructions provided and then call pcp nurses if she is unable to figure it out.  [facility] discussed with pt the likelihood that pharmacist could help give her a visual, pt verbalized understanding.    Discussed sleep hygiene with pt. She confirms that she has been trying to get to bed at the same time every day. She can typically stay consistent and then wake up at the same time in the morning. Pt sometimes has trouble sleeping and can take a tylenol to help.    Pt son frequently checks in on pt and can provide transportation when needed. Pt will often do her own grocery shopping when weather is good.    Pt did not have any new questions or concerns for pcp or clinical staff  Goals/Action Plan:    Active goal from previous outreach: managing healthcare    Patient reported progress towards goals: pt continues to see providers as needed and takes medications as prescribed               - What: blood glucose           - Where/When/How: pt will more regularly test and she is trying to decrease gaps in between meals  Patient Reported Barriers and Concerns: n/a                   - Plan for overcoming barriers: none    Care Managers Interventions: continue to provide encouragement and education for healthy coping and dx    Future Appointments:   Future Appointments   Date Time Provider Department Center   7/7/2025  2:00 PM Bebeto Blackwell MD [facility] Joni Baumann         Next Care Manager Follow Up Date: one month    Reason For Follow Up: review progress and or barriers towards patient's goals.     Time Spent This Encounter Total: 23 min medical record review, telephone communication, care plan updates where needed, education, goals, and action plan recreation/update. Provided  acknowledgment and validation to patient's concerns.   Monthly Minute Total including today: 23  Physical assessment, complete health history, and need for CCM established by Annie Newton MD.

## 2025-02-28 ENCOUNTER — TELEPHONE (OUTPATIENT)
Dept: INTERNAL MEDICINE CLINIC | Facility: CLINIC | Age: OVER 89
End: 2025-02-28

## 2025-03-14 ENCOUNTER — PATIENT OUTREACH (OUTPATIENT)
Dept: CASE MANAGEMENT | Age: OVER 89
End: 2025-03-14

## 2025-03-14 DIAGNOSIS — J44.1 COPD EXACERBATION (HCC): Primary | ICD-10-CM

## 2025-03-14 DIAGNOSIS — I10 PRIMARY HYPERTENSION: ICD-10-CM

## 2025-03-14 DIAGNOSIS — M19.041 ARTHRITIS OF BOTH HANDS: ICD-10-CM

## 2025-03-14 DIAGNOSIS — E11.42 DIABETIC POLYNEUROPATHY ASSOCIATED WITH TYPE 2 DIABETES MELLITUS (HCC): ICD-10-CM

## 2025-03-14 DIAGNOSIS — J44.9 CHRONIC OBSTRUCTIVE PULMONARY DISEASE, UNSPECIFIED COPD TYPE (HCC): ICD-10-CM

## 2025-03-14 DIAGNOSIS — E11.42 TYPE 2 DIABETES MELLITUS WITH DIABETIC POLYNEUROPATHY, WITHOUT LONG-TERM CURRENT USE OF INSULIN (HCC): ICD-10-CM

## 2025-03-14 DIAGNOSIS — M19.042 ARTHRITIS OF BOTH HANDS: ICD-10-CM

## 2025-03-14 DIAGNOSIS — E78.00 PURE HYPERCHOLESTEROLEMIA: ICD-10-CM

## 2025-03-14 DIAGNOSIS — I50.32 CHRONIC DIASTOLIC HEART FAILURE (HCC): ICD-10-CM

## 2025-03-14 DIAGNOSIS — E53.8 B12 DEFICIENCY: ICD-10-CM

## 2025-03-14 DIAGNOSIS — K21.9 GASTROESOPHAGEAL REFLUX DISEASE, UNSPECIFIED WHETHER ESOPHAGITIS PRESENT: ICD-10-CM

## 2025-03-14 RX ORDER — CLOTRIMAZOLE AND BETAMETHASONE DIPROPIONATE 10; .64 MG/G; MG/G
CREAM TOPICAL
COMMUNITY
Start: 2024-02-20

## 2025-03-14 NOTE — PROGRESS NOTES
Spoke to Paulina for Chronic Care Management.      Updates to patient care team/comments: UTD  Patient reported changes in medications: UTD  Med Adherence  Comment: pt taking medications as prescribed     Health Maintenance:   Health Maintenance   Topic Date Due    COVID-19 Vaccine (8 - 2024-25 season) 09/01/2024    Influenza Vaccine (1) 10/01/2024    Annual Depression Screening  01/01/2025    Fall Risk Screening (Annual)  01/01/2025    Diabetes Care: Foot Exam (Annual)  01/01/2025    Diabetes Care: Microalb/Creat Ratio (Annual)  01/01/2025    Diabetes Care Dilated Eye Exam  05/16/2025    Diabetes Care A1C  04/29/2025    Annual Physical  08/05/2025    Diabetes Care: GFR  10/29/2025    Pneumococcal Vaccine: 50+ Years  Completed    Zoster Vaccines  Completed    Meningococcal B Vaccine  Aged Out       Patient updates/concerns:    Spoke with pt for monthly outreach   Pt has recently seen cardiology. Pt states that EKG was administered and results were stable. Pt states that specialist was satisfied with pt condition and no changes were made to pt meds and no further testing was ordered. Pt was asked to return in 6 months.   Pt reviewed upcoming appointments.  [facility] advised pt that labs were due at next follow up and pcp requested 6 month f/u in may.  [facility] called pcp office and scheduled pt for Carlota 3 at 320.  Pt confirmed understanding and entered date into her calendar. Pt confirmed understanding that labs would be requested within two weeks of appt. [facility] mailed hard copy lab orders to patient as a reminder and will f/u to remind pt to make apt at lab.    Pt states that she frequently checks her feet for swelling. She feels this has been under control.   Pt discussed difficulties she has had with chewable magnesium or the large tablets of the supplement. [facility] reviewed with pt the availability of liquid magnesium with dropper to administer. [facility] encouraged pt to discuss this with pharmacist.  Pt verbalized understanding.     Pt discussed difficulty sleeping. She states that her mind races at bed time and can often take 2-3 hours before falling asleep. She does not use any sleep aides. [facility] sent pt list of natural ingredients that aide in sleep like chamomile tea for pt to consider adding to meal plan. Pt verbalized understanding.  Pt still takes daily naps.    Pt discussed her energy level and how she tries to take advantage of days when energy is more prominent. Pt does not walk in her neighborhood but is considering starting a short route. She will get the majority of exercise with daily activity and frequently walks to the grocery store and the strip mall to run errands.    Pt has bp cuff at home if she needs to take readings. Pt does not consistently track her bp but does not have episodes of dizziness sob or headaches.    Pt uses [employer] for transportation and can rely on son or wife to help with rides.    Pt will get help from son to manage some cleaning tasks at home that are difficult for pt.   Goals/Action Plan:    Active goal from previous outreach: managing healthcare    Patient reported progress towards goals: pt continues to see providers as needed and takes medications as prescribed               - What: exercise            - Where/When/  pt is starting to take more frequent trips to walk to store and errands  Patient Reported Barriers and Concerns: n/a                   - Plan for overcoming barriers: none    Care Managers Interventions: continue to provide encouragement and education for healthy coping and dx    Future Appointments:   Future Appointments   Date Time Provider Department Center   7/7/2025  2:00 PM Bebeto Blackwell MD [facility]         Next Care Manager Follow Up Date: one month    Reason For Follow Up: review progress and or barriers towards patient's goals.     Time Spent This Encounter Total: 45 min medical record review, telephone communication, care plan updates where needed,  education, goals, and action plan recreation/update. Provided acknowledgment and validation to patient's concerns.   Monthly Minute Total including today: 45  Physical assessment, complete health history, and need for CCM established by Annie Newton MD.

## 2025-03-21 DIAGNOSIS — J44.9 CHRONIC OBSTRUCTIVE PULMONARY DISEASE, UNSPECIFIED COPD TYPE (HCC): ICD-10-CM

## 2025-03-24 ENCOUNTER — PATIENT OUTREACH (OUTPATIENT)
Dept: CASE MANAGEMENT | Age: OVER 89
End: 2025-03-24

## 2025-03-24 DIAGNOSIS — J44.1 COPD EXACERBATION (HCC): ICD-10-CM

## 2025-03-24 DIAGNOSIS — I50.32 CHRONIC DIASTOLIC HEART FAILURE (HCC): ICD-10-CM

## 2025-03-24 DIAGNOSIS — I10 PRIMARY HYPERTENSION: ICD-10-CM

## 2025-03-24 DIAGNOSIS — G25.81 RLS (RESTLESS LEGS SYNDROME): ICD-10-CM

## 2025-03-24 DIAGNOSIS — M19.042 ARTHRITIS OF BOTH HANDS: ICD-10-CM

## 2025-03-24 DIAGNOSIS — H35.3231 EXUDATIVE AGE-RELATED MACULAR DEGENERATION, BILATERAL, WITH ACTIVE CHOROIDAL NEOVASCULARIZATION (HCC): ICD-10-CM

## 2025-03-24 DIAGNOSIS — E11.42 TYPE 2 DIABETES MELLITUS WITH DIABETIC POLYNEUROPATHY, WITHOUT LONG-TERM CURRENT USE OF INSULIN (HCC): Primary | ICD-10-CM

## 2025-03-24 DIAGNOSIS — E53.8 B12 DEFICIENCY: ICD-10-CM

## 2025-03-24 DIAGNOSIS — E78.00 PURE HYPERCHOLESTEROLEMIA: ICD-10-CM

## 2025-03-24 DIAGNOSIS — M19.041 ARTHRITIS OF BOTH HANDS: ICD-10-CM

## 2025-03-24 DIAGNOSIS — K21.9 GASTROESOPHAGEAL REFLUX DISEASE, UNSPECIFIED WHETHER ESOPHAGITIS PRESENT: ICD-10-CM

## 2025-03-24 DIAGNOSIS — J44.9 CHRONIC OBSTRUCTIVE PULMONARY DISEASE, UNSPECIFIED COPD TYPE (HCC): ICD-10-CM

## 2025-03-24 DIAGNOSIS — M81.0 AGE-RELATED OSTEOPOROSIS WITHOUT CURRENT PATHOLOGICAL FRACTURE: ICD-10-CM

## 2025-03-24 NOTE — PROGRESS NOTES
Pt received a letter from [facility] requesting pt establish with home health. Pt apprehensive because she feels she has appropriate resources for all of her health needs and does not want to incurr any extra cost for services that may be redundant in her opirnion. [facility] agreed to f/u with rosss at  to discuss what services would be offered and how they would be billed. Ccm will then f/u with pt to discuss the resource and if she would benefit from it.    CCM spoke with [employer] calls dept and confirmed pt was eligible for program that would be covered by medicare. She would be asseessed by an APN and then would tailor a plan around her conditions and subsequent needs. She would be scheduled for visits with social work RN community health worker and .     CCM followed up with pt and reviewed the benefits of the program and encouraged pt to discuss with son/pcp. [facility] endorsed the program to pt as it would be beneficial pt verbalized  understanding and will discuss with son.    Time with pt 10min   Call to healthy house calls 5min   Charting 3min     Total time 63 min

## 2025-03-25 NOTE — TELEPHONE ENCOUNTER
Dear nursing staff,    Please call patient to verify if she is still using her albuterol inhaler.    Patient was last prescribed this 9/27/2022

## 2025-03-26 RX ORDER — ALBUTEROL SULFATE 90 UG/1
2 INHALANT RESPIRATORY (INHALATION) EVERY 4 HOURS PRN
Qty: 18 G | Refills: 0 | Status: SHIPPED | OUTPATIENT
Start: 2025-03-26

## 2025-03-26 NOTE — TELEPHONE ENCOUNTER
Called and spoke with pt. Pt stated she is currently using Albuterol inhaler. Pt stated she is supposed to use this 4x daily but hasn't been doing that. Notified pt this is rescue inhaler and should be using as needed. Reviewed note from pulm and pasted below. Notified pt should be using 4x daily as needed for wheezing and shortness of breath. Pt verbalizes understanding. Notified I will see if TB will fill or if should come from pulm.       TB- Would you refill Albuterol or would you defer to Dr. Blackwell?     Pasted below from pulmonary note Dr. Blackwell 11/11/24     \"Plan:     Continue  Albuterol HFA MDI 2 puffs 4 times daily as needed.    Continue Fluticasone 2 puffs each nostril daily   Continue diuretics per cardiology   Cardiology follow up \"

## 2025-04-16 ENCOUNTER — PATIENT OUTREACH (OUTPATIENT)
Dept: CASE MANAGEMENT | Age: OVER 89
End: 2025-04-16

## 2025-04-16 DIAGNOSIS — K21.9 GASTROESOPHAGEAL REFLUX DISEASE, UNSPECIFIED WHETHER ESOPHAGITIS PRESENT: Primary | ICD-10-CM

## 2025-04-16 DIAGNOSIS — G56.03 BILATERAL CARPAL TUNNEL SYNDROME: ICD-10-CM

## 2025-04-16 DIAGNOSIS — G25.81 RLS (RESTLESS LEGS SYNDROME): ICD-10-CM

## 2025-04-16 DIAGNOSIS — E53.8 B12 DEFICIENCY: ICD-10-CM

## 2025-04-16 DIAGNOSIS — J44.9 CHRONIC OBSTRUCTIVE PULMONARY DISEASE, UNSPECIFIED COPD TYPE (HCC): ICD-10-CM

## 2025-04-16 DIAGNOSIS — E11.42 DIABETIC POLYNEUROPATHY ASSOCIATED WITH TYPE 2 DIABETES MELLITUS (HCC): ICD-10-CM

## 2025-04-16 DIAGNOSIS — M19.041 ARTHRITIS OF BOTH HANDS: ICD-10-CM

## 2025-04-16 DIAGNOSIS — M19.042 ARTHRITIS OF BOTH HANDS: ICD-10-CM

## 2025-04-16 DIAGNOSIS — R26.81 UNSTEADY GAIT: ICD-10-CM

## 2025-04-16 DIAGNOSIS — E11.42 TYPE 2 DIABETES MELLITUS WITH DIABETIC POLYNEUROPATHY, WITHOUT LONG-TERM CURRENT USE OF INSULIN (HCC): ICD-10-CM

## 2025-04-16 DIAGNOSIS — I10 PRIMARY HYPERTENSION: ICD-10-CM

## 2025-04-16 DIAGNOSIS — E78.00 PURE HYPERCHOLESTEROLEMIA: ICD-10-CM

## 2025-04-16 DIAGNOSIS — I50.32 CHRONIC DIASTOLIC HEART FAILURE (HCC): ICD-10-CM

## 2025-04-16 NOTE — PROGRESS NOTES
Spoke to Paulina for Chronic Care Management.      Updates to patient care team/comments: UTD  Patient reported changes in medications: UTD  Med Adherence  Comment: pt taking medications as prescribed     Health Maintenance:   Health Maintenance   Topic Date Due    COVID-19 Vaccine (8 - 2024-25 season) 09/01/2024    Annual Depression Screening  01/01/2025    Fall Risk Screening (Annual)  01/01/2025    Diabetes Care: Foot Exam (Annual)  01/01/2025    Diabetes Care: Microalb/Creat Ratio (Annual)  01/01/2025    Diabetes Care A1C  04/29/2025    Diabetes Care Dilated Eye Exam  05/16/2025    Annual Physical  08/05/2025    Influenza Vaccine (Season Ended) 10/01/2025    Diabetes Care: GFR  10/29/2025    Pneumococcal Vaccine: 50+ Years  Completed    Zoster Vaccines  Completed    Meningococcal B Vaccine  Aged Out       Patient updates/concerns:    Pt feels her condition has remained stable since last outreach   Pt can still rely on transportation to doctor visits from her son. He visits once weekly on Mondays to ensure that any physical task around the house is complete like taking in trash cans, etc.  Pt will typically walk to finish most of her errands like grocery shopping.   Pt weight has been stable. She states that it fluctuates by a pound or two. Her appetite is not strong and with her memory not being as strong as it was, she has taken to using a food journal. She will refer to it throughout the day to remind herself when she last ate and whether she has had enough protein during the day. Pt hydration habits are not consistent and pt hopes that this will help remind her to try and drink more water. Ccm discussed with pt to try and drink some herbal tea or to add flavor supplement to water. Pt verbalized understanding.    Pt has not had any issues with shakiness. Pt will take her bg reading when her condition changes but she does not take a reading routinely. Pt has apple juice to take right away if she starts to shake  and thinks that the food journal will prevent her readings from getting too low.    Pt has aye appt scheduled June 12. She only goes every 10-12 months and feels her vision is strong with new medicine.    Pt discussed trouble she has sleeping. She states that sometimes she can lie awake for 3 hours before falling asleep. Pt states that she refrains from computer/phone use in the hours leading to bed but she does watch tv. Ccm discussed with pt caffeine intake and pt confirms that she avoids caffeine after the morning.   Pt has sleep study but has not completed. She will review if a new order needs to be placed in June. Pt welcomed [facility] resources on academic information about what a sleep study is why it is beneficial and how a cpap machine can help provide her more restful sleep and more consistent energy levels during the day.  Pt complains that she is fatigued throughout the day and has to take a nap.    Pt discussed how her constipation continues to bother her. She tries to take colace but not regularly to avoid dependence. Ccm discussed with pt the benefits of hydration and fiber. Pt welcomed [facility] resources on diet lifestyle mods that can aid in relieving constipation.    Pt has blood pressure cuff. She has not been required to take daily bp reads but is comfortable with use and takes it periodically or if her condition changes.    Pt confirms she will call dr pruitt to schedule follow up in August. Pt feels her restless legs have not been as frequent as in the past.    Pt did not have any new questions or concerns for pcp or clinical staff.     Goals/Action Plan:    Active goal from previous outreach: managing healthcare    Patient reported progress towards goals: pt continues to see providers as needed and takes medications as prescribed               - What: exercise           - Where/When/How: pt is starting to take more frequent trips to walk to store and run errands  Patient Reported Barriers and Concerns:  n/a                   - Plan for overcoming barriers: none    Care Managers Interventions: continue to provide encouragement and education for healthy coping and dx    Future Appointments:   Future Appointments   Date Time Provider Department Center   6/2/2025  3:20 PM Annie Newton MD [facility] [facility]   7/7/2025  2:00 PM Bebeto Blackwell MD [facility] [place]         Next Care Manager Follow Up Date: one month    Reason For Follow Up: review progress and or barriers towards patient's goals.     Time Spent This Encounter Total: 41 min medical record review, telephone communication, care plan updates where needed, education, goals, and action plan recreation/update. Provided acknowledgment and validation to patient's concerns.   Monthly Minute Total including today: 41  Physical assessment, complete health history, and need for CCM established by Annie Newton MD.

## 2025-04-17 NOTE — PROGRESS NOTES
Pt called in to [facility] to request assistance if it is recommended she follow through with [facility] provided by [facility]. Pt appointment is scheduled for April 22 at 10am. Pt wanted to confirm the name of the provider along with billing information,how she was determined to participate, how she needs to prepare, how often will these visits take place, what is the purpose of these visits.  Pt was not provided the clarification on initial phone call and no [facility] were made available [facility]    [facility] followed up with [place] at [facility] who confirmed that they are a department of [facility]. [facility] is now the Value based care partner and has begun to offer a variety of new services to portions of our patient population. Pt was not referred by an individual provider but recommended for additional care based on her [facility] risk management score.   The purpose of the visit is not to replace her PCP but to supplement it.  The schedule will not be determiined until after the initial assessment. Pt will review health history medication care plan and have her vitals taken. Pt will be seen by dr willy diggs, [phone].      [facility] encouraged pt to follow through with initical visit and determine if she feels this is providing an added value to her care.pt verbalized understanding and  Pt indicated that she would keep the appointment and requested [facility] follow up with her son to review the new service and discuss whether he thinks this is appropriate for pt.     Call to [facility] 5 min   Time with pt 10 min  Charting/ 3min     Total time 63 min

## 2025-05-16 ENCOUNTER — PATIENT OUTREACH (OUTPATIENT)
Dept: CASE MANAGEMENT | Age: OVER 89
End: 2025-05-16

## 2025-05-16 DIAGNOSIS — J44.1 COPD EXACERBATION (HCC): Primary | ICD-10-CM

## 2025-05-16 DIAGNOSIS — I50.32 CHRONIC DIASTOLIC HEART FAILURE (HCC): ICD-10-CM

## 2025-05-16 DIAGNOSIS — E53.8 B12 DEFICIENCY: ICD-10-CM

## 2025-05-16 DIAGNOSIS — E11.42 TYPE 2 DIABETES MELLITUS WITH DIABETIC POLYNEUROPATHY, WITHOUT LONG-TERM CURRENT USE OF INSULIN (HCC): ICD-10-CM

## 2025-05-16 DIAGNOSIS — I10 PRIMARY HYPERTENSION: ICD-10-CM

## 2025-05-16 DIAGNOSIS — M19.041 ARTHRITIS OF BOTH HANDS: ICD-10-CM

## 2025-05-16 DIAGNOSIS — K21.9 GASTROESOPHAGEAL REFLUX DISEASE, UNSPECIFIED WHETHER ESOPHAGITIS PRESENT: ICD-10-CM

## 2025-05-16 DIAGNOSIS — E78.00 PURE HYPERCHOLESTEROLEMIA: ICD-10-CM

## 2025-05-16 DIAGNOSIS — M19.042 ARTHRITIS OF BOTH HANDS: ICD-10-CM

## 2025-05-16 NOTE — PROGRESS NOTES
Spoke to Paulina for Chronic Care Management.      Updates to patient care team/comments: UTD  Patient reported changes in medications: UTD  Med Adherence  Comment: pt taking medications as prescribed     Health Maintenance:   Health Maintenance   Topic Date Due    COVID-19 Vaccine (8 - 2024-25 season) 09/01/2024    Annual Depression Screening  01/01/2025    Fall Risk Screening (Annual)  01/01/2025    Diabetes Care: Microalb/Creat Ratio (Annual)  01/01/2025    Diabetes Care A1C  04/29/2025    Diabetes Care Dilated Eye Exam  05/16/2025    Annual Physical  08/05/2025    Diabetes Care Foot Exam  08/05/2025    Influenza Vaccine (Season Ended) 10/01/2025    Diabetes Care: GFR  10/29/2025    Pneumococcal Vaccine: 50+ Years  Completed    Zoster Vaccines  Completed    Meningococcal B Vaccine  Aged Out       Patient updates/concerns:    Spoke to pt for monthly outreach   Pt feels her condition overall remains stable.   She decided against participating in the healthy house call program. Pt feels her healthcare has been very well managed by pcp. She feels with the assistance of ccm she has the necessary support she needs to appropriately manage her own care and does not feel she requires more comprehensive services. Pt has retained contact information to coordinate an assessment in the future if she feels her condition warrants it.    Pt will have labs completed before her pcp visit on 6/6. Pt verbalizes understanding to make sure to be fasting.    Pt will discuss with pcp at upcoming visit about an order for sleep study. Pt does not wake rested and feels tired for most of the day and would like to confirm if apnea or snoring is responsible and if a cpap may benefit her.    Pt feels that she has an appropriate amount of energy throughout the day and her fatigue does not prevent her from accomplishing her daily tasks. She tries to make a trip to the store and back several times weekly for exercise. She is very sensitive to  humidity and has difficulty breathing when very humid. On those days she will stay inside.    Reviewed with pt importance of hydration. Pt verbalized understanding and confirms that with the addition of her new refrigerator she has been more consistently drinking water now that she has an ice and water dispenser for cold water  on the fridge.   Pt appetite remains appropriate. Pt has taken the step of disposing of all sweets in her home.  Pt is trying to eliminate the excess sugar in her diet.    Pt does her own grocery shopping. She has been relying on a lot of frozen meals for one that are processed. [facility] reviewed with pt options at local grocery stores that sell meals for one in the produce/deli section that are well balanced fresh and prepare quickly in th oven. Pt verbalized understanding.    Pt sleep is still inconsistent. She can sometimes lay in bed for hours before falling asleep.    Reviewed with pt her appointment calendar for accuracy. Pt verified all appropriate dates and confirmed that she will utilize [employer] for transport.   Pt did not have any new questions or concerns for pcp or clinical staff.   Goals/Action Plan:    Active goal from previous outreach: managing healthcare    Patient reported progress towards goals: pt continues to see providers as needed and takes medications as prescribed               - What: exercise           - Where/When/How: pt is starting to take more frequent walks to and from the store as weather improves  Patient Reported Barriers and Concerns: n/a                   - Plan for overcoming barriers: none    Care Managers Interventions: continue to provide encouragement and education for healthy coping and dx    Future Appointments:   Future Appointments   Date Time Provider Department Center   6/2/2025  3:20 PM Annie Newton MD [facility] [facility]   7/7/2025  2:00 PM Bebeto Blackwell MD [facility] [place]   8/11/2025  1:00 PM Aydin Burroughs MD [facility]  [facility] Care Manager Follow Up Date: n/a    Reason For Follow Up: review progress and or barriers towards patient's goals.     Time Spent This Encounter Total: 30 min medical record review, telephone communication, care plan updates where needed, education, goals, and action plan recreation/update. Provided acknowledgment and validation to patient's concerns.   Monthly Minute Total including today: 30  Physical assessment, complete health history, and need for CCM established by Annie Newton MD.

## 2025-05-22 NOTE — PROGRESS NOTES
Spoke to Paulina for Chronic Care Management.      Updates to patient care team/comments: UTD  Patient reported changes in medications: UTD  Med Adherence  Comment: pt taking medications as prescribed     Health Maintenance:   Health Maintenance   Topic Date Due    COVID-19 Vaccine (8 - 2024-25 season) 09/01/2024    Influenza Vaccine (1) 10/01/2024    Diabetes Care A1C  04/29/2025    Diabetes Care Dilated Eye Exam  05/16/2025    Diabetes Care: Microalb/Creat Ratio  07/26/2025    Diabetes Care Foot Exam  08/05/2025    Annual Physical  08/05/2025    Diabetes Care: GFR  10/29/2025    Annual Depression Screening  Completed    Fall Risk Screening (Annual)  Completed    Pneumococcal Vaccine: 65+ Years  Completed    Zoster Vaccines  Completed       Patient updates/concerns:    Spoke to pt for monthly outreach   Pt has not yet set up her mychart. She states that she will discuss with son this weekend.   Pt still relies on son for transportation. He will visit on the weekends and take her to the store or any other errands she may have.    Pt was able to tolerate flying. She did not have any pain in her back but did start to experience restless ness in her legs and feet. She states it was tolerable.   Pt spends a lot of time on her computer and started to develop back pain and attributed it to the chair she was using. She has since purchased a more ergonomic chair and is no longer experiencing any discomfort.    Pt did not receive walker exercises and requested ccm re send. Pt wants to start a more frequent routine of stretching at home during the winter.    Pt states she is still producing the excess saliva. She states that she is tolerating it and is not causing any issues with swallowing her food.    Pt is having more frequent bad dreams. She states that they started with pramepexole and and confirmed it was a listed side effect. Pt states that the medication is working and she does not want to risk a return in restless  leg symptoms by altering medication.    Pt is still experiencing constipation. She has purchased miralax but has not yet started to use it. She is trying to eat more salads.    Pt has visit to eye doc on jan 9th for injections.         Goals/Action Plan:    Active goal from previous outreach: managing healthcare    Patient reported progress towards goals: pt continues to see providers as needed and takes medications as prescribed               - What: constipation           - Where/When/How: pt has been getting salads more often and plans to start regular use of miralax  Patient Reported Barriers and Concerns: n/a                   - Plan for overcoming barriers: none    Care Managers Interventions: continue to provide encouragement and education for healthy coping and dx    Future Appointments:   Future Appointments   Date Time Provider Department Center   7/7/2025  2:00 PM Bebeto Blackwell MD [facility]         Next Care Manager Follow Up Date: one month    Reason For Follow Up: review progress and or barriers towards patient's goals.     Time Spent This Encounter Total: 30 min medical record review, telephone communication, care plan updates where needed, education, goals, and action plan recreation/update. Provided acknowledgment and validation to patient's concerns.   Monthly Minute Total including today: 30  Physical assessment, complete health history, and need for CCM established by Annie Newton MD.       91

## 2025-05-28 ENCOUNTER — PATIENT OUTREACH (OUTPATIENT)
Dept: CASE MANAGEMENT | Age: OVER 89
End: 2025-05-28

## 2025-05-30 ENCOUNTER — LAB ENCOUNTER (OUTPATIENT)
Dept: LAB | Facility: HOSPITAL | Age: OVER 89
End: 2025-05-30
Attending: INTERNAL MEDICINE
Payer: MEDICARE

## 2025-05-30 DIAGNOSIS — E11.42 TYPE 2 DIABETES MELLITUS WITH DIABETIC POLYNEUROPATHY, WITHOUT LONG-TERM CURRENT USE OF INSULIN (HCC): ICD-10-CM

## 2025-05-30 DIAGNOSIS — I10 PRIMARY HYPERTENSION: ICD-10-CM

## 2025-05-30 DIAGNOSIS — E78.00 PURE HYPERCHOLESTEROLEMIA: ICD-10-CM

## 2025-05-30 DIAGNOSIS — K59.09 OTHER CONSTIPATION: ICD-10-CM

## 2025-05-30 LAB
ALBUMIN SERPL-MCNC: 4.4 G/DL (ref 3.2–4.8)
ALBUMIN/GLOB SERPL: 1.7 {RATIO} (ref 1–2)
ALP LIVER SERPL-CCNC: 65 U/L (ref 55–142)
ALT SERPL-CCNC: 10 U/L (ref 10–49)
ANION GAP SERPL CALC-SCNC: 11 MMOL/L (ref 0–18)
AST SERPL-CCNC: 16 U/L (ref ?–34)
BILIRUB SERPL-MCNC: 0.8 MG/DL (ref 0.2–0.9)
BUN BLD-MCNC: 12 MG/DL (ref 9–23)
CALCIUM BLD-MCNC: 9.7 MG/DL (ref 8.7–10.6)
CHLORIDE SERPL-SCNC: 106 MMOL/L (ref 98–112)
CHOLEST SERPL-MCNC: 147 MG/DL (ref ?–200)
CO2 SERPL-SCNC: 27 MMOL/L (ref 21–32)
CREAT BLD-MCNC: 1.01 MG/DL (ref 0.55–1.02)
CREAT UR-SCNC: 134.6 MG/DL
EGFRCR SERPLBLD CKD-EPI 2021: 51 ML/MIN/1.73M2 (ref 60–?)
EST. AVERAGE GLUCOSE BLD GHB EST-MCNC: 131 MG/DL (ref 68–126)
FASTING PATIENT LIPID ANSWER: YES
FASTING STATUS PATIENT QL REPORTED: YES
GLOBULIN PLAS-MCNC: 2.6 G/DL (ref 2–3.5)
GLUCOSE BLD-MCNC: 119 MG/DL (ref 70–99)
HBA1C MFR BLD: 6.2 % (ref ?–5.7)
HDLC SERPL-MCNC: 43 MG/DL (ref 40–59)
LDLC SERPL CALC-MCNC: 85 MG/DL (ref ?–100)
MICROALBUMIN UR-MCNC: <0.3 MG/DL
NONHDLC SERPL-MCNC: 104 MG/DL (ref ?–130)
OSMOLALITY SERPL CALC.SUM OF ELEC: 299 MOSM/KG (ref 275–295)
POTASSIUM SERPL-SCNC: 3.4 MMOL/L (ref 3.5–5.1)
PROT SERPL-MCNC: 7 G/DL (ref 5.7–8.2)
SODIUM SERPL-SCNC: 144 MMOL/L (ref 136–145)
TRIGL SERPL-MCNC: 105 MG/DL (ref 30–149)
TSI SER-ACNC: 1.12 UIU/ML (ref 0.55–4.78)
VLDLC SERPL CALC-MCNC: 17 MG/DL (ref 0–30)

## 2025-05-30 PROCEDURE — 36415 COLL VENOUS BLD VENIPUNCTURE: CPT

## 2025-05-30 PROCEDURE — 82043 UR ALBUMIN QUANTITATIVE: CPT

## 2025-05-30 PROCEDURE — 83036 HEMOGLOBIN GLYCOSYLATED A1C: CPT

## 2025-05-30 PROCEDURE — 80061 LIPID PANEL: CPT

## 2025-05-30 PROCEDURE — 80053 COMPREHEN METABOLIC PANEL: CPT

## 2025-05-30 PROCEDURE — 82570 ASSAY OF URINE CREATININE: CPT

## 2025-05-30 PROCEDURE — 84443 ASSAY THYROID STIM HORMONE: CPT

## 2025-06-02 ENCOUNTER — OFFICE VISIT (OUTPATIENT)
Dept: INTERNAL MEDICINE CLINIC | Facility: CLINIC | Age: OVER 89
End: 2025-06-02
Payer: MEDICARE

## 2025-06-02 VITALS
RESPIRATION RATE: 18 BRPM | BODY MASS INDEX: 29.72 KG/M2 | SYSTOLIC BLOOD PRESSURE: 118 MMHG | HEIGHT: 59.65 IN | OXYGEN SATURATION: 96 % | DIASTOLIC BLOOD PRESSURE: 68 MMHG | HEART RATE: 68 BPM | WEIGHT: 151.38 LBS | TEMPERATURE: 98 F

## 2025-06-02 DIAGNOSIS — R06.83 SNORING: ICD-10-CM

## 2025-06-02 DIAGNOSIS — G47.09 OTHER INSOMNIA: ICD-10-CM

## 2025-06-02 DIAGNOSIS — E11.42 TYPE 2 DIABETES MELLITUS WITH DIABETIC POLYNEUROPATHY, WITHOUT LONG-TERM CURRENT USE OF INSULIN (HCC): Primary | ICD-10-CM

## 2025-06-02 DIAGNOSIS — I10 PRIMARY HYPERTENSION: ICD-10-CM

## 2025-06-02 DIAGNOSIS — E78.00 PURE HYPERCHOLESTEROLEMIA: ICD-10-CM

## 2025-06-02 PROBLEM — R53.82 CHRONIC FATIGUE: Status: ACTIVE | Noted: 2018-10-10

## 2025-06-02 PROCEDURE — G2211 COMPLEX E/M VISIT ADD ON: HCPCS | Performed by: INTERNAL MEDICINE

## 2025-06-02 PROCEDURE — 99214 OFFICE O/P EST MOD 30 MIN: CPT | Performed by: INTERNAL MEDICINE

## 2025-06-02 RX ORDER — TRAZODONE HYDROCHLORIDE 50 MG/1
50 TABLET ORAL NIGHTLY PRN
Qty: 30 TABLET | Refills: 1 | Status: SHIPPED | OUTPATIENT
Start: 2025-06-02

## 2025-06-02 NOTE — PROGRESS NOTES
Paulina Man is a 89 year old female.    Chief Complaint   Patient presents with    Follow - Up     [facility] Room 3  Follow up for chronic issues.         HPI:     Patient with DM2, HTN, HL, OA, [place] here for follow up. Diabetes is well controlled with hgba1c of 6.2, she is taking glipizide ER 2.5mg daily. No symptoms of hypoglycemia. No falls, she gets around the house with her walker. Lipids are controlled on atorvastatin, LDL 85. BP also well controlled, compliant with losartan.   Eye exam is scheduled this month. She reports waking up tired and is requesting sleep study to rule out sleep apnea, does have h/o snoring as well.  She is traveling soon and would like short supply of medication to help her with insomnia.       Problem List[1]  Current Medications[2]   Past Medical History[3]   Social History:  Short Social Hx on File[4]  Family History[5]     Allergies  Allergies[6]      REVIEW OF SYSTEMS:   GENERAL HEALTH:  no fevers   RESPIRATORY: no cough  CARDIOVASCULAR: denies chest pain  GI: denies abdominal pain  : no dysuria  NEURO: denies headaches  PSYCH: No reported depression   HEME: No adenopathy      EXAM:   /68 (BP Location: Left arm, Patient Position: Sitting, Cuff Size: adult)   Pulse 68   Temp 97.5 °F (36.4 °C) (Temporal)   Resp 18   Ht 4' 11.65\" (1.515 m)   Wt 151 lb 6.4 oz (68.7 kg)   LMP  (LMP Unknown)   SpO2 96%   BMI 29.92 kg/m²   GENERAL: well developed, well nourished,in no apparent distress  HEENT: atraumatic, normocephalic  NECK: supple,no adenopathy  LUNGS: normal rate without respiratory distress, lungs clear to auscultation  CARDIO: RRR nl S1 S2  GI: normal bowel sounds, soft, NT/ND  EXTREMITIES: no cyanosis, clubbing or edema  NEURO: Alert and oriented    ASSESSMENT AND PLAN:     Encounter Diagnoses   Name     Snoring- check diagnostic sleep study     Primary hypertension- well controlled on losartan, CPM     Pure hypercholesterolemia- controlled on atorvastatin, CPM     Type  2 diabetes mellitus with diabetic polyneuropathy, without long-term current use of insulin ([facility])- controlled, hgba1c 6.2. continue low dose glipizide, diabetic eye exam scheduled for this month        Other insomnia- trazodone 50mg - half to one tab po at bedtime prn, potential SE reviewed    Orders Placed This Encounter   Procedures    Hemoglobin A1C [E]    Basic Metabolic Panel (8) [E]    CBC W Differential W Platelet [E]       Meds & Refills for this Visit:  Requested Prescriptions     Signed Prescriptions Disp Refills    traZODone 50 MG Oral Tab 30 tablet 1     Sig: Take 1 tablet (50 mg total) by mouth nightly as needed for Sleep.       Imaging & Consults:  OP REFERRAL TO DIAGNOSTIC SLEEP STUDY    Return in about 4 months (around 9/17/2025), or if symptoms worsen or fail to improve, for annual visit.  There are no Patient Instructions on file for this visit.      The patient indicates understanding of these issues and agrees to the plan.           [1]   Patient Active Problem List  Diagnosis    Diabetic polyneuropathy associated with type 2 diabetes mellitus (HCC)    Gastroesophageal reflux disease    Primary hypertension    RLS (restless legs syndrome)    Pure hypercholesterolemia    Exudative age-related macular degeneration, bilateral, with active choroidal neovascularization (HCC)    DCIS (ductal carcinoma in situ) of breast    DCIS (ductal carcinoma in situ) of breast    Atypical lobular hyperplasia of right breast    Arthritis of both hands    Bilateral carpal tunnel syndrome    Chronic fatigue    Chronic obstructive pulmonary disease (HCC)    Splenic artery aneurysm    Back pain with radiation    DDD (degenerative disc disease), lumbar    Chronic vulvitis    Atrophic vaginitis    Recurrent UTI    Unsteady gait    BPPV (benign paroxysmal positional vertigo), unspecified laterality    B12 deficiency    Dextroscoliosis    Cervical spondylosis without myelopathy    Spondylosis of lumbar region without  myelopathy or radiculopathy    COPD exacerbation (HCC)    Dyspnea, unspecified type    Shortness of breath    Palpitations    Encounter for well woman exam with abnormal findings    Age-related osteoporosis without current pathological fracture    Sleep apnea, unspecified    Venous insufficiency of both lower extremities    Edema leg    Diastolic dysfunction    Chronic diastolic heart failure (HCC)    Unspecified osteoarthritis, unspecified site    Unspecified hearing loss, unspecified ear    Unspecified abdominal hernia without obstruction or gangrene    Type 2 diabetes mellitus with diabetic polyneuropathy (HCC)    Problems related to living alone    Personal history of urinary (tract) infections    Personal history of pneumonia (recurrent)    Personal history of nicotine dependence    Personal history of in-situ neoplasm of breast    Obesity, unspecified    Dyspnea on exertion    Other constipation   [2]   Current Outpatient Medications   Medication Sig Dispense Refill    traZODone 50 MG Oral Tab Take 1 tablet (50 mg total) by mouth nightly as needed for Sleep. 30 tablet 1    ALBUTEROL 108 (90 Base) MCG/ACT Inhalation Aero Soln INHALE TWO PUFFS BY MOUTH EVERY FOUR HOURS AS NEEDED FOR WHEEZING 18 g 0    clotrimazole-betamethasone 1-0.05 % External Cream       pramipexole 0.125 MG Oral Tab Take one tablet at 7 pm and take one tablet at bed time. 180 tablet 1    Blood Glucose Monitoring Suppl (ONETOUCH ULTRA 2) w/Device Does not apply Kit 1 each daily as needed. Please dispense whatever is covered by pt insurance 1 kit 0    Glucose Blood (ONETOUCH ULTRA) In Vitro Strip 1 each by Other route daily as needed for Other. Please dispense whatever is covered by pt insurance 100 each 3    OneTouch Delica Lancets 30G Does not apply Misc 1 each daily. Please dispense whatever is covered by pt insurance 100 each 3    Esomeprazole Magnesium 40 MG Oral Capsule Delayed Release TAKE ONE CAPSULE BY MOUTH TWICE DAILY BEFORE MEALS  180 capsule 1    glipiZIDE ER 2.5 MG Oral Tablet 24 Hr Take 1 tablet (2.5 mg total) by mouth daily with breakfast. 90 tablet 3    losartan 100 MG Oral Tab Take by mouth in the morning.      Magnesium 200 MG Oral Tab Take by mouth. 2 x 200 mg gummies daily      Cholecalciferol (VITAMIN D3) 25 MCG (1000 UT) Oral Cap Take 1 tablet by mouth in the morning.      cyanocobalamin 1000 MCG Oral Tab Take 1 tablet (1,000 mcg total) by mouth in the morning.      aspirin 81 MG Oral Chew Tab Chew by mouth in the morning.      Bacillus Coagulans-Inulin (BENEFIBER PREBIOTIC+PROBIOTIC) Oral Chew Tab Chew by mouth.      atorvastatin 40 MG Oral Tab Take 1 tablet (40 mg total) by mouth in the morning.      estradiol 0.1 MG/GM Vaginal Cream Apply 0.5 grams (pea-sized amount) of cream inside the vagina nightly. May use small amount at the vaginal opening as well in a thin layer. 42.5 g 3    mometasone 0.1 % External Ointment apply a thin layer daily to vulva for 2 weeks then twice weekly 45 g 0    ondansetron (ZOFRAN ODT) 8 MG Oral Tablet Dispersible Take 1 tablet (8 mg total) by mouth every 8 (eight) hours as needed for Nausea. 30 tablet 0    fluticasone propionate 50 MCG/ACT Nasal Suspension 2 sprays by Nasal route in the morning.      acetaminophen 500 MG Oral Tab Take 1 tablet (500 mg total) by mouth every 6 (six) hours as needed for Pain.      docusate sodium 100 MG Oral Cap Take 1 capsule (100 mg total) by mouth as needed in the morning and 1 capsule (100 mg total) as needed in the evening for constipation.      CAPSAICIN APR EX Apply topically as needed.      Loperamide HCl 2 MG Oral Cap Take 1 capsule (2 mg total) by mouth as needed for Diarrhea.      Multiple Vitamins-Minerals (PRESERVISION AREDS) Oral Tab Take 1 tablet by mouth. 250 mg once daily     [3]   Past Medical History:   Abdominal distention    Abdominal hernia    Abdominal pain    Acute diverticulitis    Noted on CT scan - acute diverticulitis of the sigmoid colon     Arrhythmia    Arthritis    Atypical lobular hyperplasia of right breast    Back pain    Back problem    lower lumbar bad discs     Mckeon's esophagus without dysplasia    CHF (congestive heart failure) (HCC)    Chronic cough    Chronic diarrhea    Started before metformin- related to IBS      Constipation    COPD (chronic obstructive pulmonary disease) (HCC)    NO OXYGEN- no pulmonologist    Deviated septum    left side    Diabetes mellitus (HCC)    Diabetic polyneuropathy associated with type 2 diabetes mellitus (HCC)    Diarrhea, unspecified    Disorder of thyroid    has Thyroid nodules    Diverticulosis of large intestine    Dizziness    Ductal carcinoma in situ (DCIS) of right breast    Easy bruising    Esophageal reflux    Essential hypertension    Exudative macular degeneration (HCC)    Right Eye     Eye disease    Fatigue    Flatulence/gas pain/belching    Frequent urination    Frequent UTI    Full dentures    Gastroesophageal reflux disease    GERD (gastroesophageal reflux disease)    Hearing impairment    bilateral hearing aids    Hearing loss    Heart palpitations    Hemorrhoids    High blood pressure    High cholesterol    History of cardiac murmur    History of tobacco use    Hoarseness, chronic    Hyperlipidemia    not taking lipitor due to side effects    Incontinence    uses pads    Indigestion    Irregular bowel habits    Itch of skin    Leaking of urine    Leaky heart valve    slight per pt- had echo 12/2015    Leg swelling    Loss of appetite    Macular degeneration    Macular degeneration    Menopause    Nausea    Neuropathy    Nodule of left lung    Obesity (BMI 30-39.9)    Osteoarthritis    all joints    Osteoporosis    Other fatigue    Pain in joints    Pap smear for cervical cancer screening    Per patient had pap in 2006. No abn pap.     Pneumonia, organism unspecified(486)    04/2015    PONV (postoperative nausea and vomiting)    Problems with swallowing    due hiatal hernia; gaggs at times     Restless leg syndrome    Restless leg syndrome    RLS (restless legs syndrome)    Screening breast examination    Dr. 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   Tobacco Use    Smoking status: Former     Current packs/day: 0.00     Average packs/day: 1 pack/day for 35.0 years (35.0 ttl pk-yrs)     Types: Cigarettes     Start date: 1953     Quit date: 1988     Years since quittin.7     Passive exposure: Never    Smokeless tobacco: Former   Vaping Use    Vaping status: Never Used   Substance and Sexual Activity    Alcohol use: No    Drug use: No    Sexual activity: Not Currently   Other Topics Concern    Caffeine Concern Yes     Comment: coffee 1- 2 cups daily    Exercise Yes     Comment: walking     Social Drivers of Health     Food Insecurity: No Food Insecurity (2025)    NCSS - Food Insecurity     Worried About Running Out of Food in the Last Year: No     Ran Out of Food in the Last Year: No   Transportation Needs: Unmet Transportation Needs (2025)    NCSS - Transportation     Lack of Transportation: Yes   Stress: No Stress Concern Present (2023)    Stress     Feeling of Stress : No   Housing Stability: Not At Risk (2025)    NCSS - Housing/Utilities     Has Housing: Yes     Worried About Losing Housing: No     Unable to Get Utilities: No   [5]   Family History  Problem Relation Age of Onset    Breast Cancer Paternal Aunt     Heart Disorder Mother     Hypertension Mother     Colon Polyps Mother     Heart Disorder Father      Hypertension Father     Lipids Father     Heart Disorder Paternal Grandfather     Diabetes Paternal Grandmother     Breast Cancer Self 86        DCIS   [6]   Allergies  Allergen Reactions    Ciprofloxacin NAUSEA AND VOMITING and HIVES    Peanut-Containing Drug Products UNKNOWN and OTHER (SEE COMMENTS)     Bladder infection    Pollen ITCHING and HIVES    Sulfa Antibiotics NAUSEA AND VOMITING    Adhesive Tape ITCHING    Dander RASH     fur    Other ITCHING     feather

## 2025-06-07 ENCOUNTER — WALK IN (OUTPATIENT)
Dept: URGENT CARE | Age: OVER 89
End: 2025-06-07

## 2025-06-07 VITALS
OXYGEN SATURATION: 97 % | RESPIRATION RATE: 16 BRPM | SYSTOLIC BLOOD PRESSURE: 128 MMHG | TEMPERATURE: 97.7 F | HEART RATE: 69 BPM | DIASTOLIC BLOOD PRESSURE: 56 MMHG

## 2025-06-07 DIAGNOSIS — H61.23 BILATERAL IMPACTED CERUMEN: Primary | ICD-10-CM

## 2025-06-07 ASSESSMENT — PAIN SCALES - GENERAL: PAINLEVEL_OUTOF10: 0

## 2025-07-14 ENCOUNTER — WALK IN (OUTPATIENT)
Dept: URGENT CARE | Age: OVER 89
End: 2025-07-14

## 2025-07-14 VITALS
RESPIRATION RATE: 18 BRPM | SYSTOLIC BLOOD PRESSURE: 151 MMHG | DIASTOLIC BLOOD PRESSURE: 62 MMHG | OXYGEN SATURATION: 97 % | HEART RATE: 74 BPM | TEMPERATURE: 97.9 F

## 2025-07-14 DIAGNOSIS — S05.02XA ABRASION OF LEFT CORNEA, INITIAL ENCOUNTER: Primary | ICD-10-CM

## 2025-07-14 PROCEDURE — 99214 OFFICE O/P EST MOD 30 MIN: CPT | Performed by: FAMILY MEDICINE

## 2025-07-14 RX ORDER — TOBRAMYCIN 3 MG/ML
SOLUTION/ DROPS OPHTHALMIC
Qty: 1 EACH | Refills: 0 | Status: SHIPPED | OUTPATIENT
Start: 2025-07-14

## 2025-07-21 ENCOUNTER — OFFICE VISIT (OUTPATIENT)
Facility: CLINIC | Age: OVER 89
End: 2025-07-21
Payer: MEDICARE

## 2025-07-21 VITALS
WEIGHT: 151 LBS | HEART RATE: 59 BPM | DIASTOLIC BLOOD PRESSURE: 56 MMHG | RESPIRATION RATE: 15 BRPM | SYSTOLIC BLOOD PRESSURE: 120 MMHG | OXYGEN SATURATION: 96 % | TEMPERATURE: 99 F | BODY MASS INDEX: 30 KG/M2

## 2025-07-21 DIAGNOSIS — R06.09 DYSPNEA ON EXERTION: Primary | ICD-10-CM

## 2025-07-21 DIAGNOSIS — J44.9 CHRONIC OBSTRUCTIVE PULMONARY DISEASE, UNSPECIFIED COPD TYPE (HCC): ICD-10-CM

## 2025-07-21 PROCEDURE — 99214 OFFICE O/P EST MOD 30 MIN: CPT | Performed by: INTERNAL MEDICINE

## 2025-07-21 NOTE — PATIENT INSTRUCTIONS
in left lung noted on 1/7/2016                          Plan:      Continue  Albuterol HFA MDI 2 puffs 4 times daily as needed.    Continue diuretics per cardiology   Cardiology follow up        Follow up: 8  months     Bebeto Blackwell MD

## 2025-07-21 NOTE — PROGRESS NOTES
Pulmonary/Critical Care/Sleep Medicine   Progress  Note     PCP: Annie Newton MD   Phone: [phone]   Fax: [phone]        Chief Complaint   Patient presents with    Follow - Up     Pt here for 8 month pulmonary follow up       HPI  I had the pleasure of seeing Paulina Man who is a pleasant 89 year old female who presents for follow up of COPD after visit on 11/11/2024     Since last visi the patient states that she has stable. c/o  shortness  of breath on exertion nut denies cough          The patient states that she sleeps OK at night. She lives alone at home.         She has  no pets .         Hx of tobacco use: She  reports that she quit smoking about 36 years ago. Her smoking use included cigarettes. She started smoking about 71 years ago. She has a 35 pack-year smoking history. She has never been exposed to tobacco smoke. She has quit using smokeless tobacco.    Past Medical History:    Abdominal distention    Abdominal hernia    Abdominal pain    Acute diverticulitis    Noted on CT scan - acute diverticulitis of the sigmoid colon    Arrhythmia    Arthritis    Atypical lobular hyperplasia of right breast    Back pain    Back problem    lower lumbar bad discs     Mckeon's esophagus without dysplasia    CHF (congestive heart failure) (HCC)    Chronic cough    Chronic diarrhea    Started before metformin- related to IBS      Constipation    COPD (chronic obstructive pulmonary disease) (HCC)    NO OXYGEN- no pulmonologist    Deviated septum    left side    Diabetes mellitus (HCC)    Diabetic polyneuropathy associated with type 2 diabetes mellitus (HCC)    Diarrhea, unspecified    Disorder of thyroid    has Thyroid nodules    Diverticulosis of large intestine    Dizziness    Ductal carcinoma in situ (DCIS) of right breast    Easy bruising    Esophageal reflux    Essential hypertension    Exudative macular degeneration (HCC)    Right Eye     Eye disease    Fatigue    Flatulence/gas pain/belching     Frequent urination    Frequent UTI    Full dentures    Gastroesophageal reflux disease    GERD (gastroesophageal reflux disease)    Hearing impairment    bilateral hearing aids    Hearing loss    Heart palpitations    Hemorrhoids    High blood pressure    High cholesterol    History of cardiac murmur    History of tobacco use    Hoarseness, chronic    Hyperlipidemia    not taking lipitor due to side effects    Incontinence    uses pads    Indigestion    Irregular bowel habits    Itch of skin    Leaking of urine    Leaky heart valve    slight per pt- had echo 12/2015    Leg swelling    Loss of appetite    Macular degeneration    Macular degeneration    Menopause    Nausea    Neuropathy    Nodule of left lung    Obesity (BMI 30-39.9)    Osteoarthritis    all joints    Osteoporosis    Other fatigue    Pain in joints    Pap smear for cervical cancer screening    Per patient had pap in 2006. No abn pap.     Pneumonia, organism unspecified(486)    04/2015    PONV (postoperative nausea and vomiting)    Problems with swallowing    due hiatal hernia; gaggs at times    Restless leg syndrome    Restless leg syndrome    RLS (restless legs syndrome)    Screening breast examination    Dr. Layla Pretty    Screening mammogram for breast cancer    Benign     Shortness of breath    ON EXERTION    Sleep apnea    Sleep disturbance    Splenic artery aneurysm    Torn rotator cuff    both shoulders per pt    Uncomfortable fullness after meals    Vertigo    Visual impairment    glasses    Vitamin B12 deficiency    Vulvar itching    Wears glasses    Weight loss    Wheezing      Past Surgical History:   Procedure Laterality Date    Angiogram      no intervention    Appendectomy  1935    Appendectomy      Cholecystectomy  1980    Colonoscopy      Egd  03/09/2022    Reactive gastropathy    Kevin localization wire 1 site left (cpt=19281)      1957    Kevin localization wire 1 site right (cpt=19281)      1980    Kevin localization wire 1 site right  (cpt=19281) Right 07/2016    DCIS    Mastectomy partial Right 07/01/2016    wire localized partial mastectomy    Needle biopsy right      diag DCIS- 06/07/16    Other surgical history  1980    L and R breast cyst removal    Sigmoidoscopy,diagnostic       Allergies   Allergen Reactions    Ciprofloxacin NAUSEA AND VOMITING and HIVES    Peanut-Containing Drug Products UNKNOWN and OTHER (SEE COMMENTS)     Bladder infection    Pollen ITCHING and HIVES    Sulfa Antibiotics NAUSEA AND VOMITING    Adhesive Tape ITCHING    Dander RASH     fur    Other ITCHING     feather     Current Outpatient Medications   Medication Sig Dispense Refill    traZODone 50 MG Oral Tab Take 1 tablet (50 mg total) by mouth nightly as needed for Sleep. 30 tablet 1    ALBUTEROL 108 (90 Base) MCG/ACT Inhalation Aero Soln INHALE TWO PUFFS BY MOUTH EVERY FOUR HOURS AS NEEDED FOR WHEEZING 18 g 0    clotrimazole-betamethasone 1-0.05 % External Cream       pramipexole 0.125 MG Oral Tab Take one tablet at 7 pm and take one tablet at bed time. 180 tablet 1    Blood Glucose Monitoring Suppl (ONETOUCH ULTRA 2) w/Device Does not apply Kit 1 each daily as needed. Please dispense whatever is covered by pt insurance 1 kit 0    Glucose Blood (ONETOUCH ULTRA) In Vitro Strip 1 each by Other route daily as needed for Other. Please dispense whatever is covered by pt insurance 100 each 3    OneTouch Delica Lancets 30G Does not apply Misc 1 each daily. Please dispense whatever is covered by pt insurance 100 each 3    Esomeprazole Magnesium 40 MG Oral Capsule Delayed Release TAKE ONE CAPSULE BY MOUTH TWICE DAILY BEFORE MEALS 180 capsule 1    glipiZIDE ER 2.5 MG Oral Tablet 24 Hr Take 1 tablet (2.5 mg total) by mouth daily with breakfast. 90 tablet 3    losartan 100 MG Oral Tab Take by mouth in the morning.      Magnesium 200 MG Oral Tab Take by mouth. 2 x 200 mg gummies daily      Cholecalciferol (VITAMIN D3) 25 MCG (1000 UT) Oral Cap Take 1 tablet by mouth in the  morning.      cyanocobalamin 1000 MCG Oral Tab Take 1 tablet (1,000 mcg total) by mouth in the morning.      aspirin 81 MG Oral Chew Tab Chew by mouth in the morning.      Bacillus Coagulans-Inulin (BENEFIBER PREBIOTIC+PROBIOTIC) Oral Chew Tab Chew by mouth.      atorvastatin 40 MG Oral Tab Take 1 tablet (40 mg total) by mouth in the morning.      estradiol 0.1 MG/GM Vaginal Cream Apply 0.5 grams (pea-sized amount) of cream inside the vagina nightly. May use small amount at the vaginal opening as well in a thin layer. 42.5 g 3    mometasone 0.1 % External Ointment apply a thin layer daily to vulva for 2 weeks then twice weekly 45 g 0    ondansetron (ZOFRAN ODT) 8 MG Oral Tablet Dispersible Take 1 tablet (8 mg total) by mouth every 8 (eight) hours as needed for Nausea. 30 tablet 0    fluticasone propionate 50 MCG/ACT Nasal Suspension 2 sprays by Nasal route in the morning.      acetaminophen 500 MG Oral Tab Take 1 tablet (500 mg total) by mouth every 6 (six) hours as needed for Pain.      docusate sodium 100 MG Oral Cap Take 1 capsule (100 mg total) by mouth as needed in the morning and 1 capsule (100 mg total) as needed in the evening for constipation.      CAPSAICIN APR EX Apply topically as needed.      Loperamide HCl 2 MG Oral Cap Take 1 capsule (2 mg total) by mouth as needed for Diarrhea.      Multiple Vitamins-Minerals (PRESERVISION AREDS) Oral Tab Take 1 tablet by mouth. 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   Tobacco Use    Smoking status: Former     Current packs/day: 0.00     Average packs/day: 1 pack/day for 35.0 years (35.0 ttl pk-yrs)     Types: Cigarettes     Start date: 1953     Quit date: 1988     Years since quittin.9     Passive exposure: Never    Smokeless tobacco: Former   Vaping Use    Vaping status: Never Used   Substance and Sexual Activity    Alcohol use: No    Drug  use: No    Sexual activity: Not Currently   Other Topics Concern    Caffeine Concern Yes     Comment: coffee 1- 2 cups daily    Exercise Yes     Comment: walking     Social Drivers of Health     Food Insecurity: No Food Insecurity (6/2/2025)    NCSS - Food Insecurity     Worried About Running Out of Food in the Last Year: No     Ran Out of Food in the Last Year: No   Transportation Needs: Unmet Transportation Needs (6/2/2025)    NCSS - Transportation     Lack of Transportation: Yes   Stress: No Stress Concern Present (2/27/2023)    Stress     Feeling of Stress : No   Housing Stability: Not At Risk (6/2/2025)    NCSS - Housing/Utilities     Has Housing: Yes     Worried About Losing Housing: No     Unable to Get Utilities: No      Immunization History   Administered Date(s) Administered    Covid-19 Vaccine Pfizer 30 mcg/0.3 ml 02/07/2021, 02/28/2021, 11/02/2021, 05/16/2022, 10/25/2022    Covid-19 Vaccine Pfizer Dani-Sucrose 30 mcg/0.3 ml 05/16/2022    FLU VAC High Dose 65 YRS & Older PRSV Free (40634) 09/13/2016, 09/30/2019, 09/08/2021, 09/26/2022, 08/25/2023    FLUAD High Dose 65 yr and older (10526) 09/20/2018    Fluzone Vaccine Medicare () 09/16/2015, 09/12/2016, 08/31/2017    High Dose Fluzone Influenza Vaccine, 65yr+ PF 0.5mL (49865) 09/08/2021, 08/25/2023    Influenza 09/07/2015, 08/12/2020    Pfizer Covid-19 Vaccine 30mcg/0.3ml 12yrs+ 11/09/2023    Pneumococcal (Prevnar 13) 09/16/2015    Pneumococcal Conjugate PCV20 11/26/2023    Pneumovax 23 05/23/2017    RSV, recombinant, RSVpreF, adjuvanted (Arexvy) 09/13/2023    TDAP 01/07/2016    Zoster Vaccine Recombinant Adjuvanted (Shingrix) 07/01/2019, 01/09/2020      Family History   Problem Relation Age of Onset    Breast Cancer Paternal Aunt     Heart Disorder Mother     Hypertension Mother     Colon Polyps Mother     Heart Disorder Father     Hypertension Father     Lipids Father     Heart Disorder Paternal Grandfather     Diabetes Paternal Grandmother      Breast Cancer Self 86        DCIS        Review of Systems   Constitutional:  Negative for fatigue, fever and unexpected weight change.   HENT:  Negative for congestion, mouth sores, nosebleeds, postnasal drip, rhinorrhea, sore throat and trouble swallowing.    Eyes:  Negative for visual disturbance.   Respiratory:  Positive for shortness of breath. Negative for apnea, cough, choking, chest tightness and wheezing.    Cardiovascular:  Negative for chest pain, palpitations and leg swelling.   Gastrointestinal:  Negative for abdominal pain, constipation, diarrhea, nausea and vomiting.        GERD symptoms    Genitourinary:  Negative for difficulty urinating.   Musculoskeletal:  Positive for arthralgias. Negative for back pain, gait problem and myalgias.   Neurological:  Negative for dizziness, weakness and headaches.   Psychiatric/Behavioral:  Negative for sleep disturbance.        Vitals:    07/21/25 1310   BP: 120/56   Pulse: 59   Resp: 15   Temp: 98.8 °F (37.1 °C)      Body mass index is 29.84 kg/m².     Physical Exam  Constitutional:       General: She is not in acute distress.     Appearance: Normal appearance. She is not ill-appearing or diaphoretic.   HENT:      Head: Normocephalic and atraumatic.      Nose: Nose normal. No congestion or rhinorrhea.      Comments: Narrow edematous nares L>R      Mouth/Throat:      Mouth: Mucous membranes are moist.      Pharynx: Oropharynx is clear. No oropharyngeal exudate or posterior oropharyngeal erythema.      Comments: Mallampati class II palate   Eyes:      Extraocular Movements: Extraocular movements intact.      Pupils: Pupils are equal, round, and reactive to light.   Cardiovascular:      Rate and Rhythm: Normal rate.      Pulses: Normal pulses.      Heart sounds: Normal heart sounds. No murmur heard.  Pulmonary:      Effort: Pulmonary effort is normal. No respiratory distress.      Breath sounds: Normal breath sounds. No wheezing or rhonchi.      Comments: Diminished  breath sounds in bilateral lower lung zones  Chest:      Chest wall: No tenderness.   Abdominal:      General: Abdomen is flat. Bowel sounds are normal.      Palpations: Abdomen is soft.   Musculoskeletal:         General: Normal range of motion.      Right lower leg: No edema.      Left lower leg: No edema.   Skin:     General: Skin is warm.   Neurological:      General: No focal deficit present.      Mental Status: She is alert and oriented to person, place, and time.   Psychiatric:         Mood and Affect: Mood normal.         Behavior: Behavior normal.         Thought Content: Thought content normal.         Judgment: Judgment normal.             Labs:  Last BMP  Lab Results   Component Value Date     (H) 05/30/2025    BUN 12 05/30/2025    CREATSERUM 1.01 05/30/2025    BUNCREA 13.8 10/14/2022    ANIONGAP 11 05/30/2025    GFRAA 68 07/02/2022    GFRNAA 59 (L) 07/02/2022    CA 9.7 05/30/2025     05/30/2025    K 3.4 (L) 05/30/2025     05/30/2025    CO2 27.0 05/30/2025    OSMOCALC 299 (H) 05/30/2025      Last CBC  Lab Results   Component Value Date    WBC 6.6 07/26/2024    RBC 4.30 07/26/2024    HGB 13.0 07/26/2024    HCT 38.5 07/26/2024    MCV 89.5 07/26/2024    MCH 30.2 07/26/2024    MCHC 33.8 07/26/2024    RDW 12.7 07/26/2024    .0 07/26/2024      Last CMP  Lab Results   Component Value Date     (H) 05/30/2025    BUN 12 05/30/2025    BUNCREA 13.8 10/14/2022    CREATSERUM 1.01 05/30/2025    ANIONGAP 11 05/30/2025    GFR 64 12/01/2017    GFRNAA 59 (L) 07/02/2022    GFRAA 68 07/02/2022    CA 9.7 05/30/2025    OSMOCALC 299 (H) 05/30/2025    ALKPHO 65 05/30/2025    AST 16 05/30/2025    ALT 10 05/30/2025    BILT 0.8 05/30/2025    TP 7.0 05/30/2025    ALB 4.4 05/30/2025    GLOBULIN 2.6 05/30/2025     05/30/2025    K 3.4 (L) 05/30/2025     05/30/2025    CO2 27.0 05/30/2025      Last Thyroid Function  Lab Results   Component Value Date    T4F 1.1 07/08/2020    TSH 1.117  05/30/2025        Imaging personally reviewed :      CT abdomen:    Impression   CONCLUSION:    1. Stable findings including splenic artery calcified aneurysm when compared to most recent CT of the abdomen and pelvis performed 8/7/2024.  Please see details as above.        LOCATION:  [facility]        Dictated by (CST): Cristal Gunn MD on 10/29/2024 at 8:41 AM          CXR PA lenny LAT personally reviewed   Impression   CONCLUSION:  Stable COPD changes.  Small bilateral pleural effusions versus pleural scarring.  Minimal scarring/atelectasis in the lower lungs.           LOCATION:  [id number]                 Dictated by (CST): Everette Reid MD on 10/20/2023 at 4:10 PM        PFT:    Paulina Man is a 89 year old female who presents for evaluation of dyspnea exertion     Findings:  Spirometry: FEV1 is 1.07 L, 71 predicted.  With a Z-score of -1.37  FVC is 1.87 L, 93% predicted and FEV1/ FVC ratio is 0.57.  With a Z-score of -2.18  There is no significant bronchodilator response after administration of albuterol.   The flow-volume loop demonstrates an obstructive pattern.   Lung Volumes:                                                                                                              The TLC is 4.37 L, 102% predicted.  With a Z-score of 0.13  The residual volume 2.50 L, 129% predicted.  With a Z-score of 0.91  Diffusion Capacity:  The diffusion capacity is 8.87 or 54% predicted with a Z-score of -3.15 and 74% predicted when corrected for alveolar volume.     Impression:  There is mild airway obstruction  on spirometry as FEV1/FVC ratio is reduced and visualized on flow-volume loop.  Lung volumes appear within normal limits..     Diffusion capacity  is moderately reduced . This may represent a normal finding but can be seen in emphysema, interstitial lung disease, pulmonary vascular disease (such as pulmonary hypertension), atelectasis and anemia. If not already performed, would suggest further evaluation as  determined clinically.     There are no previous pulmonary function tests available for comparison.      Disclaimer: This PFT has been interpreted in accordance to ATS/ERS interpretation guidelines 2022, with the use of upper and lower limits of normal as well as z-score references. Previous testing (before March 2024) was not performed at [facility] using z-scores and so comparison to previous testing should be taken with caution.     Bebeto Blackwell MD     Impression:    Dyspnea on exertion: With history of environmental allergies and benefit from prednisone.  Suspect related to mild intermittent asthma versus COPD along with deconditioning. PFT ON 11/7/2024 showed mild COPD with moderate diffusion limitation   Allergic rhinitis : controlled   Dextroscoliosis   Balance issues   B12 deficiency per patient, currently on daily vitamin B12 tablets  GERD with Hx Barents esophagus   Hiatal hernia on CT abdomen   History of at least 35 pack years of tobacco smoking, suspect with underlying COPD  Type 2 diabetes mellitus  Hypertension  Hyperlipidemia macular degeneration osteoporosis history of nodule in left lung noted on 1/7/2016                          Plan:      Continue  Albuterol HFA MDI 2 puffs 4 times daily as needed.    Continue diuretics per cardiology   Cardiology follow up        Follow up: 8  months     Thank you for allowing me to participate in your patient care.    MADYSON Blackwell MD, FACP, FCCP, FAASM - Pulmonary/Critical care/Sleep Medicine  Please contact our office if you have any questions or concerns at [phone].[email or web address]

## 2025-08-11 ENCOUNTER — OFFICE VISIT (OUTPATIENT)
Dept: NEUROLOGY | Facility: CLINIC | Age: OVER 89
End: 2025-08-11

## 2025-08-11 VITALS
HEIGHT: 61 IN | WEIGHT: 148 LBS | RESPIRATION RATE: 16 BRPM | HEART RATE: 71 BPM | SYSTOLIC BLOOD PRESSURE: 138 MMHG | BODY MASS INDEX: 27.94 KG/M2 | OXYGEN SATURATION: 95 % | DIASTOLIC BLOOD PRESSURE: 65 MMHG

## 2025-08-11 DIAGNOSIS — G25.81 RLS (RESTLESS LEGS SYNDROME): ICD-10-CM

## 2025-08-11 DIAGNOSIS — G25.81 RLS (RESTLESS LEGS SYNDROME): Primary | ICD-10-CM

## 2025-08-11 PROCEDURE — 99213 OFFICE O/P EST LOW 20 MIN: CPT | Performed by: OTHER

## 2025-08-11 RX ORDER — TOBRAMYCIN 3 MG/ML
2 SOLUTION/ DROPS OPHTHALMIC 4 TIMES DAILY
COMMUNITY
Start: 2025-07-14 | End: 2025-07-19

## 2025-08-11 RX ORDER — PRAMIPEXOLE DIHYDROCHLORIDE 0.12 MG/1
TABLET ORAL
Qty: 180 TABLET | Refills: 0 | Status: SHIPPED | OUTPATIENT
Start: 2025-08-11

## 2025-08-13 RX ORDER — GLIPIZIDE 2.5 MG/1
2.5 TABLET, EXTENDED RELEASE ORAL
Qty: 90 TABLET | Refills: 3 | Status: SHIPPED | OUTPATIENT
Start: 2025-08-13

## 2025-08-25 ENCOUNTER — TELEPHONE (OUTPATIENT)
Dept: INTERNAL MEDICINE CLINIC | Facility: CLINIC | Age: OVER 89
End: 2025-08-25

## (undated) DIAGNOSIS — J44.9 CHRONIC OBSTRUCTIVE PULMONARY DISEASE, UNSPECIFIED COPD TYPE (HCC): ICD-10-CM

## (undated) DIAGNOSIS — H35.3230 BILATERAL EXUDATIVE AGE-RELATED MACULAR DEGENERATION, UNSPECIFIED STAGE (HCC): ICD-10-CM

## (undated) DIAGNOSIS — D05.10 DUCTAL CARCINOMA IN SITU (DCIS) OF BREAST, UNSPECIFIED LATERALITY: ICD-10-CM

## (undated) DIAGNOSIS — E78.2 MIXED DYSLIPIDEMIA: ICD-10-CM

## (undated) DIAGNOSIS — K52.9 CHRONIC DIARRHEA: ICD-10-CM

## (undated) DIAGNOSIS — R73.9 HYPERGLYCEMIA: ICD-10-CM

## (undated) DIAGNOSIS — K57.92 ACUTE DIVERTICULITIS: ICD-10-CM

## (undated) DIAGNOSIS — I10 BENIGN ESSENTIAL HTN: ICD-10-CM

## (undated) DIAGNOSIS — M51.36 DDD (DEGENERATIVE DISC DISEASE), LUMBAR: ICD-10-CM

## (undated) DIAGNOSIS — M19.041 ARTHRITIS OF BOTH HANDS: ICD-10-CM

## (undated) DIAGNOSIS — M19.042 ARTHRITIS OF BOTH HANDS: ICD-10-CM

## (undated) DIAGNOSIS — E87.1 HYPONATREMIA: ICD-10-CM

## (undated) DIAGNOSIS — I10 ESSENTIAL HYPERTENSION: ICD-10-CM

## (undated) DIAGNOSIS — K21.9 GASTROESOPHAGEAL REFLUX DISEASE, UNSPECIFIED WHETHER ESOPHAGITIS PRESENT: ICD-10-CM

## (undated) DIAGNOSIS — E11.42 DIABETIC POLYNEUROPATHY ASSOCIATED WITH TYPE 2 DIABETES MELLITUS (HCC): ICD-10-CM

## (undated) DIAGNOSIS — D64.9 ANEMIA, UNSPECIFIED TYPE: ICD-10-CM

## (undated) DIAGNOSIS — E66.9 OBESITY (BMI 30-39.9): ICD-10-CM

## (undated) DIAGNOSIS — G25.81 RLS (RESTLESS LEGS SYNDROME): ICD-10-CM

## (undated) DIAGNOSIS — K22.70 BARRETT'S ESOPHAGUS WITHOUT DYSPLASIA: ICD-10-CM

## (undated) DIAGNOSIS — G56.03 BILATERAL CARPAL TUNNEL SYNDROME: ICD-10-CM

## (undated) DIAGNOSIS — E78.00 PURE HYPERCHOLESTEROLEMIA: ICD-10-CM

## (undated) DIAGNOSIS — I10 HYPERTENSION, UNSPECIFIED TYPE: ICD-10-CM

## (undated) DIAGNOSIS — Z13.1 ENCOUNTER FOR SCREENING EXAMINATION FOR IMPAIRED GLUCOSE REGULATION AND DIABETES MELLITUS: ICD-10-CM

## (undated) DIAGNOSIS — E86.0 DEHYDRATION: ICD-10-CM

## (undated) DIAGNOSIS — G25.81 RESTLESS LEG SYNDROME: ICD-10-CM

## (undated) DIAGNOSIS — Z00.00 ROUTINE GENERAL MEDICAL EXAMINATION AT A HEALTH CARE FACILITY: Primary | ICD-10-CM

## (undated) DEVICE — FORCEP BIOPSY RJ4 LG CAP W/ND

## (undated) DEVICE — Device: Brand: DEFENDO AIR/WATER/SUCTION AND BIOPSY VALVE

## (undated) DEVICE — SYRINGE 60ML SLIP TIP

## (undated) DEVICE — 3M™ RED DOT™ MONITORING ELECTRODE WITH FOAM TAPE AND STICKY GEL, 50/BAG, 20/CASE, 72/PLT 2570: Brand: RED DOT™

## (undated) DEVICE — MEDI-VAC NON-CONDUCTIVE SUCTION TUBING: Brand: CARDINAL HEALTH

## (undated) DEVICE — MEDI-VAC SUCTION HANDLE REGULAR CAPACITY: Brand: CARDINAL HEALTH

## (undated) DEVICE — ENDOSCOPY PACK UPPER: Brand: MEDLINE INDUSTRIES, INC.

## (undated) DEVICE — 1200CC GUARDIAN II: Brand: GUARDIAN

## (undated) DEVICE — FILTERLINE NASAL ADULT O2/CO2

## (undated) NOTE — LETTER
Patient Name: Ezra Man  YOB: 1930          MRN number:  [id number]  Date:  6/20/2018  Referring Physician:  Dr. Ivelisse Ernst     Discharge Summary    Pt has attended 7, cancelled 0, and no shown 0 visits in Occupational Therapy.      Timothy Redd Strength (lbs) Right Average Left Average   : 38#  (+10) 25#  (+10)   2 pt Pinch: 6#  (+1) 7#   (+3)   3 pt Pinch: 7#  (+1) 8#  (+3)   Lateral Pinch: 12#  (+2) 10#       Goals:    1- Decrease resting and with use pain in bilateral hands to 2 /10. 6 vis

## (undated) NOTE — MR AVS SNAPSHOT
[address], [facility] [address] [place] [address] [phone]               Thank you for choosing us for your health care visit with Cait Porter MD.  We are glad to serve you and happy to provide you with this hernandez An epidermoid cyst rarely causes pain, unless it ruptures or becomes infected.  It may ooze keratin, a white, cheesy, smelly material. If the cyst becomes inflamed or infected, it may be:  · Bad smelling  · Red  · Swollen  · Tender  Treatment for an epiderm recent med list.                ASPIR-81 OR   Take 81 mg by mouth daily. Atorvastatin Calcium 10 MG Tabs   TAKE 1 TABLET (10 MG TOTAL) BY MOUTH NIGHTLY. Commonly known as:  LIPITOR           CALCIUM-VITAMIN D OR   Take 1,200 mg by mouth daily.

## (undated) NOTE — LETTER
06/22/18        Londell Gowers Aper  [address] [facility]      Dear Clara,    [facility] records indicate that you have outstanding lab work and or testing that was ordered for you and has not yet been completed:    Lipid Panel [E]  He

## (undated) NOTE — Clinical Note
Dear Dr. Layne Gaitan,  Thank you for referring Mrs. Man to the [facility] in [place].   Sincerely,  Janelle De León NP

## (undated) NOTE — MR AVS SNAPSHOT
[facility] [address] [address] [phone]               Thank you for choosing us for your health care visit with Natalee Stone MD.  We are glad to serve you and happy to provide you with this summar Juniper     Other     feather    Peanut-Containing Drug Products     Rye Grass Flower Pollen Extract [Gramineae Pollens]     Sagebrush     Wheat Grass [Triticum Aestivum]                 Today's Vital Signs     BP Pulse Temp Weight          110/66 mmHg 76 Take 1 capsule (40 mg total) by mouth 2 (two) times daily. Commonly known as:  PRILOSEC           Pramipexole Dihydrochloride 0.25 MG Tabs   TAKE 1 TABLET (0.25 MG TOTAL) BY MOUTH NIGHTLY.    Commonly known as:  MIRAPEX           PRESERVISION AREDS Tabs you should call to schedule your appointment. [address]  [address], [address]  [address], [place]re[place] Gonzalez, [address]     [phone]      [facility] Visit [email or web address] to learn more. Educational Information     Healthy Diet and Regular Exercise  [employer] for making healthy food choices  -   Enjoy your food, but eat less.   Fully enjoy your food when ea

## (undated) NOTE — LETTER
Date: 2018  Patient Name:  Cameron Man             Address: [address] [place] [address]    : 1930        Dear Caryle Petties,      I hope this letter finds you well. Your biopsies do show Mckeon's Esophagus.  These are \"pre-cance

## (undated) NOTE — LETTER
07/01/19        Leroy Man  [address] [place] [id number]      Dear Jonathan Jara,    [facility] records indicate that you have outstanding lab work and or testing that was ordered for you and has not yet been completed:  Orders Placed This Encounter

## (undated) NOTE — MR AVS SNAPSHOT
[facility] Count includes the [facility] [place] [id number] [phone]               Thank you for choosing us for your health care visit with Zaida Carrington MD.  We are glad to serve you and happy to provide you with this summar [address] [facility], [facility] [address] [address]   [phone]            Apr 25, 2017  4:00 PM   Medicare Annual Well Visit with Clelia Soulier, MD   [facility], [facility] ([facility] [place])    [facility] [address] Commonly known as:  GLUCOPHAGE           Montelukast Sodium 10 MG Tabs   Take 1 tablet (10 mg total) by mouth daily.    Commonly known as:  SINGULAIR           Nystatin Powd   Apply powder daily prn rash for 2 weeks           Omeprazole 40 MG Cpdr   Take 1 If you are confident that your benefit plan will not require a referral or authorization, such as [place] Medicaid, please feel free to schedule your appointment immediately.  However, if you are unsure about the requirements for authorization, please wait

## (undated) NOTE — LETTER
April 18, 2018    Onesimo Man  [address] [facility]      Dear Susan Aviles: It was a pleasure speaking with you over the phone recently.  I wanted to send you your care managers contact information for you to utilize when

## (undated) NOTE — LETTER
07/24/19        Yulissa Man  [address] [facility] [address]      Dear Luis [address]to [place],    [facility] records indicate that you have outstanding lab work and or testing that was ordered for you and has not yet been completed:  Orders Placed This Encounter

## (undated) NOTE — Clinical Note
CHELO, TCM call made, see notes. [facility] attempted to schedule a TCM HFU, patient declines at this time stating she will be going to [place] for a family reunion, she will call when she gets back.  [facility] asked patient when she is leaving and she states 8/29/19

## (undated) NOTE — LETTER
12/17/19        Isiah Man  [address] [facility] [address]      Dear Guerita Munguia,    [facility] records indicate that you have outstanding lab work and or testing that was ordered for you and has not yet been completed:  Orders Placed This Encounter

## (undated) NOTE — Clinical Note
February 3, 2017    Shantal Man  [address] [place] [address]    Dear Sophia Madden: The following are the results of your recent tests. Please review the list of test results.   Your result is the value on the left; we have also supp

## (undated) NOTE — LETTER
March 17, 2020      Jet Richardson  [address] [address]      Dear Caryle Petties: It was a pleasure speaking to you over the phone recently. I have enclosed some information that you may find helpful.  I look forward to talking with you

## (undated) NOTE — Clinical Note
TCM call completed. Patient does not have TCM/HFU scheduled at this time. TE was sent to the office. Thank you!

## (undated) NOTE — ED AVS SNAPSHOT
Royal Jorge Stewartaddison   MRN: [id number]    Department:  [facility] Emergency Department   Date of Visit:  3/2/2020           Disclosure     Insurance plans vary and the physician(s) referred by the ER may not be covered by your plan.  Please contact you tell this physician (or your personal doctor if your instructions are to return to your personal doctor) about any new or lasting problems. The primary care or specialist physician will see patients referred from the [facility] Emergency Department.  Brandie Guevara

## (undated) NOTE — Clinical Note
Dear Vladimir Toney is with great pleasure that we were able to provide treatment to [address] in the [facility] today. In order to keep you informed on your patient's care I have attached the visit history.  Our providers and staff here at t

## (undated) NOTE — LETTER
Your patient was recently seen at the [facility] for a hospital follow-up visit. The visit note is attached. Please contact the clinic with any questions at [phone].    Thank you,  DENILSON Ruiz

## (undated) NOTE — LETTER
Date: 6/11/2018    Patient Name: Pamela Man          To Whom it may concern: The above patient was seen at the [facility] for treatment of a medical condition.     This patient should not live alone as she needs assistance with ambul

## (undated) NOTE — LETTER
HUGO Notifier: Benji/TAPP   MANUEL. Patient Name: Miracle Mahmood. Identification Number: [id number]      Advance Beneficiary Notice of Noncoverage (ABN)  NOTE:  If Medicare doesn’t pay for D. below, you may have to pay.   Medicare does not pay for ? OPTION 3. I don’t want the D. listed above. I understand with this choice  I am not responsible for payment, and I cannot appeal to see if Medicare would pay. H. Additional Information:     This notice gives our opinion, not an official Medicare decis

## (undated) NOTE — LETTER
Notifier: [facility]       Patient Name: Paulina Man       Identification Number: [id number]                          Advance Beneficiary Notice of Noncoverage (ABN)   NOTE:  If Medicare doesn’t pay for D. Items/service(s) below, you may have to pay.  Medicare does not pay for everything, even some care that you or your health care provider have good reason to think you need. We expect Medicare may not pay for the D. items/service(s) below.  Items or Services Reason Medicare May Not Pay: Estimated Cost   __EKG ($129.00)  _x_Pap smear ($48.23) _x_Pelvic/Breast ($65.00)  __ Ear Irrigation ($149)  __ Injection(s)  ___ Tdap ($70)       ___ Meningitis ($206)   __Prevnar ($285)  ___ Td ($51)            ___Shingrix ($215)        __Prevnar 20 ($309)  ___ Hep A ($156)   ___Prolia ($1827.00)     __ Xiaflex ($              )   ___ Hep B ($167)      __Pneumovax ($155)                                            ___ Vaccine Administration ($31)   _x_ Medicare does not cover this service      _x_ Medicare may not pay for this   item/service for your condition     _x_ Medicare may not pay for this item/service as often as this        $113.23   WHAT YOU NEED TO DO NOW:  Read this notice, so you can make an informed decision about your care.  Ask us any questions that you may have after you finish reading.  Choose an option below about whether to receive the D. item/service(s)  listed above.  Note: If you choose Option 1 or 2, we may help you to use any other insurance that you might have, but Medicare cannot require us to do this.  OPTIONS: Check only one box.  We cannot choose a box for you.   OPTION 1. I want the D. item/service(s) listed above. You may ask to be paid now, but I also want Medicare billed for an official decision on payment, which is sent to me on a Medicare Summary Notice (MSN). I understand that if Medicare doesn’t pay, I am responsible for payment, but I can appeal to Medicare by following the  directions on the MSN. If Medicare does pay, you will refund any payments I made to you, less co-pays or deductibles.  OPTION 2. I want the D. item/service(s) listed above, but do not bill Medicare. You may ask to be paid now as I am responsible for payment. I cannot appeal if Medicare is not billed.  OPTION 3. I don't want the D. item/service(s) listed above. I understand with this choice I am not responsible for payment, and I cannot appeal to see if Medicare would pay.    H. Additional Information:    This notice gives our opinion, not an official Medicare decision. If you have other questions on this notice or Medicare billing, call 1-800-MEDICARE (1-475.119.3814/TTY: 1-521.384.3180). Signing below means that you have received and understand this notice. You also receive a copy.  Signature: Date:       You have the right to get Medicare information in an accessible format, like large print, Braille, or audio. You also have the right to file a complaint if you feel you’ve been discriminated against. Visit Medicare.gov/about- us/aqekynkftzcop-udsetwcpdwbpghnhy-dexmjo.  According to the Paperwork Reduction Act of 1995, no persons are required to respond to a collection of information unless it displays a valid OMB control number. The valid OMB control number for this information collection is 8856-8212. The time required to complete this information collection is estimated to average 7 minutes per response, including the time to review instructions, search existing data resources, gather the data needed, and complete and review the information collection. If you have comments concerning the accuracy of the time estimate or suggestions for improving this form, please write to: CMS, [facility] Security     Eloise, Attn: ALFONSO Reports Clearance Officer, [place], [place] [address].  Form CMS-R-131 (Exp. 1/31/2026) Form Approved OMB No. [id number]

## (undated) NOTE — ED AVS SNAPSHOT
Rajeev Man   MRN: [id number]    Department:  [facility] Emergency Department   Date of Visit:  6/19/2019           Disclosure     Insurance plans vary and the physician(s) referred by the ER may not be covered by your plan.  Please contact your tell this physician (or your personal doctor if your instructions are to return to your personal doctor) about any new or lasting problems. The primary care or specialist physician will see patients referred from the [facility] Emergency Department.  Ximena Jefferson

## (undated) NOTE — MR AVS SNAPSHOT
[facility] [address] [phone]               Thank you for choosing us for your health care visit with Marce Zamora MD.  We are glad to serve you and happy to provide you with this summar Exudative macular degeneration (HCC)    Gastroesophageal reflux disease    Mixed dyslipidemia    Pure hypercholesterolemia    Restless leg syndrome    Wellness examination    -  Primary    Colon cancer screening        Dizziness          Instructions and Omeprazole 40 MG Cpdr   Take 1 capsule (40 mg total) by mouth 2 (two) times daily. Commonly known as:  PRILOSEC           Pramipexole Dihydrochloride 0.25 MG Tabs   TAKE 1 TABLET (0.25 MG TOTAL) BY MOUTH NIGHTLY.    Commonly known as:  MIRAPEX ? Always wear good shoes with proper support and traction. ? Always use hand rails on stairs and escalators. ? Cover porch steps with gritty weather proof paint. ? Pay attention to curbs and other changes in surfaces when out in the community.   ? Take c

## (undated) NOTE — LETTER
January 29, 2020      Trey Yeagerfabiánmarcos  [address] [address]      Dear Joon Perla: It was a pleasure speaking to you over the phone recently. I have enclosed some information that you may find helpful.  I look forward to talking with

## (undated) NOTE — ED AVS SNAPSHOT
Kayla Man   MRN: [id number]    Department:  [facility] Emergency Department   Date of Visit:  9/18/2019           Disclosure     Insurance plans vary and the physician(s) referred by the ER may not be covered by your plan.  Please contact yo tell this physician (or your personal doctor if your instructions are to return to your personal doctor) about any new or lasting problems. The primary care or specialist physician will see patients referred from the [facility] Emergency Department.  Eladia Jimenez

## (undated) NOTE — LETTER
08/26/19        Shantal Man  [address] [facility] [address]      Dear Sophia Madden,    [facility] records indicate that you have outstanding lab work and or testing that was ordered for you and has not yet been completed:  Orders Placed This Encounter

## (undated) NOTE — LETTER
05/01/20        Presley Man  [address] [facility] [address]      Dear Rocky Ash,    [facility] records indicate that you have outstanding lab work and or testing that was ordered for you and has not yet been completed Due to the current COVID-19 out